# Patient Record
Sex: MALE | Race: WHITE | NOT HISPANIC OR LATINO | Employment: OTHER | URBAN - METROPOLITAN AREA
[De-identification: names, ages, dates, MRNs, and addresses within clinical notes are randomized per-mention and may not be internally consistent; named-entity substitution may affect disease eponyms.]

---

## 2018-06-26 ENCOUNTER — TELEPHONE (OUTPATIENT)
Dept: SURGERY | Facility: CLINIC | Age: 58
End: 2018-06-26

## 2018-06-30 ENCOUNTER — PREP FOR SURGERY (OUTPATIENT)
Dept: OTHER | Facility: HOSPITAL | Age: 58
End: 2018-06-30

## 2018-06-30 DIAGNOSIS — Z86.010 HISTORY OF COLON POLYPS: Primary | ICD-10-CM

## 2018-07-02 ENCOUNTER — TELEPHONE (OUTPATIENT)
Dept: GASTROENTEROLOGY | Facility: CLINIC | Age: 58
End: 2018-07-02

## 2018-07-02 PROBLEM — Z86.0100 HISTORY OF COLON POLYPS: Status: ACTIVE | Noted: 2018-07-02

## 2018-07-02 PROBLEM — Z86.010 HISTORY OF COLON POLYPS: Status: ACTIVE | Noted: 2018-07-02

## 2018-08-16 ENCOUNTER — ANESTHESIA EVENT (OUTPATIENT)
Dept: PERIOP | Facility: HOSPITAL | Age: 58
End: 2018-08-16

## 2018-08-16 RX ORDER — LISINOPRIL 20 MG/1
20 TABLET ORAL DAILY
COMMUNITY
End: 2020-06-25 | Stop reason: SDUPTHER

## 2018-08-16 RX ORDER — DULOXETIN HYDROCHLORIDE 60 MG/1
60 CAPSULE, DELAYED RELEASE ORAL DAILY
COMMUNITY
End: 2020-06-25 | Stop reason: SDUPTHER

## 2018-08-17 ENCOUNTER — HOSPITAL ENCOUNTER (OUTPATIENT)
Facility: HOSPITAL | Age: 58
Setting detail: HOSPITAL OUTPATIENT SURGERY
Discharge: HOME | End: 2018-08-17
Attending: INTERNAL MEDICINE | Admitting: NURSE ANESTHETIST, CERTIFIED REGISTERED

## 2018-08-17 ENCOUNTER — ANESTHESIA (OUTPATIENT)
Dept: PERIOP | Facility: HOSPITAL | Age: 58
End: 2018-08-17

## 2018-08-17 VITALS
TEMPERATURE: 98 F | HEIGHT: 74 IN | HEART RATE: 63 BPM | DIASTOLIC BLOOD PRESSURE: 100 MMHG | WEIGHT: 277.6 LBS | SYSTOLIC BLOOD PRESSURE: 151 MMHG | RESPIRATION RATE: 14 BRPM | OXYGEN SATURATION: 96 % | BODY MASS INDEX: 35.63 KG/M2

## 2018-08-17 DIAGNOSIS — Z86.010 HISTORY OF COLON POLYPS: ICD-10-CM

## 2018-08-17 LAB — GLUCOSE BLDC GLUCOMTR-MCNC: 136 MG/DL (ref 70–130)

## 2018-08-17 PROCEDURE — 82962 GLUCOSE BLOOD TEST: CPT

## 2018-08-17 PROCEDURE — 45385 COLONOSCOPY W/LESION REMOVAL: CPT | Performed by: INTERNAL MEDICINE

## 2018-08-17 PROCEDURE — 45380 COLONOSCOPY AND BIOPSY: CPT | Performed by: INTERNAL MEDICINE

## 2018-08-17 PROCEDURE — 93010 ELECTROCARDIOGRAM REPORT: CPT | Performed by: INTERNAL MEDICINE

## 2018-08-17 PROCEDURE — 25010000002 PROPOFOL 10 MG/ML EMULSION: Performed by: NURSE ANESTHETIST, CERTIFIED REGISTERED

## 2018-08-17 PROCEDURE — 93005 ELECTROCARDIOGRAM TRACING: CPT | Performed by: NURSE ANESTHETIST, CERTIFIED REGISTERED

## 2018-08-17 RX ORDER — SODIUM CHLORIDE 9 MG/ML
40 INJECTION, SOLUTION INTRAVENOUS AS NEEDED
Status: DISCONTINUED | OUTPATIENT
Start: 2018-08-17 | End: 2018-08-17 | Stop reason: HOSPADM

## 2018-08-17 RX ORDER — PROPOFOL 10 MG/ML
VIAL (ML) INTRAVENOUS AS NEEDED
Status: DISCONTINUED | OUTPATIENT
Start: 2018-08-17 | End: 2018-08-17 | Stop reason: SURG

## 2018-08-17 RX ORDER — SODIUM CHLORIDE, SODIUM LACTATE, POTASSIUM CHLORIDE, CALCIUM CHLORIDE 600; 310; 30; 20 MG/100ML; MG/100ML; MG/100ML; MG/100ML
9 INJECTION, SOLUTION INTRAVENOUS CONTINUOUS PRN
Status: DISCONTINUED | OUTPATIENT
Start: 2018-08-17 | End: 2018-08-17 | Stop reason: HOSPADM

## 2018-08-17 RX ORDER — LIDOCAINE HYDROCHLORIDE 10 MG/ML
0.5 INJECTION, SOLUTION EPIDURAL; INFILTRATION; INTRACAUDAL; PERINEURAL ONCE AS NEEDED
Status: COMPLETED | OUTPATIENT
Start: 2018-08-17 | End: 2018-08-17

## 2018-08-17 RX ORDER — LIDOCAINE HYDROCHLORIDE 20 MG/ML
INJECTION, SOLUTION INFILTRATION; PERINEURAL AS NEEDED
Status: DISCONTINUED | OUTPATIENT
Start: 2018-08-17 | End: 2018-08-17 | Stop reason: SURG

## 2018-08-17 RX ORDER — MAGNESIUM HYDROXIDE 1200 MG/15ML
LIQUID ORAL AS NEEDED
Status: DISCONTINUED | OUTPATIENT
Start: 2018-08-17 | End: 2018-08-17 | Stop reason: HOSPADM

## 2018-08-17 RX ORDER — SODIUM CHLORIDE 0.9 % (FLUSH) 0.9 %
1-10 SYRINGE (ML) INJECTION AS NEEDED
Status: DISCONTINUED | OUTPATIENT
Start: 2018-08-17 | End: 2018-08-17 | Stop reason: HOSPADM

## 2018-08-17 RX ADMIN — PROPOFOL 50 MG: 10 INJECTION, EMULSION INTRAVENOUS at 09:33

## 2018-08-17 RX ADMIN — PROPOFOL 40 MG: 10 INJECTION, EMULSION INTRAVENOUS at 09:37

## 2018-08-17 RX ADMIN — PROPOFOL 40 MG: 10 INJECTION, EMULSION INTRAVENOUS at 09:55

## 2018-08-17 RX ADMIN — PROPOFOL 40 MG: 10 INJECTION, EMULSION INTRAVENOUS at 10:12

## 2018-08-17 RX ADMIN — PROPOFOL 40 MG: 10 INJECTION, EMULSION INTRAVENOUS at 09:43

## 2018-08-17 RX ADMIN — PROPOFOL 40 MG: 10 INJECTION, EMULSION INTRAVENOUS at 09:39

## 2018-08-17 RX ADMIN — PROPOFOL 40 MG: 10 INJECTION, EMULSION INTRAVENOUS at 09:48

## 2018-08-17 RX ADMIN — PROPOFOL 40 MG: 10 INJECTION, EMULSION INTRAVENOUS at 10:09

## 2018-08-17 RX ADMIN — LIDOCAINE HYDROCHLORIDE 0.2 ML: 10 INJECTION, SOLUTION EPIDURAL; INFILTRATION; INTRACAUDAL; PERINEURAL at 08:35

## 2018-08-17 RX ADMIN — PROPOFOL 40 MG: 10 INJECTION, EMULSION INTRAVENOUS at 09:35

## 2018-08-17 RX ADMIN — PROPOFOL 40 MG: 10 INJECTION, EMULSION INTRAVENOUS at 10:04

## 2018-08-17 RX ADMIN — SODIUM CHLORIDE, POTASSIUM CHLORIDE, SODIUM LACTATE AND CALCIUM CHLORIDE: 600; 310; 30; 20 INJECTION, SOLUTION INTRAVENOUS at 08:44

## 2018-08-17 RX ADMIN — LIDOCAINE HYDROCHLORIDE 100 MG: 20 INJECTION, SOLUTION INFILTRATION; PERINEURAL at 09:33

## 2018-08-17 RX ADMIN — PROPOFOL 40 MG: 10 INJECTION, EMULSION INTRAVENOUS at 09:45

## 2018-08-17 RX ADMIN — PROPOFOL 40 MG: 10 INJECTION, EMULSION INTRAVENOUS at 10:01

## 2018-08-17 RX ADMIN — PROPOFOL 40 MG: 10 INJECTION, EMULSION INTRAVENOUS at 09:51

## 2018-08-17 RX ADMIN — PROPOFOL 40 MG: 10 INJECTION, EMULSION INTRAVENOUS at 09:58

## 2018-08-17 RX ADMIN — PROPOFOL 40 MG: 10 INJECTION, EMULSION INTRAVENOUS at 09:41

## 2018-08-17 RX ADMIN — SODIUM CHLORIDE, POTASSIUM CHLORIDE, SODIUM LACTATE AND CALCIUM CHLORIDE 9 ML/HR: 600; 310; 30; 20 INJECTION, SOLUTION INTRAVENOUS at 08:35

## 2018-08-17 NOTE — ANESTHESIA POSTPROCEDURE EVALUATION
Patient: Sylwia Spears    Procedure Summary     Date:  08/17/18 Room / Location:  [facility] ENDOSCOPY 1 /  [facility] OR    Anesthesia Start:  0930 Anesthesia Stop:  1023    Procedure:  COLONOSCOPY, polypectomy (N/A ) Diagnosis:       History of colon polyps      Diverticulosis      Colon polyps      (History of colon polyps [Z86.010])    Surgeon:  Raleigh Champagne MD Provider:  Padmini Arnold CRNA    Anesthesia Type:  MAC ASA Status:  2          Anesthesia Type: MAC  Last vitals  BP   148/93 (08/17/18 0823)   Temp   98.3 °F (36.8 °C) (08/17/18 0823)   Pulse   77 (08/17/18 0823)   Resp   14 (08/17/18 0823)     SpO2   95 % (08/17/18 0823)     Post Anesthesia Care and Evaluation    Patient location during evaluation: PHASE II  Patient participation: complete - patient participated  Level of consciousness: awake  Pain management: adequate  Airway patency: patent  Anesthetic complications: No anesthetic complications  PONV Status: none  Cardiovascular status: acceptable  Respiratory status: acceptable  Hydration status: acceptable

## 2018-08-17 NOTE — ANESTHESIA PREPROCEDURE EVALUATION
Anesthesia Evaluation     Patient summary reviewed and Nursing notes reviewed   no history of anesthetic complications:  NPO Solid Status: > 8 hours  NPO Liquid Status: > 8 hours           Airway   Mallampati: II  TM distance: >3 FB  Neck ROM: full  No difficulty expected  Dental    (+) lower dentures and upper dentures    Pulmonary     breath sounds clear to auscultation  (+) a smoker (1PPD) Current Abstained day of surgery,   Cardiovascular   Exercise tolerance: good (4-7 METS)    Rhythm: regular  Rate: normal    (+) hypertension well controlled less than 2 medications,       Neuro/Psych  (+) numbness (BRUCE FINGERS NOT CONSTANT, FROM PREVIOIUS ELECTRICUTION ),     GI/Hepatic/Renal/Endo    (+) obesity,   diabetes mellitus type 2 well controlled,     Musculoskeletal     (+) back pain, chronic pain, neck pain,   Abdominal   (+) obese,    Substance History   (+) drug use (MJ 1-2X PER WEEK)     OB/GYN          Other   (+) arthritis                     Anesthesia Plan    ASA 2     MAC     intravenous induction   Anesthetic plan and risks discussed with patient.  Use of blood products discussed with patient  Consented to blood products.

## 2018-08-17 NOTE — OP NOTE
COLONOSCOPY  Procedure Report    Patient Name:  Sylwia Spears  YOB: 1960    Date of Surgery:  8/17/2018     Indications:  Previous Polyps    Pre-op Diagnosis:   History of colon polyps [Z86.010]    Post-Op Diagnosis Codes:     * History of colon polyps [Z86.010]     * Diverticulosis [K57.90]     * Colon polyps [K63.5]         Procedure/CPT® Codes:      Procedure(s):  COLONOSCOPY, polypectomy    Staff:  Surgeon(s):  Raleigh Champagne MD         Anesthesia: Monitor Anesthesia Care    Estimated Blood Loss: none    Specimens:   ID Type Source Tests Collected by Time   A : Sigmoid polyp x 8- cold snare, forcep, hot snare Polyp Large Intestine, Sigmoid Colon TISSUE PATHOLOGY EXAM Raleigh Champagne MD 8/17/2018 0941   B : Ascending polyp x 3- cold snare, forcep Polyp Large Intestine, Right / Ascending Colon TISSUE PATHOLOGY EXAM Raleigh Champagne MD 8/17/2018 0956   C : Transverse polyp x 3- hot snare, cold snare Polyp Large Intestine, Transverse Colon TISSUE PATHOLOGY EXAM Raleigh Champagne MD 8/17/2018 0957   D : Rectal polyp x 1 Polyp Large Intestine, Rectum TISSUE PATHOLOGY EXAM Raleigh Champagne MD 8/17/2018 1020       Implants:    Nothing was implanted during the procedure      Description of Procedure: After having signed informed consent, he was brought to the endoscopy suite and placed in the left lateral decubitus position and given his IV sedation. Rectal exam revealed no external lesions, normal anal tone, mildly enlarged prostate without nodules. Scope was introduced into rectum and advanced under direct visualization into the rectum and through the sigmoid colon, past a large polyp at 40 cm. There was a smaller polyp that was biopsied to be sent as a sigmoid colon polyp. A 2nd small polyp was noted in the sigmoid that was sent, as well. The scope was advanced through the descending colon, to and around the splenic flexure, reduced, advanced  through the transverse colon with some looping, to and around the hepatic flexure, around the hepatic flexure. Scope was reduced in the ascending colon, then advanced using abdominal splinting into the cecum. Cecum was identified by appendiceal orifice and ileocecal valve. The cecum was normal appearing. The ileocecal valve was identified but not intubated. Scope was drawn back into the ascending colon. Within the ascending colon, there were 3 polyps noted. They ranged in size from 5-8 mm. They were removed with cold biopsy forceps and 1 cold snare. They were sent together as ascending colon polyps x3. Scope was withdrawn around the hepatic flexure, into the transverse colon. There was a larger polyp noted in the transverse colon that was removed with hot snare. Two polyps in the area were smaller and removed with cold snare technique. These were sent together as transverse colon polyps x3. Scope was withdrawn around the splenic flexure, through the descending colon and into the sigmoid colon. Multiple other polyps were noted in the sigmoid colon. They were removed with cold snare technique. The larger polyp that was identified on the way in was removed. It was greater than 1 cm and removed with hot snare technique. Eight total polyps were removed from the sigmoid colon. Scope was withdrawn back into the rectum. There was a single diminutive polyp noted in the rectum that was biopsied, felt to be completely removed and sent separately as rectal polyp. Scope was retroflexed revealing intact dentate line and no further mucosal lesions. Scope was de-retroflexed and withdrawn. Patient tolerated procedure very well.          Findings:Colon to Cecum good Prep  Polyps (15) Hot Snare x 2, Cold Snare,Biopsy    Complications: None    Recommendations: No ASA,NSAIDS       Raleigh Champagne MD     Date: 8/17/2018  Time: 10:27 AM

## 2018-08-17 NOTE — H&P
"Patient Care Team:  Bronwyn Chiang MD as PCP - General (Family Medicine)    CHIEF COMPLAINT: Previous polyps    HISTORY OF PRESENT ILLNESS:    Last exam >3 years       Past Medical History:   Diagnosis Date   • Diabetes mellitus (CMS/HCC)    • Hypertension      Past Surgical History:   Procedure Laterality Date   • CHOLECYSTECTOMY     • COLONOSCOPY     • COLONOSCOPY W/ POLYPECTOMY     • SHOULDER SURGERY Left      History reviewed. No pertinent family history.  Social History   Substance Use Topics   • Smoking status: Current Every Day Smoker     Packs/day: 1.00   • Smokeless tobacco: Never Used   • Alcohol use No     Prescriptions Prior to Admission   Medication Sig Dispense Refill Last Dose   • DULoxetine (CYMBALTA) 60 MG capsule Take 60 mg by mouth Daily.   8/16/2018 at 0800   • lisinopril (PRINIVIL,ZESTRIL) 20 MG tablet Take 20 mg by mouth Daily.   8/16/2018 at 0800   • MetFORMIN HCl (GLUCOPHAGE PO) Take 1 tablet by mouth Daily.   8/16/2018 at 0800     Allergies:  Patient has no known allergies.    REVIEW OF SYSTEMS:  Please see the above history of present illness for pertinent positives and negatives.  The remainder of the patient's systems have been reviewed and are negative.     Vital Signs  Temp:  [98.3 °F (36.8 °C)] 98.3 °F (36.8 °C)  Heart Rate:  [77] 77  Resp:  [14] 14  BP: (148)/(93) 148/93    Flowsheet Rows      First Filed Value   Admission Height  188 cm (74\") Documented at 08/16/2018 1352   Admission Weight  125 kg (275 lb) Documented at 08/16/2018 1352           Physical Exam:  Physical Exam   Constitutional: Patient appears well-developed and well-nourished and in no acute distress   HEENT:   Head: Normocephalic and atraumatic.   Eyes:  Pupils are equal, round, and reactive to light. EOM are intact. Sclera are anicteric and non-injected.  Mouth and Throat: Patient has moist mucous membranes. Oropharynx is clear of any erythema or exudate.     Neck: Neck supple. No JVD present. No thyromegaly " present. No lymphadenopathy present.  Cardiovascular: Regular rate, regular rhythm, S1 normal and S2 normal.  Exam reveals no gallop and no friction rub.  No murmur heard.  Pulmonary/Chest: Lungs are clear to auscultation bilaterally. No respiratory distress. No wheezes. No rhonchi. No rales.   Abdominal: Soft. Bowel sounds are normal. No distension and no mass. There is no hepatosplenomegaly. There is no tenderness.   Musculoskeletal: Normal Muscle tone  Extremities: No edema. Pulses are palpable in all 4 extremities.  Neurological: Patient is alert and oriented to person, place, and time. Cranial nerves II-XII are grossly intact with no focal deficits.  Skin: Skin is warm. No rash noted. Nails show no clubbing.  No cyanosis or erythema.     Results Review:    I reviewed the patient's new clinical results.  Lab Results (most recent)     Procedure Component Value Units Date/Time    POC Glucose Once [[id number]]  (Abnormal) Collected:  08/17/18 0836    Specimen:  Blood Updated:  08/17/18 0841     Glucose 136 (H) mg/dL     Narrative:       Meter: [id number] : [id number] Shalini SUE RN          Imaging Results (most recent)     None        reviewed    ECG/EMG Results (most recent)     Procedure Component Value Units Date/Time    ECG 12 Lead [[id number]] Collected:  08/17/18 0841     Updated:  08/17/18 0855    Narrative:       RR Interval= 833 ms  RI Interval= 184 ms  QRSD Interval= 98 ms  QT Interval= 388 ms  QTc Interval= 425 ms  Heart Rate= 72 ms  P Axis= 31 deg  QRS Axis= 50 deg  T Wave Axis= 8 deg  I: 40 Axis= 31 deg  T: 40 Axis= 74 deg  ST Axis= 11 deg  SINUS RHYTHM  NO SIGNIFICANT CHANGE FROM PREVIOUS ECG  Electronically Signed by:  Scarlet Oglesby ([facility]) 17-Aug-2018 08:53:02  Date and Time of Study: 2018-08-17 08:41:59        reviewed    Assessment/Plan     Personal history of colon polyps/ Colonoscopy    I discussed the patients findings and my recommendations with patient.     Raleigh Mcbride  MD Ihsan  08/17/18  9:35 AM    Time: 10 min prior to procedure.

## 2018-08-17 NOTE — BRIEF OP NOTE
COLONOSCOPY  Progress Note    Sylwia Spears  8/17/2018    Pre-op Diagnosis:   History of colon polyps [Z86.010]       Post-Op Diagnosis Codes:     * History of colon polyps [Z86.010]     * Diverticulosis [K57.90]     * Colon polyps [K63.5]    Procedure/CPT® Codes:      Procedure(s):  COLONOSCOPY, polypectomy    Surgeon(s):  Raleigh Champagne MD    Anesthesia: Monitor Anesthesia Care    Staff:   Circulator: Dayna Rolon RN  Scrub Person: Pepper Chaves    Estimated Blood Loss: none    Urine Voided: * No values recorded between 8/17/2018  9:29 AM and 8/17/2018 10:20 AM *    Specimens:                ID Type Source Tests Collected by Time   A : Sigmoid polyp x 8- cold snare, forcep, hot snare Polyp Large Intestine, Sigmoid Colon TISSUE PATHOLOGY EXAM Raleigh Champagne MD 8/17/2018 0941   B : Ascending polyp x 3- cold snare, forcep Polyp Large Intestine, Right / Ascending Colon TISSUE PATHOLOGY EXAM Raleigh Champagne MD 8/17/2018 0956   C : Transverse polyp x 3- hot snare, cold snare Polyp Large Intestine, Transverse Colon TISSUE PATHOLOGY EXAM Raleigh Champagne MD 8/17/2018 0957   D : Rectal polyp x 1 Polyp Large Intestine, Rectum TISSUE PATHOLOGY EXAM Raleigh Champagne MD 8/17/2018 1020         Drains:      Findings: Colon to Cecum good Prep  Polyps (15) Hot Snare x 2, Cold Snare,Biopsy    Complications: None      Raleigh Champagne MD     Date: 8/17/2018  Time: 10:23 AM

## 2018-08-25 LAB
LAB AP CASE REPORT: NORMAL
LAB AP CLINICAL INFORMATION: NORMAL
PATH REPORT.FINAL DX SPEC: NORMAL

## 2018-11-01 ENCOUNTER — APPOINTMENT (OUTPATIENT)
Dept: LAB | Facility: HOSPITAL | Age: 58
End: 2018-11-01
Attending: INTERNAL MEDICINE

## 2018-11-01 ENCOUNTER — OFFICE VISIT (OUTPATIENT)
Dept: GASTROENTEROLOGY | Facility: CLINIC | Age: 58
End: 2018-11-01

## 2018-11-01 VITALS
WEIGHT: 277.4 LBS | BODY MASS INDEX: 35.6 KG/M2 | HEIGHT: 74 IN | DIASTOLIC BLOOD PRESSURE: 90 MMHG | SYSTOLIC BLOOD PRESSURE: 148 MMHG

## 2018-11-01 DIAGNOSIS — K62.5 RECTAL BLEEDING: Primary | ICD-10-CM

## 2018-11-01 LAB
ALBUMIN SERPL-MCNC: 5 G/DL (ref 3.5–5.2)
ALBUMIN/GLOB SERPL: 1.6 G/DL
ALP SERPL-CCNC: 53 U/L (ref 40–129)
ALT SERPL W P-5'-P-CCNC: 71 U/L (ref 5–41)
ANION GAP SERPL CALCULATED.3IONS-SCNC: 13.2 MMOL/L
AST SERPL-CCNC: 48 U/L (ref 5–40)
BASOPHILS # BLD AUTO: 0.05 10*3/MM3 (ref 0–0.2)
BASOPHILS NFR BLD AUTO: 0.5 % (ref 0–2)
BILIRUB SERPL-MCNC: 0.6 MG/DL (ref 0.2–1.2)
BUN BLD-MCNC: 11 MG/DL (ref 6–20)
BUN/CREAT SERPL: 10.3 (ref 7–25)
CALCIUM SPEC-SCNC: 9.9 MG/DL (ref 8.6–10.5)
CHLORIDE SERPL-SCNC: 101 MMOL/L (ref 98–107)
CO2 SERPL-SCNC: 25.8 MMOL/L (ref 22–29)
CREAT BLD-MCNC: 1.07 MG/DL (ref 0.76–1.27)
DEPRECATED RDW RBC AUTO: 43.6 FL (ref 37–54)
EOSINOPHIL # BLD AUTO: 0.22 10*3/MM3 (ref 0.1–0.3)
EOSINOPHIL NFR BLD AUTO: 2.2 % (ref 0–4)
ERYTHROCYTE [DISTWIDTH] IN BLOOD BY AUTOMATED COUNT: 13.2 % (ref 11.5–14.5)
GFR SERPL CREATININE-BSD FRML MDRD: 71 ML/MIN/1.73
GLOBULIN UR ELPH-MCNC: 3.2 GM/DL
GLUCOSE BLD-MCNC: 111 MG/DL (ref 65–99)
HCT VFR BLD AUTO: 48.9 % (ref 42–52)
HGB BLD-MCNC: 16.6 G/DL (ref 14–18)
IMM GRANULOCYTES # BLD: 0.02 10*3/MM3 (ref 0–0.03)
IMM GRANULOCYTES NFR BLD: 0.2 % (ref 0–0.5)
LYMPHOCYTES # BLD AUTO: 3.79 10*3/MM3 (ref 0.6–4.8)
LYMPHOCYTES NFR BLD AUTO: 37 % (ref 20–45)
MCH RBC QN AUTO: 30.6 PG (ref 27–31)
MCHC RBC AUTO-ENTMCNC: 33.9 G/DL (ref 31–37)
MCV RBC AUTO: 90.1 FL (ref 80–94)
MONOCYTES # BLD AUTO: 0.66 10*3/MM3 (ref 0–1)
MONOCYTES NFR BLD AUTO: 6.5 % (ref 3–8)
NEUTROPHILS # BLD AUTO: 5.49 10*3/MM3 (ref 1.5–8.3)
NEUTROPHILS NFR BLD AUTO: 53.6 % (ref 45–70)
NRBC BLD MANUAL-RTO: 0 /100 WBC (ref 0–0)
PLATELET # BLD AUTO: 184 10*3/MM3 (ref 140–500)
PMV BLD AUTO: 10.8 FL (ref 7.4–10.4)
POTASSIUM BLD-SCNC: 5 MMOL/L (ref 3.5–5.2)
PROT SERPL-MCNC: 8.2 G/DL (ref 6–8.5)
RBC # BLD AUTO: 5.43 10*6/MM3 (ref 4.7–6.1)
SODIUM BLD-SCNC: 140 MMOL/L (ref 136–145)
WBC NRBC COR # BLD: 10.23 10*3/MM3 (ref 4.8–10.8)

## 2018-11-01 PROCEDURE — 85025 COMPLETE CBC W/AUTO DIFF WBC: CPT | Performed by: INTERNAL MEDICINE

## 2018-11-01 PROCEDURE — 36415 COLL VENOUS BLD VENIPUNCTURE: CPT | Performed by: INTERNAL MEDICINE

## 2018-11-01 PROCEDURE — 80053 COMPREHEN METABOLIC PANEL: CPT | Performed by: INTERNAL MEDICINE

## 2018-11-01 PROCEDURE — 99213 OFFICE O/P EST LOW 20 MIN: CPT | Performed by: INTERNAL MEDICINE

## 2018-11-01 RX ORDER — ZOLPIDEM TARTRATE 10 MG/1
TABLET ORAL
COMMUNITY
Start: 2018-10-18 | End: 2020-06-25

## 2018-11-01 NOTE — PROGRESS NOTES
PATIENT INFORMATION  Sylwia Spears       - 1960    CHIEF COMPLAINT  Chief Complaint   Patient presents with   • Abdominal Pain   • Rectal Bleeding   • Diarrhea       HISTORY OF PRESENT ILLNESS  Baseline BMs but thatcan be up to 3-6 a day and last week had bleeding with every BM for 3 days, Did have significant cramping and bloody incontinence    Says he is chronically tender in Right abd and no previous diverticulosis. Multiple polyps last colon 2018      Abdominal Pain   Associated symptoms include diarrhea and hematochezia.   Rectal Bleeding   Associated symptoms include abdominal pain.   Diarrhea    Associated symptoms include abdominal pain.           REVIEW OF SYSTEMS  Review of Systems   Gastrointestinal: Positive for abdominal pain, anal bleeding, diarrhea, hematochezia and rectal pain.   All other systems reviewed and are negative.        ACTIVE PROBLEMS  Patient Active Problem List    Diagnosis   • History of colon polyps [Z86.010]         PAST MEDICAL HISTORY  Past Medical History:   Diagnosis Date   • Colon polyp    • Diabetes mellitus (CMS/HCC)    • Hypertension          SURGICAL HISTORY  Past Surgical History:   Procedure Laterality Date   • CHOLECYSTECTOMY     • COLONOSCOPY     • COLONOSCOPY N/A 2018    Procedure: COLONOSCOPY, polypectomy;  Surgeon: Raleigh Champagne MD;  Location: [facility];  Service: Gastroenterology   • COLONOSCOPY W/ POLYPECTOMY     • SHOULDER SURGERY Left          FAMILY HISTORY  Family History   Problem Relation Age of Onset   • Colon cancer Mother    • Crohn's disease Brother          SOCIAL HISTORY  Social History     Occupational History   • Not on file.     Social History Main Topics   • Smoking status: Current Every Day Smoker     Packs/day: 1.00   • Smokeless tobacco: Never Used   • Alcohol use No   • Drug use: Yes     Frequency: 2.0 times per week     Types: Marijuana   • Sexual activity: Defer       Debilities/Disabilities Identified:  "None    Emotional Behavior: Appropriate    CURRENT MEDICATIONS    Current Outpatient Prescriptions:   •  DULoxetine (CYMBALTA) 60 MG capsule, Take 60 mg by mouth Daily., Disp: , Rfl:   •  lisinopril (PRINIVIL,ZESTRIL) 20 MG tablet, Take 20 mg by mouth Daily., Disp: , Rfl:   •  metFORMIN (GLUCOPHAGE) 1000 MG tablet, , Disp: , Rfl:   •  MetFORMIN HCl (GLUCOPHAGE PO), Take 1 tablet by mouth Daily., Disp: , Rfl:   •  zolpidem (AMBIEN) 10 MG tablet, , Disp: , Rfl:     ALLERGIES  Patient has no known allergies.    VITALS  Vitals:    11/01/18 1304   BP: 148/90   Weight: 126 kg (277 lb 6.4 oz)   Height: 188 cm (74.02\")       LAST RESULTS   Admission on 08/17/2018, Discharged on 08/17/2018   Component Date Value Ref Range Status   • Glucose 08/17/2018 136* 70 - 130 mg/dL Final   • Case Report 08/17/2018    Final                    Value:Surgical Pathology Report                         Case: [id number]                                  Authorizing Provider:  Raleigh Champagne        Collected:           08/17/2018 09:41 AM                                 MD Rae                                                                   Ordering Location:     [facility]   Received:            08/17/2018 12:53 PM                                 OR                                                                           Pathologist:           Gilbert Carson MD                                                     Specimens:   1) - Large Intestine, Sigmoid Colon, Sigmoid polyp x 8- cold snare, forcep, hot snare               2) - Large Intestine, Right / Ascending Colon, Ascending polyp x 3- cold snare,                     forcep                                                                                              3) - Large Intestine, Transverse Colon, Transverse polyp x 3- hot snare, cold snare                                           4) - Large Intestine, Rectum, Rectal polyp x 1                         "                    • Clinical Information 08/17/2018    Final                    Value:This result contains rich text formatting which cannot be displayed here.   • Final Diagnosis 08/17/2018    Final                    Value:This result contains rich text formatting which cannot be displayed here.     No results found.    PHYSICAL EXAM  Physical Exam   Constitutional: He is oriented to person, place, and time. He appears well-developed and well-nourished.   HENT:   Head: Normocephalic and atraumatic.   Eyes: Pupils are equal, round, and reactive to light. Conjunctivae and EOM are normal. No scleral icterus.   Neck: Normal range of motion. Neck supple. No thyromegaly present.   Cardiovascular: Normal rate, regular rhythm, normal heart sounds and intact distal pulses.  Exam reveals no gallop.    No murmur heard.  Pulmonary/Chest: Effort normal and breath sounds normal. He has no wheezes. He has no rales.   Abdominal: Soft. Bowel sounds are normal. He exhibits no shifting dullness, no distension, no fluid wave, no abdominal bruit, no ascites and no mass. There is no hepatosplenomegaly. There is tenderness in the right upper quadrant, right lower quadrant and periumbilical area. There is no guarding and negative Hodge's sign. Hernia confirmed negative in the ventral area.   Musculoskeletal: Normal range of motion. He exhibits no edema.   Lymphadenopathy:     He has no cervical adenopathy.   Neurological: He is alert and oriented to person, place, and time.   Skin: Skin is warm and dry. No rash noted. He is not diaphoretic. No erythema.   Psychiatric: He has a normal mood and affect. His behavior is normal.       ASSESSMENT  Diagnoses and all orders for this visit:    Rectal bleeding  -     CBC & Differential  -     Comprehensive Metabolic Panel    Other orders  -     zolpidem (AMBIEN) 10 MG tablet;   -     metFORMIN (GLUCOPHAGE) 1000 MG tablet;           PLAN  Return in about 6 weeks (around  12/13/2018).

## 2019-01-07 ENCOUNTER — TRANSCRIBE ORDERS (OUTPATIENT)
Dept: ADMINISTRATIVE | Facility: HOSPITAL | Age: 59
End: 2019-01-07

## 2019-01-07 DIAGNOSIS — R94.31 ABNORMAL ELECTROCARDIOGRAM: Primary | ICD-10-CM

## 2019-01-11 ENCOUNTER — HOSPITAL ENCOUNTER (OUTPATIENT)
Dept: CARDIOLOGY | Facility: HOSPITAL | Age: 59
Discharge: HOME | End: 2019-01-11
Admitting: NURSE PRACTITIONER

## 2019-01-11 DIAGNOSIS — R94.31 ABNORMAL ELECTROCARDIOGRAM: ICD-10-CM

## 2019-01-11 LAB
BH CV STRESS BP STAGE 1: NORMAL
BH CV STRESS BP STAGE 2: NORMAL
BH CV STRESS BP STAGE 3: NORMAL
BH CV STRESS DURATION MIN STAGE 1: 3
BH CV STRESS DURATION MIN STAGE 2: 3
BH CV STRESS DURATION MIN STAGE 3: 0
BH CV STRESS DURATION SEC STAGE 1: 0
BH CV STRESS DURATION SEC STAGE 2: 0
BH CV STRESS DURATION SEC STAGE 3: 54
BH CV STRESS GRADE STAGE 1: 10
BH CV STRESS GRADE STAGE 2: 12
BH CV STRESS GRADE STAGE 3: 14
BH CV STRESS HR STAGE 1: 107
BH CV STRESS HR STAGE 2: 128
BH CV STRESS HR STAGE 3: 128
BH CV STRESS METS STAGE 1: 5
BH CV STRESS METS STAGE 2: 7.5
BH CV STRESS METS STAGE 3: 10
BH CV STRESS PROTOCOL 1: NORMAL
BH CV STRESS RECOVERY BP: NORMAL MMHG
BH CV STRESS RECOVERY HR: 88 BPM
BH CV STRESS SPEED STAGE 1: 1.7
BH CV STRESS SPEED STAGE 2: 2.5
BH CV STRESS SPEED STAGE 3: 3.4
BH CV STRESS STAGE 1: 1
BH CV STRESS STAGE 2: 2
BH CV STRESS STAGE 3: 3
MAXIMAL PREDICTED HEART RATE: 163 BPM
PERCENT MAX PREDICTED HR: 84.66 %
STRESS BASELINE BP: NORMAL MMHG
STRESS BASELINE HR: 73 BPM
STRESS O2 SAT REST: 95 %
STRESS PERCENT HR: 100 %
STRESS POST ESTIMATED WORKLOAD: 8.4 METS
STRESS POST EXERCISE DUR MIN: 6 MIN
STRESS POST EXERCISE DUR SEC: 55 SEC
STRESS POST O2 SAT PEAK: 95 %
STRESS POST PEAK BP: NORMAL MMHG
STRESS POST PEAK HR: 138 BPM
STRESS TARGET HR: 139 BPM

## 2019-01-11 PROCEDURE — 93017 CV STRESS TEST TRACING ONLY: CPT

## 2019-01-11 PROCEDURE — 93016 CV STRESS TEST SUPVJ ONLY: CPT | Performed by: INTERNAL MEDICINE

## 2019-01-11 PROCEDURE — 93018 CV STRESS TEST I&R ONLY: CPT | Performed by: INTERNAL MEDICINE

## 2020-06-25 ENCOUNTER — TELEPHONE (OUTPATIENT)
Dept: INTERNAL MEDICINE | Facility: CLINIC | Age: 60
End: 2020-06-25

## 2020-06-25 ENCOUNTER — OFFICE VISIT (OUTPATIENT)
Dept: INTERNAL MEDICINE | Facility: CLINIC | Age: 60
End: 2020-06-25

## 2020-06-25 VITALS
SYSTOLIC BLOOD PRESSURE: 138 MMHG | WEIGHT: 291 LBS | HEIGHT: 74 IN | TEMPERATURE: 98 F | DIASTOLIC BLOOD PRESSURE: 86 MMHG | RESPIRATION RATE: 16 BRPM | HEART RATE: 80 BPM | OXYGEN SATURATION: 96 % | BODY MASS INDEX: 37.35 KG/M2

## 2020-06-25 DIAGNOSIS — I10 BENIGN ESSENTIAL HTN: ICD-10-CM

## 2020-06-25 DIAGNOSIS — Z11.59 NEED FOR HEPATITIS C SCREENING TEST: ICD-10-CM

## 2020-06-25 DIAGNOSIS — E11.9 TYPE 2 DIABETES MELLITUS WITHOUT COMPLICATION, WITHOUT LONG-TERM CURRENT USE OF INSULIN (HCC): Primary | ICD-10-CM

## 2020-06-25 DIAGNOSIS — E66.01 MORBIDLY OBESE (HCC): ICD-10-CM

## 2020-06-25 DIAGNOSIS — Z12.5 SCREENING FOR PROSTATE CANCER: ICD-10-CM

## 2020-06-25 DIAGNOSIS — Z86.010 HISTORY OF COLON POLYPS: ICD-10-CM

## 2020-06-25 PROCEDURE — 99204 OFFICE O/P NEW MOD 45 MIN: CPT | Performed by: NURSE PRACTITIONER

## 2020-06-25 PROCEDURE — 99406 BEHAV CHNG SMOKING 3-10 MIN: CPT | Performed by: NURSE PRACTITIONER

## 2020-06-25 RX ORDER — LISINOPRIL 20 MG/1
20 TABLET ORAL DAILY
Qty: 90 TABLET | Refills: 3 | Status: SHIPPED | OUTPATIENT
Start: 2020-06-25 | End: 2021-06-12

## 2020-06-25 RX ORDER — DULOXETIN HYDROCHLORIDE 60 MG/1
60 CAPSULE, DELAYED RELEASE ORAL DAILY
Qty: 90 CAPSULE | Refills: 1 | Status: SHIPPED | OUTPATIENT
Start: 2020-06-25 | End: 2020-12-16

## 2020-06-25 RX ORDER — DULOXETIN HYDROCHLORIDE 60 MG/1
60 CAPSULE, DELAYED RELEASE ORAL DAILY
Qty: 30 CAPSULE | Refills: 3 | Status: CANCELLED | OUTPATIENT
Start: 2020-06-25

## 2020-06-25 NOTE — PATIENT INSTRUCTIONS
"Carbohydrate Counting for Diabetes Mellitus, Adult    Carbohydrate counting is a method of keeping track of how many carbohydrates you eat. Eating carbohydrates naturally increases the amount of sugar (glucose) in the blood. Counting how many carbohydrates you eat helps keep your blood glucose within normal limits, which helps you manage your diabetes (diabetes mellitus).  It is important to know how many carbohydrates you can safely have in each meal. This is different for every person. A diet and nutrition specialist (registered dietitian) can help you make a meal plan and calculate how many carbohydrates you should have at each meal and snack.  Carbohydrates are found in the following foods:  · Grains, such as breads and cereals.  · Dried beans and soy products.  · Starchy vegetables, such as potatoes, peas, and corn.  · Fruit and fruit juices.  · Milk and yogurt.  · Sweets and snack foods, such as cake, cookies, candy, chips, and soft drinks.  How do I count carbohydrates?  There are two ways to count carbohydrates in food. You can use either of the methods or a combination of both.  Reading \"Nutrition Facts\" on packaged food  The \"Nutrition Facts\" list is included on the labels of almost all packaged foods and beverages in the [place] It includes:  · The serving size.  · Information about nutrients in each serving, including the grams (g) of carbohydrate per serving.  To use the “Nutrition Facts\":  · Decide how many servings you will have.  · Multiply the number of servings by the number of carbohydrates per serving.  · The resulting number is the total amount of carbohydrates that you will be having.  Learning standard serving sizes of other foods  When you eat carbohydrate foods that are not packaged or do not include \"Nutrition Facts\" on the label, you need to measure the servings in order to count the amount of carbohydrates:  · Measure the foods that you will eat with a food scale or measuring cup, if " needed.  · Decide how many standard-size servings you will eat.  · Multiply the number of servings by 15. Most carbohydrate-rich foods have about 15 g of carbohydrates per serving.  ? For example, if you eat 8 oz (170 g) of strawberries, you will have eaten 2 servings and 30 g of carbohydrates (2 servings x 15 g = 30 g).  · For foods that have more than one food mixed, such as soups and casseroles, you must count the carbohydrates in each food that is included.  The following list contains standard serving sizes of common carbohydrate-rich foods. Each of these servings has about 15 g of carbohydrates:  · ½ hamburger bun or ½ English muffin.  · ½ oz (15 mL) syrup.  · ½ oz (14 g) jelly.  · 1 slice of bread.  · 1 six-inch tortilla.  · 3 oz (85 g) cooked rice or pasta.  · 4 oz (113 g) cooked dried beans.  · 4 oz (113 g) starchy vegetable, such as peas, corn, or potatoes.  · 4 oz (113 g) hot cereal.  · 4 oz (113 g) mashed potatoes or ¼ of a large baked potato.  · 4 oz (113 g) canned or frozen fruit.  · 4 oz (120 mL) fruit juice.  · 4-6 crackers.  · 6 chicken nuggets.  · 6 oz (170 g) unsweetened dry cereal.  · 6 oz (170 g) plain fat-free yogurt or yogurt sweetened with artificial sweeteners.  · 8 oz (240 mL) milk.  · 8 oz (170 g) fresh fruit or one small piece of fruit.  · 24 oz (680 g) popped popcorn.  Example of carbohydrate counting  Sample meal  · 3 oz (85 g) chicken breast.  · 6 oz (170 g) brown rice.  · 4 oz (113 g) corn.  · 8 oz (240 mL) milk.  · 8 oz (170 g) strawberries with sugar-free whipped topping.  Carbohydrate calculation  1. Identify the foods that contain carbohydrates:  ? Rice.  ? Corn.  ? Milk.  ? Strawberries.  2. Calculate how many servings you have of each food:  ? 2 servings rice.  ? 1 serving corn.  ? 1 serving milk.  ? 1 serving strawberries.  3. Multiply each number of servings by 15 g:  ? 2 servings rice x 15 g = 30 g.  ? 1 serving corn x 15 g = 15 g.  ? 1 serving milk x 15 g = 15 g.  ? 1  serving strawberries x 15 g = 15 g.  4. Add together all of the amounts to find the total grams of carbohydrates eaten:  ? 30 g + 15 g + 15 g + 15 g = 75 g of carbohydrates total.  Summary  · Carbohydrate counting is a method of keeping track of how many carbohydrates you eat.  · Eating carbohydrates naturally increases the amount of sugar (glucose) in the blood.  · Counting how many carbohydrates you eat helps keep your blood glucose within normal limits, which helps you manage your diabetes.  · A diet and nutrition specialist (registered dietitian) can help you make a meal plan and calculate how many carbohydrates you should have at each meal and snack.  This information is not intended to replace advice given to you by your health care provider. Make sure you discuss any questions you have with your health care provider.  Document Released: 12/18/2006 Document Revised: 07/12/2018 Document Reviewed: 05/31/2017  Elsevier Patient Education © 2020 [employer]

## 2020-06-25 NOTE — TELEPHONE ENCOUNTER
MELIZA CALLED IN AND STATED PATIENT NEEDS A REFILL OF metFORMIN (GLUCOPHAGE) 1000 MG tablet  AND DULoxetine (CYMBALTA) 60 MG capsule SENT TO [facility] [address] [place], [place] [address], [place] - [address] [facility] [address] - [phone]  - [phone]   [phone] PATIENT CALL BACK [phone]

## 2020-06-25 NOTE — PROGRESS NOTES
"Establish care for DM 2, HTN, colon polyps    Subjective     Sylwia Spears is a 60 y.o. male being seen for a new patient appointment for DM 2, HTN, colon polyps, and chrinc neck pain. He has been a Type 2 diabetic for 10 years. He  Is on Glucophage 1000mg once daily. He reports that \" if I take it more, I get diarrhea\". He does not have a glucose meter at home. He norman shave urinary frequency but no blurred vision, dry mouth. He tatum numbness in feet, but does report intermittent tingling.     He has HTN. He takes lisinopril 20mg once daily. He denies CP, SA, edema. He does not check his BP at home.     He had an electrocution in 2012, where he fell off the ladder. He has chronic neck pain from cervical spinal stenosis for which he takes Duloxetine. He uses marijuana daily as well. Daily pain 4 of 10 with tingling in left arm    He is followed by Dr. Champagne (GI) for history of colon polyps. He had a colonoscopy 8-.    History of Present Illness     No Known Allergies      Current Outpatient Medications:   •  DULoxetine (CYMBALTA) 60 MG capsule, Take 60 mg by mouth Daily., Disp: , Rfl:   •  lisinopril (PRINIVIL,ZESTRIL) 20 MG tablet, Take 20 mg by mouth Daily., Disp: , Rfl:   •  metFORMIN (GLUCOPHAGE) 1000 MG tablet, , Disp: , Rfl:   •  MetFORMIN HCl (GLUCOPHAGE PO), Take 1 tablet by mouth Daily., Disp: , Rfl:   •  zolpidem (AMBIEN) 10 MG tablet, , Disp: , Rfl:     The following portions of the patient's history were reviewed and updated as appropriate: allergies, current medications, past family history, past medical history, past social history, past surgical history and problem list.    Review of Systems   Constitutional: Negative.  Negative for activity change, appetite change, fever and unexpected weight change.   HENT: Negative.  Negative for congestion and dental problem.    Eyes: Negative.    Respiratory: Negative.  Negative for shortness of breath, wheezing and stridor.    Cardiovascular: " Negative.  Negative for chest pain, palpitations and leg swelling.   Gastrointestinal: Negative.    Endocrine: Negative.  Negative for polyphagia and polyuria.   Genitourinary: Positive for frequency. Negative for decreased urine volume and urgency.   Musculoskeletal: Positive for arthralgias, back pain and neck pain.   Allergic/Immunologic: Negative.  Negative for environmental allergies, food allergies and immunocompromised state.   Neurological: Negative for weakness.   Hematological: Negative.  Negative for adenopathy. Does not bruise/bleed easily.   Psychiatric/Behavioral: Negative.  Negative for agitation and behavioral problems.   All other systems reviewed and are negative.      Assessment     Physical Exam   Constitutional: He is oriented to person, place, and time. He appears well-developed and well-nourished. No distress.   HENT:   Head: Normocephalic.   Right Ear: External ear normal.   Left Ear: External ear normal.   Mouth/Throat: Oropharynx is clear and moist.   Neck: Neck supple.   Cardiovascular: Normal rate, regular rhythm and normal heart sounds.   No murmur heard.  Pulmonary/Chest: Effort normal. No stridor. No respiratory distress. He has decreased breath sounds in the right lower field and the left lower field.   Musculoskeletal: He exhibits no edema.   Neurological: He is alert and oriented to person, place, and time.   Skin: Skin is warm and dry. He is not diaphoretic.   Psychiatric: He has a normal mood and affect. His behavior is normal.   Vitals reviewed.      Plan     His fasting labs were reviewed with the patient from last week.     Sylwia was seen today for establish care, med refill, diabetes and shoulder pain.    Diagnoses and all orders for this visit:    Type 2 diabetes mellitus without complication, without long-term current use of insulin (CMS/[facility])  -     Comprehensive metabolic panel  -     Lipid panel  -     Hemoglobin A1c  -     CBC & Differential  -     MicroAlbumin, Urine,  "Random - Urine, Clean Catch  -     Blood Gluc Meter Disp-Strips device; 1 each 2 (Two) Times a Day.  -     Testosterone (Free & Total), LC / MS    Benign essential HTN  -     Comprehensive metabolic panel  -     Lipid panel  -     CBC & Differential  -     Testosterone (Free & Total), LC / MS  -     lisinopril (PRINIVIL,ZESTRIL) 20 MG tablet; Take 1 tablet by mouth Daily.    History of colon polyps  -     CBC & Differential    Morbidly obese (CMS/HCC)  -     Testosterone (Free & Total), LC / MS    Need for hepatitis C screening test  -     Hepatitis C Antibody    Screening for prostate cancer  -     PSA SCREENING    Reviewed last C-scope. Will obtain records from [facility].     He declined pneumonia vaccines today.     Discussed low carb diet for DM 2. He will need to obtain a glucose meter and check his glucose fasting daily.     Smoking cessation was discussed today for 3 minutes. We have chosen a quit date for 12/1/20. The medication management chosen was cold turkey. \"Steps to Quit Smoking\" materials dispensed at visit,  and referral placed to [facility] Smoking Cessation Program. Follow up on progress 3 or at next scheduled appointment.     Follow up in 3 months with AWV  "

## 2020-06-28 LAB
ALBUMIN SERPL-MCNC: 4.6 G/DL (ref 3.5–5.2)
ALBUMIN/GLOB SERPL: 1.6 G/DL
ALP SERPL-CCNC: 55 U/L (ref 39–117)
ALT SERPL-CCNC: 48 U/L (ref 1–41)
AST SERPL-CCNC: 42 U/L (ref 1–40)
BASOPHILS # BLD AUTO: ABNORMAL 10*3/UL
BASOPHILS # BLD MANUAL: 0.06 10*3/MM3 (ref 0–0.2)
BASOPHILS NFR BLD MANUAL: 1 % (ref 0–1.5)
BILIRUB SERPL-MCNC: 0.3 MG/DL (ref 0.2–1.2)
BUN SERPL-MCNC: 13 MG/DL (ref 6–20)
BUN/CREAT SERPL: 14.1 (ref 7–25)
CALCIUM SERPL-MCNC: 10.4 MG/DL (ref 8.6–10.5)
CHLORIDE SERPL-SCNC: 103 MMOL/L (ref 98–107)
CHOLEST SERPL-MCNC: 173 MG/DL (ref 0–200)
CO2 SERPL-SCNC: 23.1 MMOL/L (ref 22–29)
CREAT SERPL-MCNC: 0.92 MG/DL (ref 0.76–1.27)
DIFFERENTIAL COMMENT: NORMAL
EOSINOPHIL # BLD AUTO: ABNORMAL 10*3/UL
EOSINOPHIL # BLD MANUAL: 0.24 10*3/MM3 (ref 0–0.4)
EOSINOPHIL NFR BLD AUTO: ABNORMAL %
EOSINOPHIL NFR BLD MANUAL: 4 % (ref 0.3–6.2)
ERYTHROCYTE [DISTWIDTH] IN BLOOD BY AUTOMATED COUNT: 14.2 % (ref 12.3–15.4)
GLOBULIN SER CALC-MCNC: 2.8 GM/DL
GLUCOSE SERPL-MCNC: 137 MG/DL (ref 65–99)
HBA1C MFR BLD: 6.76 % (ref 4.8–5.6)
HCT VFR BLD AUTO: 44.5 % (ref 37.5–51)
HCV AB S/CO SERPL IA: <0.1 S/CO RATIO (ref 0–0.9)
HDLC SERPL-MCNC: 31 MG/DL (ref 40–60)
HGB BLD-MCNC: 15.2 G/DL (ref 13–17.7)
LDLC SERPL CALC-MCNC: 88 MG/DL (ref 0–100)
LYMPHOCYTES # BLD AUTO: ABNORMAL 10*3/UL
LYMPHOCYTES # BLD MANUAL: 2.38 10*3/MM3 (ref 0.7–3.1)
LYMPHOCYTES NFR BLD AUTO: ABNORMAL %
LYMPHOCYTES NFR BLD MANUAL: 40 % (ref 19.6–45.3)
MCH RBC QN AUTO: 31 PG (ref 26.6–33)
MCHC RBC AUTO-ENTMCNC: 34.2 G/DL (ref 31.5–35.7)
MCV RBC AUTO: 90.8 FL (ref 79–97)
MONOCYTES # BLD MANUAL: 0.42 10*3/MM3 (ref 0.1–0.9)
MONOCYTES NFR BLD AUTO: ABNORMAL %
MONOCYTES NFR BLD MANUAL: 7 % (ref 5–12)
NEUTROPHILS # BLD MANUAL: 2.85 10*3/MM3 (ref 1.7–7)
NEUTROPHILS NFR BLD AUTO: ABNORMAL %
NEUTROPHILS NFR BLD MANUAL: 48 % (ref 42.7–76)
PLATELET # BLD AUTO: 121 10*3/MM3 (ref 140–450)
PLATELET BLD QL SMEAR: NORMAL
POTASSIUM SERPL-SCNC: 4.3 MMOL/L (ref 3.5–5.2)
PROT SERPL-MCNC: 7.4 G/DL (ref 6–8.5)
PSA SERPL-MCNC: 0.64 NG/ML (ref 0–4)
RBC # BLD AUTO: 4.9 10*6/MM3 (ref 4.14–5.8)
RBC MORPH BLD: NORMAL
SODIUM SERPL-SCNC: 139 MMOL/L (ref 136–145)
TESTOST FREE SERPL-MCNC: 2.4 PG/ML (ref 7.2–24)
TESTOST SERPL-MCNC: 326.2 NG/DL (ref 264–916)
TRIGL SERPL-MCNC: 268 MG/DL (ref 0–150)
VLDLC SERPL CALC-MCNC: 53.6 MG/DL
WBC # BLD AUTO: 5.94 10*3/MM3 (ref 3.4–10.8)

## 2020-06-29 ENCOUNTER — TELEPHONE (OUTPATIENT)
Dept: INTERNAL MEDICINE | Facility: CLINIC | Age: 60
End: 2020-06-29

## 2020-06-29 NOTE — TELEPHONE ENCOUNTER
Talked to pt about why his Glucose meter is having a problem getting filled. Called pharmacy and they advised me that he needs to call his Medicare PART B plan and get the information of which formulary meter, lancets and strips are covered. I let him know that, and he is going to call me back in a couple of days and let me know that information.

## 2020-07-07 ENCOUNTER — TELEPHONE (OUTPATIENT)
Dept: INTERNAL MEDICINE | Facility: CLINIC | Age: 60
End: 2020-07-07

## 2020-07-07 NOTE — TELEPHONE ENCOUNTER
PATIENT CALLED AND SAID THAT HIS INSURANCE WILL PAY FOR THE [employer]ootsuite ONE TOUCH. PLEASE CALL IN TO PHARMACY.

## 2020-07-10 DIAGNOSIS — E11.9 TYPE 2 DIABETES MELLITUS WITHOUT COMPLICATION, WITHOUT LONG-TERM CURRENT USE OF INSULIN (HCC): Primary | ICD-10-CM

## 2020-07-10 RX ORDER — LANCETS 33 GAUGE
1 EACH MISCELLANEOUS 3 TIMES DAILY
Qty: 200 EACH | Refills: 2 | Status: SHIPPED | OUTPATIENT
Start: 2020-07-10 | End: 2021-01-24

## 2020-07-10 RX ORDER — BLOOD-GLUCOSE METER
1 EACH MISCELLANEOUS 3 TIMES DAILY
Qty: 1 EACH | Refills: 0 | Status: SHIPPED | OUTPATIENT
Start: 2020-07-10

## 2020-07-10 NOTE — TELEPHONE ENCOUNTER
Called pharmacy and they told me it was ONETOUCH, sent in what they told me. Called Sylwia and advised him I sent in what they told me to, and to let me know asap if there is another problem with insurance.

## 2020-10-27 DIAGNOSIS — R79.89 LOW TESTOSTERONE IN MALE: Primary | ICD-10-CM

## 2020-10-27 DIAGNOSIS — I10 BENIGN ESSENTIAL HTN: ICD-10-CM

## 2020-10-27 DIAGNOSIS — E11.9 TYPE 2 DIABETES MELLITUS WITHOUT COMPLICATION, WITHOUT LONG-TERM CURRENT USE OF INSULIN (HCC): ICD-10-CM

## 2020-11-02 LAB
ALBUMIN SERPL-MCNC: 4.5 G/DL (ref 3.5–5.2)
ALBUMIN/GLOB SERPL: 1.9 G/DL
ALP SERPL-CCNC: 62 U/L (ref 39–117)
ALT SERPL-CCNC: 48 U/L (ref 1–41)
AST SERPL-CCNC: 40 U/L (ref 1–40)
BASOPHILS # BLD AUTO: ABNORMAL 10*3/UL
BILIRUB SERPL-MCNC: 0.3 MG/DL (ref 0–1.2)
BUN SERPL-MCNC: 18 MG/DL (ref 6–20)
BUN/CREAT SERPL: 17.6 (ref 7–25)
CALCIUM SERPL-MCNC: 9.9 MG/DL (ref 8.6–10.5)
CHLORIDE SERPL-SCNC: 104 MMOL/L (ref 98–107)
CHOLEST SERPL-MCNC: 178 MG/DL (ref 0–200)
CHOLEST/HDLC SERPL: 5.56 {RATIO}
CO2 SERPL-SCNC: 25.3 MMOL/L (ref 22–29)
CREAT SERPL-MCNC: 1.02 MG/DL (ref 0.76–1.27)
DIFFERENTIAL COMMENT: NORMAL
EOSINOPHIL # BLD AUTO: ABNORMAL 10*3/UL
EOSINOPHIL # BLD MANUAL: 0.2 10*3/MM3 (ref 0–0.4)
EOSINOPHIL NFR BLD AUTO: ABNORMAL %
EOSINOPHIL NFR BLD MANUAL: 3.1 % (ref 0.3–6.2)
ERYTHROCYTE [DISTWIDTH] IN BLOOD BY AUTOMATED COUNT: 13.1 % (ref 12.3–15.4)
GLOBULIN SER CALC-MCNC: 2.4 GM/DL
GLUCOSE SERPL-MCNC: 123 MG/DL (ref 65–99)
HBA1C MFR BLD: 6.3 % (ref 4.8–5.6)
HCT VFR BLD AUTO: 44.7 % (ref 37.5–51)
HDLC SERPL-MCNC: 32 MG/DL (ref 40–60)
HGB BLD-MCNC: 15.6 G/DL (ref 13–17.7)
LDLC SERPL CALC-MCNC: 89 MG/DL (ref 0–100)
LYMPHOCYTES # BLD AUTO: ABNORMAL 10*3/UL
LYMPHOCYTES # BLD MANUAL: 2.6 10*3/MM3 (ref 0.7–3.1)
LYMPHOCYTES NFR BLD AUTO: ABNORMAL %
LYMPHOCYTES NFR BLD MANUAL: 40.2 % (ref 19.6–45.3)
MCH RBC QN AUTO: 31.1 PG (ref 26.6–33)
MCHC RBC AUTO-ENTMCNC: 34.9 G/DL (ref 31.5–35.7)
MCV RBC AUTO: 89 FL (ref 79–97)
MONOCYTES # BLD MANUAL: 0.67 10*3/MM3 (ref 0.1–0.9)
MONOCYTES NFR BLD AUTO: ABNORMAL %
MONOCYTES NFR BLD MANUAL: 10.3 % (ref 5–12)
NEUTROPHILS # BLD MANUAL: 3 10*3/MM3 (ref 1.7–7)
NEUTROPHILS NFR BLD AUTO: ABNORMAL %
NEUTROPHILS NFR BLD MANUAL: 46.4 % (ref 42.7–76)
PLATELET # BLD AUTO: 132 10*3/MM3 (ref 140–450)
PLATELET BLD QL SMEAR: NORMAL
POTASSIUM SERPL-SCNC: 4.9 MMOL/L (ref 3.5–5.2)
PROT SERPL-MCNC: 6.9 G/DL (ref 6–8.5)
RBC # BLD AUTO: 5.02 10*6/MM3 (ref 4.14–5.8)
RBC MORPH BLD: NORMAL
SODIUM SERPL-SCNC: 139 MMOL/L (ref 136–145)
TESTOST FREE SERPL-MCNC: 3.7 PG/ML (ref 7.2–24)
TESTOST SERPL-MCNC: 306.7 NG/DL (ref 264–916)
TRIGL SERPL-MCNC: 341 MG/DL (ref 0–150)
VLDLC SERPL CALC-MCNC: 57 MG/DL (ref 5–40)
WBC # BLD AUTO: 6.46 10*3/MM3 (ref 3.4–10.8)

## 2020-11-03 ENCOUNTER — OFFICE VISIT (OUTPATIENT)
Dept: INTERNAL MEDICINE | Facility: CLINIC | Age: 60
End: 2020-11-03

## 2020-11-03 VITALS
HEIGHT: 75 IN | RESPIRATION RATE: 16 BRPM | DIASTOLIC BLOOD PRESSURE: 78 MMHG | TEMPERATURE: 97.7 F | BODY MASS INDEX: 35.43 KG/M2 | SYSTOLIC BLOOD PRESSURE: 138 MMHG | HEART RATE: 80 BPM | WEIGHT: 285 LBS | OXYGEN SATURATION: 98 %

## 2020-11-03 DIAGNOSIS — M48.02 CERVICAL SPINAL STENOSIS: ICD-10-CM

## 2020-11-03 DIAGNOSIS — I10 BENIGN ESSENTIAL HTN: ICD-10-CM

## 2020-11-03 DIAGNOSIS — Z00.00 MEDICARE ANNUAL WELLNESS VISIT, INITIAL: Primary | ICD-10-CM

## 2020-11-03 DIAGNOSIS — E11.9 TYPE 2 DIABETES MELLITUS WITHOUT COMPLICATION, WITHOUT LONG-TERM CURRENT USE OF INSULIN (HCC): ICD-10-CM

## 2020-11-03 PROBLEM — Z11.59 NEED FOR HEPATITIS C SCREENING TEST: Status: RESOLVED | Noted: 2020-06-25 | Resolved: 2020-11-03

## 2020-11-03 PROCEDURE — 99213 OFFICE O/P EST LOW 20 MIN: CPT | Performed by: NURSE PRACTITIONER

## 2020-11-03 PROCEDURE — G0439 PPPS, SUBSEQ VISIT: HCPCS | Performed by: NURSE PRACTITIONER

## 2020-11-03 PROCEDURE — 93000 ELECTROCARDIOGRAM COMPLETE: CPT | Performed by: NURSE PRACTITIONER

## 2020-11-03 RX ORDER — LANOLIN ALCOHOL/MO/W.PET/CERES
500 CREAM (GRAM) TOPICAL NIGHTLY
Qty: 90 CAPSULE | Refills: 0
Start: 2020-11-03 | End: 2022-06-13

## 2020-11-03 NOTE — PATIENT INSTRUCTIONS
Advance Directive    Advance directives are legal documents that let you make choices ahead of time about your health care and medical treatment in case you become unable to communicate for yourself. Advance directives are a way for you to make known your wishes to family, friends, and health care providers. This can let others know about your end-of-life care if you become unable to communicate.  Discussing and writing advance directives should happen over time rather than all at once. Advance directives can be changed depending on your situation and what you want, even after you have signed the advance directives.  There are different types of advance directives, such as:  · Medical power of .  · Living will.  · Do not resuscitate (DNR) or do not attempt resuscitation (DNAR) order.  Health care proxy and medical power of   A health care proxy is also called a health care agent. This is a person who is appointed to make medical decisions for you in cases where you are unable to make the decisions yourself. Generally, people choose someone they know well and trust to represent their preferences. Make sure to ask this person for an agreement to act as your proxy. A proxy may have to exercise judgment in the event of a medical decision for which your wishes are not known.  A medical power of  is a legal document that names your health care proxy. Depending on the laws in your state, after the document is written, it may also need to be:  · Signed.  · Notarized.  · Dated.  · Copied.  · Witnessed.  · Incorporated into your medical record.  You may also want to appoint someone to manage your money in a situation in which you are unable to do so. This is called a durable power of  for finances. It is a separate legal document from the durable power of  for health care. You may choose the same person or someone different from your health care proxy to act as your agent in money  matters.  If you do not appoint a proxy, or if there is a concern that the proxy is not acting in your best interests, a court may appoint a guardian to act on your behalf.  Living will  A living will is a set of instructions that state your wishes about medical care when you cannot express them yourself. Health care providers should keep a copy of your living will in your medical record. You may want to give a copy to family members or friends. To alert caregivers in case of an emergency, you can place a card in your wallet to let them know that you have a living will and where they can find it. A living will is used if you become:  · Terminally ill.  · Disabled.  · Unable to communicate or make decisions.  Items to consider in your living will include:  · To use or not to use life-support equipment, such as dialysis machines and breathing machines (ventilators).  · A DNR or DNAR order. This tells health care providers not to use cardiopulmonary resuscitation (CPR) if breathing or heartbeat stops.  · To use or not to use tube feeding.  · To be given or not to be given food and fluids.  · Comfort (palliative) care when the goal becomes comfort rather than a cure.  · Donation of organs and tissues.  A living will does not give instructions for distributing your money and property if you should pass away.  DNR or DNAR  A DNR or DNAR order is a request not to have CPR in the event that your heart stops beating or you stop breathing. If a DNR or DNAR order has not been made and shared, a health care provider will try to help any patient whose heart has stopped or who has stopped breathing. If you plan to have surgery, talk with your health care provider about how your DNR or DNAR order will be followed if problems occur.  What if I do not have an advance directive?  If you do not have an advance directive, some states assign family decision makers to act on your behalf based on how closely you are related to them. Each  state has its own laws about advance directives. You may want to check with your health care provider, , or state representative about the laws in your state.  Summary  · Advance directives are the legal documents that allow you to make choices ahead of time about your health care and medical treatment in case you become unable to tell others about your care.  · The process of discussing and writing advance directives should happen over time. You can change the advance directives, even after you have signed them.  · Advance directives include DNR or DNAR orders, living villavicencio, and designating an agent as your medical power of .  This information is not intended to replace advice given to you by your health care provider. Make sure you discuss any questions you have with your health care provider.  Document Released: 03/26/2009 Document Revised: 07/16/2020 Document Reviewed: 07/16/2020  Elsevier Patient Education © 2020 [employer]

## 2020-11-03 NOTE — PROGRESS NOTES
The ABCs of the Annual Wellness Visit  Initial Medicare Wellness Visit    Chief Complaint   Patient presents with   • Medicare Wellness-subsequent   • Diabetes   • Hypertension       Subjective   History of Present Illness:  Sylwia Spears is a 60 y.o. male who presents for an Initial Medicare Wellness Visit.    He will also need a F/U appt of DM 2, HTN, and spinal stenosis.    He is on Glucophage 1000 mg daily for DM 2. He was prescribed twice daily, but only takes it once due to diarrhea of he increases the dose. His last glucose check was a few months ago, running 120 fasting. He denies blurred vision, urinary frequency, dry mouth.     He takes lisinopril 10 mg daily for HTN. He has a stress treadmill 1-7-2019 showing a low risk study.     He is on cymbalta 60 mg daily for Spinal stenosis and chronic neck pain.     HEALTH RISK ASSESSMENT    Recent Hospitalizations:  No hospitalization(s) within the last year.    Current Medical Providers:  Patient Care Team:  Angie Munoz APRN as PCP - General (Family Medicine)    Smoking Status:  Social History     Tobacco Use   Smoking Status Current Every Day Smoker   • Packs/day: 1.00   • Types: Cigarettes   • Start date: 1976   Smokeless Tobacco Never Used       Alcohol Consumption:  Social History     Substance and Sexual Activity   Alcohol Use No       Depression Screen:   PHQ-2/PHQ-9 Depression Screening 11/3/2020   Little interest or pleasure in doing things 0   Feeling down, depressed, or hopeless 0   Total Score 0       Fall Risk Screen:  STEADI Fall Risk Assessment has not been completed.    Health Habits and Functional and Cognitive Screening:  Functional & Cognitive Status 11/3/2020   Do you have difficulty preparing food and eating? No   Do you have difficulty bathing yourself, getting dressed or grooming yourself? No   Do you have difficulty using the toilet? No   Do you have difficulty moving around from place to place? No   Do you have trouble with steps or  getting out of a bed or a chair? No   Current Diet Well Balanced Diet   Dental Exam Up to date   Eye Exam Not up to date   Exercise (times per week) 5 times per week   Current Exercise Activities Include Yard Work   Do you need help using the phone?  No   Are you deaf or do you have serious difficulty hearing?  Yes   Do you need help with transportation? No   Do you need help shopping? No   Do you need help preparing meals?  No   Do you need help with housework?  No   Do you need help with laundry? No   Do you need help taking your medications? No   Do you need help managing money? No   Do you ever drive or ride in a car without wearing a seat belt? No   Have you felt unusual stress, anger or loneliness in the last month? No   Who do you live with? Spouse   If you need help, do you have trouble finding someone available to you? No   Have you been bothered in the last four weeks by sexual problems? No   Do you have difficulty concentrating, remembering or making decisions? No         Does the patient have evidence of cognitive impairment? No    Asprin use counseling:Start ASA 81 mg daily     Age-appropriate Screening Schedule:  Refer to the list below for future screening recommendations based on patient's age, sex and/or medical conditions. Orders for these recommended tests are listed in the plan section. The patient has been provided with a written plan.    Health Maintenance   Topic Date Due   • URINE MICROALBUMIN  11/21/2020 (Originally 1960)   • DIABETIC EYE EXAM  12/22/2020 (Originally 6/26/2018)   • TDAP/TD VACCINES (1 - Tdap) 11/02/2021 (Originally 3/18/1980)   • ZOSTER VACCINE (1 of 2) 11/02/2021 (Originally 3/18/2011)   • INFLUENZA VACCINE  11/03/2021 (Originally 8/1/2020)   • HEMOGLOBIN A1C  04/27/2021   • COLONOSCOPY  08/17/2021   • DIABETIC FOOT EXAM  11/03/2021          The following portions of the patient's history were reviewed and updated as appropriate: allergies, current medications, past  family history, past medical history, past social history, past surgical history and problem list.    Outpatient Medications Prior to Visit   Medication Sig Dispense Refill   • Blood Gluc Meter Disp-Strips device 1 each 2 (Two) Times a Day. 1 each 0   • Blood Glucose Monitoring Suppl (ONE TOUCH ULTRA 2) w/Device kit 1 Device 3 (Three) Times a Day. 1 each 0   • DULoxetine (CYMBALTA) 60 MG capsule Take 1 capsule by mouth Daily. 90 capsule 1   • glucose blood test strip Use as instructed 200 each 12   • lisinopril (PRINIVIL,ZESTRIL) 20 MG tablet Take 1 tablet by mouth Daily. 90 tablet 3   • metFORMIN (GLUCOPHAGE) 1000 MG tablet Take 1 tablet by mouth Daily With Breakfast. 90 tablet 1   • OneTouch Delica Lancets 33G misc 1 stick 3 (Three) Times a Day. 200 each 2     No facility-administered medications prior to visit.        Patient Active Problem List   Diagnosis   • History of colon polyps   • Type 2 diabetes mellitus without complication, without long-term current use of insulin (CMS/HCC)   • Benign essential HTN   • Morbidly obese (CMS/HCC)   • Need for hepatitis C screening test       Advanced Care Planning:  ACP discussion was held with the patient during this visit. Patient has an advance directive in EMR which is still valid.  Patient does not have an advance directive, information provided.    Review of Systems   Constitutional: Negative.    HENT: Negative.  Negative for congestion and dental problem.    Eyes: Negative.    Respiratory: Negative.  Negative for wheezing and stridor.    Cardiovascular: Negative.    Gastrointestinal: Negative.  Negative for abdominal distention, abdominal pain and anal bleeding.   Endocrine: Negative.    Genitourinary: Negative.    Musculoskeletal: Positive for arthralgias, neck pain and neck stiffness.   Allergic/Immunologic: Negative.  Negative for environmental allergies, food allergies and immunocompromised state.   Neurological: Negative.  Negative for dizziness.  "  Hematological: Negative.    Psychiatric/Behavioral: Negative.  Negative for agitation, behavioral problems and confusion.   All other systems reviewed and are negative.      Compared to one year ago, the patient feels his physical health is the same.  Compared to one year ago, the patient feels his mental health is the same.    Reviewed chart for potential of high risk medication in the elderly: yes  Reviewed chart for potential of harmful drug interactions in the elderly:yes    Objective         Vitals:    11/03/20 0805   BP: 138/78   BP Location: Left arm   Patient Position: Sitting   Cuff Size: Large Adult   Pulse: 80   Resp: 16   Temp: 97.7 °F (36.5 °C)   TempSrc: Temporal   SpO2: 98%   Weight: 129 kg (285 lb)   Height: 189.2 cm (74.5\")       Body mass index is 36.1 kg/m².  Discussed the patient's BMI with him. The BMI is above average; BMI management plan is completed.    Physical Exam  Vitals signs reviewed.   Constitutional:       General: He is not in acute distress.     Appearance: He is obese. He is not ill-appearing.   HENT:      Right Ear: Tympanic membrane normal.      Left Ear: Tympanic membrane normal.      Nose: Nose normal.      Mouth/Throat:      Mouth: Mucous membranes are moist.   Neck:      Musculoskeletal: Neck supple.   Cardiovascular:      Rate and Rhythm: Normal rate and regular rhythm.      Pulses: Normal pulses.      Heart sounds: Normal heart sounds.   Pulmonary:      Effort: Pulmonary effort is normal.      Breath sounds: Normal breath sounds.   Musculoskeletal: Normal range of motion.         General: No swelling, tenderness or deformity.      Right foot: Normal range of motion.      Left foot: Normal range of motion.   Feet:      Right foot:      Protective Sensation: 8 sites tested. 8 sites sensed.      Skin integrity: Skin integrity normal.      Toenail Condition: Right toenails are long.      Left foot:      Protective Sensation: 8 sites tested. 8 sites sensed.      Skin " integrity: Skin integrity normal.      Toenail Condition: Left toenails are long.   Skin:     General: Skin is warm and dry.      Coloration: Skin is not jaundiced or pale.      Findings: No erythema.   Neurological:      General: No focal deficit present.      Mental Status: He is alert and oriented to person, place, and time.   Psychiatric:         Mood and Affect: Mood normal.         Behavior: Behavior normal.         Thought Content: Thought content normal.         Lab Results   Component Value Date     (H) 10/27/2020    CHLPL 178 10/27/2020    TRIG 341 (H) 10/27/2020    HDL 32 (L) 10/27/2020    LDL 89 10/27/2020    VLDL 57 (H) 10/27/2020    HGBA1C 6.30 (H) 10/27/2020        Assessment/Plan   Medicare Risks and Personalized Health Plan  CMS Preventative Services Quick Reference  Advance Directive Discussion  Cardiovascular risk  Chronic Pain   Depression/Dysphoria  Immunizations Discussed/Encouraged (specific immunizations; adacel Tdap, Pneumococcal 23, Prevnar and Shingrix )  Inactivity/Sedentary  Obesity/Overweight   Polypharmacy    The above risks/problems have been discussed with the patient.  Pertinent information has been shared with the patient in the After Visit Summary.  Follow up plans and orders are seen below in the Assessment/Plan Section.     Diagnosis Plan   1. Medicare annual wellness visit, initial  ECG 12 Lead   2. Type 2 diabetes mellitus without complication, without long-term current use of insulin (CMS/[facility])  ECG 12 Lead    Comprehensive metabolic panel    Lipid panel    Hemoglobin A1c    CBC w AUTO Differential    niacin (NIACIN SR,NIASPAN ER) 500 MG CR capsule   3. Benign essential HTN  ECG 12 Lead    Comprehensive metabolic panel    Lipid panel    niacin (NIACIN SR,NIASPAN ER) 500 MG CR capsule   4. Cervical spinal stenosis         Follow Up:    6 months with labs    An After Visit Summary and PPPS were given to the patient.

## 2020-11-03 NOTE — PROGRESS NOTES
Procedure     ECG 12 Lead    Date/Time: 11/3/2020 8:27 AM  Performed by: Angie Munoz APRN  Authorized by: Angie Munoz APRN   Comparison: compared with previous ECG from 8/17/2018  Similar to previous ECG  Rhythm: sinus rhythm  Ectopy comments: none  Rate: normal  BPM: 75  Conduction: conduction normal  Conduction comments: UT 0.20 QRS 0.12  ST Segments: ST segments normal  T Waves: T waves normal  QRS axis: normal  Other: no other findings    Clinical impression: normal ECG

## 2020-11-08 ENCOUNTER — RESULTS ENCOUNTER (OUTPATIENT)
Dept: INTERNAL MEDICINE | Facility: CLINIC | Age: 60
End: 2020-11-08

## 2020-11-08 DIAGNOSIS — I10 BENIGN ESSENTIAL HTN: ICD-10-CM

## 2020-11-08 DIAGNOSIS — E11.9 TYPE 2 DIABETES MELLITUS WITHOUT COMPLICATION, WITHOUT LONG-TERM CURRENT USE OF INSULIN (HCC): ICD-10-CM

## 2020-12-16 RX ORDER — DULOXETIN HYDROCHLORIDE 60 MG/1
CAPSULE, DELAYED RELEASE ORAL
Qty: 90 CAPSULE | Refills: 0 | Status: SHIPPED | OUTPATIENT
Start: 2020-12-16 | End: 2020-12-21

## 2020-12-21 RX ORDER — DULOXETIN HYDROCHLORIDE 60 MG/1
CAPSULE, DELAYED RELEASE ORAL
Qty: 90 CAPSULE | Refills: 0 | Status: SHIPPED | OUTPATIENT
Start: 2020-12-21 | End: 2021-03-15

## 2021-01-22 DIAGNOSIS — E11.9 TYPE 2 DIABETES MELLITUS WITHOUT COMPLICATION, WITHOUT LONG-TERM CURRENT USE OF INSULIN (HCC): ICD-10-CM

## 2021-01-24 RX ORDER — LANCETS 33 GAUGE
EACH MISCELLANEOUS
Qty: 200 EACH | Refills: 1 | Status: SHIPPED | OUTPATIENT
Start: 2021-01-24

## 2021-03-15 RX ORDER — DULOXETIN HYDROCHLORIDE 60 MG/1
CAPSULE, DELAYED RELEASE ORAL
Qty: 90 CAPSULE | Refills: 3 | Status: SHIPPED | OUTPATIENT
Start: 2021-03-15 | End: 2022-06-06

## 2021-03-22 ENCOUNTER — TELEPHONE (OUTPATIENT)
Dept: GASTROENTEROLOGY | Facility: CLINIC | Age: 61
End: 2021-03-22

## 2021-03-26 ENCOUNTER — PREP FOR SURGERY (OUTPATIENT)
Dept: OTHER | Facility: HOSPITAL | Age: 61
End: 2021-03-26

## 2021-03-26 DIAGNOSIS — Z80.0 FAMILY HISTORY OF COLON CANCER: ICD-10-CM

## 2021-03-26 DIAGNOSIS — Z86.010 PERSONAL HISTORY OF COLONIC POLYPS: ICD-10-CM

## 2021-03-26 DIAGNOSIS — Z12.11 ENCOUNTER FOR SCREENING FOR MALIGNANT NEOPLASM OF COLON: Primary | ICD-10-CM

## 2021-03-29 NOTE — TELEPHONE ENCOUNTER
CALLED AND SPOKE WITH PATIENT.  SCHEDULED AT [place] ON 09/02/2021 AT 8:45AM - ARRIVE 7:30AM.  WILL MAIL INSTRUCTIONS.    COVID TEST ON 08/31/2021, WILL CALL WITH TIME.  NEED TO SELF QUARANTINE UNTIL AFTER PROCEDURE.  HE UNDERSTANDS.

## 2021-03-30 PROBLEM — Z80.0 FAMILY HISTORY OF COLON CANCER: Status: ACTIVE | Noted: 2021-03-30

## 2021-03-30 PROBLEM — Z12.11 ENCOUNTER FOR SCREENING FOR MALIGNANT NEOPLASM OF COLON: Status: ACTIVE | Noted: 2021-03-30

## 2021-03-30 PROBLEM — Z86.010 PERSONAL HISTORY OF COLONIC POLYPS: Status: ACTIVE | Noted: 2021-03-30

## 2021-03-30 PROBLEM — Z86.0100 PERSONAL HISTORY OF COLONIC POLYPS: Status: ACTIVE | Noted: 2021-03-30

## 2021-04-28 LAB
ALBUMIN SERPL-MCNC: 4.3 G/DL (ref 3.5–5.2)
ALBUMIN/GLOB SERPL: 1.7 G/DL
ALP SERPL-CCNC: 61 U/L (ref 39–117)
ALT SERPL-CCNC: 51 U/L (ref 1–41)
AST SERPL-CCNC: 36 U/L (ref 1–40)
BASOPHILS # BLD AUTO: ABNORMAL 10*3/UL
BILIRUB SERPL-MCNC: 0.3 MG/DL (ref 0–1.2)
BUN SERPL-MCNC: 18 MG/DL (ref 8–23)
BUN/CREAT SERPL: 21.2 (ref 7–25)
CALCIUM SERPL-MCNC: 9.9 MG/DL (ref 8.6–10.5)
CHLORIDE SERPL-SCNC: 106 MMOL/L (ref 98–107)
CHOLEST SERPL-MCNC: 169 MG/DL (ref 0–200)
CO2 SERPL-SCNC: 24.2 MMOL/L (ref 22–29)
CREAT SERPL-MCNC: 0.85 MG/DL (ref 0.76–1.27)
DIFFERENTIAL COMMENT: NORMAL
EOSINOPHIL # BLD AUTO: ABNORMAL 10*3/UL
EOSINOPHIL # BLD MANUAL: 0.3 10*3/MM3 (ref 0–0.4)
EOSINOPHIL NFR BLD AUTO: ABNORMAL %
EOSINOPHIL NFR BLD MANUAL: 5.2 % (ref 0.3–6.2)
ERYTHROCYTE [DISTWIDTH] IN BLOOD BY AUTOMATED COUNT: 14.4 % (ref 12.3–15.4)
GLOBULIN SER CALC-MCNC: 2.5 GM/DL
GLUCOSE SERPL-MCNC: 148 MG/DL (ref 65–99)
HBA1C MFR BLD: 6.7 % (ref 4.8–5.6)
HCT VFR BLD AUTO: 46.5 % (ref 37.5–51)
HDLC SERPL-MCNC: 29 MG/DL (ref 40–60)
HGB BLD-MCNC: 15.6 G/DL (ref 13–17.7)
LDLC SERPL CALC-MCNC: 81 MG/DL (ref 0–100)
LYMPHOCYTES # BLD AUTO: ABNORMAL 10*3/UL
LYMPHOCYTES # BLD MANUAL: 2.13 10*3/MM3 (ref 0.7–3.1)
LYMPHOCYTES NFR BLD AUTO: ABNORMAL %
LYMPHOCYTES NFR BLD MANUAL: 37.5 % (ref 19.6–45.3)
MCH RBC QN AUTO: 30.8 PG (ref 26.6–33)
MCHC RBC AUTO-ENTMCNC: 33.5 G/DL (ref 31.5–35.7)
MCV RBC AUTO: 91.9 FL (ref 79–97)
MONOCYTES # BLD MANUAL: 0.3 10*3/MM3 (ref 0.1–0.9)
MONOCYTES NFR BLD AUTO: ABNORMAL %
MONOCYTES NFR BLD MANUAL: 5.2 % (ref 5–12)
NEUTROPHILS # BLD MANUAL: 2.96 10*3/MM3 (ref 1.7–7)
NEUTROPHILS NFR BLD AUTO: ABNORMAL %
NEUTROPHILS NFR BLD MANUAL: 52.1 % (ref 42.7–76)
PLATELET # BLD AUTO: 125 10*3/MM3 (ref 140–450)
PLATELET BLD QL SMEAR: NORMAL
POTASSIUM SERPL-SCNC: 4.4 MMOL/L (ref 3.5–5.2)
PROT SERPL-MCNC: 6.8 G/DL (ref 6–8.5)
RBC # BLD AUTO: 5.06 10*6/MM3 (ref 4.14–5.8)
RBC MORPH BLD: NORMAL
SODIUM SERPL-SCNC: 139 MMOL/L (ref 136–145)
TRIGL SERPL-MCNC: 363 MG/DL (ref 0–150)
VLDLC SERPL CALC-MCNC: 59 MG/DL (ref 5–40)
WBC # BLD AUTO: 5.68 10*3/MM3 (ref 3.4–10.8)

## 2021-05-04 ENCOUNTER — OFFICE VISIT (OUTPATIENT)
Dept: INTERNAL MEDICINE | Facility: CLINIC | Age: 61
End: 2021-05-04

## 2021-05-04 VITALS
TEMPERATURE: 96.8 F | SYSTOLIC BLOOD PRESSURE: 130 MMHG | WEIGHT: 284 LBS | BODY MASS INDEX: 35.31 KG/M2 | HEART RATE: 88 BPM | OXYGEN SATURATION: 98 % | HEIGHT: 75 IN | DIASTOLIC BLOOD PRESSURE: 78 MMHG | RESPIRATION RATE: 16 BRPM

## 2021-05-04 DIAGNOSIS — I10 BENIGN ESSENTIAL HTN: ICD-10-CM

## 2021-05-04 DIAGNOSIS — E78.5 HYPERLIPIDEMIA LDL GOAL <70: ICD-10-CM

## 2021-05-04 DIAGNOSIS — M48.02 CERVICAL SPINAL STENOSIS: ICD-10-CM

## 2021-05-04 DIAGNOSIS — E11.9 TYPE 2 DIABETES MELLITUS WITHOUT COMPLICATION, WITHOUT LONG-TERM CURRENT USE OF INSULIN (HCC): Primary | ICD-10-CM

## 2021-05-04 PROCEDURE — 99214 OFFICE O/P EST MOD 30 MIN: CPT | Performed by: NURSE PRACTITIONER

## 2021-05-04 NOTE — PROGRESS NOTES
"Chief Complaint   Patient presents with   • Hypertension   • Diabetes       Subjective     Sylwia Spears is a 61 y.o. male being seen for a follow up appointment today regarding DDM 2, HTN, Cervical spinal stenosis. He takes Metformin 1000mg daily with food. He checks his glucose \"every once in a while\". He reports once monthly checks, last reading 121, but he cannot remember any others. He denies blurred vision, urinary changes  He is on Lisinopril 20mg daily for HTN. Denies CP, SOA, edema. He does not regulary exercise. He has daily pain from \"where I got electrocuted\" he has nerve pain. Pain rated a 5 of 10. He pain is mostly in upper back and shoulders. He takes duloxetine for this.       History of Present Illness     No Known Allergies      Current Outpatient Medications:   •  Blood Gluc Meter Disp-Strips device, 1 each 2 (Two) Times a Day., Disp: 1 each, Rfl: 0  •  Blood Glucose Monitoring Suppl (ONE TOUCH ULTRA 2) w/Device kit, 1 Device 3 (Three) Times a Day., Disp: 1 each, Rfl: 0  •  DULoxetine (CYMBALTA) 60 MG capsule, TAKE ONE CAPSULE BY MOUTH DAILY, Disp: 90 capsule, Rfl: 3  •  glucose blood test strip, Use as instructed, Disp: 200 each, Rfl: 12  •  Lancets (OneTouch Delica Plus [id number]) misc, USE THREE TIMES A DAY, Disp: 200 each, Rfl: 1  •  lisinopril (PRINIVIL,ZESTRIL) 20 MG tablet, Take 1 tablet by mouth Daily., Disp: 90 tablet, Rfl: 3  •  metFORMIN (GLUCOPHAGE) 1000 MG tablet, TAKE ONE TABLET BY MOUTH DAILY WITH BREAKFAST, Disp: 90 tablet, Rfl: 0  •  niacin (NIACIN SR,NIASPAN ER) 500 MG CR capsule, Take 1 capsule by mouth Every Night., Disp: 90 capsule, Rfl: 0    The following portions of the patient's history were reviewed and updated as appropriate: allergies, current medications, past family history, past medical history, past social history, past surgical history and problem list.    Review of Systems   Constitutional: Negative.  Negative for fever and unexpected weight change.   HENT: " Negative.    Cardiovascular: Negative.  Negative for chest pain, palpitations and leg swelling.   Endocrine: Negative.    Genitourinary: Negative.    Musculoskeletal: Positive for arthralgias, back pain and neck pain. Negative for gait problem, joint swelling and myalgias.   Allergic/Immunologic: Negative.  Negative for environmental allergies and food allergies.   Neurological: Negative.    Hematological: Negative for adenopathy. Does not bruise/bleed easily.   Psychiatric/Behavioral: Negative.        Assessment     Physical Exam  HENT:      Head: Normocephalic.   Cardiovascular:      Rate and Rhythm: Normal rate and regular rhythm.      Pulses: Normal pulses.      Heart sounds: No murmur heard.     Pulmonary:      Effort: Pulmonary effort is normal. No respiratory distress.      Breath sounds: Normal breath sounds. No stridor.   Musculoskeletal:         General: No swelling.      Cervical back: Neck supple.   Skin:     General: Skin is warm and dry.      Findings: No erythema or rash.   Neurological:      General: No focal deficit present.      Mental Status: He is alert and oriented to person, place, and time.   Psychiatric:         Mood and Affect: Mood normal.         Behavior: Behavior normal.         Thought Content: Thought content normal.         Judgment: Judgment normal.         Plan     His fasting labs were reviewed with the patient from last week.     Diagnoses and all orders for this visit:    1. Type 2 diabetes mellitus without complication, without long-term current use of insulin (CMS/[facility]) (Primary)  Comments:  Hgb A1c 6.7, at goal. Reviewed low carb diet due to rising levels  Orders:  -     CBC & Differential; Future  -     Comprehensive Metabolic Panel; Future  -     Lipid Panel With / Chol / HDL Ratio; Future  -     Hemoglobin A1c; Future  -     TSH; Future  -     Microalbumin / Creatinine Urine Ratio - Urine, Clean Catch; Future    2. Benign essential HTN  Comments:  Bp well controlled on  lisinopril 20mg daily  Orders:  -     CBC & Differential; Future  -     Comprehensive Metabolic Panel; Future  -     Lipid Panel With / Chol / HDL Ratio; Future  -     Hemoglobin A1c; Future  -     TSH; Future  -     Microalbumin / Creatinine Urine Ratio - Urine, Clean Catch; Future    3. Cervical spinal stenosis    4. Hyperlipidemia LDL goal <70         He decline ortho follow referral for shoulders    Follow up in 6 months with IVONNE

## 2021-06-11 DIAGNOSIS — I10 BENIGN ESSENTIAL HTN: ICD-10-CM

## 2021-06-12 RX ORDER — LISINOPRIL 20 MG/1
TABLET ORAL
Qty: 90 TABLET | Refills: 2 | Status: SHIPPED | OUTPATIENT
Start: 2021-06-12 | End: 2022-03-08

## 2021-09-01 ENCOUNTER — ANESTHESIA EVENT (OUTPATIENT)
Dept: PERIOP | Facility: HOSPITAL | Age: 61
End: 2021-09-01

## 2021-09-02 ENCOUNTER — ANESTHESIA (OUTPATIENT)
Dept: PERIOP | Facility: HOSPITAL | Age: 61
End: 2021-09-02

## 2021-09-02 ENCOUNTER — HOSPITAL ENCOUNTER (OUTPATIENT)
Facility: HOSPITAL | Age: 61
Setting detail: HOSPITAL OUTPATIENT SURGERY
Discharge: HOME | End: 2021-09-02
Attending: INTERNAL MEDICINE | Admitting: INTERNAL MEDICINE

## 2021-09-02 VITALS
HEART RATE: 85 BPM | RESPIRATION RATE: 16 BRPM | OXYGEN SATURATION: 95 % | WEIGHT: 275.8 LBS | TEMPERATURE: 98 F | BODY MASS INDEX: 34.94 KG/M2 | SYSTOLIC BLOOD PRESSURE: 130 MMHG | DIASTOLIC BLOOD PRESSURE: 85 MMHG

## 2021-09-02 DIAGNOSIS — Z12.11 ENCOUNTER FOR SCREENING FOR MALIGNANT NEOPLASM OF COLON: ICD-10-CM

## 2021-09-02 DIAGNOSIS — Z80.0 FAMILY HISTORY OF COLON CANCER: ICD-10-CM

## 2021-09-02 DIAGNOSIS — Z86.010 PERSONAL HISTORY OF COLONIC POLYPS: ICD-10-CM

## 2021-09-02 LAB
GLUCOSE BLDC GLUCOMTR-MCNC: 171 MG/DL (ref 70–130)
GLUCOSE BLDC GLUCOMTR-MCNC: 171 MG/DL (ref 70–130)

## 2021-09-02 PROCEDURE — 88305 TISSUE EXAM BY PATHOLOGIST: CPT | Performed by: INTERNAL MEDICINE

## 2021-09-02 PROCEDURE — 82962 GLUCOSE BLOOD TEST: CPT

## 2021-09-02 PROCEDURE — 45380 COLONOSCOPY AND BIOPSY: CPT | Performed by: INTERNAL MEDICINE

## 2021-09-02 PROCEDURE — 25010000002 PROPOFOL 10 MG/ML EMULSION: Performed by: NURSE ANESTHETIST, CERTIFIED REGISTERED

## 2021-09-02 PROCEDURE — 45385 COLONOSCOPY W/LESION REMOVAL: CPT | Performed by: INTERNAL MEDICINE

## 2021-09-02 RX ORDER — SODIUM CHLORIDE, SODIUM LACTATE, POTASSIUM CHLORIDE, CALCIUM CHLORIDE 600; 310; 30; 20 MG/100ML; MG/100ML; MG/100ML; MG/100ML
9 INJECTION, SOLUTION INTRAVENOUS CONTINUOUS PRN
Status: DISCONTINUED | OUTPATIENT
Start: 2021-09-02 | End: 2021-09-02 | Stop reason: HOSPADM

## 2021-09-02 RX ORDER — SODIUM CHLORIDE 0.9 % (FLUSH) 0.9 %
10 SYRINGE (ML) INJECTION AS NEEDED
Status: DISCONTINUED | OUTPATIENT
Start: 2021-09-02 | End: 2021-09-02 | Stop reason: HOSPADM

## 2021-09-02 RX ORDER — LIDOCAINE HYDROCHLORIDE 10 MG/ML
0.5 INJECTION, SOLUTION EPIDURAL; INFILTRATION; INTRACAUDAL; PERINEURAL ONCE AS NEEDED
Status: DISCONTINUED | OUTPATIENT
Start: 2021-09-02 | End: 2021-09-02 | Stop reason: HOSPADM

## 2021-09-02 RX ORDER — LIDOCAINE HYDROCHLORIDE 20 MG/ML
INJECTION, SOLUTION INFILTRATION; PERINEURAL AS NEEDED
Status: DISCONTINUED | OUTPATIENT
Start: 2021-09-02 | End: 2021-09-02 | Stop reason: SURG

## 2021-09-02 RX ORDER — SODIUM CHLORIDE 0.9 % (FLUSH) 0.9 %
10 SYRINGE (ML) INJECTION EVERY 12 HOURS SCHEDULED
Status: DISCONTINUED | OUTPATIENT
Start: 2021-09-02 | End: 2021-09-02 | Stop reason: HOSPADM

## 2021-09-02 RX ORDER — PROPOFOL 10 MG/ML
VIAL (ML) INTRAVENOUS AS NEEDED
Status: DISCONTINUED | OUTPATIENT
Start: 2021-09-02 | End: 2021-09-02 | Stop reason: SURG

## 2021-09-02 RX ORDER — SODIUM CHLORIDE 9 MG/ML
40 INJECTION, SOLUTION INTRAVENOUS AS NEEDED
Status: DISCONTINUED | OUTPATIENT
Start: 2021-09-02 | End: 2021-09-02 | Stop reason: HOSPADM

## 2021-09-02 RX ORDER — GLYCOPYRROLATE 0.2 MG/ML
INJECTION INTRAMUSCULAR; INTRAVENOUS AS NEEDED
Status: DISCONTINUED | OUTPATIENT
Start: 2021-09-02 | End: 2021-09-02 | Stop reason: SURG

## 2021-09-02 RX ADMIN — LIDOCAINE HYDROCHLORIDE 100 MG: 20 INJECTION, SOLUTION INFILTRATION; PERINEURAL at 08:43

## 2021-09-02 RX ADMIN — PROPOFOL 50 MG: 10 INJECTION, EMULSION INTRAVENOUS at 08:47

## 2021-09-02 RX ADMIN — PROPOFOL 50 MG: 10 INJECTION, EMULSION INTRAVENOUS at 08:43

## 2021-09-02 RX ADMIN — PROPOFOL 50 MG: 10 INJECTION, EMULSION INTRAVENOUS at 09:11

## 2021-09-02 RX ADMIN — SODIUM CHLORIDE, POTASSIUM CHLORIDE, SODIUM LACTATE AND CALCIUM CHLORIDE: 600; 310; 30; 20 INJECTION, SOLUTION INTRAVENOUS at 08:43

## 2021-09-02 RX ADMIN — PROPOFOL 50 MG: 10 INJECTION, EMULSION INTRAVENOUS at 09:19

## 2021-09-02 RX ADMIN — PROPOFOL 50 MG: 10 INJECTION, EMULSION INTRAVENOUS at 08:56

## 2021-09-02 RX ADMIN — PROPOFOL 50 MG: 10 INJECTION, EMULSION INTRAVENOUS at 09:06

## 2021-09-02 RX ADMIN — GLYCOPYRROLATE 0.2 MG: 0.2 INJECTION INTRAMUSCULAR; INTRAVENOUS at 08:43

## 2021-09-02 RX ADMIN — PROPOFOL 50 MG: 10 INJECTION, EMULSION INTRAVENOUS at 08:50

## 2021-09-02 RX ADMIN — PROPOFOL 50 MG: 10 INJECTION, EMULSION INTRAVENOUS at 08:45

## 2021-09-02 RX ADMIN — PROPOFOL 50 MG: 10 INJECTION, EMULSION INTRAVENOUS at 09:01

## 2021-09-02 NOTE — OP NOTE
COLONOSCOPY  Procedure Report    Patient Name:  Sylwia Spears  YOB: 1960    Date of Surgery:  9/2/2021     Indications:  Encounter for screening for malignant neoplasm of colon [Z12.11]  Personal history of colonic polyps [Z86.010]  Family history of colon cancer [Z80.0]      Pre-op Diagnosis:   Encounter for screening for malignant neoplasm of colon [Z12.11]  Personal history of colonic polyps [Z86.010]  Family history of colon cancer [Z80.0]    Post-Op Diagnosis Codes:     * Encounter for screening for malignant neoplasm of colon [Z12.11]     * Personal history of colonic polyps [Z86.010]     * Family history of colon cancer [Z80.0]     * Colon polyp [K63.5]     * Diverticulosis [K57.90]         Procedure/CPT® Codes:      Procedure(s):  COLONOSCOPY; polypectomy    Staff:  Surgeon(s):  Raleigh Champagne MD         Anesthesia: Monitored Anesthesia Care    Estimated Blood Loss: none    Specimens:   ID Type Source Tests Collected by Time   A :  Polyp Large Intestine, Right / Ascending Colon TISSUE PATHOLOGY EXAM Raleigh Champagne MD 9/2/2021 0854   B : x5 Polyp Large Intestine, Transverse Colon TISSUE PATHOLOGY EXAM Raleigh Champagne MD 9/2/2021 0905   C :  Polyp Large Intestine, Sigmoid Colon TISSUE PATHOLOGY EXAM Raleigh Champagne MD 9/2/2021 0920       Implants:    Nothing was implanted during the procedure      Description of Procedure: After having signed informed consent he was brought to the endoscopy suite and placed in the left lateral decubitus position. He was given IV sedation.  Rectal exam revealed no external lesions, normal  anal tone, no rectal mass. The scope was introduced in the rectum and advanced under direct visualization through the rectum, into the sigmoid colon, past scattered diverticulum, through the   descending colon, to and around the splenic flexure; reduced; advanced through the transverse colon with significant looping, to and  around the hepatic flexure. The scope would not advance easily through the ascending colon. The scope was reduced, using abdominal splinting. The scope was advanced still with some looping past the ileocecal valve into the cecum. The cecum was identified by the appendiceal orifice and the ileocecal valve. The ileocecal valve was not intubated. The preparation overall was fair, there was thick opaque liquid present, significant flushing was required to attempt to remove as much of this as possible. As the scope was withdrawn back into the ascending colon there was a sessile 6-7 mm polyp that was removed by cold biopsy forceps. The colon was subjectively spastic, difficult to keep distended. The scope was withdrawn through the remainder of the ascending colon, around the hepatic flexure, into the transverse colon. In the transverse colon there were scattered polyps, these ranged in size from 5 to 8 mm. They were removed with a combination of cold biopsy forceps and 1 cold snare. Colon polyps were retrieved and sent together as transverse colon polyps x5. The scope was then withdrawn around the splenic flexure, through the descending colon, back into the sigmoid colon where the diverticulum were limited to. There was a sigmoid polyp that was 7-8 mm, removed with cold biopsy forceps and sent separately as sigmoid colon polyp. The scope was withdrawn back into the rectum and retroflexed revealing an intact dentate line and no mucosal lesions. The scope was deretroflexed and withdrawn. The patient tolerated the lengthy procedure well.          Findings: Colon to Cecum, Moderately difficult (Looping , Spastic Colon) Fair Prep  Sigmoid Diverticulosis  Polyps-7-Cold Snare,Biopsy         Complications: None    Recommendations: Results to be called. and Repeat in 3 years      Raleigh Champagne MD     Date: 9/2/2021  Time: 09:25 EDT

## 2021-09-02 NOTE — H&P
Patient Care Team:  Angie Munoz APRN as PCP - General (Family Medicine)    CHIEF COMPLAINT: Personal hx colon polyps and Family hx of CRC or polyps    HISTORY OF PRESENT ILLNESS:  Last exam 2018    Past Medical History:   Diagnosis Date   • Cervical disc disease 2012    stenosis   • Colon polyp    • Diabetes mellitus (CMS/HCC)    • Electrocution 2012    Fell off a ladder and has chronic neck pain   • Hypertension      Past Surgical History:   Procedure Laterality Date   • CHOLECYSTECTOMY     • COLONOSCOPY     • COLONOSCOPY N/A 8/17/2018    Procedure: COLONOSCOPY, polypectomy;  Surgeon: Raleigh Champagne MD;  Location: [facility];  Service: Gastroenterology   • COLONOSCOPY W/ POLYPECTOMY     • SHOULDER SURGERY Left      Family History   Problem Relation Age of Onset   • Colon cancer Mother    • Stroke Mother    • Cancer Mother    • Crohn's disease Brother      Social History     Tobacco Use   • Smoking status: Current Every Day Smoker     Packs/day: 1.00     Types: Cigarettes     Start date: 1976   • Smokeless tobacco: Never Used   Substance Use Topics   • Alcohol use: No   • Drug use: Yes     Frequency: 7.0 times per week     Types: Marijuana     Medications Prior to Admission   Medication Sig Dispense Refill Last Dose   • DULoxetine (CYMBALTA) 60 MG capsule TAKE ONE CAPSULE BY MOUTH DAILY 90 capsule 3 9/1/2021 at 0600   • lisinopril (PRINIVIL,ZESTRIL) 20 MG tablet TAKE ONE TABLET BY MOUTH DAILY 90 tablet 2 9/1/2021 at 0600   • metFORMIN (GLUCOPHAGE) 1000 MG tablet TAKE ONE TABLET BY MOUTH DAILY WITH BREAKFAST 90 tablet 0 9/1/2021 at 0800   • niacin (NIACIN SR,NIASPAN ER) 500 MG CR capsule Take 1 capsule by mouth Every Night. 90 capsule 0 9/1/2021 at 0600   • Blood Gluc Meter Disp-Strips device 1 each 2 (Two) Times a Day. 1 each 0    • Blood Glucose Monitoring Suppl (ONE TOUCH ULTRA 2) w/Device kit 1 Device 3 (Three) Times a Day. 1 each 0    • glucose blood test strip Use as instructed 200 each 12    •  Lancets (OneTouch Delica Plus [id number]) misc USE THREE TIMES A  each 1      Allergies:  Patient has no known allergies.    REVIEW OF SYSTEMS:  Please see the above history of present illness for pertinent positives and negatives.  The remainder of the patient's systems have been reviewed and are negative.     Vital Signs  Temp:  [97.9 °F (36.6 °C)] 97.9 °F (36.6 °C)  Resp:  [14] 14  BP: (133)/(78) 133/78    Flowsheet Rows      First Filed Value   Admission Height  --   Admission Weight  125 kg (275 lb 12.8 oz) Documented at 09/02/2021 0737           Physical Exam:  Physical Exam   Constitutional: Patient appears well-developed and well-nourished and in no acute distress   HEENT:   Head: Normocephalic and atraumatic.   Eyes:  Pupils are equal, round, and reactive to light. EOM are intact. Sclerae are anicteric and non-injected.  Mouth and Throat: Patient has moist mucous membranes. Oropharynx is clear of any erythema or exudate.     Neck: Neck supple. No JVD present. No thyromegaly present. No lymphadenopathy present.  Cardiovascular: Regular rate, regular rhythm, S1 normal and S2 normal.  Exam reveals no gallop and no friction rub.  No murmur heard.  Pulmonary/Chest: Lungs are clear to auscultation bilaterally. No respiratory distress. No wheezes. No rhonchi. No rales.   Abdominal: Soft. Bowel sounds are normal. No distension and no mass. There is no hepatosplenomegaly. There is no tenderness.   Musculoskeletal: Normal Muscle tone  Extremities: No edema. Pulses are palpable in all 4 extremities.  Neurological: Patient is alert and oriented to person, place, and time. Cranial nerves II-XII are grossly intact with no focal deficits.  Skin: Skin is warm. No rash noted. Nails show no clubbing.  No cyanosis or erythema.    Debilities/Disabilities Identified: None  Emotional Behavior: Appropriate     Results Review:   I reviewed the patient's new clinical results.    Lab Results (most recent)     None           Imaging Results (Most Recent)     None        reviewed    ECG/EMG Results (most recent)     None        reviewed    Assessment/Plan   Personal hx colon polyps and Family hx of CRC or polyps/  colonoscopy      I discussed the patient's findings and my recommendations with patient.     Raleigh Champagne MD  09/02/21  08:03 EDT    Time: 10 min prior to procedure.

## 2021-09-02 NOTE — ANESTHESIA PREPROCEDURE EVALUATION
Anesthesia Evaluation     Patient summary reviewed and Nursing notes reviewed   NPO Solid Status: > 8 hours  NPO Liquid Status: > 8 hours           Airway   Mallampati: II  TM distance: >3 FB  Neck ROM: limited  Possible difficult intubation  Dental    (+) upper dentures and lower dentures    Pulmonary - normal exam   (+) a smoker Current Smoked day of surgery,   Cardiovascular - normal exam  Exercise tolerance: good (4-7 METS)    ECG reviewed    (+) hypertension well controlled less than 2 medications,     ROS comment: Rate: normal   BPM: 75   Conduction: conduction normal   Conduction comments: [place] 0.20 QRS 0.12   ST Segments: ST segments normal   T Waves: T waves normal   QRS axis: normal   Other: no other findings     Clinical impression: normal ECG     Neuro/Psych  (+) numbness (hands),     GI/Hepatic/Renal/Endo    (+) morbid obesity,  diabetes mellitus (a1c 6.7) type 2,     Musculoskeletal     (+) radiculopathy Right lower extremity  Abdominal  - normal exam   Substance History   (+) drug use     OB/GYN          Other - negative ROS                       Anesthesia Plan    ASA 3     MAC     intravenous induction     Anesthetic plan, all risks, benefits, and alternatives have been provided, discussed and informed consent has been obtained with: patient.  Use of blood products discussed with patient  Consented to blood products.

## 2021-09-02 NOTE — BRIEF OP NOTE
COLONOSCOPY  Progress Note    Sylwia Spears  9/2/2021    Pre-op Diagnosis:   Encounter for screening for malignant neoplasm of colon [Z12.11]  Personal history of colonic polyps [Z86.010]  Family history of colon cancer [Z80.0]       Post-Op Diagnosis Codes:     * Encounter for screening for malignant neoplasm of colon [Z12.11]     * Personal history of colonic polyps [Z86.010]     * Family history of colon cancer [Z80.0]     * Colon polyp [K63.5]     * Diverticulosis [K57.90]    Procedure/CPT® Codes:        Procedure(s):  COLONOSCOPY; polypectomy    Surgeon(s):  Raleigh Champagne MD    Anesthesia: Monitored Anesthesia Care    Staff:   Circulator: Rebecca Dumas RN  Scrub Person: Gail Edgar         Estimated Blood Loss: none    Urine Voided: * No values recorded between 9/2/2021  8:41 AM and 9/2/2021  9:22 AM *    Specimens:                Specimens     ID Source Type Tests Collected By Collected At Frozen?    A Large Intestine, Right / Ascending Colon Polyp · TISSUE PATHOLOGY EXAM   Raleigh Champagne MD 9/2/21 0854     B Large Intestine, Transverse Colon Polyp · TISSUE PATHOLOGY EXAM   Raleigh Champagne MD 9/2/21 0905     Description: x5    Comment: Trap 1 cold snare x1  forcep x4    C Large Intestine, Sigmoid Colon Polyp · TISSUE PATHOLOGY EXAM   Raleigh Champagne MD 9/2/21 0920                 Drains: * No LDAs found *    Findings: Colon to Cecum, Moderately difficult (Looping , Spastic Colon) Fair Prep  Sigmoid Diverticulosis  Polyps-7-Cold Snare,Biopsy    Complications: None          Raleigh Champagne MD     Date: 9/2/2021  Time: 09:23 EDT

## 2021-09-02 NOTE — ANESTHESIA POSTPROCEDURE EVALUATION
Patient: Sylwia Spears    Procedure Summary     Date: 09/02/21 Room / Location: [facility] ENDOSCOPY 2 /  [facility] OR    Anesthesia Start: 0841 Anesthesia Stop: 0924    Procedure: COLONOSCOPY; polypectomy (N/A ) Diagnosis:       Encounter for screening for malignant neoplasm of colon      Personal history of colonic polyps      Family history of colon cancer      Colon polyp      Diverticulosis      (Encounter for screening for malignant neoplasm of colon [Z12.11])      (Personal history of colonic polyps [Z86.010])      (Family history of colon cancer [Z80.0])    Surgeons: Raleigh Champagne MD Provider: Wilder Fraire CRNA    Anesthesia Type: MAC ASA Status: 3          Anesthesia Type: MAC    Vitals  Vitals Value Taken Time   /85 09/02/21 0930   Temp 98 °F (36.7 °C) 09/02/21 0927   Pulse 85 09/02/21 0930   Resp 16 09/02/21 0930   SpO2 95 % 09/02/21 0930           Post Anesthesia Care and Evaluation    Patient location during evaluation: bedside  Patient participation: complete - patient participated  Level of consciousness: awake and alert  Pain score: 0  Pain management: adequate  Airway patency: patent  Anesthetic complications: No anesthetic complications  PONV Status: none  Cardiovascular status: acceptable  Respiratory status: acceptable  Hydration status: acceptable

## 2021-09-03 LAB
CYTO UR: NORMAL
LAB AP CASE REPORT: NORMAL
PATH REPORT.FINAL DX SPEC: NORMAL
PATH REPORT.GROSS SPEC: NORMAL

## 2021-09-28 ENCOUNTER — HOSPITAL ENCOUNTER (OUTPATIENT)
Dept: GENERAL RADIOLOGY | Facility: HOSPITAL | Age: 61
Discharge: HOME | End: 2021-09-28
Admitting: NURSE PRACTITIONER

## 2021-09-28 ENCOUNTER — OFFICE VISIT (OUTPATIENT)
Dept: INTERNAL MEDICINE | Facility: CLINIC | Age: 61
End: 2021-09-28

## 2021-09-28 VITALS
HEIGHT: 74 IN | RESPIRATION RATE: 18 BRPM | OXYGEN SATURATION: 95 % | TEMPERATURE: 97.5 F | DIASTOLIC BLOOD PRESSURE: 70 MMHG | SYSTOLIC BLOOD PRESSURE: 140 MMHG | HEART RATE: 76 BPM | BODY MASS INDEX: 36.37 KG/M2 | WEIGHT: 283.4 LBS

## 2021-09-28 DIAGNOSIS — M54.6 ACUTE MIDLINE THORACIC BACK PAIN: Primary | ICD-10-CM

## 2021-09-28 DIAGNOSIS — M54.6 ACUTE MIDLINE THORACIC BACK PAIN: ICD-10-CM

## 2021-09-28 PROCEDURE — 72072 X-RAY EXAM THORAC SPINE 3VWS: CPT

## 2021-09-28 PROCEDURE — 99213 OFFICE O/P EST LOW 20 MIN: CPT | Performed by: NURSE PRACTITIONER

## 2021-09-28 RX ORDER — NAPROXEN 500 MG/1
500 TABLET ORAL 2 TIMES DAILY WITH MEALS
Qty: 30 TABLET | Refills: 0 | Status: SHIPPED | OUTPATIENT
Start: 2021-09-28 | End: 2021-10-11

## 2021-09-28 NOTE — PROGRESS NOTES
"Chief Complaint   Patient presents with   • Back Pain     2months ago is when pain started       Subjective     Sylwia Spears is a 61 y.o. male being seen for an acute visit for upper back pain for 2 to 2.5 months. He describes it as \"pain between shoulder blades\" that can be both aching and stabbing. Pain rated 7 of 10 at this time. Worse with moving arms overhead or reaching arms back. He denies abd pain or chest pain.        History of Present Illness     No Known Allergies      Current Outpatient Medications:   •  Blood Gluc Meter Disp-Strips device, 1 each 2 (Two) Times a Day., Disp: 1 each, Rfl: 0  •  Blood Glucose Monitoring Suppl (ONE TOUCH ULTRA 2) w/Device kit, 1 Device 3 (Three) Times a Day., Disp: 1 each, Rfl: 0  •  DULoxetine (CYMBALTA) 60 MG capsule, TAKE ONE CAPSULE BY MOUTH DAILY, Disp: 90 capsule, Rfl: 3  •  glucose blood test strip, Use as instructed, Disp: 200 each, Rfl: 12  •  Lancets (OneTouch Delica Plus [id number]) misc, USE THREE TIMES A DAY, Disp: 200 each, Rfl: 1  •  lisinopril (PRINIVIL,ZESTRIL) 20 MG tablet, TAKE ONE TABLET BY MOUTH DAILY, Disp: 90 tablet, Rfl: 2  •  metFORMIN (GLUCOPHAGE) 1000 MG tablet, TAKE ONE TABLET BY MOUTH DAILY WITH BREAKFAST, Disp: 90 tablet, Rfl: 0  •  niacin (NIACIN SR,NIASPAN ER) 500 MG CR capsule, Take 1 capsule by mouth Every Night., Disp: 90 capsule, Rfl: 0    The following portions of the patient's history were reviewed and updated as appropriate: allergies, current medications, past family history, past medical history, past social history, past surgical history and problem list.    Review of Systems   Constitutional: Negative.    HENT: Negative.    Eyes: Negative.    Respiratory: Negative.    Cardiovascular: Negative.    Gastrointestinal: Negative.    Endocrine: Negative.    Genitourinary: Negative.    Musculoskeletal: Positive for arthralgias and back pain.   Skin: Negative.    Allergic/Immunologic: Negative.    Neurological: Negative.  "   Hematological: Negative.    All other systems reviewed and are negative.      Assessment     Physical Exam  Vitals reviewed.   Constitutional:       Appearance: Normal appearance. He is obese. He is not ill-appearing.   Cardiovascular:      Rate and Rhythm: Normal rate and regular rhythm.      Pulses: Normal pulses.      Heart sounds: Normal heart sounds. No murmur heard.     Pulmonary:      Effort: Pulmonary effort is normal.      Breath sounds: Normal breath sounds.   Musculoskeletal:      Cervical back: Normal.      Thoracic back: Deformity (kyphosis) and tenderness present. No edema, signs of trauma or bony tenderness. Decreased range of motion.      Comments: Decreased shoulder ROM, pain reproduced with back reaching and over head reaching. Strength equal and strong bilateral hands   Neurological:      Mental Status: He is alert.   Psychiatric:         Mood and Affect: Mood normal.         Thought Content: Thought content normal.         Plan       Diagnoses and all orders for this visit:    1. Acute midline thoracic back pain (Primary)  -     XR spine thoracic 3 vw; Future  -     naproxen (Naprosyn) 500 MG tablet; Take 1 tablet by mouth 2 (Two) Times a Day With Meals.  Dispense: 30 tablet; Refill: 0    Most likely DDD, will get an XR to r/o compression fracture.     Discussed PT, will await XR.    Follow up after XR

## 2021-09-29 ENCOUNTER — TELEPHONE (OUTPATIENT)
Dept: INTERNAL MEDICINE | Facility: CLINIC | Age: 61
End: 2021-09-29

## 2021-09-29 DIAGNOSIS — M51.34 DDD (DEGENERATIVE DISC DISEASE), THORACIC: Primary | ICD-10-CM

## 2021-09-29 NOTE — TELEPHONE ENCOUNTER
Unable to get in contact with pt, Hub please inform patient Thoracic XR confirms DDD, would advise Physical therapy. Referral placed

## 2021-09-29 NOTE — TELEPHONE ENCOUNTER
"Timothy Rolling (Self) [phone] (W)          Alternative phone number: [phone] (MAIN PHONE)     Turnaround time given to caller: ""This message will be sent to Sondra Parkinson MD ~ [facility] CLINICAL SUPPORT POOL [[id number]]. The clinical team will fulfill your request as soon as they review your message. \""  " Caller: Sylwia Spears    Relationship: Self    Best call back number: [phone]    What test was performed: XRAY    When was the test performed: 9/28/21    Where was the test performed:  [facility]    Additional notes: PATIENT NEVER RECEIVED A CALL ABOUT HIS RESULTS, BUT GOT A CALL FROM  REHABILITATION WANTING TO SCHEDULE AN APPOINTMENT. HE DOESN'T WANT TO SCHEDULE ANYTHING WITH REHAB UNTIL HE KNOWS THE RESULTS OF HIS XRAY AND WHY HE IS NEEDING REHAB.    PLEASE ADVISE PATIENT

## 2021-10-01 ENCOUNTER — TELEPHONE (OUTPATIENT)
Dept: INTERNAL MEDICINE | Facility: CLINIC | Age: 61
End: 2021-10-01

## 2021-10-01 NOTE — TELEPHONE ENCOUNTER
Caller: Ruthy Spears    Relationship: Emergency Contact    Best call back number: [phone]   What is the best time to reach you: ANY  Who are you requesting to speak with (clinical staff, provider,  specific staff member): CLINICAL    What was the call regarding: XRAY RESULTS    Do you require a callback: YES

## 2021-10-10 DIAGNOSIS — M54.6 ACUTE MIDLINE THORACIC BACK PAIN: ICD-10-CM

## 2021-10-11 RX ORDER — NAPROXEN 500 MG/1
TABLET ORAL
Qty: 30 TABLET | Refills: 0 | Status: SHIPPED | OUTPATIENT
Start: 2021-10-11 | End: 2021-10-28

## 2021-10-28 DIAGNOSIS — M54.6 ACUTE MIDLINE THORACIC BACK PAIN: ICD-10-CM

## 2021-10-28 RX ORDER — NAPROXEN 500 MG/1
TABLET ORAL
Qty: 30 TABLET | Refills: 0 | Status: SHIPPED | OUTPATIENT
Start: 2021-10-28 | End: 2021-11-15

## 2021-11-08 ENCOUNTER — TELEPHONE (OUTPATIENT)
Dept: INTERNAL MEDICINE | Facility: CLINIC | Age: 61
End: 2021-11-08

## 2021-11-08 NOTE — TELEPHONE ENCOUNTER
PHARMACY NEEDS TO KNOW IF PATIENT IS ON ANY TYPE OF STATIN. IF NOT, WOULD THEY BE WILLING TO DISCUSS THE BENEFITS OF TAKING ONE?

## 2021-11-15 DIAGNOSIS — M54.6 ACUTE MIDLINE THORACIC BACK PAIN: ICD-10-CM

## 2021-11-15 RX ORDER — NAPROXEN 500 MG/1
TABLET ORAL
Qty: 30 TABLET | Refills: 0 | Status: SHIPPED | OUTPATIENT
Start: 2021-11-15 | End: 2021-11-29

## 2021-11-28 DIAGNOSIS — M54.6 ACUTE MIDLINE THORACIC BACK PAIN: ICD-10-CM

## 2021-11-29 RX ORDER — NAPROXEN 500 MG/1
TABLET ORAL
Qty: 30 TABLET | Refills: 0 | Status: SHIPPED | OUTPATIENT
Start: 2021-11-29 | End: 2021-12-09

## 2021-12-09 ENCOUNTER — OFFICE VISIT (OUTPATIENT)
Dept: INTERNAL MEDICINE | Facility: CLINIC | Age: 61
End: 2021-12-09

## 2021-12-09 VITALS
DIASTOLIC BLOOD PRESSURE: 78 MMHG | BODY MASS INDEX: 36.61 KG/M2 | TEMPERATURE: 98.7 F | SYSTOLIC BLOOD PRESSURE: 130 MMHG | HEIGHT: 74 IN | HEART RATE: 84 BPM | OXYGEN SATURATION: 98 % | RESPIRATION RATE: 20 BRPM | WEIGHT: 285.3 LBS

## 2021-12-09 DIAGNOSIS — I10 BENIGN ESSENTIAL HTN: ICD-10-CM

## 2021-12-09 DIAGNOSIS — Z12.83 SCREENING EXAM FOR SKIN CANCER: ICD-10-CM

## 2021-12-09 DIAGNOSIS — Z00.00 ENCOUNTER FOR ANNUAL WELLNESS VISIT (AWV) IN MEDICARE PATIENT: Primary | ICD-10-CM

## 2021-12-09 DIAGNOSIS — E11.9 TYPE 2 DIABETES MELLITUS WITHOUT COMPLICATION, WITHOUT LONG-TERM CURRENT USE OF INSULIN (HCC): ICD-10-CM

## 2021-12-09 DIAGNOSIS — Z12.5 SCREENING PSA (PROSTATE SPECIFIC ANTIGEN): ICD-10-CM

## 2021-12-09 DIAGNOSIS — J41.0 SIMPLE CHRONIC BRONCHITIS (HCC): ICD-10-CM

## 2021-12-09 DIAGNOSIS — E78.5 HYPERLIPIDEMIA LDL GOAL <70: ICD-10-CM

## 2021-12-09 PROCEDURE — 99214 OFFICE O/P EST MOD 30 MIN: CPT | Performed by: NURSE PRACTITIONER

## 2021-12-09 PROCEDURE — G0439 PPPS, SUBSEQ VISIT: HCPCS | Performed by: NURSE PRACTITIONER

## 2021-12-09 PROCEDURE — 1170F FXNL STATUS ASSESSED: CPT | Performed by: NURSE PRACTITIONER

## 2021-12-09 PROCEDURE — 1126F AMNT PAIN NOTED NONE PRSNT: CPT | Performed by: NURSE PRACTITIONER

## 2021-12-09 PROCEDURE — 1160F RVW MEDS BY RX/DR IN RCRD: CPT | Performed by: NURSE PRACTITIONER

## 2021-12-09 RX ORDER — METFORMIN HYDROCHLORIDE 500 MG/1
500 TABLET, EXTENDED RELEASE ORAL
Qty: 60 TABLET | Refills: 3 | Status: SHIPPED | OUTPATIENT
Start: 2021-12-09 | End: 2022-07-06

## 2021-12-09 RX ORDER — FLUTICASONE PROPIONATE AND SALMETEROL XINAFOATE 115; 21 UG/1; UG/1
2 AEROSOL, METERED RESPIRATORY (INHALATION)
Qty: 12 G | Refills: 0 | Status: SHIPPED | OUTPATIENT
Start: 2021-12-09 | End: 2022-01-05

## 2021-12-09 RX ORDER — AZITHROMYCIN 250 MG/1
TABLET, FILM COATED ORAL
Qty: 6 TABLET | Refills: 0 | Status: SHIPPED | OUTPATIENT
Start: 2021-12-09 | End: 2022-06-13

## 2021-12-09 NOTE — PROGRESS NOTES
"The ABCs of the Annual Wellness Visit  Subsequent Medicare Wellness Visit    Chief Complaint   Patient presents with   • Medicare Wellness-subsequent      Subjective    History of Present Illness:  Sylwia Spears is a 61 y.o. male who presents for a Subsequent Medicare Wellness Visit.      He will need a separate follow up on DM 2, HTN, Cervical spinal stenosis. He takes Metformin 1000mg oncce daily with food, due to diarrhea if he takes it twicec daily as prescribed. . He checks his glucose \"every once in a while\". He reports once monthly checks, last reading 120s, but he cannot remember any others. He denies blurred vision, urinary changes  He is on Lisinopril 20mg daily for HTN. Denies CP, SOA, edema. He does not regulary exercise. He has daily pain from \"where I got electrocuted\" he has nerve pain. Pain rated a 5 of 10. He pain is mostly in upper back and shoulders. He takes duloxetine for this.     He is reporting a \"chest cold and rattling for 2 weeks.\" He denies fever, loss of taste or smell, Covid exposure. He has not been Covid vaccinated. He is a current every day smoker.           The following portions of the patient's history were reviewed and   updated as appropriate: allergies, current medications, past family history, past medical history, past social history, past surgical history and problem list.    Compared to one year ago, the patient feels his physical   health is the same.    Compared to one year ago, the patient feels his mental   health is the same.    Recent Hospitalizations:  He was not admitted to the hospital during the last year.       Current Medical Providers:  Patient Care Team:  Angie Munoz APRN as PCP - General (Family Medicine)    Outpatient Medications Prior to Visit   Medication Sig Dispense Refill   • Blood Gluc Meter Disp-Strips device 1 each 2 (Two) Times a Day. 1 each 0   • Blood Glucose Monitoring Suppl (ONE TOUCH ULTRA 2) w/Device kit 1 Device 3 (Three) Times a Day. 1 " each 0   • DULoxetine (CYMBALTA) 60 MG capsule TAKE ONE CAPSULE BY MOUTH DAILY 90 capsule 3   • glucose blood test strip Use as instructed 200 each 12   • Lancets (OneTouch Delica Plus [id number]) misc USE THREE TIMES A  each 1   • lisinopril (PRINIVIL,ZESTRIL) 20 MG tablet TAKE ONE TABLET BY MOUTH DAILY 90 tablet 2   • metFORMIN (GLUCOPHAGE) 1000 MG tablet TAKE ONE TABLET BY MOUTH DAILY WITH BREAKFAST 90 tablet 0   • naproxen (NAPROSYN) 500 MG tablet TAKE ONE TABLET BY MOUTH TWICE A DAY WITH MEALS 30 tablet 0   • niacin (NIACIN SR,NIASPAN ER) 500 MG CR capsule Take 1 capsule by mouth Every Night. 90 capsule 0     No facility-administered medications prior to visit.       No opioid medication identified on active medication list. I have reviewed chart for other potential  high risk medication/s and harmful drug interactions in the elderly.          Aspirin is not on active medication list.  Aspirin use is indicated based on review of current medical condition/s. Pros and cons of this therapy have been discussed with this patient. Benefits of this medication outweigh potential harm.  Patient has been instructed to start taking this medication..    Patient Active Problem List   Diagnosis   • History of colon polyps   • Type 2 diabetes mellitus without complication, without long-term current use of insulin (HCC)   • Benign essential HTN   • Morbidly obese (HCC)   • Medicare annual wellness visit, initial   • Cervical spinal stenosis   • Encounter for screening for malignant neoplasm of colon   • Personal history of colonic polyps   • Family history of colon cancer   • Hyperlipidemia LDL goal <70   • Midline thoracic back pain     Advance Care Planning  Advance Directive is not on file.  ACP discussion was held with the patient during this visit. Patient does not have an advance directive, information provided.          Objective    Vitals:    12/09/21 1401   BP: 130/78   Pulse: 84   Resp: 20   Temp: 98.7 °F (37.1  "°C)   TempSrc: Temporal   SpO2: 98%   Weight: 129 kg (285 lb 4.8 oz)   Height: 189.2 cm (74.49\")   PainSc: 0-No pain     BMI Readings from Last 1 Encounters:   12/09/21 36.15 kg/m²   BMI is above normal parameters. Recommendations include: exercise counseling    Does the patient have evidence of cognitive impairment? No    Physical Exam  Vitals reviewed.   Constitutional:       Appearance: Normal appearance. He is obese. He is not ill-appearing.   HENT:      Head: Normocephalic.      Right Ear: Tympanic membrane normal.      Left Ear: Tympanic membrane normal.   Cardiovascular:      Rate and Rhythm: Normal rate and regular rhythm.      Pulses: Normal pulses.      Heart sounds: Normal heart sounds. No murmur heard.      Musculoskeletal:         General: No swelling.      Right lower leg: No edema.   Skin:     General: Skin is warm and dry.   Neurological:      General: No focal deficit present.      Mental Status: He is alert and oriented to person, place, and time.   Psychiatric:         Mood and Affect: Mood normal.         Behavior: Behavior normal.         Thought Content: Thought content normal.       Lab Results   Component Value Date    CHLPL 165 12/02/2021    TRIG 316 (H) 12/02/2021    HDL 29 (L) 12/02/2021    LDL 84 12/02/2021    VLDL 52 (H) 12/02/2021    HGBA1C 7.2 (H) 12/02/2021        /78   Pulse 84   Temp 98.7 °F (37.1 °C) (Temporal)   Resp 20   Ht 189.2 cm (74.49\")   Wt 129 kg (285 lb 4.8 oz)   SpO2 98%   BMI 36.15 kg/m²     HEALTH RISK ASSESSMENT    Smoking Status:  Social History     Tobacco Use   Smoking Status Current Every Day Smoker   • Packs/day: 1.00   • Types: Cigarettes   • Start date: 1976   Smokeless Tobacco Never Used     Alcohol Consumption:  Social History     Substance and Sexual Activity   Alcohol Use No     Fall Risk Screen:    OSMAN Fall Risk Assessment has not been completed.    Depression Screening:  PHQ-2/PHQ-9 Depression Screening 12/9/2021   Little interest or " pleasure in doing things 0   Feeling down, depressed, or hopeless 0   Total Score 0       Health Habits and Functional and Cognitive Screening:  Functional & Cognitive Status 12/9/2021   Do you have difficulty preparing food and eating? No   Do you have difficulty bathing yourself, getting dressed or grooming yourself? No   Do you have difficulty using the toilet? No   Do you have difficulty moving around from place to place? Yes   Do you have trouble with steps or getting out of a bed or a chair? No   Current Diet Well Balanced Diet   Dental Exam Up to date   Eye Exam Up to date   Exercise (times per week) 1 times per week   Current Exercises Include Walking   Current Exercise Activities Include -   Do you need help using the phone?  No   Are you deaf or do you have serious difficulty hearing?  No   Do you need help with transportation? No   Do you need help shopping? No   Do you need help preparing meals?  No   Do you need help with housework?  Yes   Do you need help with laundry? No   Do you need help taking your medications? No   Do you need help managing money? No   Do you ever drive or ride in a car without wearing a seat belt? No   Have you felt unusual stress, anger or loneliness in the last month? Yes   Who do you live with? Spouse   If you need help, do you have trouble finding someone available to you? No   Have you been bothered in the last four weeks by sexual problems? No   Do you have difficulty concentrating, remembering or making decisions? No       Age-appropriate Screening Schedule:  Refer to the list below for future screening recommendations based on patient's age, sex and/or medical conditions. Orders for these recommended tests are listed in the plan section. The patient has been provided with a written plan.    Health Maintenance   Topic Date Due   • URINE MICROALBUMIN  Never done   • TDAP/TD VACCINES (1 - Tdap) Never done   • ZOSTER VACCINE (1 of 2) Never done   • INFLUENZA VACCINE  Never  done   • DIABETIC FOOT EXAM  11/03/2021   • DIABETIC EYE EXAM  01/15/2022   • HEMOGLOBIN A1C  06/02/2022   • LIPID PANEL  12/02/2022              Assessment/Plan   CMS Preventative Services Quick Reference  Risk Factors Identified During Encounter  Cardiovascular Disease  Immunizations Discussed/Encouraged (specific Immunizations; Influenza, Pneumococcal 23, Shingrix and COVID19  Inactivity/Sedentary  Obesity/Overweight   Tobacco Use/Dependance (use dotphrase .tobaccocessation for documentation)  The above risks/problems have been discussed with the patient.  Follow up actions/plans if indicated are seen below in the Assessment/Plan Section.  Pertinent information has been shared with the patient in the After Visit Summary.     Diagnosis Plan   1. Encounter for annual wellness visit (AWV) in Medicare patient     2. Type 2 diabetes mellitus without complication, without long-term current use of insulin (HCC)  metFORMIN ER (GLUCOPHAGE-XR) 500 MG 24 hr tablet    CBC (No Diff)    Comprehensive Metabolic Panel    Lipid Panel With / Chol / HDL Ratio    Hemoglobin A1c    PSA Screen   3. Benign essential HTN  CBC (No Diff)    Comprehensive Metabolic Panel    Lipid Panel With / Chol / HDL Ratio    Hemoglobin A1c    PSA Screen   4. Hyperlipidemia LDL goal <70  CBC (No Diff)    Comprehensive Metabolic Panel    Lipid Panel With / Chol / HDL Ratio    Hemoglobin A1c    PSA Screen   5. Simple chronic bronchitis (HCC)  fluticasone-salmeterol (Advair HFA) 115-21 MCG/ACT inhaler    azithromycin (Zithromax) 250 MG tablet   6. Screening PSA (prostate specific antigen)  PSA Screen   7. Screening exam for skin cancer  Ambulatory Referral to Dermatology       Change to Metformin XR 500mg 2 per day. He is not tolerating Glucophage 1000mg daily due to diarrhea.     Adviar  2 puffs twice daily. Rinse after dose.       Follow Up:     6 months w labs      An After Visit Summary and PPPS were made available to the patient.

## 2021-12-09 NOTE — PATIENT INSTRUCTIONS
Advance Directive    Advance directives are legal documents that let you make choices ahead of time about your health care and medical treatment in case you become unable to communicate for yourself. Advance directives are a way for you to make known your wishes to family, friends, and health care providers. This can let others know about your end-of-life care if you become unable to communicate.  Discussing and writing advance directives should happen over time rather than all at once. Advance directives can be changed depending on your situation and what you want, even after you have signed the advance directives.  There are different types of advance directives, such as:  · Medical power of .  · Living will.  · Do not resuscitate (DNR) or do not attempt resuscitation (DNAR) order.  Health care proxy and medical power of   A health care proxy is also called a health care agent. This is a person who is appointed to make medical decisions for you in cases where you are unable to make the decisions yourself. Generally, people choose someone they know well and trust to represent their preferences. Make sure to ask this person for an agreement to act as your proxy. A proxy may have to exercise judgment in the event of a medical decision for which your wishes are not known.  A medical power of  is a legal document that names your health care proxy. Depending on the laws in your state, after the document is written, it may also need to be:  · Signed.  · Notarized.  · Dated.  · Copied.  · Witnessed.  · Incorporated into your medical record.  You may also want to appoint someone to manage your money in a situation in which you are unable to do so. This is called a durable power of  for finances. It is a separate legal document from the durable power of  for health care. You may choose the same person or someone different from your health care proxy to act as your agent in money  matters.  If you do not appoint a proxy, or if there is a concern that the proxy is not acting in your best interests, a court may appoint a guardian to act on your behalf.  Living will  A living will is a set of instructions that state your wishes about medical care when you cannot express them yourself. Health care providers should keep a copy of your living will in your medical record. You may want to give a copy to family members or friends. To alert caregivers in case of an emergency, you can place a card in your wallet to let them know that you have a living will and where they can find it. A living will is used if you become:  · Terminally ill.  · Disabled.  · Unable to communicate or make decisions.  Items to consider in your living will include:  · To use or not to use life-support equipment, such as dialysis machines and breathing machines (ventilators).  · A DNR or DNAR order. This tells health care providers not to use cardiopulmonary resuscitation (CPR) if breathing or heartbeat stops.  · To use or not to use tube feeding.  · To be given or not to be given food and fluids.  · Comfort (palliative) care when the goal becomes comfort rather than a cure.  · Donation of organs and tissues.  A living will does not give instructions for distributing your money and property if you should pass away.  DNR or DNAR  A DNR or DNAR order is a request not to have CPR in the event that your heart stops beating or you stop breathing. If a DNR or DNAR order has not been made and shared, a health care provider will try to help any patient whose heart has stopped or who has stopped breathing. If you plan to have surgery, talk with your health care provider about how your DNR or DNAR order will be followed if problems occur.  What if I do not have an advance directive?  If you do not have an advance directive, some states assign family decision makers to act on your behalf based on how closely you are related to them. Each  state has its own laws about advance directives. You may want to check with your health care provider, , or state representative about the laws in your state.  Summary  · Advance directives are the legal documents that allow you to make choices ahead of time about your health care and medical treatment in case you become unable to tell others about your care.  · The process of discussing and writing advance directives should happen over time. You can change the advance directives, even after you have signed them.  · Advance directives include DNR or DNAR orders, living villavicencio, and designating an agent as your medical power of .  This information is not intended to replace advice given to you by your health care provider. Make sure you discuss any questions you have with your health care provider.  Document Revised: 01/28/2021 Document Reviewed: 07/16/2020  Elsevier Patient Education © 2021 [employer]

## 2021-12-17 RX ORDER — NAPROXEN 500 MG/1
TABLET ORAL
Qty: 30 TABLET | Refills: 2 | Status: SHIPPED | OUTPATIENT
Start: 2021-12-17 | End: 2022-01-31

## 2021-12-29 ENCOUNTER — TELEPHONE (OUTPATIENT)
Dept: INTERNAL MEDICINE | Facility: CLINIC | Age: 61
End: 2021-12-29

## 2021-12-29 NOTE — TELEPHONE ENCOUNTER
Caller: Ruthy Spears    Relationship to patient: Emergency Contact    Best call back number:[phone]    Patient is needing: PATIENTS WIFE CALLED IN STATING THE PATIENT IS CONFUSED ON HOW HE IS SUPPOSED TO BE TAKING HIS metFORMIN ER (GLUCOPHAGE-XR) 500 MG 24 hr tablet AND WOULD LIKE A PHONE CALL WITH DIRECTIONS. PLEASE ADVISE. THANK YOU

## 2022-01-05 DIAGNOSIS — J41.0 SIMPLE CHRONIC BRONCHITIS: ICD-10-CM

## 2022-01-05 RX ORDER — FLUTICASONE PROPIONATE AND SALMETEROL XINAFOATE 115; 21 UG/1; UG/1
AEROSOL, METERED RESPIRATORY (INHALATION)
Qty: 12 G | Refills: 0 | Status: SHIPPED | OUTPATIENT
Start: 2022-01-05 | End: 2022-01-31

## 2022-01-31 DIAGNOSIS — J41.0 SIMPLE CHRONIC BRONCHITIS: ICD-10-CM

## 2022-01-31 RX ORDER — NAPROXEN 500 MG/1
TABLET ORAL
Qty: 60 TABLET | Refills: 2 | Status: SHIPPED | OUTPATIENT
Start: 2022-01-31 | End: 2022-06-13

## 2022-01-31 RX ORDER — FLUTICASONE PROPIONATE AND SALMETEROL XINAFOATE 115; 21 UG/1; UG/1
AEROSOL, METERED RESPIRATORY (INHALATION)
Qty: 12 G | Refills: 6 | Status: SHIPPED | OUTPATIENT
Start: 2022-01-31 | End: 2022-06-13

## 2022-03-08 DIAGNOSIS — I10 BENIGN ESSENTIAL HTN: ICD-10-CM

## 2022-03-08 RX ORDER — LISINOPRIL 20 MG/1
TABLET ORAL
Qty: 90 TABLET | Refills: 2 | Status: SHIPPED | OUTPATIENT
Start: 2022-03-08 | End: 2022-12-05

## 2022-06-06 ENCOUNTER — LAB (OUTPATIENT)
Dept: INTERNAL MEDICINE | Facility: CLINIC | Age: 62
End: 2022-06-06

## 2022-06-06 DIAGNOSIS — E11.9 TYPE 2 DIABETES MELLITUS WITHOUT COMPLICATION, WITHOUT LONG-TERM CURRENT USE OF INSULIN: ICD-10-CM

## 2022-06-06 DIAGNOSIS — Z12.5 SCREENING PSA (PROSTATE SPECIFIC ANTIGEN): ICD-10-CM

## 2022-06-06 DIAGNOSIS — E78.5 HYPERLIPIDEMIA LDL GOAL <70: ICD-10-CM

## 2022-06-06 DIAGNOSIS — I10 BENIGN ESSENTIAL HTN: ICD-10-CM

## 2022-06-06 RX ORDER — DULOXETIN HYDROCHLORIDE 60 MG/1
CAPSULE, DELAYED RELEASE ORAL
Qty: 90 CAPSULE | Refills: 3 | Status: SHIPPED | OUTPATIENT
Start: 2022-06-06

## 2022-06-06 NOTE — TELEPHONE ENCOUNTER
Rx Refill Note  Requested Prescriptions     Pending Prescriptions Disp Refills   • DULoxetine (CYMBALTA) 60 MG capsule [Pharmacy Med Name: DULOXETINE HCL DR 60 MG CAPSULE] 90 capsule 3     Sig: TAKE ONE CAPSULE BY MOUTH DAILY      Last office visit with prescribing clinician: 12/9/2021      Next office visit with prescribing clinician: 6/13/2022            Cortney Sampson MA  06/06/22, 11:48 EDT

## 2022-06-07 LAB
ALBUMIN SERPL-MCNC: 4.3 G/DL (ref 3.8–4.8)
ALBUMIN/GLOB SERPL: 1.9 {RATIO} (ref 1.2–2.2)
ALP SERPL-CCNC: 61 IU/L (ref 44–121)
ALT SERPL-CCNC: 37 IU/L (ref 0–44)
AST SERPL-CCNC: 30 IU/L (ref 0–40)
BILIRUB SERPL-MCNC: 0.3 MG/DL (ref 0–1.2)
BUN SERPL-MCNC: 14 MG/DL (ref 8–27)
BUN/CREAT SERPL: 15 (ref 10–24)
CALCIUM SERPL-MCNC: 9.2 MG/DL (ref 8.6–10.2)
CHLORIDE SERPL-SCNC: 106 MMOL/L (ref 96–106)
CHOLEST SERPL-MCNC: 170 MG/DL (ref 100–199)
CHOLEST/HDLC SERPL: 6.1 RATIO (ref 0–5)
CO2 SERPL-SCNC: 23 MMOL/L (ref 20–29)
CREAT SERPL-MCNC: 0.95 MG/DL (ref 0.76–1.27)
EGFRCR SERPLBLD CKD-EPI 2021: 91 ML/MIN/1.73
ERYTHROCYTE [DISTWIDTH] IN BLOOD BY AUTOMATED COUNT: 13.6 % (ref 11.6–15.4)
GLOBULIN SER CALC-MCNC: 2.3 G/DL (ref 1.5–4.5)
GLUCOSE SERPL-MCNC: 112 MG/DL (ref 65–99)
HBA1C MFR BLD: 6.8 % (ref 4.8–5.6)
HCT VFR BLD AUTO: 43.7 % (ref 37.5–51)
HDLC SERPL-MCNC: 28 MG/DL
HGB BLD-MCNC: 14.9 G/DL (ref 13–17.7)
LDLC SERPL CALC-MCNC: 93 MG/DL (ref 0–99)
MCH RBC QN AUTO: 31.2 PG (ref 26.6–33)
MCHC RBC AUTO-ENTMCNC: 34.1 G/DL (ref 31.5–35.7)
MCV RBC AUTO: 91 FL (ref 79–97)
PLATELET # BLD AUTO: 112 X10E3/UL (ref 150–450)
POTASSIUM SERPL-SCNC: 4.7 MMOL/L (ref 3.5–5.2)
PROT SERPL-MCNC: 6.6 G/DL (ref 6–8.5)
PSA SERPL-MCNC: 0.6 NG/ML (ref 0–4)
RBC # BLD AUTO: 4.78 X10E6/UL (ref 4.14–5.8)
SODIUM SERPL-SCNC: 141 MMOL/L (ref 134–144)
TRIGL SERPL-MCNC: 288 MG/DL (ref 0–149)
VLDLC SERPL CALC-MCNC: 49 MG/DL (ref 5–40)
WBC # BLD AUTO: 5.5 X10E3/UL (ref 3.4–10.8)

## 2022-06-13 ENCOUNTER — OFFICE VISIT (OUTPATIENT)
Dept: INTERNAL MEDICINE | Facility: CLINIC | Age: 62
End: 2022-06-13

## 2022-06-13 ENCOUNTER — HOSPITAL ENCOUNTER (OUTPATIENT)
Dept: GENERAL RADIOLOGY | Facility: HOSPITAL | Age: 62
Discharge: HOME | End: 2022-06-13
Admitting: NURSE PRACTITIONER

## 2022-06-13 VITALS
WEIGHT: 274.4 LBS | SYSTOLIC BLOOD PRESSURE: 132 MMHG | TEMPERATURE: 97.5 F | RESPIRATION RATE: 18 BRPM | BODY MASS INDEX: 35.22 KG/M2 | HEART RATE: 97 BPM | DIASTOLIC BLOOD PRESSURE: 88 MMHG | HEIGHT: 74 IN | OXYGEN SATURATION: 96 %

## 2022-06-13 DIAGNOSIS — E78.5 HYPERLIPIDEMIA LDL GOAL <70: ICD-10-CM

## 2022-06-13 DIAGNOSIS — G89.29 CHRONIC PAIN OF RIGHT WRIST: ICD-10-CM

## 2022-06-13 DIAGNOSIS — E11.9 TYPE 2 DIABETES MELLITUS WITHOUT COMPLICATION, WITHOUT LONG-TERM CURRENT USE OF INSULIN: Primary | ICD-10-CM

## 2022-06-13 DIAGNOSIS — I10 BENIGN ESSENTIAL HTN: ICD-10-CM

## 2022-06-13 DIAGNOSIS — M25.531 CHRONIC PAIN OF RIGHT WRIST: ICD-10-CM

## 2022-06-13 DIAGNOSIS — M48.02 CERVICAL SPINAL STENOSIS: ICD-10-CM

## 2022-06-13 PROCEDURE — 99214 OFFICE O/P EST MOD 30 MIN: CPT | Performed by: NURSE PRACTITIONER

## 2022-06-13 PROCEDURE — 73110 X-RAY EXAM OF WRIST: CPT

## 2022-06-13 RX ORDER — MELOXICAM 15 MG/1
15 TABLET ORAL DAILY
Qty: 14 TABLET | Refills: 0 | Status: SHIPPED | OUTPATIENT
Start: 2022-06-13 | End: 2023-02-08

## 2022-06-14 ENCOUNTER — TELEPHONE (OUTPATIENT)
Dept: INTERNAL MEDICINE | Facility: CLINIC | Age: 62
End: 2022-06-14

## 2022-06-14 NOTE — TELEPHONE ENCOUNTER
Okay for hub to read, please read to pt that   No wrist fracture. If pain persists after bracing, icing, and Mobic, then I will have him see hand surgery for possible injection. Please call if referral needed

## 2022-06-14 NOTE — TELEPHONE ENCOUNTER
Caller: Sylwia Spears    Relationship: Self    Best call back number: [phone]    PATIENT CALLED BACK. HUB READ MESSAGE.

## 2022-06-22 LAB
TESTOST FREE SERPL-MCNC: 2.3 PG/ML (ref 6.6–18.1)
TESTOST SERPL-MCNC: 348.2 NG/DL (ref 264–916)

## 2022-06-24 ENCOUNTER — TELEPHONE (OUTPATIENT)
Dept: INTERNAL MEDICINE | Facility: CLINIC | Age: 62
End: 2022-06-24

## 2022-06-24 DIAGNOSIS — G89.29 CHRONIC PAIN OF RIGHT WRIST: ICD-10-CM

## 2022-06-24 DIAGNOSIS — R79.89 LOW SERUM TESTOSTERONE LEVEL IN MALE: Primary | ICD-10-CM

## 2022-06-24 DIAGNOSIS — M25.531 CHRONIC PAIN OF RIGHT WRIST: ICD-10-CM

## 2022-06-24 RX ORDER — MELOXICAM 15 MG/1
TABLET ORAL
Qty: 14 TABLET | Refills: 0 | OUTPATIENT
Start: 2022-06-24

## 2022-06-24 NOTE — TELEPHONE ENCOUNTER
Patient decided that he wanted to go through with the referral to Urology from the low Free Testosterone levels, can we put in a referral for him please

## 2022-06-24 NOTE — TELEPHONE ENCOUNTER
Caller: Sylwia Spears    Relationship: Self    Best call back number: [phone]    What specialty or service is being requested: UROLOGY     What is the provider, practice or medical service name: N/A    What is the office location: IN [place] IF POSSIBLE    What is the office phone number: N/A    Any additional details: PATIENT WAS INSTRUCTED TO CALL BACK WHEN HE WAS READY TO SCHEDULE. PLEASE CALL AND ADVISE.

## 2022-06-24 NOTE — TELEPHONE ENCOUNTER
Rx Refill Note  Requested Prescriptions     Pending Prescriptions Disp Refills   • meloxicam (MOBIC) 15 MG tablet [Pharmacy Med Name: MELOXICAM 15 MG TABLET] 14 tablet 0     Sig: TAKE ONE TABLET BY MOUTH DAILY      Last office visit with prescribing clinician: 6/13/2022      Next office visit with prescribing clinician: 2/8/2023            Cortney Sampson MA  06/24/22, 09:11 EDT

## 2022-07-05 DIAGNOSIS — E11.9 TYPE 2 DIABETES MELLITUS WITHOUT COMPLICATION, WITHOUT LONG-TERM CURRENT USE OF INSULIN: ICD-10-CM

## 2022-07-06 RX ORDER — METFORMIN HYDROCHLORIDE 500 MG/1
TABLET, EXTENDED RELEASE ORAL
Qty: 60 TABLET | Refills: 3 | Status: SHIPPED | OUTPATIENT
Start: 2022-07-06 | End: 2022-12-30 | Stop reason: SDUPTHER

## 2022-12-05 DIAGNOSIS — I10 BENIGN ESSENTIAL HTN: ICD-10-CM

## 2022-12-05 RX ORDER — LISINOPRIL 20 MG/1
TABLET ORAL
Qty: 90 TABLET | Refills: 2 | Status: SHIPPED | OUTPATIENT
Start: 2022-12-05

## 2022-12-30 DIAGNOSIS — E11.9 TYPE 2 DIABETES MELLITUS WITHOUT COMPLICATION, WITHOUT LONG-TERM CURRENT USE OF INSULIN: ICD-10-CM

## 2022-12-30 RX ORDER — METFORMIN HYDROCHLORIDE 500 MG/1
500 TABLET, EXTENDED RELEASE ORAL
Qty: 90 TABLET | Refills: 0 | Status: SHIPPED | OUTPATIENT
Start: 2022-12-30 | End: 2023-02-08

## 2022-12-30 NOTE — TELEPHONE ENCOUNTER
Rx Refill Note  Requested Prescriptions     Pending Prescriptions Disp Refills   • metFORMIN ER (GLUCOPHAGE-XR) 500 MG 24 hr tablet 60 tablet 3     Sig: Take 1 tablet by mouth Daily With Breakfast.      Last office visit with prescribing clinician: 6/13/2022   Last telemedicine visit with prescribing clinician: 2/1/2023   Next office visit with prescribing clinician: 2/8/2023                         Would you like a call back once the refill request has been completed: [] Yes [] No    If the office needs to give you a call back, can they leave a voicemail: [] Yes [] No    Bianca Patterson MA  12/30/22, 16:16 EST

## 2023-02-08 ENCOUNTER — OFFICE VISIT (OUTPATIENT)
Dept: INTERNAL MEDICINE | Facility: CLINIC | Age: 63
End: 2023-02-08
Payer: MEDICARE

## 2023-02-08 VITALS
TEMPERATURE: 98.1 F | HEIGHT: 74 IN | HEART RATE: 82 BPM | BODY MASS INDEX: 35.45 KG/M2 | WEIGHT: 276.2 LBS | DIASTOLIC BLOOD PRESSURE: 74 MMHG | SYSTOLIC BLOOD PRESSURE: 132 MMHG | OXYGEN SATURATION: 95 %

## 2023-02-08 DIAGNOSIS — E78.5 HYPERLIPIDEMIA LDL GOAL <70: ICD-10-CM

## 2023-02-08 DIAGNOSIS — E11.65 TYPE 2 DIABETES MELLITUS WITH HYPERGLYCEMIA, WITHOUT LONG-TERM CURRENT USE OF INSULIN: ICD-10-CM

## 2023-02-08 DIAGNOSIS — Z87.891 PERSONAL HISTORY OF NICOTINE DEPENDENCE: ICD-10-CM

## 2023-02-08 DIAGNOSIS — Z00.00 ENCOUNTER FOR ANNUAL WELLNESS VISIT (AWV) IN MEDICARE PATIENT: Primary | ICD-10-CM

## 2023-02-08 DIAGNOSIS — I10 BENIGN ESSENTIAL HTN: ICD-10-CM

## 2023-02-08 DIAGNOSIS — N52.1 ERECTILE DYSFUNCTION DUE TO DISEASES CLASSIFIED ELSEWHERE: ICD-10-CM

## 2023-02-08 DIAGNOSIS — Z12.2 ENCOUNTER FOR SCREENING FOR LUNG CANCER: ICD-10-CM

## 2023-02-08 PROBLEM — J41.0 SIMPLE CHRONIC BRONCHITIS: Status: RESOLVED | Noted: 2021-12-09 | Resolved: 2023-02-08

## 2023-02-08 PROCEDURE — 1170F FXNL STATUS ASSESSED: CPT | Performed by: NURSE PRACTITIONER

## 2023-02-08 PROCEDURE — 1159F MED LIST DOCD IN RCRD: CPT | Performed by: NURSE PRACTITIONER

## 2023-02-08 PROCEDURE — G0439 PPPS, SUBSEQ VISIT: HCPCS | Performed by: NURSE PRACTITIONER

## 2023-02-08 PROCEDURE — 99214 OFFICE O/P EST MOD 30 MIN: CPT | Performed by: NURSE PRACTITIONER

## 2023-02-08 PROCEDURE — 3052F HG A1C>EQUAL 8.0%<EQUAL 9.0%: CPT | Performed by: NURSE PRACTITIONER

## 2023-02-08 RX ORDER — METFORMIN HYDROCHLORIDE 500 MG/1
1000 TABLET, EXTENDED RELEASE ORAL
Qty: 180 TABLET | Refills: 1 | Status: SHIPPED | OUTPATIENT
Start: 2023-02-08

## 2023-02-08 RX ORDER — TADALAFIL 10 MG/1
10 TABLET ORAL DAILY PRN
Qty: 5 TABLET | Refills: 0 | Status: SHIPPED | OUTPATIENT
Start: 2023-02-08

## 2023-02-08 RX ORDER — MULTIVIT-MIN/FERROUS FUMARATE 9 MG/15 ML
LIQUID (ML) ORAL
COMMUNITY

## 2023-02-08 NOTE — PROGRESS NOTES
The ABCs of the Annual Wellness Visit  Initial Medicare Wellness Visit    Subjective     Sylwia Spears is a 62 y.o. male who presents for an Initial Medicare Wellness Visit.    The following portions of the patient's history were reviewed and   updated as appropriate: allergies, current medications, past family history, past medical history, past social history, past surgical history and problem list.     Compared to one year ago, the patient feels his physical   health is the same.    Compared to one year ago, the patient feels his mental   health is the same.    Recent Hospitalizations:  He was not admitted to the hospital during the last year.       Current Medical Providers:  Patient Care Team:  Angie Munoz APRN as PCP - General (Family Medicine)    Outpatient Medications Prior to Visit   Medication Sig Dispense Refill   • Blood Gluc Meter Disp-Strips device 1 each 2 (Two) Times a Day. 1 each 0   • Blood Glucose Monitoring Suppl (ONE TOUCH ULTRA 2) w/Device kit 1 Device 3 (Three) Times a Day. 1 each 0   • DULoxetine (CYMBALTA) 60 MG capsule TAKE ONE CAPSULE BY MOUTH DAILY 90 capsule 3   • Elastic Bandages & Supports (Wrist Brace Deluxe/Right L-XL) misc 1 each Daily. 1 each 0   • glucose blood test strip Use as instructed 200 each 12   • Lancets (OneTouch Delica Plus [id number]) misc USE THREE TIMES A  each 1   • lisinopril (PRINIVIL,ZESTRIL) 20 MG tablet TAKE ONE TABLET BY MOUTH DAILY 90 tablet 2   • meloxicam (Mobic) 15 MG tablet Take 1 tablet by mouth Daily. 14 tablet 0   • metFORMIN ER (GLUCOPHAGE-XR) 500 MG 24 hr tablet Take 1 tablet by mouth Daily With Breakfast. 90 tablet 0     No facility-administered medications prior to visit.       No opioid medication identified on active medication list. I have reviewed chart for other potential  high risk medication/s and harmful drug interactions in the elderly.          Aspirin is not on active medication list.  Aspirin use is not indicated based on  "review of current medical condition/s. Risk of harm outweighs potential benefits.  .    Patient Active Problem List   Diagnosis   • History of colon polyps   • Type 2 diabetes mellitus without complication, without long-term current use of insulin (HCC)   • Benign essential HTN   • Morbidly obese (HCC)   • Medicare annual wellness visit, initial   • Cervical spinal stenosis   • Screening exam for skin cancer   • Personal history of colonic polyps   • Family history of colon cancer   • Hyperlipidemia LDL goal <70   • Midline thoracic back pain   • Simple chronic bronchitis (HCC)     Advance Care Planning  Advance Directive is not on file.  ACP discussion was held with the patient during this visit. Patient does not have an advance directive, information provided.       Objective    Vitals:    02/08/23 1413   Weight: 125 kg (276 lb 3.2 oz)   Height: 189.2 cm (74.49\")     Estimated body mass index is 35 kg/m² as calculated from the following:    Height as of this encounter: 189.2 cm (74.49\").    Weight as of this encounter: 125 kg (276 lb 3.2 oz).    Class 2 Severe Obesity (BMI >=35 and <=39.9). Obesity-related health conditions include the following: hypertension, diabetes mellitus and ED. Obesity is unchanged. BMI is is above average; BMI management plan is completed. We discussed portion control and increasing exercise.      Does the patient have evidence of cognitive impairment?   No    Lab Results   Component Value Date    CHLPL 187 02/01/2023    TRIG 415 (H) 02/01/2023    HDL 34 (L) 02/01/2023    LDL 85 02/01/2023    VLDL 68 (H) 02/01/2023    HGBA1C 8.10 (H) 02/01/2023        HEALTH RISK ASSESSMENT    Smoking Status:  Social History     Tobacco Use   Smoking Status Every Day   • Packs/day: 1.00   • Years: 40.00   • Pack years: 40.00   • Types: Cigarettes   • Start date: 1976   Smokeless Tobacco Never     Alcohol Consumption:  Social History     Substance and Sexual Activity   Alcohol Use No     Fall Risk " Screen:    PATRICIAADI Fall Risk Assessment was completed, and patient is at MODERATE risk for falls. Assessment completed on:2/8/2023    Depression Screen:   PHQ-2/PHQ-9 Depression Screening 2/8/2023   Little Interest or Pleasure in Doing Things 0-->not at all   Feeling Down, Depressed or Hopeless 0-->not at all   PHQ-9: Brief Depression Severity Measure Score 0       Health Habits and Functional and Cognitive Screening:  Functional & Cognitive Status 2/8/2023   Do you have difficulty preparing food and eating? No   Do you have difficulty bathing yourself, getting dressed or grooming yourself? No   Do you have difficulty using the toilet? No   Do you have difficulty moving around from place to place? No   Do you have trouble with steps or getting out of a bed or a chair? No   Current Diet Well Balanced Diet   Dental Exam Up to date   Eye Exam Up to date   Exercise (times per week) 3 times per week   Current Exercises Include -   Current Exercise Activities Include -   Do you need help using the phone?  No   Are you deaf or do you have serious difficulty hearing?  No   Do you need help with transportation? No   Do you need help shopping? No   Do you need help preparing meals?  No   Do you need help with housework?  No   Do you need help with laundry? No   Do you need help taking your medications? No   Do you need help managing money? No   Do you ever drive or ride in a car without wearing a seat belt? No   Have you felt unusual stress, anger or loneliness in the last month? -   Who do you live with? -   If you need help, do you have trouble finding someone available to you? -   Have you been bothered in the last four weeks by sexual problems? -   Do you have difficulty concentrating, remembering or making decisions? -       Age-appropriate Screening Schedule:  Refer to the list below for future screening recommendations based on patient's age, sex and/or medical conditions. Orders for these recommended tests are listed  "in the plan section. The patient has been provided with a written plan.    Health Maintenance   Topic Date Due   • ZOSTER VACCINE (1 of 2) Never done   • DIABETIC EYE EXAM  01/15/2022   • INFLUENZA VACCINE  Never done   • DIABETIC FOOT EXAM  12/09/2022   • TDAP/TD VACCINES (1 - Tdap) 02/08/2024 (Originally 3/18/1979)   • HEMOGLOBIN A1C  08/01/2023   • LIPID PANEL  02/01/2024   • URINE MICROALBUMIN  02/01/2024          CMS Preventative Services Quick Reference  Risk Factors Identified During Encounter    Chronic Pain: Natural history and expected course discussed. Questions answered.  Immunizations Discussed/Encouraged: Influenza, Pneumococcal 23, Prevnar 20 (Pneumococcal 20-valent conjugate) and Shingrix  Inactivity/Sedentary: Patient was advised to exercise at least 150 minutes a week per CDC recommendations.    The above risks/problems have been discussed with the patient.  Pertinent information has been shared with the patient in the After Visit Summary.  An After Visit Summary and PPPS were made available to the patient.  There are no diagnoses linked to this encounter.  Follow Up:  Next Medicare Wellness visit to be scheduled in 1 year.        Additional E&M Note during same encounter follows:  Patient has multiple medical problems which are significant and separately identifiable that require additional work above and beyond the Medicare Wellness Visit.      Chief Complaint  Medicare Wellness-subsequent    Subjective        HPI  Sylwia Spears is also being seen today for DM 2, HTN, hyperlipidemia, and Cervical spinal stenosis. He takes Metformin XR 500mg daily, switched from 1000mg once daily with food, due to diarrhea. He has not check ed his glucose at all.  He denies blurred vision, urinary changes. His eye exam is scheduled 6-4-2022.      He is on Lisinopril 20mg daily for HTN. Denies CP, SOA, edema. He does not regulary exercise, but does walk with his dog.       He has daily pain from \"where I got " "electrocuted\" he has nerve pain. Pain rated a 5 of 10. He pain is mostly in upper back and shoulders. He takes duloxetine for this, which helps his pain.     He is complaining of ED. Difficultly maintaining erection.        He is complaining of ED.   Review of Systems   Constitutional: Negative.    HENT: Negative.    Eyes: Negative.    Respiratory: Negative.    Cardiovascular: Negative.    Endocrine: Positive for polyuria.   Genitourinary: Negative.    Musculoskeletal: Negative.    Skin: Negative.    Psychiatric/Behavioral: Negative.    All other systems reviewed and are negative.      Objective   Vital Signs:  Ht 189.2 cm (74.49\")   Wt 125 kg (276 lb 3.2 oz)   BMI 35.00 kg/m²     Physical Exam  Vitals reviewed.   Constitutional:       Appearance: Normal appearance. He is obese.   HENT:      Head: Normocephalic.      Right Ear: Tympanic membrane normal.      Left Ear: Tympanic membrane normal.   Cardiovascular:      Rate and Rhythm: Normal rate and regular rhythm.      Pulses: Normal pulses.      Heart sounds: Normal heart sounds.   Pulmonary:      Effort: Pulmonary effort is normal.      Breath sounds: Normal breath sounds.   Musculoskeletal:      Right lower leg: No edema.   Skin:     General: Skin is warm and dry.   Neurological:      General: No focal deficit present.      Mental Status: He is alert and oriented to person, place, and time.      Gait: Gait normal.   Psychiatric:         Mood and Affect: Mood normal.         Behavior: Behavior normal.         Thought Content: Thought content normal.          The following data was reviewed by: CELIA Peres on 02/08/2023:  CBC    CBC 6/6/22 2/1/23   WBC 5.5 5.99   RBC 4.78 4.72   Hemoglobin 14.9 14.4   Hematocrit 43.7 43.6   MCV 91 92.4   MCH 31.2 30.5   MCHC 34.1 33.0   RDW 13.6 14.0   Platelets 112 (A) 108 (A)   (A) Abnormal value       Comments are available for some flowsheets but are not being displayed.           CBC w/diff    CBC w/Diff 6/6/22 2/1/23 "   WBC 5.5 5.99   RBC 4.78 4.72   Hemoglobin 14.9 14.4   Hematocrit 43.7 43.6   MCV 91 92.4   MCH 31.2 30.5   MCHC 34.1 33.0   RDW 13.6 14.0   Platelets 112 (A) 108 (A)   Neutrophil Rel %  53.6   Lymphocyte Rel %  36.4   Monocyte Rel %  6.3   Eosinophil Rel %  3.2   Basophil Rel %  0.3   (A) Abnormal value       Comments are available for some flowsheets but are not being displayed.           Lipid Panel    Lipid Panel 6/6/22 2/1/23   Total Cholesterol 170 187   Triglycerides 288 (A) 415 (A)   HDL Cholesterol 28 (A) 34 (A)   VLDL Cholesterol 49 (A) 68 (A)   LDL Cholesterol  93 85   (A) Abnormal value       Comments are available for some flowsheets but are not being displayed.           HgB    HGB 6/6/22 2/1/23   Hemoglobin 14.9 14.4      Comments are available for some flowsheets but are not being displayed.           Data reviewed: Cardiology studies Telemetry and GI studies colonoscopy           Assessment and Plan   There are no diagnoses linked to this encounter.     Diagnosis Plan   1. Encounter for annual wellness visit (AWV) in Medicare patient  Zinc Acetate, Oral, (ZINC ACETATE PO)    Cholecalciferol (Vitamin D3) 30 MCG/15ML liquid      2. Type 2 diabetes mellitus with hyperglycemia, without long-term current use of insulin (HCC)  metFORMIN ER (GLUCOPHAGE-XR) 500 MG 24 hr tablet    Comprehensive Metabolic Panel    Lipid Panel With / Chol / HDL Ratio    Hemoglobin A1c    Increase Matformin to 1000mg daily. Uncontrolled. Keep a glucose log.       3. Benign essential HTN  Comprehensive Metabolic Panel    Lipid Panel With / Chol / HDL Ratio    Hemoglobin A1c    BP at goal on lisinopril      4. Hyperlipidemia LDL goal <70  Comprehensive Metabolic Panel    Lipid Panel With / Chol / HDL Ratio    Hemoglobin A1c      5. Encounter for screening for lung cancer  CT Chest Low Dose Wo      6. Personal history of nicotine dependence  CT Chest Low Dose Wo      7. Erectile dysfunction due to diseases classified elsewhere   tadalafil (Cialis) 10 MG tablet             I spent 45 minutes caring for Sylwia on this date of service. This time includes time spent by me in the following activities:preparing for the visit, reviewing tests, obtaining and/or reviewing a separately obtained history, performing a medically appropriate examination and/or evaluation , counseling and educating the patient/family/caregiver, ordering medications, tests, or procedures, referring and communicating with other health care professionals  and documenting information in the medical record  Follow Up     4 weeks w hgbA1c    Patient was given instructions and counseling regarding his condition or for health maintenance advice. Please see specific information pulled into the AVS if appropriate.

## 2023-02-10 PROBLEM — Z87.891 PERSONAL HISTORY OF NICOTINE DEPENDENCE: Status: ACTIVE | Noted: 2023-02-10

## 2023-02-10 PROBLEM — N52.1 ERECTILE DYSFUNCTION DUE TO DISEASES CLASSIFIED ELSEWHERE: Status: ACTIVE | Noted: 2023-02-10

## 2023-03-06 ENCOUNTER — HOSPITAL ENCOUNTER (OUTPATIENT)
Dept: CT IMAGING | Facility: HOSPITAL | Age: 63
Discharge: HOME | End: 2023-03-06
Admitting: NURSE PRACTITIONER
Payer: MEDICARE

## 2023-03-06 DIAGNOSIS — Z12.2 ENCOUNTER FOR SCREENING FOR LUNG CANCER: ICD-10-CM

## 2023-03-06 DIAGNOSIS — Z87.891 PERSONAL HISTORY OF NICOTINE DEPENDENCE: ICD-10-CM

## 2023-03-06 PROCEDURE — 71271 CT THORAX LUNG CANCER SCR C-: CPT

## 2023-03-07 RX ORDER — ATORVASTATIN CALCIUM 10 MG/1
10 TABLET, FILM COATED ORAL DAILY
Qty: 90 TABLET | Refills: 1 | Status: SHIPPED | OUTPATIENT
Start: 2023-03-07 | End: 2023-03-08 | Stop reason: SDUPTHER

## 2023-03-08 ENCOUNTER — OFFICE VISIT (OUTPATIENT)
Dept: INTERNAL MEDICINE | Facility: CLINIC | Age: 63
End: 2023-03-08
Payer: MEDICARE

## 2023-03-08 VITALS
TEMPERATURE: 98.7 F | SYSTOLIC BLOOD PRESSURE: 128 MMHG | HEART RATE: 101 BPM | DIASTOLIC BLOOD PRESSURE: 90 MMHG | WEIGHT: 270.4 LBS | BODY MASS INDEX: 34.7 KG/M2 | HEIGHT: 74 IN | OXYGEN SATURATION: 98 %

## 2023-03-08 DIAGNOSIS — E11.65 TYPE 2 DIABETES MELLITUS WITH HYPERGLYCEMIA, WITHOUT LONG-TERM CURRENT USE OF INSULIN: Primary | ICD-10-CM

## 2023-03-08 DIAGNOSIS — I25.10 CORONARY ARTERY CALCIFICATION: ICD-10-CM

## 2023-03-08 DIAGNOSIS — I25.84 CORONARY ARTERY CALCIFICATION: ICD-10-CM

## 2023-03-08 LAB
EXPIRATION DATE: ABNORMAL
HBA1C MFR BLD: 8.1 %
Lab: 543

## 2023-03-08 PROCEDURE — 99214 OFFICE O/P EST MOD 30 MIN: CPT | Performed by: NURSE PRACTITIONER

## 2023-03-08 PROCEDURE — 83036 HEMOGLOBIN GLYCOSYLATED A1C: CPT | Performed by: NURSE PRACTITIONER

## 2023-03-08 PROCEDURE — 3052F HG A1C>EQUAL 8.0%<EQUAL 9.0%: CPT | Performed by: NURSE PRACTITIONER

## 2023-03-08 PROCEDURE — 3074F SYST BP LT 130 MM HG: CPT | Performed by: NURSE PRACTITIONER

## 2023-03-08 PROCEDURE — 3080F DIAST BP >= 90 MM HG: CPT | Performed by: NURSE PRACTITIONER

## 2023-03-08 RX ORDER — ATORVASTATIN CALCIUM 10 MG/1
10 TABLET, FILM COATED ORAL DAILY
Qty: 90 TABLET | Refills: 1
Start: 2023-03-08

## 2023-03-08 NOTE — PROGRESS NOTES
Chief Complaint   Patient presents with   • Diabetes     Follow up       Subjective     Sylwia Spears is a 62 y.o. male being seen for a follow up appointment today regarding uncontrolled DM 2 and recent abnormal CT chest showing: . CT Chest Low Dose Cancer Screening WO (03/06/2023 16:26)  He is on Metformin 1000mg daily with breakfast. He tatum any diarrhea at this time.  Since last visit he cut out his sweets. He has lost 6 pounds. He has not checked hs glucose.     His CT showing Coronary artery calcifications. He denies chest pains. No recent hypoglycemic events. He had a stress test 1-.     History of Present Illness     No Known Allergies      Current Outpatient Medications:   •  atorvastatin (Lipitor) 10 MG tablet, Take 1 tablet by mouth Daily., Disp: 90 tablet, Rfl: 1  •  Blood Gluc Meter Disp-Strips device, 1 each 2 (Two) Times a Day., Disp: 1 each, Rfl: 0  •  Blood Glucose Monitoring Suppl (ONE TOUCH ULTRA 2) w/Device kit, 1 Device 3 (Three) Times a Day., Disp: 1 each, Rfl: 0  •  Cholecalciferol (Vitamin D3) 30 MCG/15ML liquid, Take  by mouth., Disp: , Rfl:   •  DULoxetine (CYMBALTA) 60 MG capsule, TAKE ONE CAPSULE BY MOUTH DAILY, Disp: 90 capsule, Rfl: 3  •  Elastic Bandages & Supports (Wrist Brace Deluxe/Right L-XL) misc, 1 each Daily., Disp: 1 each, Rfl: 0  •  glucose blood test strip, Use as instructed, Disp: 200 each, Rfl: 12  •  Lancets (OneTouch Delica Plus [id number]) misc, USE THREE TIMES A DAY, Disp: 200 each, Rfl: 1  •  lisinopril (PRINIVIL,ZESTRIL) 20 MG tablet, TAKE ONE TABLET BY MOUTH DAILY, Disp: 90 tablet, Rfl: 2  •  metFORMIN ER (GLUCOPHAGE-XR) 500 MG 24 hr tablet, Take 2 tablets by mouth Daily With Breakfast., Disp: 180 tablet, Rfl: 1  •  tadalafil (Cialis) 10 MG tablet, Take 1 tablet by mouth Daily As Needed for Erectile Dysfunction., Disp: 5 tablet, Rfl: 0  •  Zinc Acetate, Oral, (ZINC ACETATE PO), Take  by mouth., Disp: , Rfl:     The following portions of the patient's  history were reviewed and updated as appropriate: allergies, current medications, past family history, past medical history, past social history, past surgical history and problem list.    Review of Systems   Constitutional: Negative.    HENT: Negative.    Eyes: Negative.    Respiratory: Negative.    Cardiovascular: Negative.  Negative for chest pain, palpitations and leg swelling.   Endocrine: Negative.    Genitourinary: Negative.    Musculoskeletal: Negative.    Allergic/Immunologic: Negative.    Neurological: Negative.    Hematological: Negative.    Psychiatric/Behavioral: Negative.    All other systems reviewed and are negative.      Assessment     Physical Exam  Vitals reviewed.   Constitutional:       Appearance: Normal appearance. He is obese.   Cardiovascular:      Rate and Rhythm: Normal rate and regular rhythm.      Pulses: Normal pulses.      Heart sounds: Normal heart sounds. No murmur heard.  Pulmonary:      Effort: Pulmonary effort is normal.      Breath sounds: Normal breath sounds.   Musculoskeletal:      Cervical back: Neck supple.      Right lower leg: No edema.      Left lower leg: No edema.   Skin:     General: Skin is warm and dry.   Neurological:      Mental Status: He is alert and oriented to person, place, and time.   Psychiatric:         Mood and Affect: Mood normal.         Behavior: Behavior normal.         Thought Content: Thought content normal.         Plan     His low dose CT chest reviewed:     Diagnoses and all orders for this visit:    1. Type 2 diabetes mellitus with hyperglycemia, without long-term current use of insulin (HCC) (Primary)  Comments:  Discussed trulicity  Orders:  -     POC Glycosylated Hemoglobin (Hb A1C)    2. Coronary artery calcification  Comments:  Aggressive BP and lipid control discussed. Start statin theray 10mg nightly  Orders:  -     atorvastatin (Lipitor) 10 MG tablet; Take 1 tablet by mouth Daily.  Dispense: 90 tablet; Refill: 1    Continue metformin,  considered Trulicity 0.75mg weekly    Discussed CV risk, start Lipitor 10mg    The 10-year ASCVD risk score (Jovita CONNOLLY, et al., 2019) is: 35.4%    Values used to calculate the score:      Age: 62 years      Sex: Male      Is Non- : No      Diabetic: Yes      Tobacco smoker: Yes      Systolic Blood Pressure: 128 mmHg      Is BP treated: Yes      HDL Cholesterol: 34 mg/dL      Total Cholesterol: 187 mg/dL    Follow up in 3 months with [facility] labs

## 2023-05-28 RX ORDER — DULOXETIN HYDROCHLORIDE 60 MG/1
CAPSULE, DELAYED RELEASE ORAL
Qty: 90 CAPSULE | Refills: 3 | Status: SHIPPED | OUTPATIENT
Start: 2023-05-28

## 2023-05-28 NOTE — TELEPHONE ENCOUNTER
Rx Refill Note  Requested Prescriptions     Pending Prescriptions Disp Refills    DULoxetine (CYMBALTA) 60 MG capsule [Pharmacy Med Name: DULoxetine HCL DR 60 MG CAPSULE] 90 capsule 3     Sig: TAKE ONE CAPSULE BY MOUTH DAILY      Last office visit with prescribing clinician: 3/8/2023   Last telemedicine visit with prescribing clinician: Visit date not found   Next office visit with prescribing clinician: 6/9/2023                         Would you like a call back once the refill request has been completed: [] Yes [] No    If the office needs to give you a call back, can they leave a voicemail: [] Yes [] No    Bianca Patterson MA  05/28/23, 15:08 EDT

## 2023-06-09 ENCOUNTER — OFFICE VISIT (OUTPATIENT)
Dept: INTERNAL MEDICINE | Facility: CLINIC | Age: 63
End: 2023-06-09
Payer: MEDICARE

## 2023-06-09 VITALS
TEMPERATURE: 97.7 F | BODY MASS INDEX: 32.75 KG/M2 | HEIGHT: 74 IN | WEIGHT: 255.2 LBS | DIASTOLIC BLOOD PRESSURE: 88 MMHG | RESPIRATION RATE: 18 BRPM | OXYGEN SATURATION: 97 % | SYSTOLIC BLOOD PRESSURE: 132 MMHG | HEART RATE: 64 BPM

## 2023-06-09 DIAGNOSIS — I10 BENIGN ESSENTIAL HTN: ICD-10-CM

## 2023-06-09 DIAGNOSIS — I83.891 VARICOSE VEINS OF RIGHT LOWER EXTREMITY WITH OTHER COMPLICATIONS: ICD-10-CM

## 2023-06-09 DIAGNOSIS — E11.9 TYPE 2 DIABETES MELLITUS WITHOUT COMPLICATION, WITHOUT LONG-TERM CURRENT USE OF INSULIN: Primary | ICD-10-CM

## 2023-06-09 DIAGNOSIS — E78.5 HYPERLIPIDEMIA LDL GOAL <70: ICD-10-CM

## 2023-06-09 DIAGNOSIS — Z12.5 SCREENING FOR PROSTATE CANCER: ICD-10-CM

## 2023-06-09 NOTE — PROGRESS NOTES
"Chief Complaint   Patient presents with    Diabetes     3 month follow up    Hypertension       Subjective     Sylwia Spears is a 63 y.o. male being seen for a follow up appointment for DM 2, HTN and hyperlipidemia.  He is on Metformin XR 500mg 2 daily. He has not been checking his glucose levels at home. He denies any hypoglycemic events unless he does not eat. He has lost 20 pounds with better eating and avoiding sugars.     He is on lisinopril 20mg daily for HTN. He tolerates this w/o cough.     He has daily pain from \"where I got electrocuted\" he has nerve pain. Daily pain rated a 5 of 10. He pain is mostly in upper back and shoulders. He takes duloxetine for this.      He has a CT chest showing Coronary artery calcifications. He was started on Lipitor 10mg nightly for this. He denies chest pain, unilateral weakness.  He had a stress test 1-.      He is having aching pain in varicose veins of RLE. He has tried massage. He denies ulcerations, redness.     History of Present Illness     No Known Allergies      Current Outpatient Medications:     atorvastatin (Lipitor) 10 MG tablet, Take 1 tablet by mouth Daily., Disp: 90 tablet, Rfl: 1    Cholecalciferol (Vitamin D3) 30 MCG/15ML liquid, Take  by mouth., Disp: , Rfl:     DULoxetine (CYMBALTA) 60 MG capsule, TAKE ONE CAPSULE BY MOUTH DAILY, Disp: 90 capsule, Rfl: 3    lisinopril (PRINIVIL,ZESTRIL) 20 MG tablet, TAKE ONE TABLET BY MOUTH DAILY, Disp: 90 tablet, Rfl: 2    metFORMIN ER (GLUCOPHAGE-XR) 500 MG 24 hr tablet, Take 2 tablets by mouth Daily With Breakfast., Disp: 180 tablet, Rfl: 1    Zinc Acetate, Oral, (ZINC ACETATE PO), Take  by mouth., Disp: , Rfl:     The following portions of the patient's history were reviewed and updated as appropriate: allergies, current medications, past family history, past medical history, past social history, past surgical history, and problem list.    Review of Systems   Constitutional: Negative.  Negative for fatigue. "   HENT: Negative.     Eyes: Negative.    Respiratory: Negative.  Negative for cough and shortness of breath.    Cardiovascular:  Positive for leg swelling. Negative for palpitations.   Gastrointestinal: Negative.    Endocrine: Negative.  Negative for polyphagia and polyuria.   Genitourinary: Negative.    Musculoskeletal:  Positive for arthralgias, back pain, neck pain and neck stiffness.   Allergic/Immunologic: Negative.    Neurological: Negative.    Hematological: Negative.    Psychiatric/Behavioral: Negative.     All other systems reviewed and are negative.    Assessment     Physical Exam  Vitals reviewed.   Constitutional:       Appearance: Normal appearance. He is obese. He is not ill-appearing.   HENT:      Head: Normocephalic.      Nose: Nose normal.   Cardiovascular:      Rate and Rhythm: Normal rate and regular rhythm.      Pulses: Normal pulses.      Heart sounds: Normal heart sounds. No murmur heard.  Musculoskeletal:      Right lower leg: Edema (varicose veins RLE) present.      Left lower leg: No edema.   Skin:     General: Skin is warm and dry.   Neurological:      General: No focal deficit present.      Mental Status: He is alert and oriented to person, place, and time.   Psychiatric:         Mood and Affect: Mood normal.         Behavior: Behavior normal.         Thought Content: Thought content normal.       Plan     His fasting labs were reviewed with the patient from last week.     Diagnoses and all orders for this visit:    1. Type 2 diabetes mellitus without complication, without long-term current use of insulin (Primary)  Comments:  Hgb A1c at goal pn Meformin XR 1000mg daily  Orders:  -     CBC & Differential; Future  -     Comprehensive Metabolic Panel; Future  -     Lipid Panel With / Chol / HDL Ratio; Future  -     Hemoglobin A1c; Future  -     Microalbumin / Creatinine Urine Ratio - Urine, Clean Catch; Future    2. Benign essential HTN  Comments:  BP at goal on Lisinopril 20mg  daily  Orders:  -     CBC & Differential; Future  -     Comprehensive Metabolic Panel; Future  -     Lipid Panel With / Chol / HDL Ratio; Future    3. Hyperlipidemia LDL goal <70  Comments:  LDL at goal < 70 on Lipitor 10mg nightly  Orders:  -     Comprehensive Metabolic Panel; Future  -     Lipid Panel With / Chol / HDL Ratio; Future    4. Varicose veins of right lower extremity with other complications  Comments:  RLE wo ulveration, support hose ordered  Orders:  -     Ambulatory Referral to Vascular Surgery    5. Screening for prostate cancer  -     Elastic Bandages & Supports (Support Compression Sock Mens) misc; 1 each Daily.  -     PSA Screen; Future        Follow up with AWV in 6 months with labs

## 2023-06-28 ENCOUNTER — TELEPHONE (OUTPATIENT)
Dept: INTERNAL MEDICINE | Facility: CLINIC | Age: 63
End: 2023-06-28

## 2023-06-28 NOTE — TELEPHONE ENCOUNTER
Caller: [email or web address]    Relationship: Emergency Contact    Best call back number: [phone]    What is the medical concern/diagnosis: RIGHT SHOULDER PAIN     What specialty or service is being requested:    What is the provider, practice or medical service name:     What is the office location:     What is the office phone number:     Any additional details: WIFE STATES SHOULDER PAIN IS GETTING WORSE. ASKING FOR REFERRAL OR RECOMMENDATIONS FOR TREATMENT

## 2023-06-28 NOTE — TELEPHONE ENCOUNTER
Patient was just seen on 6/9 would you be okay with putting a referral for him in or advising on treatment options?

## 2023-06-30 NOTE — TELEPHONE ENCOUNTER
Tried to call patient to inform him of this but was unable to reach and left a voicemail for him to call back to discuss

## 2023-07-27 ENCOUNTER — OFFICE VISIT (OUTPATIENT)
Dept: ORTHOPEDIC SURGERY | Facility: CLINIC | Age: 63
End: 2023-07-27
Payer: MEDICARE

## 2023-07-27 VITALS
HEIGHT: 74 IN | BODY MASS INDEX: 32.29 KG/M2 | HEART RATE: 66 BPM | WEIGHT: 251.6 LBS | SYSTOLIC BLOOD PRESSURE: 138 MMHG | DIASTOLIC BLOOD PRESSURE: 79 MMHG

## 2023-07-27 DIAGNOSIS — R29.898 SHOULDER WEAKNESS: ICD-10-CM

## 2023-07-27 DIAGNOSIS — G89.29 CHRONIC RIGHT SHOULDER PAIN: Primary | ICD-10-CM

## 2023-07-27 DIAGNOSIS — M25.511 CHRONIC RIGHT SHOULDER PAIN: Primary | ICD-10-CM

## 2023-07-27 PROCEDURE — 99203 OFFICE O/P NEW LOW 30 MIN: CPT | Performed by: ORTHOPAEDIC SURGERY

## 2023-07-27 RX ORDER — METHYLPREDNISOLONE 4 MG/1
TABLET ORAL
Qty: 1 EACH | Refills: 0 | Status: SHIPPED | OUTPATIENT
Start: 2023-07-27

## 2023-07-27 RX ORDER — MELOXICAM 15 MG/1
15 TABLET ORAL DAILY
Qty: 30 TABLET | Refills: 0 | Status: SHIPPED | OUTPATIENT
Start: 2023-07-27

## 2023-07-27 NOTE — PROGRESS NOTES
Subjective:     Patient ID: Sylwia Spears is a 63 y.o. male.    Chief Complaint:  Right shoulder pain, new patient  History of Present Illness  Sylwia Spears presents to clinic today for evaluation of right shoulder pain.     On 2023, he was using a , went to raise it with his right upper extremity, and felt a sharp pain in his right shoulder. He denies feeling a pop. He has had minimal improvement in his pain since then. He is right hand dominant. He is not diabetic and is not taking any anti-inflammatories. Sleeping on his back and right side exacerbates the pain. He rates his pain as an 8 to 10 out of 10. He describes his pain as stabbing, radiating, and aching in nature. He has occasional sharp pain localized primarily to the anterior and anterolateral aspects with some radiation down the lateral and anterior aspects of his upper extremity. He denies any current new numbness or tingling to his right upper extremity. Minimal improvement with rest, activity modification, and over the counter anti-inflammatory medications. Pain is worse with attempts at trying to raise his upper extremity overhead, lifting, pushing, pulling, or any resistance activities. He has significant decrease in his ability to even be able to actively move his upper extremity at this time. He had prior left shoulder surgery several years ago. He is currently retired. He denies any associated neck pain.     Social History     Occupational History    Not on file   Tobacco Use    Smoking status: Former     Packs/day: 1.00     Years: 47.00     Pack years: 47.00     Types: Cigarettes     Start date:      Quit date: 2022     Years since quittin.6     Passive exposure: Past    Smokeless tobacco: Never   Vaping Use    Vaping Use: Never used   Substance and Sexual Activity    Alcohol use: No    Drug use: Yes     Frequency: 7.0 times per week     Types: Marijuana    Sexual activity: Defer      Past Medical History:  "  Diagnosis Date    Cervical disc disease 2012    stenosis    Colon polyp     Diabetes mellitus     Electrocution 2012    Fell off a ladder and has chronic neck pain    Hypertension      Past Surgical History:   Procedure Laterality Date    CHOLECYSTECTOMY      COLONOSCOPY      COLONOSCOPY N/A 8/17/2018    Procedure: COLONOSCOPY, polypectomy;  Surgeon: Raleigh Champagne MD;  Location: [facility];  Service: Gastroenterology    COLONOSCOPY W/ POLYPECTOMY      COLONOSCOPY W/ POLYPECTOMY N/A 9/2/2021    Procedure: COLONOSCOPY; polypectomy;  Surgeon: Raleigh Champagne MD;  Location: [facility];  Service: Gastroenterology;  Laterality: N/A;  polyps   diverticulosis    SHOULDER SURGERY Left        Family History   Problem Relation Age of Onset    Colon cancer Mother     Stroke Mother     Cancer Mother     Crohn's disease Brother          Review of Systems        Objective:  Vitals:    07/27/23 1046   BP: 138/79   Pulse: 66   Weight: 114 kg (251 lb 9.6 oz)   Height: 189.2 cm (74.49\")         07/27/23  1046   Weight: 114 kg (251 lb 9.6 oz)     Body mass index is 31.88 kg/mý.  Physical Exam    Vital signs reviewed.   General: No acute distress, alert and oriented  Eyes: conjunctiva clear; pupils equally round and reactive  ENT: external ears and nose atraumatic; oropharynx clear  CV: no peripheral edema  Resp: normal respiratory effort  Skin: no rashes or wounds; normal turgor  Psych: mood and affect appropriate; recent and remote memory intact        Ortho Exam     Right shoulder-  FF- Active- 50 degrees  Passive- 95 degrees  Strength- 2/5  ER- Active- 10 degrees  Passive 35 degrees  Strength- 2/5  IR Strength- 4/5  Bear hug sign- Negative  Empty can test- Positive  Drop arm test- Positive  Ext rotation lag sign- Positive  Neer's sign- Positive  Garcia- Positive  Cross arm test- Negative  Tenderness over AC joint- Minimal  Yergason- Positive  Speeds- Positive  [place]'s- Positive  Brisk cap refill to all " digits, palpable 2+ radial pulse right wrist    Imaging:  Right Shoulder X-Ray  Indication: Pain  AP, scapular Y, and axillary lateral views    Findings:  No fracture  No bony lesion  Normal soft tissues  Moderate glenohumeral joint space narrowing with mild to moderate humeral head elevation noted, mild anterior positioning of humeral head on the glenoid on axillary lateral view.  Small reactive osteophyte along medial proximal calcar of humerus.  Blunting of greater tuberosity profile on AP view    No prior studies were available for comparison.      Assessment:        1. Chronic right shoulder pain    2. Shoulder weakness           Plan:        1. Discussed treatment options at length with patient at today's visit. At this point in time, I am concerned about a large rotator cuff tear given his significant weakness and inability to actively move his right upper extremity at this point in time. We discussed options. I would recommend an urgent MRI to evaluate this in further detail, which has been ordered today.  2. I am giving the patient a prescription for Medrol Dosepak to be followed by meloxicam to try to help with initial swelling and inflammation, as well as his pain that he is noticing at this time.  3. I have recommended that he try to work on some light passive and active assisted range of motion as much as possible to limit stiffness to his shoulder.  4. Follow up after MRI to review results and discuss further treatment options.      Sylwia Spears was in agreement with plan and had all questions answered.     Orders:  Orders Placed This Encounter   Procedures    XR Shoulder 2+ View Right    MRI Shoulder Right Without Contrast       Medications:  New Medications Ordered This Visit   Medications    methylPREDNISolone (MEDROL) 4 MG dose pack     Sig: Take as directed on package instructions.     Dispense:  1 each     Refill:  0    meloxicam (MOBIC) 15 MG tablet     Sig: Take 1 tablet by mouth Daily.      Dispense:  30 tablet     Refill:  0       Followup:  Return for review of MRI results.    Diagnoses and all orders for this visit:    1. Chronic right shoulder pain (Primary)  -     XR Shoulder 2+ View Right    2. Shoulder weakness  -     MRI Shoulder Right Without Contrast; Future    Other orders  -     methylPREDNISolone (MEDROL) 4 MG dose pack; Take as directed on package instructions.  Dispense: 1 each; Refill: 0  -     meloxicam (MOBIC) 15 MG tablet; Take 1 tablet by mouth Daily.  Dispense: 30 tablet; Refill: 0          Dictated utilizing Dragon dictation     Transcribed from ambient dictation for David Ozuna MD by Sharon Whatley.  07/27/23   14:15 EDT    Patient or patient representative verbalized consent to the visit recording.  I have personally performed the services described in this document as transcribed by the above individual, and it is both accurate and complete.

## 2023-08-07 DIAGNOSIS — E11.65 TYPE 2 DIABETES MELLITUS WITH HYPERGLYCEMIA, WITHOUT LONG-TERM CURRENT USE OF INSULIN: ICD-10-CM

## 2023-08-07 RX ORDER — METFORMIN HYDROCHLORIDE 500 MG/1
TABLET, EXTENDED RELEASE ORAL
Qty: 180 TABLET | Refills: 1 | Status: SHIPPED | OUTPATIENT
Start: 2023-08-07

## 2023-08-18 ENCOUNTER — HOSPITAL ENCOUNTER (OUTPATIENT)
Dept: MRI IMAGING | Facility: HOSPITAL | Age: 63
Discharge: HOME | End: 2023-08-18
Admitting: ORTHOPAEDIC SURGERY
Payer: MEDICARE

## 2023-08-18 DIAGNOSIS — R29.898 SHOULDER WEAKNESS: ICD-10-CM

## 2023-08-18 PROCEDURE — 73221 MRI JOINT UPR EXTREM W/O DYE: CPT

## 2023-08-21 ENCOUNTER — TELEPHONE (OUTPATIENT)
Dept: ORTHOPEDIC SURGERY | Facility: CLINIC | Age: 63
End: 2023-08-21

## 2023-08-21 NOTE — TELEPHONE ENCOUNTER
Caller: Sylwia Spears    Relationship to patient: Self    Best call back number: [phone]    Chief complaint: RIGHT SHOULDER    Type of visit: FOLLOW UP     Requested date: ASAP     Additional notes:HUB UNABLE TO SCHEDULE BEFORE 09/14. PATIENT'S WIFE IS ASKING IF HE CAN BE WORKED IN ANY SOONER.

## 2023-08-28 ENCOUNTER — OFFICE VISIT (OUTPATIENT)
Dept: ORTHOPEDIC SURGERY | Facility: CLINIC | Age: 63
End: 2023-08-28
Payer: MEDICARE

## 2023-08-28 VITALS — BODY MASS INDEX: 32.21 KG/M2 | HEIGHT: 74 IN | WEIGHT: 251 LBS

## 2023-08-28 DIAGNOSIS — M75.121 COMPLETE TEAR OF RIGHT ROTATOR CUFF, UNSPECIFIED WHETHER TRAUMATIC: ICD-10-CM

## 2023-08-28 DIAGNOSIS — M12.811 ROTATOR CUFF ARTHROPATHY OF RIGHT SHOULDER: ICD-10-CM

## 2023-08-28 DIAGNOSIS — G89.29 CHRONIC RIGHT SHOULDER PAIN: Primary | ICD-10-CM

## 2023-08-28 DIAGNOSIS — M25.511 CHRONIC RIGHT SHOULDER PAIN: Primary | ICD-10-CM

## 2023-08-28 RX ORDER — DICLOFENAC SODIUM 75 MG/1
75 TABLET, DELAYED RELEASE ORAL 2 TIMES DAILY
Qty: 42 TABLET | Refills: 0 | Status: SHIPPED | OUTPATIENT
Start: 2023-08-28

## 2023-08-28 RX ORDER — PREDNISONE 10 MG/1
TABLET ORAL
Qty: 39 TABLET | Refills: 0 | Status: SHIPPED | OUTPATIENT
Start: 2023-08-28

## 2023-08-28 RX ADMIN — LIDOCAINE HYDROCHLORIDE 4 ML: 10 INJECTION, SOLUTION EPIDURAL; INFILTRATION; INTRACAUDAL; PERINEURAL at 16:15

## 2023-08-28 RX ADMIN — TRIAMCINOLONE ACETONIDE 80 MG: 40 INJECTION, SUSPENSION INTRA-ARTICULAR; INTRAMUSCULAR at 16:15

## 2023-08-28 NOTE — PROGRESS NOTES
Subjective:     Patient ID: Sylwia Spears is a 63 y.o. male.    Chief Complaint:  Follow-up right shoulder pain, follow-up MRI results    History of Present Illness  Sylwia Spears returns to clinic today for evaluation of right shoulder pain. He is accompanied by an adult female.    The patient's pain is continuing to be significant for him with minimal to no improvement in regards to motion, strength, or function of his right upper extremity. He continues to localize pain to the anterolateral aspect of the right shoulder, rates it as moderate to severe in intensity, worse with any attempts to try and range his upper extremity overhead, particularly with any attempts at resistant activities. He denies any numbness or paresthesia. He denies any fevers, chills, or sweats. Mild radiation of pain down the anterolateral aspect of the right upper extremity. He is right-hand dominant. He tried meloxicam with no significant improvement, and he was unable to tolerate Celebrex due to abdominal pain.     The patient had a chest x-ray ordered by Dr. Munoz, and was told that it was not cause for concern. He lost approximately 40 pounds and quit smoking cigarettes. His hemoglobin A1c was at least 6.0 percent, but he is not sure.      Social History     Occupational History    Not on file   Tobacco Use    Smoking status: Former     Packs/day: 1.00     Years: 47.00     Pack years: 47.00     Types: Cigarettes     Start date:      Quit date: 2022     Years since quittin.8     Passive exposure: Past    Smokeless tobacco: Never   Vaping Use    Vaping Use: Never used   Substance and Sexual Activity    Alcohol use: No    Drug use: Yes     Frequency: 7.0 times per week     Types: Marijuana    Sexual activity: Defer      Past Medical History:   Diagnosis Date    Cervical disc disease 2012    stenosis    Colon polyp     Diabetes mellitus     Electrocution     Fell off a ladder and has chronic neck pain    Hypertension   "    Past Surgical History:   Procedure Laterality Date    CHOLECYSTECTOMY      COLONOSCOPY      COLONOSCOPY N/A 8/17/2018    Procedure: COLONOSCOPY, polypectomy;  Surgeon: Raleigh Champagne MD;  Location: [facility] OR;  Service: Gastroenterology    COLONOSCOPY W/ POLYPECTOMY      COLONOSCOPY W/ POLYPECTOMY N/A 9/2/2021    Procedure: COLONOSCOPY; polypectomy;  Surgeon: Raleigh Champagne MD;  Location: [facility] OR;  Service: Gastroenterology;  Laterality: N/A;  polyps   diverticulosis    SHOULDER SURGERY Left        Family History   Problem Relation Age of Onset    Colon cancer Mother     Stroke Mother     Cancer Mother     Crohn's disease Brother          Review of Systems        Objective:  Vitals:    08/28/23 1515   Weight: 114 kg (251 lb)   Height: 189.2 cm (74.49\")         08/28/23  1515   Weight: 114 kg (251 lb)     Body mass index is 31.8 kg/m².  Physical Exam    Vital signs reviewed.   General: No acute distress, alert and oriented  Eyes: conjunctiva clear; pupils equally round and reactive  ENT: external ears and nose atraumatic; oropharynx clear  CV: no peripheral edema  Resp: normal respiratory effort  Skin: no rashes or wounds; normal turgor  Psych: mood and affect appropriate; recent and remote memory intact        Ortho Exam     Right shoulder-    FF- Active- 50 degrees  Passive- 95 degrees  Strength- 2/5  ER- Active- 10 degrees  Passive 35 degrees  Strength- 2/5  IR Strength- 4/5  Bear hug sign- Negative  Empty can test- Positive  Drop arm test- Positive  Ext rotation lag sign- Positive  Neer's sign- Positive  Garcia- Positive  Cross arm test- Negative  Tenderness over AC joint- Minimal  Yergason- Positive  Speeds- Positive  [place]'s- Positive  Brisk cap refill to all digits, palpable 2+ radial pulse right wrist    Imaging:    MRI Shoulder Right Without Contrast    Result Date: 8/18/2023   Complete supraspinatus and infraspinatus tendon tears with retraction to the glenohumeral joint " and associated moderate fatty muscle atrophy.  Mild fatty muscle atrophy of the teres minor muscle without evidence of tendon tear. No suspicious lesion within the quadrilateral space.  Distal subscapularis tendinopathy with possible low-grade partial-thickness articular sided tearing at the footprint. No significant fatty atrophy.  Glenohumeral degenerative change including anterior superior labral tearing from 12:00 to 4:00 as well as mild articular cartilage loss of the anterior superior glenoid.  Small joint effusion with fluid decompressing into the subacromial-subdeltoid bursa..   This report was finalized on 8/18/2023 3:32 PM by Ryan Issa MD.         Reviewed outside MRI including review of images as well as radiology report indicates massive rotator cuff tear involving supraspinatus and infraspinatus with retraction to the glenoid with moderate fatty atrophy of greater than 50% of both supraspinatus and infraspinatus on sagittal imaging with degenerative changes of the glenohumeral joint noted.  Assessment:        1. Chronic right shoulder pain    2. Rotator cuff arthropathy of right shoulder    3. Complete tear of right rotator cuff, unspecified whether traumatic           Plan:    - Large Joint Arthrocentesis: R glenohumeral on 8/28/2023 4:15 PM  Indications: pain  Details: 22 G needle, anterior approach  Medications: 80 mg triamcinolone acetonide 40 MG/ML; 4 mL lidocaine PF 1% 1 %  Outcome: tolerated well, no immediate complications  Procedure, treatment alternatives, risks and benefits explained, specific risks discussed. Consent was given by the patient. Immediately prior to procedure a time out was called to verify the correct patient, procedure, equipment, support staff and site/side marked as required. Patient was prepped and draped in the usual sterile fashion.             1. I discussed treatment options at length with patient at today's visit. Reviewed with patient findings from MRI indicating  massive rotator cuff tear with significant retraction and fatty atrophy as well as glenohumeral degenerative change. This is most consistent with rotator cuff arthropathy. Given his failure of other conservative treatment including anti-inflammatory medications, activity modification and rest as well as his significant limitations with functional activity and failure of home exercise program over the last 4 to 6 weeks, we discussed options. He would like to proceed with right reverse shoulder arthroplasty. At this point today, he would like to proceed with a glenohumeral injection, so we will schedule his surgery for 3 months or later on account of this.  2. The patient would like to proceed with cortisone injection today to right shoulder glenohumeral joint. Recommended limited use of affected extremity for the next 24 hours to only essential activities other than work on general active and passive motion. Recommended supplementing with ice and soft tissue massage. Discussed with patient that they should see results in 5 to 7 days, if no improvement in 5 to 6 weeks I have asked them to call the office to review other options. Patient should call office immediately if they notice redness, warmth, fevers, chills, or residual numbness or tingling for greater than 6 hours after injection.   3. The patient wishes to proceed with reverse shoulder arthroplasty at this point in time. I discussed with the patient the specific indication of a reverse shoulder arthroplasty particularly for shoulder pain as well as for pseudo paralysis, and reviewed anatomy of the shoulder as well as a model of the implant.  I reviewed details of the procedure as well as risks, benefits, and alternatives with the risks including but not limited to neurovascular damage, bleeding, infection, periprosthetic fracture, chronic pain, instability, loosening of implant, failure of implant, loss of motion, weakness, stiffness, DVT, pulmonary embolus,  death, stroke, complex regional pain syndrome, myocardial infarction, need for additional procedures.  Patient understood all these had all questions answered today.  We will have patient medically optimized by primary care physician and plan on proceeding with surgery at next available date.  Patient understood all this had all questions answered.  No guarantees were given in regards to results of the surgery.  4. The patient denies history of DVT or pulmonary embolus. Denies cardiac history. He is diabetic.    Sylwia Spears was in agreement with plan and had all questions answered.     Orders:  Orders Placed This Encounter   Procedures    - Large Joint Arthrocentesis: R glenohumeral    CT shoulder right wo contrast       Medications:  New Medications Ordered This Visit   Medications    diclofenac (VOLTAREN) 75 MG EC tablet     Sig: Take 1 tablet by mouth 2 (Two) Times a Day.     Dispense:  42 tablet     Refill:  0    predniSONE (DELTASONE) 10 MG tablet     Si mg po daily x 3 days, then 40 mg po daily x 3 days, then 20 mg po daily x 3 days, then 10 mg po daily x 3 days     Dispense:  39 tablet     Refill:  0       Followup:  Return for preop visit.    Diagnoses and all orders for this visit:    1. Chronic right shoulder pain (Primary)  -     CT shoulder right wo contrast; Future    2. Rotator cuff arthropathy of right shoulder  -     CT shoulder right wo contrast; Future    3. Complete tear of right rotator cuff, unspecified whether traumatic    Other orders  -     diclofenac (VOLTAREN) 75 MG EC tablet; Take 1 tablet by mouth 2 (Two) Times a Day.  Dispense: 42 tablet; Refill: 0  -     predniSONE (DELTASONE) 10 MG tablet; 60 mg po daily x 3 days, then 40 mg po daily x 3 days, then 20 mg po daily x 3 days, then 10 mg po daily x 3 days  Dispense: 39 tablet; Refill: 0  -     - Large Joint Arthrocentesis: R glenohumeral          Dictated utilizing Dragon dictation   Transcribed from ambient dictation for  David Ozuna MD by Sharon Whatley.  08/28/23   17:11 EDT    Patient or patient representative verbalized consent to the visit recording.  I have personally performed the services described in this document as transcribed by the above individual, and it is both accurate and complete.

## 2023-08-31 DIAGNOSIS — I10 BENIGN ESSENTIAL HTN: ICD-10-CM

## 2023-08-31 DIAGNOSIS — I25.10 CORONARY ARTERY CALCIFICATION: ICD-10-CM

## 2023-08-31 DIAGNOSIS — I25.84 CORONARY ARTERY CALCIFICATION: ICD-10-CM

## 2023-08-31 RX ORDER — LISINOPRIL 20 MG/1
TABLET ORAL
Qty: 90 TABLET | Refills: 2 | Status: SHIPPED | OUTPATIENT
Start: 2023-08-31

## 2023-08-31 RX ORDER — ATORVASTATIN CALCIUM 10 MG/1
TABLET, FILM COATED ORAL
Qty: 90 TABLET | Refills: 1 | Status: SHIPPED | OUTPATIENT
Start: 2023-08-31

## 2023-09-07 ENCOUNTER — HOSPITAL ENCOUNTER (OUTPATIENT)
Dept: CT IMAGING | Facility: HOSPITAL | Age: 63
Discharge: HOME | End: 2023-09-07
Admitting: ORTHOPAEDIC SURGERY
Payer: MEDICARE

## 2023-09-07 DIAGNOSIS — M12.811 ROTATOR CUFF ARTHROPATHY OF RIGHT SHOULDER: ICD-10-CM

## 2023-09-07 DIAGNOSIS — M25.511 CHRONIC RIGHT SHOULDER PAIN: ICD-10-CM

## 2023-09-07 DIAGNOSIS — G89.29 CHRONIC RIGHT SHOULDER PAIN: ICD-10-CM

## 2023-09-07 PROCEDURE — 73200 CT UPPER EXTREMITY W/O DYE: CPT

## 2023-09-26 DIAGNOSIS — E11.9 TYPE 2 DIABETES MELLITUS WITHOUT COMPLICATION, WITHOUT LONG-TERM CURRENT USE OF INSULIN: ICD-10-CM

## 2023-09-26 DIAGNOSIS — E78.5 HYPERLIPIDEMIA LDL GOAL <70: ICD-10-CM

## 2023-09-26 DIAGNOSIS — Z12.5 SCREENING FOR PROSTATE CANCER: ICD-10-CM

## 2023-09-26 DIAGNOSIS — I10 BENIGN ESSENTIAL HTN: ICD-10-CM

## 2023-09-27 PROBLEM — M19.019 DJD OF SHOULDER: Status: ACTIVE | Noted: 2023-09-27

## 2023-09-27 RX ORDER — TRIAMCINOLONE ACETONIDE 40 MG/ML
80 INJECTION, SUSPENSION INTRA-ARTICULAR; INTRAMUSCULAR
Status: COMPLETED | OUTPATIENT
Start: 2023-08-28 | End: 2023-08-28

## 2023-09-27 RX ORDER — ACETAMINOPHEN 325 MG/1
1000 TABLET ORAL ONCE
OUTPATIENT
Start: 2023-09-27 | End: 2023-09-27

## 2023-09-27 RX ORDER — LIDOCAINE HYDROCHLORIDE 10 MG/ML
4 INJECTION, SOLUTION EPIDURAL; INFILTRATION; INTRACAUDAL; PERINEURAL
Status: COMPLETED | OUTPATIENT
Start: 2023-08-28 | End: 2023-08-28

## 2023-09-27 RX ORDER — PREGABALIN 150 MG/1
150 CAPSULE ORAL ONCE
OUTPATIENT
Start: 2023-09-27 | End: 2023-09-27

## 2023-09-27 RX ORDER — MELOXICAM 7.5 MG/1
15 TABLET ORAL ONCE
OUTPATIENT
Start: 2023-09-27 | End: 2023-09-27

## 2023-09-29 PROBLEM — M12.811 ROTATOR CUFF ARTHROPATHY OF RIGHT SHOULDER: Status: ACTIVE | Noted: 2023-09-29

## 2023-09-29 PROBLEM — M75.121 COMPLETE TEAR OF RIGHT ROTATOR CUFF: Status: ACTIVE | Noted: 2023-09-29

## 2023-10-07 LAB
ALBUMIN SERPL-MCNC: 4.6 G/DL (ref 3.5–5.2)
ALBUMIN/GLOB SERPL: 2.3 G/DL
ALP SERPL-CCNC: 52 U/L (ref 39–117)
ALT SERPL-CCNC: 30 U/L (ref 1–41)
AST SERPL-CCNC: 28 U/L (ref 1–40)
BASOPHILS # BLD AUTO: 0.04 10*3/MM3 (ref 0–0.2)
BASOPHILS NFR BLD AUTO: 0.7 % (ref 0–1.5)
BILIRUB SERPL-MCNC: 0.4 MG/DL (ref 0–1.2)
BUN SERPL-MCNC: 22 MG/DL (ref 8–23)
BUN/CREAT SERPL: 28.2 (ref 7–25)
CALCIUM SERPL-MCNC: 9.9 MG/DL (ref 8.6–10.5)
CHLORIDE SERPL-SCNC: 104 MMOL/L (ref 98–107)
CHOLEST SERPL-MCNC: 117 MG/DL (ref 0–200)
CHOLEST/HDLC SERPL: 3.34 {RATIO}
CO2 SERPL-SCNC: 26.2 MMOL/L (ref 22–29)
CREAT SERPL-MCNC: 0.78 MG/DL (ref 0.76–1.27)
EGFRCR SERPLBLD CKD-EPI 2021: 100.8 ML/MIN/1.73
EOSINOPHIL # BLD AUTO: 0.11 10*3/MM3 (ref 0–0.4)
EOSINOPHIL NFR BLD AUTO: 2.1 % (ref 0.3–6.2)
ERYTHROCYTE [DISTWIDTH] IN BLOOD BY AUTOMATED COUNT: 13 % (ref 12.3–15.4)
GLOBULIN SER CALC-MCNC: 2 GM/DL
GLUCOSE SERPL-MCNC: 123 MG/DL (ref 65–99)
HBA1C MFR BLD: 6.2 % (ref 4.8–5.6)
HCT VFR BLD AUTO: 43.9 % (ref 37.5–51)
HDLC SERPL-MCNC: 35 MG/DL (ref 40–60)
HGB BLD-MCNC: 15.1 G/DL (ref 13–17.7)
IMM GRANULOCYTES # BLD AUTO: 0.01 10*3/MM3 (ref 0–0.05)
IMM GRANULOCYTES NFR BLD AUTO: 0.2 % (ref 0–0.5)
LDLC SERPL CALC-MCNC: 61 MG/DL (ref 0–100)
LYMPHOCYTES # BLD AUTO: 1.8 10*3/MM3 (ref 0.7–3.1)
LYMPHOCYTES NFR BLD AUTO: 33.6 % (ref 19.6–45.3)
MCH RBC QN AUTO: 30.9 PG (ref 26.6–33)
MCHC RBC AUTO-ENTMCNC: 34.4 G/DL (ref 31.5–35.7)
MCV RBC AUTO: 89.8 FL (ref 79–97)
MONOCYTES # BLD AUTO: 0.38 10*3/MM3 (ref 0.1–0.9)
MONOCYTES NFR BLD AUTO: 7.1 % (ref 5–12)
NEUTROPHILS # BLD AUTO: 3.02 10*3/MM3 (ref 1.7–7)
NEUTROPHILS NFR BLD AUTO: 56.3 % (ref 42.7–76)
PLATELET # BLD AUTO: 121 10*3/MM3 (ref 140–450)
POTASSIUM SERPL-SCNC: 4.4 MMOL/L (ref 3.5–5.2)
PROT SERPL-MCNC: 6.6 G/DL (ref 6–8.5)
PSA SERPL-MCNC: 0.8 NG/ML (ref 0–4)
RBC # BLD AUTO: 4.89 10*6/MM3 (ref 4.14–5.8)
SODIUM SERPL-SCNC: 140 MMOL/L (ref 136–145)
TRIGL SERPL-MCNC: 116 MG/DL (ref 0–150)
UNABLE TO VOID: NORMAL
VLDLC SERPL CALC-MCNC: 21 MG/DL (ref 5–40)
WBC # BLD AUTO: 5.36 10*3/MM3 (ref 3.4–10.8)

## 2023-10-17 ENCOUNTER — OFFICE VISIT (OUTPATIENT)
Dept: INTERNAL MEDICINE | Facility: CLINIC | Age: 63
End: 2023-10-17
Payer: MEDICARE

## 2023-10-17 VITALS
BODY MASS INDEX: 31.29 KG/M2 | OXYGEN SATURATION: 96 % | DIASTOLIC BLOOD PRESSURE: 80 MMHG | SYSTOLIC BLOOD PRESSURE: 124 MMHG | TEMPERATURE: 98.4 F | HEIGHT: 74 IN | WEIGHT: 243.8 LBS | HEART RATE: 88 BPM

## 2023-10-17 DIAGNOSIS — M25.511 CHRONIC RIGHT SHOULDER PAIN: ICD-10-CM

## 2023-10-17 DIAGNOSIS — I10 BENIGN ESSENTIAL HTN: ICD-10-CM

## 2023-10-17 DIAGNOSIS — I25.10 CORONARY ARTERY CALCIFICATION: ICD-10-CM

## 2023-10-17 DIAGNOSIS — G89.29 CHRONIC RIGHT SHOULDER PAIN: ICD-10-CM

## 2023-10-17 DIAGNOSIS — I25.84 CORONARY ARTERY CALCIFICATION: ICD-10-CM

## 2023-10-17 DIAGNOSIS — D69.6 THROMBOCYTOPENIA: ICD-10-CM

## 2023-10-17 DIAGNOSIS — E11.9 TYPE 2 DIABETES MELLITUS WITHOUT COMPLICATION, WITHOUT LONG-TERM CURRENT USE OF INSULIN: Primary | ICD-10-CM

## 2023-10-17 RX ORDER — METFORMIN HYDROCHLORIDE 500 MG/1
500 TABLET, EXTENDED RELEASE ORAL
Start: 2023-10-17

## 2023-10-17 RX ORDER — GABAPENTIN 300 MG/1
300 CAPSULE ORAL NIGHTLY
Qty: 30 CAPSULE | Refills: 0 | Status: SHIPPED | OUTPATIENT
Start: 2023-10-17

## 2023-10-17 NOTE — PROGRESS NOTES
"Chief Complaint   Patient presents with    Hypertension     F/u    Diabetes       Subjective     Sylwia Spears is a 63 y.o. male being seen for a follow up appointment today regarding DM 2, HTN and hyperlipidemia.  He is on Metformin XR 500mg 2 daily, but motivated to get off this. He has not been checking his glucose levels at home. He denies any hypoglycemic events unless he does not eat. He has lost 40 pounds with better eating and avoiding sugars over the past 6 months, this is intentional weight loss.      He is on lisinopril 20mg daily for HTN. He tolerates this w/o cough.      He has daily pain from \"where I got electrocuted\" he has nerve pain. Daily pain rated a 5 of 10. He pain is mostly in upper back and shoulders. He takes duloxetine for this.       He has a CT chest showing Coronary artery calcifications. He was started on Lipitor 10mg nightly for this. He denies chest pain, unilateral weakness.  He had a stress test 1-.      He is having aching pain in varicose veins of RLE. He has tried massage. He denies ulcerations, redness.      He has chronic right shoulder pain, and had an injection with Dr. Ozuna. He is scheduled for total shoulder 12-1-2023. He has been taking his wife's gabapentin and has helped with the pain.     He has had low mildly platelet count for 3 years, stable. No excess bruising, no use of aspirin products.     Hypertension  Pertinent negatives include no chest pain or palpitations.   Diabetes  Pertinent negatives for diabetes include no chest pain.        No Known Allergies      Current Outpatient Medications:     atorvastatin (LIPITOR) 10 MG tablet, TAKE ONE TABLET BY MOUTH DAILY, Disp: 90 tablet, Rfl: 1    Cholecalciferol (Vitamin D3) 30 MCG/15ML liquid, Take  by mouth., Disp: , Rfl:     CINNAMON PO, Take  by mouth., Disp: , Rfl:     DULoxetine (CYMBALTA) 60 MG capsule, TAKE ONE CAPSULE BY MOUTH DAILY, Disp: 90 capsule, Rfl: 3    lisinopril (PRINIVIL,ZESTRIL) 20 MG " tablet, TAKE ONE TABLET BY MOUTH DAILY, Disp: 90 tablet, Rfl: 2    metFORMIN ER (GLUCOPHAGE-XR) 500 MG 24 hr tablet, TAKE TWO TABLETS BY MOUTH DAILY WITH BREAKFAST, Disp: 180 tablet, Rfl: 1    Turmeric (QC TUMERIC COMPLEX PO), Take  by mouth., Disp: , Rfl:     Zinc Acetate, Oral, (ZINC ACETATE PO), Take  by mouth., Disp: , Rfl:     diclofenac (VOLTAREN) 75 MG EC tablet, Take 1 tablet by mouth 2 (Two) Times a Day. (Patient not taking: Reported on 10/17/2023), Disp: 42 tablet, Rfl: 0    Elastic Bandages & Supports (Support Compression Sock Mens) misc, 1 each Daily., Disp: , Rfl:     meloxicam (MOBIC) 15 MG tablet, Take 1 tablet by mouth Daily. (Patient not taking: Reported on 10/17/2023), Disp: 30 tablet, Rfl: 0    methylPREDNISolone (MEDROL) 4 MG dose pack, Take as directed on package instructions., Disp: 1 each, Rfl: 0    predniSONE (DELTASONE) 10 MG tablet, 60 mg po daily x 3 days, then 40 mg po daily x 3 days, then 20 mg po daily x 3 days, then 10 mg po daily x 3 days, Disp: 39 tablet, Rfl: 0    Reviewed allergies, PMSH, and med list    Review of Systems   Constitutional: Negative.    HENT: Negative.  Negative for congestion.    Respiratory: Negative.     Cardiovascular: Negative.  Negative for chest pain, palpitations and leg swelling.   Gastrointestinal: Negative.    Genitourinary: Negative.    Musculoskeletal:  Positive for arthralgias (shoulder pain).   Skin: Negative.    Allergic/Immunologic: Negative.    Hematological: Negative.  Negative for adenopathy. Does not bruise/bleed easily.   Psychiatric/Behavioral: Negative.  Negative for agitation and behavioral problems.    All other systems reviewed and are negative.      Assessment     Physical Exam  Vitals reviewed.   HENT:      Right Ear: Tympanic membrane normal.      Left Ear: Tympanic membrane normal.   Cardiovascular:      Rate and Rhythm: Normal rate and regular rhythm.      Pulses: Normal pulses.      Heart sounds: Normal heart sounds. No murmur  heard.  Pulmonary:      Effort: Pulmonary effort is normal. No respiratory distress.      Breath sounds: Normal breath sounds. No stridor.   Musculoskeletal:      Right shoulder: Tenderness (anterior) and crepitus present. No bony tenderness. Decreased range of motion. Decreased strength. Normal pulse.      Cervical back: Neck supple.      Right lower leg: No edema.      Left lower leg: No edema.   Skin:     General: Skin is warm and dry.   Neurological:      General: No focal deficit present.      Mental Status: He is oriented to person, place, and time.   Psychiatric:         Mood and Affect: Mood normal.         Behavior: Behavior normal.         Thought Content: Thought content normal.         Plan     His fasting labs were reviewed with the patient from last week.     Diagnoses and all orders for this visit:    1. Type 2 diabetes mellitus without complication, without long-term current use of insulin (Primary)  Comments:  Reduce Metformin XR 500mg daily due to weight loss  Orders:  -     metFORMIN ER (GLUCOPHAGE-XR) 500 MG 24 hr tablet; Take 1 tablet by mouth Daily With Breakfast.  -     CBC & Differential; Future  -     Comprehensive Metabolic Panel; Future  -     Lipid Panel With / Chol / HDL Ratio; Future  -     Hemoglobin A1c; Future    2. Benign essential HTN  Comments:  BP at goal on lisinopril 20mg daily    3. Coronary artery calcification  Comments:  LDL at goal on Lipitor 20mg nightly. Pre-op stress test needed  Orders:  -     Treadmill Stress Test; Future  -     Comprehensive Metabolic Panel; Future  -     Lipid Panel With / Chol / HDL Ratio; Future    4. Chronic right shoulder pain  Comments:  Reviewd Ortho note  Orders:  -     gabapentin (NEURONTIN) 300 MG capsule; Take 1 capsule by mouth Every Night.  Dispense: 30 capsule; Refill: 0    5. Thrombocytopenia  Comments:  Hematology eval due to low platelets  Orders:  -     Ambulatory Referral to Hematology  -     CBC & Differential; Future    The  patient has read and signed the [facility] Controlled Substance Contract.  I will continue to see patient for regular follow up appointments.  They are well controlled on their medication.  JUDI is updated every 3 months. The patient is aware of the potential for addiction and dependence.     Follow up in 3 months w labs

## 2023-10-18 LAB
ALBUMIN/CREAT UR: 3 MG/G CREAT (ref 0–29)
CREAT UR-MCNC: 114.8 MG/DL
MICROALBUMIN UR-MCNC: 3.5 UG/ML

## 2023-11-02 ENCOUNTER — HOSPITAL ENCOUNTER (OUTPATIENT)
Dept: CARDIOLOGY | Facility: HOSPITAL | Age: 63
Discharge: HOME | End: 2023-11-02
Payer: MEDICARE

## 2023-11-02 DIAGNOSIS — I25.84 CORONARY ARTERY CALCIFICATION: ICD-10-CM

## 2023-11-02 DIAGNOSIS — I25.10 CORONARY ARTERY CALCIFICATION: ICD-10-CM

## 2023-11-02 LAB
BH CV STRESS BP STAGE 1: NORMAL
BH CV STRESS BP STAGE 2: NORMAL
BH CV STRESS DURATION MIN STAGE 1: 3
BH CV STRESS DURATION MIN STAGE 2: 2
BH CV STRESS DURATION SEC STAGE 1: 0
BH CV STRESS DURATION SEC STAGE 2: 59
BH CV STRESS GRADE STAGE 1: 10
BH CV STRESS GRADE STAGE 2: 12
BH CV STRESS HR STAGE 1: 107
BH CV STRESS HR STAGE 2: 143
BH CV STRESS METS STAGE 1: 4.6
BH CV STRESS METS STAGE 2: 7.1
BH CV STRESS PROTOCOL 1: NORMAL
BH CV STRESS RECOVERY BP: NORMAL MMHG
BH CV STRESS RECOVERY HR: 86 BPM
BH CV STRESS SPEED STAGE 1: 1.7
BH CV STRESS SPEED STAGE 2: 2.5
BH CV STRESS STAGE 1: 1
BH CV STRESS STAGE 2: 2
MAXIMAL PREDICTED HEART RATE: 158 BPM
PERCENT MAX PREDICTED HR: 91.77 %
STRESS BASELINE BP: NORMAL MMHG
STRESS BASELINE HR: 79 BPM
STRESS O2 SAT REST: 96 %
STRESS PERCENT HR: 108 %
STRESS POST ESTIMATED WORKLOAD: 7.1 METS
STRESS POST EXERCISE DUR MIN: 5 MIN
STRESS POST EXERCISE DUR SEC: 59 SEC
STRESS POST O2 SAT PEAK: 96 %
STRESS POST PEAK BP: NORMAL MMHG
STRESS POST PEAK HR: 145 BPM
STRESS TARGET HR: 134 BPM

## 2023-11-02 PROCEDURE — 93017 CV STRESS TEST TRACING ONLY: CPT

## 2023-11-16 ENCOUNTER — CONSULT (OUTPATIENT)
Dept: ONCOLOGY | Facility: CLINIC | Age: 63
End: 2023-11-16
Payer: MEDICARE

## 2023-11-16 ENCOUNTER — APPOINTMENT (OUTPATIENT)
Dept: LAB | Facility: HOSPITAL | Age: 63
End: 2023-11-16
Payer: MEDICARE

## 2023-11-16 VITALS
OXYGEN SATURATION: 96 % | RESPIRATION RATE: 16 BRPM | DIASTOLIC BLOOD PRESSURE: 84 MMHG | SYSTOLIC BLOOD PRESSURE: 148 MMHG | HEART RATE: 73 BPM | HEIGHT: 74 IN | TEMPERATURE: 98.2 F | BODY MASS INDEX: 31.73 KG/M2 | WEIGHT: 247.2 LBS

## 2023-11-16 DIAGNOSIS — D69.6 THROMBOCYTOPENIA: Primary | ICD-10-CM

## 2023-11-16 LAB
BASOPHILS # BLD AUTO: 0.02 10*3/MM3 (ref 0–0.2)
BASOPHILS NFR BLD AUTO: 0.4 % (ref 0–1.5)
DEPRECATED RDW RBC AUTO: 46.1 FL (ref 37–54)
EOSINOPHIL # BLD AUTO: 0.11 10*3/MM3 (ref 0–0.4)
EOSINOPHIL NFR BLD AUTO: 2.2 % (ref 0.3–6.2)
ERYTHROCYTE [DISTWIDTH] IN BLOOD BY AUTOMATED COUNT: 13.4 % (ref 12.3–15.4)
ERYTHROCYTE [SEDIMENTATION RATE] IN BLOOD: 13 MM/HR (ref 0–20)
FOLATE SERPL-MCNC: 9.75 NG/ML (ref 4.78–24.2)
HCT VFR BLD AUTO: 44.1 % (ref 37.5–51)
HGB BLD-MCNC: 14.7 G/DL (ref 13–17.7)
IMM GRANULOCYTES # BLD AUTO: 0.01 10*3/MM3 (ref 0–0.05)
IMM GRANULOCYTES NFR BLD AUTO: 0.2 % (ref 0–0.5)
LYMPHOCYTES # BLD AUTO: 1.59 10*3/MM3 (ref 0.7–3.1)
LYMPHOCYTES NFR BLD AUTO: 31.6 % (ref 19.6–45.3)
MCH RBC QN AUTO: 30.8 PG (ref 26.6–33)
MCHC RBC AUTO-ENTMCNC: 33.3 G/DL (ref 31.5–35.7)
MCV RBC AUTO: 92.3 FL (ref 79–97)
MONOCYTES # BLD AUTO: 0.29 10*3/MM3 (ref 0.1–0.9)
MONOCYTES NFR BLD AUTO: 5.8 % (ref 5–12)
NEUTROPHILS NFR BLD AUTO: 3.01 10*3/MM3 (ref 1.7–7)
NEUTROPHILS NFR BLD AUTO: 59.8 % (ref 42.7–76)
NRBC BLD AUTO-RTO: 0 /100 WBC (ref 0–0.2)
PLATELET # BLD AUTO: 112 10*3/MM3 (ref 140–450)
PLATELETS.RETICULATED NFR BLD AUTO: 4.4 % (ref 0.9–6.5)
PMV BLD AUTO: 11 FL (ref 6–12)
RBC # BLD AUTO: 4.78 10*6/MM3 (ref 4.14–5.8)
VIT B12 BLD-MCNC: 654 PG/ML (ref 211–946)
WBC NRBC COR # BLD AUTO: 5.03 10*3/MM3 (ref 3.4–10.8)

## 2023-11-16 PROCEDURE — 85652 RBC SED RATE AUTOMATED: CPT | Performed by: INTERNAL MEDICINE

## 2023-11-16 PROCEDURE — 82784 ASSAY IGA/IGD/IGG/IGM EACH: CPT

## 2023-11-16 PROCEDURE — 3077F SYST BP >= 140 MM HG: CPT | Performed by: INTERNAL MEDICINE

## 2023-11-16 PROCEDURE — 82746 ASSAY OF FOLIC ACID SERUM: CPT | Performed by: INTERNAL MEDICINE

## 2023-11-16 PROCEDURE — 85055 RETICULATED PLATELET ASSAY: CPT | Performed by: INTERNAL MEDICINE

## 2023-11-16 PROCEDURE — 86334 IMMUNOFIX E-PHORESIS SERUM: CPT | Performed by: INTERNAL MEDICINE

## 2023-11-16 PROCEDURE — 36415 COLL VENOUS BLD VENIPUNCTURE: CPT | Performed by: INTERNAL MEDICINE

## 2023-11-16 PROCEDURE — 85025 COMPLETE CBC W/AUTO DIFF WBC: CPT | Performed by: INTERNAL MEDICINE

## 2023-11-16 PROCEDURE — 82607 VITAMIN B-12: CPT | Performed by: INTERNAL MEDICINE

## 2023-11-16 PROCEDURE — 99204 OFFICE O/P NEW MOD 45 MIN: CPT | Performed by: INTERNAL MEDICINE

## 2023-11-16 PROCEDURE — 86038 ANTINUCLEAR ANTIBODIES: CPT | Performed by: INTERNAL MEDICINE

## 2023-11-16 PROCEDURE — 1126F AMNT PAIN NOTED NONE PRSNT: CPT | Performed by: INTERNAL MEDICINE

## 2023-11-16 PROCEDURE — 82784 ASSAY IGA/IGD/IGG/IGM EACH: CPT | Performed by: INTERNAL MEDICINE

## 2023-11-16 PROCEDURE — 3079F DIAST BP 80-89 MM HG: CPT | Performed by: INTERNAL MEDICINE

## 2023-11-16 NOTE — PROGRESS NOTES
Subjective     REASON FOR CONSULTATION: Thrombocytopenia  Provide an opinion on any further workup or treatment                             REQUESTING PHYSICIAN: Margaret    RECORDS OBTAINED:  Records of the patients history including those obtained from the referring provider were reviewed and summarized in detail.    HISTORY OF PRESENT ILLNESS:  The patient is a 63 y.o. year old male who is here for an opinion about the above issue.    History of Present Illness   This is a pleasant 63-year-old man with diabetes, hypertension, obesity prior electrocution experienced during work as  referred for evaluation of thrombocytopenia.  Reviewing the patient's labs, show a persistent low platelet count since at least June 2020 ranging between 108-130,000 with otherwise normal white blood cell count, differential and hemoglobin.  The patient denies excessive bleeding or bruising.  He does not drink alcohol on a regular basis.  He does not have history of liver problems to his knowledge.  He denies history of autoimmune disorders or thromboses.  He has lost 70 pounds over the previous year with dietary modification but denies unintentional weight loss fevers chills night sweats or other constitutional symptoms.    Past Medical History:   Diagnosis Date    Cervical disc disease 2012    stenosis    Colon polyp     Coronary artery calcification     Diabetes mellitus     Electrocution 2012    Fell off a ladder and has chronic neck pain    Hypertension     Thrombocytopenia         Past Surgical History:   Procedure Laterality Date    CHOLECYSTECTOMY      COLONOSCOPY      COLONOSCOPY N/A 8/17/2018    Procedure: COLONOSCOPY, polypectomy;  Surgeon: Raleigh Champagne MD;  Location: [facility] OR;  Service: Gastroenterology    COLONOSCOPY W/ POLYPECTOMY      COLONOSCOPY W/ POLYPECTOMY N/A 9/2/2021    Procedure: COLONOSCOPY; polypectomy;  Surgeon: Raleigh Champagne MD;  Location: [facility] OR;  Service:  Gastroenterology;  Laterality: N/A;  polyps   diverticulosis    SHOULDER SURGERY Left         Current Outpatient Medications on File Prior to Visit   Medication Sig Dispense Refill    atorvastatin (LIPITOR) 10 MG tablet TAKE ONE TABLET BY MOUTH DAILY 90 tablet 1    Cholecalciferol (Vitamin D3) 30 MCG/15ML liquid Take  by mouth.      CINNAMON PO Take  by mouth.      DULoxetine (CYMBALTA) 60 MG capsule TAKE ONE CAPSULE BY MOUTH DAILY 90 capsule 3    Elastic Bandages & Supports (Support Compression Sock Mens) misc 1 each Daily.      gabapentin (NEURONTIN) 300 MG capsule Take 1 capsule by mouth Every Night. 30 capsule 0    lisinopril (PRINIVIL,ZESTRIL) 20 MG tablet TAKE ONE TABLET BY MOUTH DAILY 90 tablet 2    metFORMIN ER (GLUCOPHAGE-XR) 500 MG 24 hr tablet Take 1 tablet by mouth Daily With Breakfast.      Turmeric (QC TUMERIC COMPLEX PO) Take  by mouth.      Zinc Acetate, Oral, (ZINC ACETATE PO) Take  by mouth.       No current facility-administered medications on file prior to visit.        ALLERGIES:  No Known Allergies     Social History     Socioeconomic History    Marital status:    Tobacco Use    Smoking status: Former     Packs/day: 1.00     Years: 47.00     Additional pack years: 0.00     Total pack years: 47.00     Types: Cigarettes     Start date:      Quit date: 2022     Years since quittin.9     Passive exposure: Past    Smokeless tobacco: Never   Vaping Use    Vaping Use: Never used   Substance and Sexual Activity    Alcohol use: No    Drug use: Yes     Frequency: 7.0 times per week     Types: Marijuana    Sexual activity: Defer        Family History   Problem Relation Age of Onset    Colon cancer Mother     Stroke Mother     Cancer Mother     Crohn's disease Brother         Review of Systems   Constitutional: Negative.    HENT: Negative.     Respiratory: Negative.     Cardiovascular: Negative.    Gastrointestinal: Negative.    Genitourinary: Negative.    Musculoskeletal:  Positive for  "arthralgias.   Neurological: Negative.    Hematological: Negative.    Psychiatric/Behavioral: Negative.            Objective     Vitals:    11/16/23 1052   BP: 148/84   Pulse: 73   Resp: 16   Temp: 98.2 °F (36.8 °C)   TempSrc: Infrared   SpO2: 96%   Weight: 112 kg (247 lb 3.2 oz)   Height: 187 cm (73.62\")   PainSc: 0-No pain   PainLoc: Shoulder         11/16/2023    10:59 AM   Current Status   ECOG score 0       Physical Exam    CONSTITUTIONAL: pleasant well-developed adult man  HEENT: no icterus, no thrush, moist membranes  LYMPH: no cervical or supraclavicular lad  CV: RRR, S1S2, no murmur  RESP: cta bilat, no wheezing, no rales  GI: soft, non-tender, no splenomegaly but spleen exam limited by habitus, +bs  MUSC: no edema, normal gait, decreased range of motion right shoulder  NEURO: alert and oriented x3, normal strength  PSYCH: normal mood and affect      RECENT LABS:  Hematology WBC   Date Value Ref Range Status   10/06/2023 5.36 3.40 - 10.80 10*3/mm3 Final     RBC   Date Value Ref Range Status   10/06/2023 4.89 4.14 - 5.80 10*6/mm3 Final     Hemoglobin   Date Value Ref Range Status   10/06/2023 15.1 13.0 - 17.7 g/dL Final   11/01/2018 16.6 14.0 - 18.0 g/dL Final     Hematocrit   Date Value Ref Range Status   10/06/2023 43.9 37.5 - 51.0 % Final   11/01/2018 48.9 42.0 - 52.0 % Final     Platelets   Date Value Ref Range Status   10/06/2023 121 (L) 140 - 450 10*3/mm3 Final   11/01/2018 184 140 - 500 10*3/mm3 Final          Assessment & Plan     *Chronic mild thrombocytopenia in the absence of WBC/Hgb abnormalities    Hematology recommendations/plan:  Further assessment with vitamin B12, folate, SALLY, ESR, IPF, SARAVANAN/reflex and abdominal ultrasound to assess for fatty liver/cirrhosis and splenomegaly.  We will call the patient for results once available.  He will require at least annual follow-up CBC with PCP or hematology for surveillance.  He states he is scheduled for upcoming shoulder surgery and his platelet " count is adequate for surgery.    We will call the patient with results and further advice once available.  Thank you for allowing me to participate in the care of this pleasant patient.

## 2023-11-16 NOTE — LETTER
November 16, 2023       No Recipients    Patient: Sylwia Spears   YOB: 1960   Date of Visit: 11/16/2023     Dear CELIA Peres:       Thank you for referring Sylwia Spears to me for evaluation. Below are the relevant portions of my assessment and plan of care.    If you have questions, please do not hesitate to call me. I look forward to following Sylwia along with you.         Sincerely,        Uriel Fowler MD        CC:   No Recipients    Uriel Fowler MD  11/16/23 1151  Sign when Signing Visit    Subjective    REASON FOR CONSULTATION: Thrombocytopenia  Provide an opinion on any further workup or treatment                             REQUESTING PHYSICIAN: Margaret    RECORDS OBTAINED:  Records of the patients history including those obtained from the referring provider were reviewed and summarized in detail.    HISTORY OF PRESENT ILLNESS:  The patient is a 63 y.o. year old male who is here for an opinion about the above issue.    History of Present Illness   This is a pleasant 63-year-old man with diabetes, hypertension, obesity prior electrocution experienced during work as  referred for evaluation of thrombocytopenia.  Reviewing the patient's labs, show a persistent low platelet count since at least June 2020 ranging between 108-130,000 with otherwise normal white blood cell count, differential and hemoglobin.  The patient denies excessive bleeding or bruising.  He does not drink alcohol on a regular basis.  He does not have history of liver problems to his knowledge.  He denies history of autoimmune disorders or thromboses.  He has lost 70 pounds over the previous year with dietary modification but denies unintentional weight loss fevers chills night sweats or other constitutional symptoms.    Past Medical History:   Diagnosis Date   • Cervical disc disease 2012    stenosis   • Colon polyp    • Coronary artery calcification    • Diabetes mellitus    • Electrocution 2012     Fell off a ladder and has chronic neck pain   • Hypertension    • Thrombocytopenia         Past Surgical History:   Procedure Laterality Date   • CHOLECYSTECTOMY     • COLONOSCOPY     • COLONOSCOPY N/A 2018    Procedure: COLONOSCOPY, polypectomy;  Surgeon: Raleigh Champagne MD;  Location: [facility];  Service: Gastroenterology   • COLONOSCOPY W/ POLYPECTOMY     • COLONOSCOPY W/ POLYPECTOMY N/A 2021    Procedure: COLONOSCOPY; polypectomy;  Surgeon: Raleigh Champagne MD;  Location: [facility];  Service: Gastroenterology;  Laterality: N/A;  polyps   diverticulosis   • SHOULDER SURGERY Left         Current Outpatient Medications on File Prior to Visit   Medication Sig Dispense Refill   • atorvastatin (LIPITOR) 10 MG tablet TAKE ONE TABLET BY MOUTH DAILY 90 tablet 1   • Cholecalciferol (Vitamin D3) 30 MCG/15ML liquid Take  by mouth.     • CINNAMON PO Take  by mouth.     • DULoxetine (CYMBALTA) 60 MG capsule TAKE ONE CAPSULE BY MOUTH DAILY 90 capsule 3   • Elastic Bandages & Supports (Support Compression Sock Mens) misc 1 each Daily.     • gabapentin (NEURONTIN) 300 MG capsule Take 1 capsule by mouth Every Night. 30 capsule 0   • lisinopril (PRINIVIL,ZESTRIL) 20 MG tablet TAKE ONE TABLET BY MOUTH DAILY 90 tablet 2   • metFORMIN ER (GLUCOPHAGE-XR) 500 MG 24 hr tablet Take 1 tablet by mouth Daily With Breakfast.     • Turmeric (QC TUMERIC COMPLEX PO) Take  by mouth.     • Zinc Acetate, Oral, (ZINC ACETATE PO) Take  by mouth.       No current facility-administered medications on file prior to visit.        ALLERGIES:  No Known Allergies     Social History     Socioeconomic History   • Marital status:    Tobacco Use   • Smoking status: Former     Packs/day: 1.00     Years: 47.00     Additional pack years: 0.00     Total pack years: 47.00     Types: Cigarettes     Start date:      Quit date: 2022     Years since quittin.9     Passive exposure: Past   • Smokeless tobacco: Never  "  Vaping Use   • Vaping Use: Never used   Substance and Sexual Activity   • Alcohol use: No   • Drug use: Yes     Frequency: 7.0 times per week     Types: Marijuana   • Sexual activity: Defer        Family History   Problem Relation Age of Onset   • Colon cancer Mother    • Stroke Mother    • Cancer Mother    • Crohn's disease Brother         Review of Systems   Constitutional: Negative.    HENT: Negative.     Respiratory: Negative.     Cardiovascular: Negative.    Gastrointestinal: Negative.    Genitourinary: Negative.    Musculoskeletal:  Positive for arthralgias.   Neurological: Negative.    Hematological: Negative.    Psychiatric/Behavioral: Negative.            Objective    Vitals:    11/16/23 1052   BP: 148/84   Pulse: 73   Resp: 16   Temp: 98.2 °F (36.8 °C)   TempSrc: Infrared   SpO2: 96%   Weight: 112 kg (247 lb 3.2 oz)   Height: 187 cm (73.62\")   PainSc: 0-No pain   PainLoc: Shoulder         11/16/2023    10:59 AM   Current Status   ECOG score 0       Physical Exam    CONSTITUTIONAL: pleasant well-developed adult man  HEENT: no icterus, no thrush, moist membranes  LYMPH: no cervical or supraclavicular lad  CV: RRR, S1S2, no murmur  RESP: cta bilat, no wheezing, no rales  GI: soft, non-tender, no splenomegaly but spleen exam limited by habitus, +bs  MUSC: no edema, normal gait, decreased range of motion right shoulder  NEURO: alert and oriented x3, normal strength  PSYCH: normal mood and affect      RECENT LABS:  Hematology WBC   Date Value Ref Range Status   10/06/2023 5.36 3.40 - 10.80 10*3/mm3 Final     RBC   Date Value Ref Range Status   10/06/2023 4.89 4.14 - 5.80 10*6/mm3 Final     Hemoglobin   Date Value Ref Range Status   10/06/2023 15.1 13.0 - 17.7 g/dL Final   11/01/2018 16.6 14.0 - 18.0 g/dL Final     Hematocrit   Date Value Ref Range Status   10/06/2023 43.9 37.5 - 51.0 % Final   11/01/2018 48.9 42.0 - 52.0 % Final     Platelets   Date Value Ref Range Status   10/06/2023 121 (L) 140 - 450 " 10*3/mm3 Final   11/01/2018 184 140 - 500 10*3/mm3 Final          Assessment & Plan    *Chronic mild thrombocytopenia in the absence of WBC/Hgb abnormalities    Hematology recommendations/plan:  Further assessment with vitamin B12, folate, SALLY, ESR, IPF, SARAVANAN/reflex and abdominal ultrasound to assess for fatty liver/cirrhosis and splenomegaly.  We will call the patient for results once available.  He will require at least annual follow-up CBC with PCP or hematology for surveillance.  He states he is scheduled for upcoming shoulder surgery and his platelet count is adequate for surgery.    We will call the patient with results and further advice once available.  Thank you for allowing me to participate in the care of this pleasant patient.

## 2023-11-20 ENCOUNTER — PRE-ADMISSION TESTING (OUTPATIENT)
Dept: PREADMISSION TESTING | Facility: HOSPITAL | Age: 63
End: 2023-11-20
Payer: MEDICARE

## 2023-11-20 ENCOUNTER — LAB (OUTPATIENT)
Dept: LAB | Facility: HOSPITAL | Age: 63
End: 2023-11-20
Payer: MEDICARE

## 2023-11-20 ENCOUNTER — OFFICE VISIT (OUTPATIENT)
Dept: INTERNAL MEDICINE | Facility: CLINIC | Age: 63
End: 2023-11-20
Payer: MEDICARE

## 2023-11-20 VITALS
DIASTOLIC BLOOD PRESSURE: 84 MMHG | SYSTOLIC BLOOD PRESSURE: 142 MMHG | RESPIRATION RATE: 16 BRPM | WEIGHT: 242.51 LBS | HEART RATE: 72 BPM | OXYGEN SATURATION: 96 % | BODY MASS INDEX: 31.12 KG/M2 | HEIGHT: 74 IN

## 2023-11-20 VITALS
HEART RATE: 79 BPM | DIASTOLIC BLOOD PRESSURE: 66 MMHG | WEIGHT: 243.4 LBS | HEIGHT: 74 IN | SYSTOLIC BLOOD PRESSURE: 102 MMHG | BODY MASS INDEX: 31.24 KG/M2 | TEMPERATURE: 98.7 F | OXYGEN SATURATION: 97 %

## 2023-11-20 DIAGNOSIS — Z01.818 PRE-OPERATIVE CLEARANCE: Primary | ICD-10-CM

## 2023-11-20 DIAGNOSIS — E78.5 HYPERLIPIDEMIA LDL GOAL <70: ICD-10-CM

## 2023-11-20 DIAGNOSIS — M75.121 COMPLETE TEAR OF RIGHT ROTATOR CUFF, UNSPECIFIED WHETHER TRAUMATIC: ICD-10-CM

## 2023-11-20 DIAGNOSIS — E11.9 TYPE 2 DIABETES MELLITUS WITHOUT COMPLICATION, WITHOUT LONG-TERM CURRENT USE OF INSULIN: ICD-10-CM

## 2023-11-20 DIAGNOSIS — M12.811 ROTATOR CUFF ARTHROPATHY OF RIGHT SHOULDER: ICD-10-CM

## 2023-11-20 DIAGNOSIS — I10 BENIGN ESSENTIAL HTN: ICD-10-CM

## 2023-11-20 DIAGNOSIS — R79.1 ABNORMAL INTERNATIONAL NORMAL RATIO (INR): ICD-10-CM

## 2023-11-20 LAB
ABO GROUP BLD: NORMAL
ABO GROUP BLD: NORMAL
ANA INTERCELL BRIDGE TITR SER IF: ABNORMAL {TITER}
ANA SER QL IF: POSITIVE
ANION GAP SERPL CALCULATED.3IONS-SCNC: 11 MMOL/L (ref 5–15)
APTT PPP: 38.1 SECONDS (ref 24.3–38.1)
BASOPHILS # BLD AUTO: 0.02 10*3/MM3 (ref 0–0.2)
BASOPHILS NFR BLD AUTO: 0.3 % (ref 0–1.5)
BILIRUB UR QL STRIP: NEGATIVE
BLD GP AB SCN SERPL QL: NEGATIVE
BUN SERPL-MCNC: 18 MG/DL (ref 8–23)
BUN/CREAT SERPL: 22.2 (ref 7–25)
CALCIUM SPEC-SCNC: 9.6 MG/DL (ref 8.6–10.5)
CHLORIDE SERPL-SCNC: 102 MMOL/L (ref 98–107)
CLARITY UR: CLEAR
CO2 SERPL-SCNC: 22 MMOL/L (ref 22–29)
COLOR UR: YELLOW
CREAT SERPL-MCNC: 0.81 MG/DL (ref 0.76–1.27)
DEPRECATED RDW RBC AUTO: 44.4 FL (ref 37–54)
EGFRCR SERPLBLD CKD-EPI 2021: 99.7 ML/MIN/1.73
EOSINOPHIL # BLD AUTO: 0.09 10*3/MM3 (ref 0–0.4)
EOSINOPHIL NFR BLD AUTO: 1.4 % (ref 0.3–6.2)
ERYTHROCYTE [DISTWIDTH] IN BLOOD BY AUTOMATED COUNT: 13.3 % (ref 12.3–15.4)
GLUCOSE SERPL-MCNC: 113 MG/DL (ref 65–99)
GLUCOSE UR STRIP-MCNC: NEGATIVE MG/DL
HCT VFR BLD AUTO: 46.8 % (ref 37.5–51)
HGB BLD-MCNC: 15.9 G/DL (ref 13–17.7)
HGB UR QL STRIP.AUTO: NEGATIVE
IGA SERPL-MCNC: 345 MG/DL (ref 61–437)
IGG SERPL-MCNC: NORMAL MG/DL
IGM SERPL-MCNC: NORMAL MG/DL
IMM GRANULOCYTES # BLD AUTO: 0 10*3/MM3 (ref 0–0.05)
IMM GRANULOCYTES NFR BLD AUTO: 0 % (ref 0–0.5)
INR PPP: 1.1 (ref 0.9–1.1)
INR PPP: 1.14 (ref 0.9–1.1)
KETONES UR QL STRIP: NEGATIVE
LEUKOCYTE ESTERASE UR QL STRIP.AUTO: NEGATIVE
LYMPHOCYTES # BLD AUTO: 1.96 10*3/MM3 (ref 0.7–3.1)
LYMPHOCYTES NFR BLD AUTO: 30.6 % (ref 19.6–45.3)
Lab: ABNORMAL
MCH RBC QN AUTO: 30.8 PG (ref 26.6–33)
MCHC RBC AUTO-ENTMCNC: 34 G/DL (ref 31.5–35.7)
MCV RBC AUTO: 90.5 FL (ref 79–97)
MONOCYTES # BLD AUTO: 0.38 10*3/MM3 (ref 0.1–0.9)
MONOCYTES NFR BLD AUTO: 5.9 % (ref 5–12)
NEUTROPHILS NFR BLD AUTO: 3.96 10*3/MM3 (ref 1.7–7)
NEUTROPHILS NFR BLD AUTO: 61.8 % (ref 42.7–76)
NITRITE UR QL STRIP: NEGATIVE
NRBC BLD AUTO-RTO: 0 /100 WBC (ref 0–0.2)
PH UR STRIP.AUTO: 6.5 [PH] (ref 4.5–8)
PLATELET # BLD AUTO: 140 10*3/MM3 (ref 140–450)
PMV BLD AUTO: 11 FL (ref 6–12)
POTASSIUM SERPL-SCNC: 4.5 MMOL/L (ref 3.5–5.2)
PROT PATTERN SERPL IFE-IMP: NORMAL
PROT UR QL STRIP: NEGATIVE
PROTHROMBIN TIME: 14.5 SECONDS (ref 12.1–15)
RBC # BLD AUTO: 5.17 10*6/MM3 (ref 4.14–5.8)
RH BLD: POSITIVE
RH BLD: POSITIVE
SODIUM SERPL-SCNC: 135 MMOL/L (ref 136–145)
SP GR UR STRIP: 1.01 (ref 1–1.03)
T&S EXPIRATION DATE: NORMAL
UROBILINOGEN UR QL STRIP: NORMAL
WBC NRBC COR # BLD AUTO: 6.41 10*3/MM3 (ref 3.4–10.8)

## 2023-11-20 PROCEDURE — 85610 PROTHROMBIN TIME: CPT | Performed by: ORTHOPAEDIC SURGERY

## 2023-11-20 PROCEDURE — 81003 URINALYSIS AUTO W/O SCOPE: CPT | Performed by: ORTHOPAEDIC SURGERY

## 2023-11-20 PROCEDURE — 87081 CULTURE SCREEN ONLY: CPT | Performed by: ORTHOPAEDIC SURGERY

## 2023-11-20 PROCEDURE — 85730 THROMBOPLASTIN TIME PARTIAL: CPT | Performed by: ORTHOPAEDIC SURGERY

## 2023-11-20 PROCEDURE — 93005 ELECTROCARDIOGRAM TRACING: CPT

## 2023-11-20 PROCEDURE — 86900 BLOOD TYPING SEROLOGIC ABO: CPT

## 2023-11-20 PROCEDURE — 80048 BASIC METABOLIC PNL TOTAL CA: CPT | Performed by: ORTHOPAEDIC SURGERY

## 2023-11-20 PROCEDURE — 36415 COLL VENOUS BLD VENIPUNCTURE: CPT

## 2023-11-20 PROCEDURE — 86901 BLOOD TYPING SEROLOGIC RH(D): CPT | Performed by: ORTHOPAEDIC SURGERY

## 2023-11-20 PROCEDURE — 86900 BLOOD TYPING SEROLOGIC ABO: CPT | Performed by: ORTHOPAEDIC SURGERY

## 2023-11-20 PROCEDURE — 86901 BLOOD TYPING SEROLOGIC RH(D): CPT

## 2023-11-20 PROCEDURE — 86850 RBC ANTIBODY SCREEN: CPT | Performed by: ORTHOPAEDIC SURGERY

## 2023-11-20 PROCEDURE — 85025 COMPLETE CBC W/AUTO DIFF WBC: CPT | Performed by: ORTHOPAEDIC SURGERY

## 2023-11-20 NOTE — PROGRESS NOTES
"Chief Complaint   Patient presents with    Pre-op Exam     Having right shoulder replacement on 12/1       Subjective     Sylwia Spears is a 63 y.o. male being seen for a pre-op clearance for right total shoulder reverse arthroplasty. He has health history of DM 2, HTN and hyperlipidemia. He has chronic right shoulder pain. He is scheduled for total shoulder 12-1-2023. He has been taking gabapentin at night for this, which has helped with the pain. He is on Metformin XR 500mg 2 daily for diabetes, but motivated to get off this. He has not been checking his glucose levels at home. He denies any hypoglycemic events unless he does not eat. He has lost 40 pounds with better eating and avoiding sugars over the past 6 months, this is intentional weight loss.      He is on lisinopril 20mg daily for HTN. He tolerates this w/o cough. He denies CP, SOA swelling in legs.      He has daily pain from \"where I got electrocuted\" he has nerve pain. Daily pain rated a 5 of 10. He pain is mostly in upper back and shoulders. He takes duloxetine for this.       He has a CT chest showing Coronary artery calcifications. He was started on Lipitor 10mg nightly for this. He denies chest pain, unilateral weakness.  He had a stress test 1-.      He has had low mildly platelet count for 3 years. He was referred to [facility] for eval. Consult with Uriel Fowler MD (11/16/2023) No excess bruising, no use of aspirin products.         History of Present Illness     No Known Allergies      Current Outpatient Medications:     atorvastatin (LIPITOR) 10 MG tablet, TAKE ONE TABLET BY MOUTH DAILY (Patient taking differently: Take 1 tablet by mouth Daily.), Disp: 90 tablet, Rfl: 1    Cholecalciferol (Vitamin D3) 30 MCG/15ML liquid, Take  by mouth., Disp: , Rfl:     Chromic Chloride crystals, Use Daily., Disp: , Rfl:     CINNAMON PO, Take  by mouth., Disp: , Rfl:     DULoxetine (CYMBALTA) 60 MG capsule, TAKE ONE CAPSULE BY MOUTH DAILY " (Patient taking differently: Take 1 capsule by mouth Daily.), Disp: 90 capsule, Rfl: 3    gabapentin (NEURONTIN) 300 MG capsule, Take 1 capsule by mouth Every Night., Disp: 30 capsule, Rfl: 0    lisinopril (PRINIVIL,ZESTRIL) 20 MG tablet, TAKE ONE TABLET BY MOUTH DAILY (Patient taking differently: Take 1 tablet by mouth Daily.), Disp: 90 tablet, Rfl: 2    metFORMIN ER (GLUCOPHAGE-XR) 500 MG 24 hr tablet, Take 1 tablet by mouth Daily With Breakfast., Disp: , Rfl:     Turmeric (QC TUMERIC COMPLEX PO), Take  by mouth., Disp: , Rfl:     Zinc Acetate, Oral, (ZINC ACETATE PO), Take  by mouth., Disp: , Rfl:     Elastic Bandages & Supports (Support Compression Sock Mens) misc, 1 each Daily., Disp: , Rfl:     The following portions of the patient's history were reviewed and updated as appropriate: allergies, current medications, past family history, past medical history, past social history, past surgical history, and problem list.    Review of Systems   Constitutional: Negative.    Eyes: Negative.    Respiratory: Negative.     Cardiovascular: Negative.  Negative for chest pain, palpitations and leg swelling.   Endocrine: Negative.    Musculoskeletal:  Positive for arthralgias.   Neurological: Negative.    Hematological: Negative.  Negative for adenopathy. Does not bruise/bleed easily.   Psychiatric/Behavioral: Negative.     All other systems reviewed and are negative.      Assessment     Physical Exam  Vitals reviewed.   Constitutional:       Appearance: Normal appearance. He is obese. He is not ill-appearing.   HENT:      Head: Normocephalic.      Right Ear: Tympanic membrane normal.      Left Ear: Tympanic membrane normal.   Cardiovascular:      Rate and Rhythm: Normal rate and regular rhythm.   Musculoskeletal:      Cervical back: Neck supple.      Comments: Right shoulder with poor ROM and reduced strength. Unable to lift > 30 degrees. Right shoulder lower than left shoulder with surrounding muscle atriphy    Neurological:      Mental Status: He is alert.         Plan     His Pre-op labs reviewed.   POC Protime / INR (11/20/2023 15:59)   Urinalysis With Culture If Indicated - Urine, Clean Catch (11/20/2023 16:13)   CBC and Differential (11/20/2023 14:13)   Basic metabolic panel (11/20/2023 14:13)   APTT (11/20/2023 14:13)   ABORH 2ND SPECIMEN VERIFICATION (11/20/2023 13:51)     Stress test reviewed:   Treadmill Stress Test (11/02/2023 14:25)       Diagnoses and all orders for this visit:    1. Pre-operative clearance (Primary)  -     POC Protime / INR    2. Type 2 diabetes mellitus without complication, without long-term current use of insulin  -     POC Protime / INR    3. Benign essential HTN  -     POC Protime / INR    4. Hyperlipidemia LDL goal <70  -     POC Protime / INR    5. Abnormal international normal ratio (INR)  -     POC Protime / INR    PT/INR repeated in office 1.0. Cleared by hematology.     Low risk for surgery, cleared for shoulder surgery

## 2023-11-20 NOTE — DISCHARGE INSTRUCTIONS
PRE-ADMISSION TESTING INSTRUCTIONS FOR TOTAL JOINT PATIENTS    Take these medications the morning of surgery with a small sip of water: nothing       No aspirin, advil, aleve, ibuprofen, naproxen, diet pills, decongestants, or vitamin/herbal supplements for a week prior to your surgery.     Tylenol/Acetaminophen ok to take if needed.    Do not take any insulin or diabetes medications the morning of surgery.    Your surgeon may give you Bactroban to use prior to surgery. This will be started 5 days prior to surgery, follow the directions on your prescription from your surgeon for use.      General Instructions:    DO NOT EAT SOLID FOOD AFTER MIDNIGHT THE NIGHT BEFORE SURGERY. No gum, mints, or hard candy after midnight the night before surgery.  You may drink clear liquids the day of surgery up until 2 hours before your arrival time.  Clear liquids are liquids you can see through. Nothing RED in color.    Plain water    Sports drinks      Gelatin (Jell-O)  Fruit juices without pulp such as white grape juice and apple juice  Popsicles that contain no fruit or yogurt  Tea or coffee (no cream or milk added)    It is beneficial for you to have a clear drink that contains carbohydrates 2 hours prior to your arrival time.  DRINK A BOTTLE OF SPORTS DRINK 2 HOURS BEFORE YOUR ARRIVAL TIME. IF YOU ARE DIABETIC, DRINK A LOW OR NO SUGAR SPORTS DRINK. ANY COLOR EXCEPT RED.    Patients who avoid smoking, chewing tobacco and alcohol for 4 weeks prior to surgery have a reduced risk of post-operative complications.  If at all possible, quit smoking as many days before surgery as you can.    Do not smoke, use chewing tobacco or drink alcohol after midnight the day of surgery.    Bring your C-PAP/ BI-PAP machine if you use one.  Wear clean comfortable clothes.  Do not wear contact lenses, lotion, deodorant, or make-up.  Bring a case for your glasses if applicable.   You may brush your teeth the morning of surgery.  You may wear  dentures/partials, do not put adhesive/glue on them.  Leave all other jewelry and valuables at home.  NOTIFY YOUR SURGEON IF YOU BECOME ILL, HAVE A FEVER, PRODUCTIVE COUGH, OR CANNOT BE HERE THE DAY OF SURGERY.      Preventing a Surgical Site Infection:    Shower the night before and on the morning of surgery using the chlorhexidine soap you were given.  Use a clean washcloth with the soap.  Place clean sheets on your bed after showering the night before surgery. Do not use the CHG soap on your hair, face, or private areas. Wash your body gently for five (5) minutes. Do not scrub your skin.  Dry with a clean towel and dress in clean clothing.    Do not shave the surgical area for 10 days-2 weeks prior to surgery  because the razor can irritate skin and make it easier to develop an infection.  Make sure you, your family, and all healthcare providers clean their hands with soap and water or an alcohol based hand  before caring for you or your wound.      Day of surgery:    Your surgeon’s office will advise you of your arrival time for the day of surgery.    Upon arrival, a Pre-op nurse and Anesthesia provider will review your health history, obtain vital signs, and answer questions you may have. The anesthesia provider will also discuss the type of anesthesia that will be needed for your procedure, which may include general anesthesia. The only belongings needed at this time will be your home medications and if applicable your C-PAP/BI-PAP machine.  If you are staying overnight your family can leave the rest of your belongings in the car and bring them to your room later.  A Pre-op nurse will start an IV and you may receive medication in preparation for surgery, including something to help you relax.  Your family will be able to see you in the Pre-op area.  While you are in surgery your family should notify the waiting room  if they leave the waiting room area and provide a contact phone  number.    If you have any questions, you can call the Pre-Admission Department at [phone] or your surgeon's office.    Please be aware that surgery does come with discomfort.  We want to make every effort to control your discomfort so please discuss any uncontrolled symptoms with your nurse.   Your doctor will most likely have prescribed pain medications.     You may have bruising or discomfort from the tourniquet used in surgery.     Please leave all luggage in the car the morning of surgery.  You will be transported to your hospital room following the recovery period.  Your family can get your luggage at that time.      You may receive a survey regarding the care you received. Your feedback is very important and will be used to collect the necessary data to help us to continue to provide excellent care.

## 2023-11-21 LAB
MRSA SPEC QL CULT: NORMAL
QT INTERVAL: 374 MS
QTC INTERVAL: 400 MS

## 2023-11-24 ENCOUNTER — HOSPITAL ENCOUNTER (OUTPATIENT)
Dept: ULTRASOUND IMAGING | Facility: HOSPITAL | Age: 63
Discharge: HOME | End: 2023-11-24
Admitting: INTERNAL MEDICINE
Payer: MEDICARE

## 2023-11-24 DIAGNOSIS — D69.6 THROMBOCYTOPENIA: ICD-10-CM

## 2023-11-24 PROCEDURE — 76700 US EXAM ABDOM COMPLETE: CPT

## 2023-11-27 ENCOUNTER — TELEPHONE (OUTPATIENT)
Dept: ONCOLOGY | Facility: CLINIC | Age: 63
End: 2023-11-27
Payer: MEDICARE

## 2023-11-27 DIAGNOSIS — K74.60 CIRRHOSIS OF LIVER WITHOUT ASCITES, UNSPECIFIED HEPATIC CIRRHOSIS TYPE: Primary | ICD-10-CM

## 2023-11-27 NOTE — TELEPHONE ENCOUNTER
----- Message from Uriel Fowler MD sent at 11/26/2023  2:25 PM EST -----  SOLANGE Harris his labwork showed a positive SARAVANAN which indicates possible over activity of the immune system but will defer to PCP if needs further workup of this.  His ultrasound of the abdomen showed probable cirrhosis of liver causing enlarged spleen which likely is the cause of low platelet count.  Would recommend GI eval of liver.  Please

## 2023-11-27 NOTE — DISCHARGE INSTRUCTIONS
Total Shoulder Joint Replacement Discharge Instructions:    I. ACTIVITIES:  1. Exercises:  Complete exercise program as taught by the hospital physical therapist 2 times per day  Wear sling when in bed and when out of bed (except when doing exercises)  Exercise program will be advanced by the physical therapist  During the day be up ambulating every 2 hours (while awake) for short distances  Complete the ankle pump exercises at least 10 times per hour (while awake)  Elevate operative arm when in bed and for at least 30 minutes during the day.   Use cold packs 20-30 minutes approximately 5 times per day. This should be done before and after completing your exercises and at any time you are experiencing pain/ stiffness in your operative extremity.    2. Activities of Daily Living:  No tub baths, hot tubs, or swimming pools for 4 weeks  May shower and let water run over the adhesive dressing on post-operative day #7 if no drainage. If dressing does come off, cover incision with waterproof band-aids while showering until first post-op visit in the office.     II. RESTRICTIONS  No pushing, pulling, lifting, or weight bearing on operative extremity  Avoid pushing yourself up out of a chair with the operative extremity  Continue to follow shoulder precautions as instructed by hospital physical therapist  Your surgeon will discuss with you when you will be able to drive again.  First week stay inside on even terrain. May go up and down stairs one stair at a time utilizing the hand rail  After one week, you may venture outside.    III. PRECAUTIONS:  Everyone that comes near you should wash their hands  No elective dental, genital-urinary, or colon procedures or surgical procedures for 12 weeks after surgery unless absolutely necessary.   If dental work or surgical procedure is deemed absolutely necessary, you will need to contact your surgeon as you will need to take antibiotics 1 hour prior to any dental work (including  teeth cleanings).  Please discuss with your surgeon prophylactic antibiotics as the length of time this intervention will be necessary for you varies with each patient’s health history and situation.  Avoid sick people. If you must be around someone who is ill, they should wear a mask.  Avoid visits to the Emergency Room or Urgent Care unless you are having a life threatening event.     IV. INCISION CARE:  Keep clear adhesive dressing and gauze in place until follow up visit. If this dressing does come off, then cover with dry gauze and paper tape daily.Wash your hands prior to dressing changes  No creams or ointments to the incisionMay remove dressing once the incision is free of drainage  Do not touch or pick at the incision  Check dressing every day and notify surgeon immediately if any of the following signs or symptoms are noted:  Increase in redness  Increase in swelling around the incision and of the entire extremity  Increase in pain  Drainage oozing from the incision  Pulling apart of the edges of the incision  Increase in overall body temperature (greater than 100.5 degrees)  Any visible sutures or staples will be removed at your 2 week follow up as long as the incision is healing appropriately.     V. MEDICATIONS:     1. Stool Softeners: You will be at greater risk of constipation after surgery due to being less mobile and the pain medications.   Take stool softeners as instructed by your surgeon while on pain medications. Over the counter Colace 100 mg 1-2 capsules twice daily.   If stools become too loose or too frequent, please decreases the dosage or stop the stool softener.  If constipation occurs despite use of stool softeners, you are to continue the stool softeners and add a laxative (Milk of Magnesia 1 ounce daily as needed)  Drink plenty of fluids, and eat fruits and vegetables during your recovery time    2. Pain Medications utilized after surgery are narcotics and the law requires that the  following information be given to all patients that are prescribed narcotics:  CLASSIFICATION: Pain medications are called Opioids and are narcotics  LEGALITIES: It is illegal to share narcotics with others and to drive within 24 hours of taking narcotics  POTENTIAL SIDE EFFECTS: Potential side effects of opioids include: nausea, vomiting, itching, dizziness, drowsiness, dry mouth, constipation, and difficulty urinating.  POTENTIAL ADVERSE EFFECTS:   Opioid tolerance can develop with use of pain medications and this simply means that it requires more and more of the medication to control pain; however, this is seen more in patients that use opioids for longer periods of time.  Opioid dependence can develop with use of Opioids and this simply means that to stop the medication can cause withdrawal symptoms; however, this is seen with patients that use Opioids for longer periods of time.  Opioid addiction can develop with use of Opioids and the incidence of this is very unlikely in patients who take the medications as ordered and stop the medications as instructed.  Opioid overdose can be dangerous, but is unlikely when the medication is taken as ordered and stopped when ordered. It is important not to mix opioids with alcohol or with and type of sedative such as Benadryl as this can lead to over sedation and respiratory difficulty.  DOSAGE:   Pain medications will need to be taken consistently for the first week to decrease pain and promote adequate pain relief and participation in physical therapy.  After the initial surgical pain begins to resolve, you may begin to decrease the pain medication. By the end of 6 weeks, you should be off of pain medications.  Refills will not be given by the office during evening hours, on weekends, or after 12 weeks post-op.  To seek refills on pain medications during the initial 6 week post-operative period, you must call the office 48 hours in advance to request the refill. The  office will then notify you when to  the prescription. DO NOT wait until you are out of the medication to request a refill.    VI. FOLLOW-UP VISITS:  You will need to follow up in the office with Dr Ozuna in 2 weeks. Please call [phone] to schedule this appointment.  You will need to follow up with your primary care physician within 4 weeks.  If you have any concerns or suspected complications prior to your follow up visit, please call your surgeons office. Do not wait until your appointment time if you suspect complications. These will need to be addressed in the office promptly.

## 2023-11-29 ENCOUNTER — OFFICE VISIT (OUTPATIENT)
Dept: ORTHOPEDIC SURGERY | Facility: CLINIC | Age: 63
End: 2023-11-29
Payer: MEDICARE

## 2023-11-29 VITALS — HEIGHT: 74 IN | WEIGHT: 243 LBS | BODY MASS INDEX: 31.18 KG/M2

## 2023-11-29 DIAGNOSIS — M12.811 ROTATOR CUFF ARTHROPATHY OF RIGHT SHOULDER: Primary | ICD-10-CM

## 2023-11-29 DIAGNOSIS — M75.121 COMPLETE TEAR OF RIGHT ROTATOR CUFF, UNSPECIFIED WHETHER TRAUMATIC: ICD-10-CM

## 2023-11-29 PROCEDURE — 99024 POSTOP FOLLOW-UP VISIT: CPT | Performed by: ORTHOPAEDIC SURGERY
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
## 2023-11-29 NOTE — PROGRESS NOTES
CC: f/u right shoulder pain, DJD     Sylwia Spears presented to clinic today for preoperative history and physical visit in anticipation of upcoming scheduled right reverse total shoulder arthroplasty.     Discussed treatment options at length with patient today in regards to right shoulder. He has failed conservative treatment at this point in time and would like to proceed with shoulder arthroplasty. I discussed with the patient the specific indication of a reverse shoulder arthroplasty particularly for shoulder pain as well as for pseudoparalysis, and reviewed anatomy of the shoulder as well as a model of the implant. I reviewed details of the procedure as well as risks, benefits, and alternatives with the risks including but not limited to neurovascular damage, bleeding, infection, periprosthetic fracture, chronic pain, instability, loosening of implant, failure of implant, loss of motion, weakness, stiffness, DVT, pulmonary embolus, death, stroke, complex regional pain syndrome, myocardial infarction, need for additional procedures. Patient understood all these and had all questions answered today. No guarantees given in regards to results from the surgery.     Postoperative DVT prophylaxis will be with aspirin.  Patient does not have a history of DVT or pulmonary embolus.     All remaining questions answered today.
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
## 2023-11-30 ENCOUNTER — ANESTHESIA EVENT (OUTPATIENT)
Dept: PERIOP | Facility: HOSPITAL | Age: 63
End: 2023-11-30
Payer: MEDICARE

## 2023-12-01 ENCOUNTER — ANESTHESIA (OUTPATIENT)
Dept: PERIOP | Facility: HOSPITAL | Age: 63
End: 2023-12-01
Payer: MEDICARE

## 2023-12-01 ENCOUNTER — HOSPITAL ENCOUNTER (OUTPATIENT)
Facility: HOSPITAL | Age: 63
Discharge: HOME | End: 2023-12-02
Attending: ORTHOPAEDIC SURGERY | Admitting: ORTHOPAEDIC SURGERY
Payer: MEDICARE

## 2023-12-01 ENCOUNTER — APPOINTMENT (OUTPATIENT)
Dept: GENERAL RADIOLOGY | Facility: HOSPITAL | Age: 63
End: 2023-12-01
Payer: MEDICARE

## 2023-12-01 DIAGNOSIS — M75.121 COMPLETE TEAR OF RIGHT ROTATOR CUFF, UNSPECIFIED WHETHER TRAUMATIC: ICD-10-CM

## 2023-12-01 DIAGNOSIS — M12.811 ROTATOR CUFF ARTHROPATHY OF RIGHT SHOULDER: ICD-10-CM

## 2023-12-01 DIAGNOSIS — Z96.611 STATUS POST REVERSE TOTAL REPLACEMENT OF RIGHT SHOULDER: Primary | ICD-10-CM

## 2023-12-01 LAB — GLUCOSE BLDC GLUCOMTR-MCNC: 119 MG/DL (ref 70–130)

## 2023-12-01 PROCEDURE — C1713 ANCHOR/SCREW BN/BN,TIS/BN: HCPCS | Performed by: ORTHOPAEDIC SURGERY

## 2023-12-01 PROCEDURE — A9270 NON-COVERED ITEM OR SERVICE: HCPCS | Performed by: ORTHOPAEDIC SURGERY

## 2023-12-01 PROCEDURE — 63710000001 LISINOPRIL 20 MG TABLET: Performed by: ORTHOPAEDIC SURGERY

## 2023-12-01 PROCEDURE — 25010000002 PROPOFOL 200 MG/20ML EMULSION: Performed by: NURSE ANESTHETIST, CERTIFIED REGISTERED

## 2023-12-01 PROCEDURE — 25010000002 VANCOMYCIN HCL 1.25 G RECONSTITUTED SOLUTION 1 EACH VIAL: Performed by: ORTHOPAEDIC SURGERY

## 2023-12-01 PROCEDURE — 25010000002 PHENYLEPHRINE 10 MG/ML SOLUTION: Performed by: NURSE ANESTHETIST, CERTIFIED REGISTERED

## 2023-12-01 PROCEDURE — C1776 JOINT DEVICE (IMPLANTABLE): HCPCS | Performed by: ORTHOPAEDIC SURGERY

## 2023-12-01 PROCEDURE — 25810000003 SODIUM CHLORIDE 0.9 % SOLUTION 500 ML FLEX CONT: Performed by: ORTHOPAEDIC SURGERY

## 2023-12-01 PROCEDURE — 63710000001 ACETAMINOPHEN 325 MG TABLET: Performed by: ORTHOPAEDIC SURGERY

## 2023-12-01 PROCEDURE — 25010000002 VANCOMYCIN PER 500 MG: Performed by: ORTHOPAEDIC SURGERY

## 2023-12-01 PROCEDURE — 82948 REAGENT STRIP/BLOOD GLUCOSE: CPT

## 2023-12-01 PROCEDURE — 25010000002 DEXAMETHASONE PER 1 MG: Performed by: NURSE ANESTHETIST, CERTIFIED REGISTERED

## 2023-12-01 PROCEDURE — 63710000001 ATORVASTATIN 10 MG TABLET: Performed by: ORTHOPAEDIC SURGERY

## 2023-12-01 PROCEDURE — 63710000001 DULOXETINE 60 MG CAPSULE DELAYED-RELEASE PARTICLES: Performed by: ORTHOPAEDIC SURGERY

## 2023-12-01 PROCEDURE — 63710000001 PREGABALIN 75 MG CAPSULE: Performed by: ORTHOPAEDIC SURGERY

## 2023-12-01 PROCEDURE — G0378 HOSPITAL OBSERVATION PER HR: HCPCS

## 2023-12-01 PROCEDURE — 73020 X-RAY EXAM OF SHOULDER: CPT

## 2023-12-01 PROCEDURE — 25010000002 SUGAMMADEX 200 MG/2ML SOLUTION: Performed by: NURSE ANESTHETIST, CERTIFIED REGISTERED

## 2023-12-01 PROCEDURE — 25810000003 SODIUM CHLORIDE 0.9 % SOLUTION: Performed by: ORTHOPAEDIC SURGERY

## 2023-12-01 PROCEDURE — 63710000001 POVIDONE-IODINE 10 % SOLUTION 118 ML BOTTLE: Performed by: ORTHOPAEDIC SURGERY

## 2023-12-01 PROCEDURE — 25810000003 SODIUM CHLORIDE 0.9 % SOLUTION 250 ML FLEX CONT: Performed by: NURSE ANESTHETIST, CERTIFIED REGISTERED

## 2023-12-01 PROCEDURE — 25010000002 ONDANSETRON PER 1 MG: Performed by: NURSE ANESTHETIST, CERTIFIED REGISTERED

## 2023-12-01 PROCEDURE — 63710000001 MELOXICAM 7.5 MG TABLET: Performed by: ORTHOPAEDIC SURGERY

## 2023-12-01 PROCEDURE — 25810000003 SODIUM CHLORIDE 0.9 % SOLUTION: Performed by: NURSE ANESTHETIST, CERTIFIED REGISTERED

## 2023-12-01 PROCEDURE — 25010000002 MIDAZOLAM PER 1MG: Performed by: NURSE ANESTHETIST, CERTIFIED REGISTERED

## 2023-12-01 PROCEDURE — 25010000002 PHENYLEPHRINE 10 MG/ML SOLUTION 5 ML VIAL: Performed by: NURSE ANESTHETIST, CERTIFIED REGISTERED

## 2023-12-01 PROCEDURE — 63710000001 ACETAMINOPHEN EXTRA STRENGTH 500 MG TABLET: Performed by: ORTHOPAEDIC SURGERY

## 2023-12-01 PROCEDURE — 63710000001 OXYCODONE 5 MG TABLET: Performed by: ORTHOPAEDIC SURGERY

## 2023-12-01 PROCEDURE — 25810000003 LACTATED RINGERS PER 1000 ML: Performed by: NURSE ANESTHETIST, CERTIFIED REGISTERED

## 2023-12-01 PROCEDURE — 25010000002 VANCOMYCIN 1 G RECONSTITUTED SOLUTION: Performed by: ORTHOPAEDIC SURGERY

## 2023-12-01 PROCEDURE — C9290 INJ, BUPIVACAINE LIPOSOME: HCPCS | Performed by: ORTHOPAEDIC SURGERY

## 2023-12-01 PROCEDURE — 23472 RECONSTRUCT SHOULDER JOINT: CPT | Performed by: ORTHOPAEDIC SURGERY

## 2023-12-01 PROCEDURE — 0 BUPIVACAINE LIPOSOME 1.3 % SUSPENSION: Performed by: ORTHOPAEDIC SURGERY

## 2023-12-01 PROCEDURE — 25010000002 BUPIVACAINE (PF) 0.5 % SOLUTION: Performed by: NURSE ANESTHETIST, CERTIFIED REGISTERED

## 2023-12-01 PROCEDURE — 23472 RECONSTRUCT SHOULDER JOINT: CPT | Performed by: SPECIALIST/TECHNOLOGIST, OTHER

## 2023-12-01 DEVICE — STEM HUM/SHLDR TRABECULARMETAL REV 12X130MM: Type: IMPLANTABLE DEVICE | Site: SHOULDER | Status: FUNCTIONAL

## 2023-12-01 DEVICE — SPACR HUM REV TM PLS 9MM: Type: IMPLANTABLE DEVICE | Site: SHOULDER | Status: FUNCTIONAL

## 2023-12-01 DEVICE — IMPLANTABLE DEVICE
Type: IMPLANTABLE DEVICE | Site: SHOULDER | Status: FUNCTIONAL
Brand: COMPREHENSIVE® REVERSE SHOULDER

## 2023-12-01 DEVICE — CP SHLDR PSI/SIGN GUIDE: Type: IMPLANTABLE DEVICE | Site: SHOULDER | Status: FUNCTIONAL

## 2023-12-01 DEVICE — TOTL SHLDER REV: Type: IMPLANTABLE DEVICE | Site: SHOULDER | Status: FUNCTIONAL

## 2023-12-01 DEVICE — FW,BPB #2 SUTR,BLU W/NDL
Type: IMPLANTABLE DEVICE | Site: SHOULDER | Status: FUNCTIONAL
Brand: ARTHREX®

## 2023-12-01 DEVICE — COMP GLEN VERSA/DIAL PLS3 40MM: Type: IMPLANTABLE DEVICE | Site: SHOULDER | Status: FUNCTIONAL

## 2023-12-01 DEVICE — KNOTLESS TISSUE CONTROL DEVICE, UNDYED UNIDIRECTIONAL (ANTIBACTERIAL) SYNTHETIC ABSORBABLE DEVICE
Type: IMPLANTABLE DEVICE | Site: SHOULDER | Status: FUNCTIONAL
Brand: STRATAFIX

## 2023-12-01 RX ORDER — BUPIVACAINE HYDROCHLORIDE 5 MG/ML
INJECTION, SOLUTION EPIDURAL; INTRACAUDAL
Status: COMPLETED | OUTPATIENT
Start: 2023-12-01 | End: 2023-12-01

## 2023-12-01 RX ORDER — VANCOMYCIN HYDROCHLORIDE 1 G/20ML
INJECTION, POWDER, LYOPHILIZED, FOR SOLUTION INTRAVENOUS AS NEEDED
Status: DISCONTINUED | OUTPATIENT
Start: 2023-12-01 | End: 2023-12-01 | Stop reason: HOSPADM

## 2023-12-01 RX ORDER — ONDANSETRON 4 MG/1
4 TABLET, FILM COATED ORAL EVERY 6 HOURS PRN
Status: DISCONTINUED | OUTPATIENT
Start: 2023-12-01 | End: 2023-12-02 | Stop reason: HOSPADM

## 2023-12-01 RX ORDER — SODIUM CHLORIDE 0.9 % (FLUSH) 0.9 %
10 SYRINGE (ML) INJECTION EVERY 12 HOURS SCHEDULED
Status: DISCONTINUED | OUTPATIENT
Start: 2023-12-01 | End: 2023-12-01 | Stop reason: HOSPADM

## 2023-12-01 RX ORDER — PHENYLEPHRINE HYDROCHLORIDE 10 MG/ML
INJECTION INTRAVENOUS AS NEEDED
Status: DISCONTINUED | OUTPATIENT
Start: 2023-12-01 | End: 2023-12-01 | Stop reason: SURG

## 2023-12-01 RX ORDER — KETAMINE HYDROCHLORIDE 10 MG/ML
INJECTION INTRAMUSCULAR; INTRAVENOUS AS NEEDED
Status: DISCONTINUED | OUTPATIENT
Start: 2023-12-01 | End: 2023-12-01 | Stop reason: SURG

## 2023-12-01 RX ORDER — MELOXICAM 7.5 MG/1
15 TABLET ORAL ONCE
Status: COMPLETED | OUTPATIENT
Start: 2023-12-01 | End: 2023-12-01

## 2023-12-01 RX ORDER — TRANEXAMIC ACID 10 MG/ML
1000 INJECTION, SOLUTION INTRAVENOUS ONCE
Status: COMPLETED | OUTPATIENT
Start: 2023-12-01 | End: 2023-12-01

## 2023-12-01 RX ORDER — ASPIRIN 81 MG/1
81 TABLET ORAL DAILY
Status: DISCONTINUED | OUTPATIENT
Start: 2023-12-02 | End: 2023-12-02 | Stop reason: HOSPADM

## 2023-12-01 RX ORDER — FAMOTIDINE 20 MG/1
40 TABLET, FILM COATED ORAL DAILY
Status: DISCONTINUED | OUTPATIENT
Start: 2023-12-01 | End: 2023-12-02 | Stop reason: HOSPADM

## 2023-12-01 RX ORDER — PREGABALIN 75 MG/1
150 CAPSULE ORAL ONCE
Status: COMPLETED | OUTPATIENT
Start: 2023-12-01 | End: 2023-12-01

## 2023-12-01 RX ORDER — PROPOFOL 10 MG/ML
INJECTION, EMULSION INTRAVENOUS AS NEEDED
Status: DISCONTINUED | OUTPATIENT
Start: 2023-12-01 | End: 2023-12-01 | Stop reason: SURG

## 2023-12-01 RX ORDER — SODIUM CHLORIDE 0.9 % (FLUSH) 0.9 %
10 SYRINGE (ML) INJECTION AS NEEDED
Status: DISCONTINUED | OUTPATIENT
Start: 2023-12-01 | End: 2023-12-01 | Stop reason: HOSPADM

## 2023-12-01 RX ORDER — DEXMEDETOMIDINE HYDROCHLORIDE 100 UG/ML
INJECTION, SOLUTION INTRAVENOUS
Status: COMPLETED | OUTPATIENT
Start: 2023-12-01 | End: 2023-12-01

## 2023-12-01 RX ORDER — OXYCODONE HYDROCHLORIDE 5 MG/1
5 TABLET ORAL EVERY 4 HOURS PRN
Status: DISCONTINUED | OUTPATIENT
Start: 2023-12-01 | End: 2023-12-02

## 2023-12-01 RX ORDER — MIDAZOLAM HYDROCHLORIDE 2 MG/2ML
1 INJECTION, SOLUTION INTRAMUSCULAR; INTRAVENOUS
Status: COMPLETED | OUTPATIENT
Start: 2023-12-01 | End: 2023-12-01

## 2023-12-01 RX ORDER — OXYCODONE HYDROCHLORIDE 5 MG/1
10 TABLET ORAL EVERY 4 HOURS PRN
Status: DISCONTINUED | OUTPATIENT
Start: 2023-12-01 | End: 2023-12-02

## 2023-12-01 RX ORDER — NALOXONE HCL 0.4 MG/ML
0.4 VIAL (ML) INJECTION
Status: DISCONTINUED | OUTPATIENT
Start: 2023-12-01 | End: 2023-12-02 | Stop reason: HOSPADM

## 2023-12-01 RX ORDER — SODIUM CHLORIDE 9 MG/ML
100 INJECTION, SOLUTION INTRAVENOUS CONTINUOUS
Status: DISCONTINUED | OUTPATIENT
Start: 2023-12-01 | End: 2023-12-02

## 2023-12-01 RX ORDER — ONDANSETRON 2 MG/ML
4 INJECTION INTRAMUSCULAR; INTRAVENOUS ONCE AS NEEDED
Status: COMPLETED | OUTPATIENT
Start: 2023-12-01 | End: 2023-12-01

## 2023-12-01 RX ORDER — SODIUM CHLORIDE 9 MG/ML
100 INJECTION, SOLUTION INTRAVENOUS CONTINUOUS
Status: DISCONTINUED | OUTPATIENT
Start: 2023-12-01 | End: 2023-12-01

## 2023-12-01 RX ORDER — SODIUM CHLORIDE 9 MG/ML
INJECTION, SOLUTION INTRAVENOUS CONTINUOUS PRN
Status: DISCONTINUED | OUTPATIENT
Start: 2023-12-01 | End: 2023-12-01 | Stop reason: SURG

## 2023-12-01 RX ORDER — DULOXETIN HYDROCHLORIDE 60 MG/1
60 CAPSULE, DELAYED RELEASE ORAL DAILY
Status: DISCONTINUED | OUTPATIENT
Start: 2023-12-01 | End: 2023-12-02 | Stop reason: HOSPADM

## 2023-12-01 RX ORDER — SODIUM CHLORIDE 9 MG/ML
40 INJECTION, SOLUTION INTRAVENOUS AS NEEDED
Status: DISCONTINUED | OUTPATIENT
Start: 2023-12-01 | End: 2023-12-01 | Stop reason: HOSPADM

## 2023-12-01 RX ORDER — FAMOTIDINE 10 MG/ML
20 INJECTION, SOLUTION INTRAVENOUS
Status: COMPLETED | OUTPATIENT
Start: 2023-12-01 | End: 2023-12-01

## 2023-12-01 RX ORDER — DEXAMETHASONE SODIUM PHOSPHATE 4 MG/ML
4 INJECTION, SOLUTION INTRA-ARTICULAR; INTRALESIONAL; INTRAMUSCULAR; INTRAVENOUS; SOFT TISSUE ONCE AS NEEDED
Status: COMPLETED | OUTPATIENT
Start: 2023-12-01 | End: 2023-12-01

## 2023-12-01 RX ORDER — LIDOCAINE HYDROCHLORIDE 20 MG/ML
INJECTION, SOLUTION INFILTRATION; PERINEURAL AS NEEDED
Status: DISCONTINUED | OUTPATIENT
Start: 2023-12-01 | End: 2023-12-01 | Stop reason: SURG

## 2023-12-01 RX ORDER — ACETAMINOPHEN 325 MG/1
975 TABLET ORAL 4 TIMES DAILY
Status: DISCONTINUED | OUTPATIENT
Start: 2023-12-01 | End: 2023-12-02

## 2023-12-01 RX ORDER — SODIUM CHLORIDE, SODIUM LACTATE, POTASSIUM CHLORIDE, CALCIUM CHLORIDE 600; 310; 30; 20 MG/100ML; MG/100ML; MG/100ML; MG/100ML
100 INJECTION, SOLUTION INTRAVENOUS CONTINUOUS
Status: CANCELLED | OUTPATIENT
Start: 2023-12-01

## 2023-12-01 RX ORDER — MAGNESIUM HYDROXIDE 1200 MG/15ML
LIQUID ORAL AS NEEDED
Status: DISCONTINUED | OUTPATIENT
Start: 2023-12-01 | End: 2023-12-01 | Stop reason: HOSPADM

## 2023-12-01 RX ORDER — CHOLECALCIFEROL (VITAMIN D3) 125 MCG
5 CAPSULE ORAL NIGHTLY PRN
Status: DISCONTINUED | OUTPATIENT
Start: 2023-12-01 | End: 2023-12-02 | Stop reason: HOSPADM

## 2023-12-01 RX ORDER — LISINOPRIL 20 MG/1
20 TABLET ORAL DAILY
Status: DISCONTINUED | OUTPATIENT
Start: 2023-12-01 | End: 2023-12-02 | Stop reason: HOSPADM

## 2023-12-01 RX ORDER — ROCURONIUM BROMIDE 10 MG/ML
INJECTION, SOLUTION INTRAVENOUS AS NEEDED
Status: DISCONTINUED | OUTPATIENT
Start: 2023-12-01 | End: 2023-12-01 | Stop reason: SURG

## 2023-12-01 RX ORDER — MELOXICAM 7.5 MG/1
15 TABLET ORAL DAILY
Status: DISCONTINUED | OUTPATIENT
Start: 2023-12-01 | End: 2023-12-02 | Stop reason: HOSPADM

## 2023-12-01 RX ORDER — ONDANSETRON 2 MG/ML
4 INJECTION INTRAMUSCULAR; INTRAVENOUS EVERY 6 HOURS PRN
Status: DISCONTINUED | OUTPATIENT
Start: 2023-12-01 | End: 2023-12-02 | Stop reason: HOSPADM

## 2023-12-01 RX ORDER — SODIUM CHLORIDE, SODIUM LACTATE, POTASSIUM CHLORIDE, CALCIUM CHLORIDE 600; 310; 30; 20 MG/100ML; MG/100ML; MG/100ML; MG/100ML
9 INJECTION, SOLUTION INTRAVENOUS CONTINUOUS PRN
Status: DISCONTINUED | OUTPATIENT
Start: 2023-12-01 | End: 2023-12-01 | Stop reason: HOSPADM

## 2023-12-01 RX ORDER — ACETAMINOPHEN 500 MG
1000 TABLET ORAL ONCE
Status: COMPLETED | OUTPATIENT
Start: 2023-12-01 | End: 2023-12-01

## 2023-12-01 RX ORDER — ONDANSETRON 2 MG/ML
4 INJECTION INTRAMUSCULAR; INTRAVENOUS ONCE AS NEEDED
Status: DISCONTINUED | OUTPATIENT
Start: 2023-12-01 | End: 2023-12-01 | Stop reason: HOSPADM

## 2023-12-01 RX ORDER — ATORVASTATIN CALCIUM 10 MG/1
10 TABLET, FILM COATED ORAL DAILY
Status: DISCONTINUED | OUTPATIENT
Start: 2023-12-01 | End: 2023-12-02 | Stop reason: HOSPADM

## 2023-12-01 RX ADMIN — MELOXICAM 15 MG: 7.5 TABLET ORAL at 09:36

## 2023-12-01 RX ADMIN — ACETAMINOPHEN 975 MG: 325 TABLET ORAL at 20:19

## 2023-12-01 RX ADMIN — KETAMINE HYDROCHLORIDE 30 MG: 10 INJECTION INTRAMUSCULAR; INTRAVENOUS at 12:28

## 2023-12-01 RX ADMIN — BUPIVACAINE HYDROCHLORIDE 20 ML: 5 INJECTION, SOLUTION EPIDURAL; INTRACAUDAL; PERINEURAL at 11:17

## 2023-12-01 RX ADMIN — TRANEXAMIC ACID 1000 MG: 10 INJECTION, SOLUTION INTRAVENOUS at 14:38

## 2023-12-01 RX ADMIN — PROPOFOL 150 MG: 10 INJECTION, EMULSION INTRAVENOUS at 12:28

## 2023-12-01 RX ADMIN — DEXMEDETOMIDINE 30 MCG: 100 INJECTION, SOLUTION, CONCENTRATE INTRAVENOUS at 11:17

## 2023-12-01 RX ADMIN — ROCURONIUM BROMIDE 80 MG: 10 INJECTION INTRAVENOUS at 12:28

## 2023-12-01 RX ADMIN — MIDAZOLAM HYDROCHLORIDE 1 MG: 1 INJECTION, SOLUTION INTRAMUSCULAR; INTRAVENOUS at 11:14

## 2023-12-01 RX ADMIN — ONDANSETRON 4 MG: 2 INJECTION INTRAMUSCULAR; INTRAVENOUS at 09:35

## 2023-12-01 RX ADMIN — OXYCODONE HYDROCHLORIDE 5 MG: 5 TABLET ORAL at 18:25

## 2023-12-01 RX ADMIN — LISINOPRIL 20 MG: 20 TABLET ORAL at 17:54

## 2023-12-01 RX ADMIN — FAMOTIDINE 20 MG: 10 INJECTION, SOLUTION INTRAVENOUS at 09:35

## 2023-12-01 RX ADMIN — SUGAMMADEX 200 MG: 100 INJECTION, SOLUTION INTRAVENOUS at 14:33

## 2023-12-01 RX ADMIN — VANCOMYCIN HYDROCHLORIDE 1750 MG: 500 INJECTION, POWDER, LYOPHILIZED, FOR SOLUTION INTRAVENOUS at 11:31

## 2023-12-01 RX ADMIN — DULOXETINE HYDROCHLORIDE 60 MG: 60 CAPSULE, DELAYED RELEASE ORAL at 17:54

## 2023-12-01 RX ADMIN — ROCURONIUM BROMIDE 10 MG: 10 INJECTION INTRAVENOUS at 13:48

## 2023-12-01 RX ADMIN — PHENYLEPHRINE HYDROCHLORIDE 100 MCG: 10 INJECTION INTRAVENOUS at 13:04

## 2023-12-01 RX ADMIN — PREGABALIN 150 MG: 75 CAPSULE ORAL at 09:36

## 2023-12-01 RX ADMIN — DEXAMETHASONE SODIUM PHOSPHATE 4 MG: 4 INJECTION, SOLUTION INTRAMUSCULAR; INTRAVENOUS at 09:35

## 2023-12-01 RX ADMIN — ACETAMINOPHEN 975 MG: 325 TABLET ORAL at 17:54

## 2023-12-01 RX ADMIN — SUGAMMADEX 200 MG: 100 INJECTION, SOLUTION INTRAVENOUS at 14:31

## 2023-12-01 RX ADMIN — PHENYLEPHRINE HYDROCHLORIDE 0.2 MCG/KG/MIN: 10 INJECTION INTRAVENOUS at 13:09

## 2023-12-01 RX ADMIN — KETAMINE HYDROCHLORIDE 20 MG: 10 INJECTION INTRAMUSCULAR; INTRAVENOUS at 13:13

## 2023-12-01 RX ADMIN — ACETAMINOPHEN 1000 MG: 500 TABLET ORAL at 09:36

## 2023-12-01 RX ADMIN — ATORVASTATIN CALCIUM 10 MG: 10 TABLET, FILM COATED ORAL at 17:54

## 2023-12-01 RX ADMIN — MIDAZOLAM HYDROCHLORIDE 1 MG: 1 INJECTION, SOLUTION INTRAMUSCULAR; INTRAVENOUS at 11:02

## 2023-12-01 RX ADMIN — OXYCODONE HYDROCHLORIDE 10 MG: 5 TABLET ORAL at 22:34

## 2023-12-01 RX ADMIN — SODIUM CHLORIDE: 0.9 INJECTION, SOLUTION INTRAVENOUS at 15:16

## 2023-12-01 RX ADMIN — SODIUM CHLORIDE 100 ML/HR: 9 INJECTION, SOLUTION INTRAVENOUS at 19:00

## 2023-12-01 RX ADMIN — TRANEXAMIC ACID 1000 MG: 10 INJECTION, SOLUTION INTRAVENOUS at 12:45

## 2023-12-01 RX ADMIN — SODIUM CHLORIDE, POTASSIUM CHLORIDE, SODIUM LACTATE AND CALCIUM CHLORIDE 9 ML/HR: 600; 310; 30; 20 INJECTION, SOLUTION INTRAVENOUS at 09:35

## 2023-12-01 RX ADMIN — LIDOCAINE HYDROCHLORIDE 100 MG: 20 INJECTION, SOLUTION INFILTRATION; PERINEURAL at 12:28

## 2023-12-01 NOTE — ANESTHESIA POSTPROCEDURE EVALUATION
Patient: Sylwia Spears    Procedure Summary       Date: 12/01/23 Room / Location:  [facility] OR 1 /  [facility]    Anesthesia Start: 1227 Anesthesia Stop: 1516    Procedure: TOTAL SHOULDER REVERSE ARTHROPLASTY (Right: Shoulder) Diagnosis:       Rotator cuff arthropathy of right shoulder      Complete tear of right rotator cuff, unspecified whether traumatic      (Rotator cuff arthropathy of right shoulder [M12.811])      (Complete tear of right rotator cuff, unspecified whether traumatic [M75.121])    Surgeons: David Ozuna MD Provider: Steven Delvalle CRNA    Anesthesia Type: general, regional ASA Status: 2            Anesthesia Type: general, regional    Vitals  Vitals Value Taken Time   /80 12/01/23 1630   Temp 97.8 °F (36.6 °C) 12/01/23 1520   Pulse 85 12/01/23 1630   Resp 18 12/01/23 1630   SpO2 92 % 12/01/23 1630           Post Anesthesia Care and Evaluation    Patient location during evaluation: PACU  Patient participation: complete - patient participated  Level of consciousness: awake and alert  Pain score: 0  Pain management: adequate    Airway patency: patent  Anesthetic complications: No anesthetic complications  PONV Status: none  Cardiovascular status: acceptable  Respiratory status: acceptable  Hydration status: acceptable

## 2023-12-01 NOTE — PLAN OF CARE
Goal Outcome Evaluation:  Plan of Care Reviewed With: patient        Progress: improving  Outcome Evaluation: vss. a&o x4. pt able to wiggle fingers, medicated once with po pain medication. R shoulder with dressing c,d,i.  immobilizer in place. ice wrap applied. spouse at bedside. up with standby assist to bathroom. no other complaints at this time. iv fluids in place.

## 2023-12-01 NOTE — ANESTHESIA PROCEDURE NOTES
Airway  Urgency: elective    Date/Time: 12/1/2023 12:31 PM  Airway not difficult    General Information and Staff    Patient location during procedure: OR  CRNA/CAA: Steven Delvalle CRNA    Indications and Patient Condition  Indications for airway management: airway protection    Preoxygenated: yes  Mask difficulty assessment: 0 - not attempted    Final Airway Details  Final airway type: endotracheal airway      Successful airway: ETT  Cuffed: yes   Successful intubation technique: direct laryngoscopy  Endotracheal tube insertion site: oral  Blade: Rolon  Blade size: 2  ETT size (mm): 7.0  Cormack-Lehane Classification: grade I - full view of glottis  Placement verified by: chest auscultation and capnometry   Measured from: lips  ETT/EBT  to lips (cm): 22  Number of attempts at approach: 1  Assessment: lips, teeth, and gum same as pre-op and atraumatic intubation

## 2023-12-01 NOTE — INTERVAL H&P NOTE
H&P reviewed. The patient was examined and there are no changes to the H&P.    Vitals:    12/01/23 0907 12/01/23 0927   BP:  120/89   BP Location:  Left arm   Patient Position:  Sitting   Pulse:  72   Resp:  16   Temp: 98.7 °F (37.1 °C)    TempSrc: Oral    SpO2:  95%   Weight: 110 kg (242 lb 3.2 oz)

## 2023-12-01 NOTE — ANESTHESIA PREPROCEDURE EVALUATION
Anesthesia Evaluation     Patient summary reviewed and Nursing notes reviewed   no history of anesthetic complications:   NPO Solid Status: > 8 hours  NPO Liquid Status: > 2 hours           Airway   Mallampati: I  TM distance: >3 FB  Neck ROM: full  No difficulty expected  Dental    (+) edentulous    Pulmonary - normal exam    breath sounds clear to auscultation  (+) ,sleep apnea  Cardiovascular - normal exam  Exercise tolerance: good (4-7 METS)    ECG reviewed  Rhythm: regular  Rate: normal    (+) hypertension well controlled less than 2 medications, CAD, hyperlipidemia      Neuro/Psych  GI/Hepatic/Renal/Endo    (+) obesity, liver disease cirrhosis, diabetes mellitus type 2 well controlled  (-) morbid obesity    Musculoskeletal     (+) back pain, chronic pain  Abdominal   (+) obese   Substance History   (+) alcohol use, drug use (THC)     OB/GYN negative ob/gyn ROS         Other   arthritis,                 Anesthesia Plan    ASA 2     general and regional     intravenous induction     Anesthetic plan, risks, benefits, and alternatives have been provided, discussed and informed consent has been obtained with: patient.  Pre-procedure education provided  Use of blood products discussed with patient  Consented to blood products.      CODE STATUS:

## 2023-12-01 NOTE — ANESTHESIA PROCEDURE NOTES
Peripheral Block    Pre-sedation assessment completed: 12/1/2023 11:02 AM    Patient reassessed immediately prior to procedure    Patient location during procedure: pre-op  Start time: 12/1/2023 11:09 AM  Stop time: 12/1/2023 11:17 AM  Reason for block: at surgeon's request and post-op pain management  Performed by  CRNA/CAA: Mercy Camacho CRNA  Preanesthetic Checklist  Completed: patient identified, IV checked, site marked, risks and benefits discussed, surgical consent, monitors and equipment checked, pre-op evaluation and timeout performed  Prep:  Pt Position: supine  Sterile barriers:cap, gloves, mask and washed/disinfected hands  Prep: ChloraPrep  Patient monitoring: blood pressure monitoring, continuous pulse oximetry and EKG  Procedure    Sedation: yes  Performed under: local infiltration  Guidance:ultrasound guided    ULTRASOUND INTERPRETATION.  Using ultrasound guidance a 21 G gauge needle was placed in close proximity to the brachial plexus nerve, at which point, under ultrasound guidance anesthetic was injected in the area of the nerve and spread of the anesthesia was seen on ultrasound in close proximity thereto.  There were no abnormalities seen on ultrasound; a digital image was taken; and the patient tolerated the procedure with no complications. Images:still images obtained, printed/placed on chart  Loss of twitch: 0.5 mA  Laterality:right  Block Type:interscalene  Injection Technique:single-shot  Needle Type:echogenic  Needle Gauge:21 G  Resistance on Injection: none    Medications Used: bupivacaine PF (MARCAINE) injection 0.5% - Injection   20 mL - 12/1/2023 11:17:00 AM  dexmedetomidine HCl (PRECEDEX) injection - Intravenous   30 mcg - 12/1/2023 11:17:00 AM      Post Assessment  Injection Assessment: negative aspiration for heme, no paresthesia on injection and incremental injection  Patient Tolerance:comfortable throughout block  Complications:no

## 2023-12-02 ENCOUNTER — READMISSION MANAGEMENT (OUTPATIENT)
Dept: CALL CENTER | Facility: HOSPITAL | Age: 63
End: 2023-12-02
Payer: MEDICARE

## 2023-12-02 VITALS
SYSTOLIC BLOOD PRESSURE: 140 MMHG | RESPIRATION RATE: 16 BRPM | BODY MASS INDEX: 31.1 KG/M2 | WEIGHT: 242.2 LBS | DIASTOLIC BLOOD PRESSURE: 65 MMHG | HEART RATE: 66 BPM | TEMPERATURE: 98.5 F | OXYGEN SATURATION: 92 %

## 2023-12-02 LAB
ANION GAP SERPL CALCULATED.3IONS-SCNC: 10.7 MMOL/L (ref 5–15)
BUN SERPL-MCNC: 16 MG/DL (ref 8–23)
BUN/CREAT SERPL: 19.5 (ref 7–25)
CALCIUM SPEC-SCNC: 8.5 MG/DL (ref 8.6–10.5)
CHLORIDE SERPL-SCNC: 105 MMOL/L (ref 98–107)
CO2 SERPL-SCNC: 22.3 MMOL/L (ref 22–29)
CREAT SERPL-MCNC: 0.82 MG/DL (ref 0.76–1.27)
DEPRECATED RDW RBC AUTO: 44.4 FL (ref 37–54)
EGFRCR SERPLBLD CKD-EPI 2021: 99.3 ML/MIN/1.73
ERYTHROCYTE [DISTWIDTH] IN BLOOD BY AUTOMATED COUNT: 13.4 % (ref 12.3–15.4)
GLUCOSE SERPL-MCNC: 131 MG/DL (ref 65–99)
HCT VFR BLD AUTO: 37 % (ref 37.5–51)
HGB BLD-MCNC: 12.7 G/DL (ref 13–17.7)
MCH RBC QN AUTO: 31.1 PG (ref 26.6–33)
MCHC RBC AUTO-ENTMCNC: 34.3 G/DL (ref 31.5–35.7)
MCV RBC AUTO: 90.7 FL (ref 79–97)
PLATELET # BLD AUTO: 125 10*3/MM3 (ref 140–450)
PMV BLD AUTO: 11.2 FL (ref 6–12)
POTASSIUM SERPL-SCNC: 3.8 MMOL/L (ref 3.5–5.2)
RBC # BLD AUTO: 4.08 10*6/MM3 (ref 4.14–5.8)
SODIUM SERPL-SCNC: 138 MMOL/L (ref 136–145)
WBC NRBC COR # BLD AUTO: 10.57 10*3/MM3 (ref 3.4–10.8)

## 2023-12-02 PROCEDURE — 25010000002 VANCOMYCIN 750 MG RECONSTITUTED SOLUTION 1 EACH VIAL: Performed by: ORTHOPAEDIC SURGERY

## 2023-12-02 PROCEDURE — 85027 COMPLETE CBC AUTOMATED: CPT | Performed by: ORTHOPAEDIC SURGERY

## 2023-12-02 PROCEDURE — 63710000001 ACETAMINOPHEN 325 MG TABLET: Performed by: ORTHOPAEDIC SURGERY

## 2023-12-02 PROCEDURE — A9270 NON-COVERED ITEM OR SERVICE: HCPCS | Performed by: ORTHOPAEDIC SURGERY

## 2023-12-02 PROCEDURE — 25010000002 VANCOMYCIN 1 G RECONSTITUTED SOLUTION 1 EACH VIAL: Performed by: ORTHOPAEDIC SURGERY

## 2023-12-02 PROCEDURE — 25810000003 SODIUM CHLORIDE 0.9 % SOLUTION 500 ML FLEX CONT: Performed by: ORTHOPAEDIC SURGERY

## 2023-12-02 PROCEDURE — 63710000001 OXYCODONE-ACETAMINOPHEN 7.5-325 MG TABLET: Performed by: NURSE PRACTITIONER

## 2023-12-02 PROCEDURE — 63710000001 ASPIRIN 81 MG TABLET DELAYED-RELEASE: Performed by: ORTHOPAEDIC SURGERY

## 2023-12-02 PROCEDURE — 63710000001 MELOXICAM 7.5 MG TABLET: Performed by: ORTHOPAEDIC SURGERY

## 2023-12-02 PROCEDURE — 80048 BASIC METABOLIC PNL TOTAL CA: CPT | Performed by: ORTHOPAEDIC SURGERY

## 2023-12-02 PROCEDURE — 63710000001 FAMOTIDINE 20 MG TABLET: Performed by: ORTHOPAEDIC SURGERY

## 2023-12-02 PROCEDURE — 97161 PT EVAL LOW COMPLEX 20 MIN: CPT

## 2023-12-02 PROCEDURE — 63710000001 LISINOPRIL 20 MG TABLET: Performed by: ORTHOPAEDIC SURGERY

## 2023-12-02 PROCEDURE — 63710000001 DULOXETINE 60 MG CAPSULE DELAYED-RELEASE PARTICLES: Performed by: ORTHOPAEDIC SURGERY

## 2023-12-02 PROCEDURE — 63710000001 ATORVASTATIN 10 MG TABLET: Performed by: ORTHOPAEDIC SURGERY

## 2023-12-02 PROCEDURE — 63710000001 OXYCODONE 5 MG TABLET: Performed by: ORTHOPAEDIC SURGERY

## 2023-12-02 PROCEDURE — A9270 NON-COVERED ITEM OR SERVICE: HCPCS | Performed by: NURSE PRACTITIONER

## 2023-12-02 PROCEDURE — 25010000002 KETOROLAC TROMETHAMINE PER 15 MG: Performed by: NURSE PRACTITIONER

## 2023-12-02 PROCEDURE — 25810000003 SODIUM CHLORIDE 0.9 % SOLUTION: Performed by: ORTHOPAEDIC SURGERY

## 2023-12-02 PROCEDURE — G0378 HOSPITAL OBSERVATION PER HR: HCPCS

## 2023-12-02 PROCEDURE — 97165 OT EVAL LOW COMPLEX 30 MIN: CPT

## 2023-12-02 RX ORDER — MELOXICAM 15 MG/1
15 TABLET ORAL DAILY
Qty: 30 TABLET | Refills: 0 | Status: SHIPPED | OUTPATIENT
Start: 2023-12-02

## 2023-12-02 RX ORDER — NALOXONE HYDROCHLORIDE 4 MG/.1ML
SPRAY NASAL
Qty: 2 EACH | Refills: 0 | Status: SHIPPED | OUTPATIENT
Start: 2023-12-02

## 2023-12-02 RX ORDER — ONDANSETRON 4 MG/1
4 TABLET, FILM COATED ORAL EVERY 8 HOURS PRN
Qty: 20 TABLET | Refills: 0 | Status: SHIPPED | OUTPATIENT
Start: 2023-12-02

## 2023-12-02 RX ORDER — PREGABALIN 75 MG/1
75 CAPSULE ORAL 2 TIMES DAILY
Qty: 60 CAPSULE | Refills: 0 | Status: SHIPPED | OUTPATIENT
Start: 2023-12-02 | End: 2023-12-02 | Stop reason: SDUPTHER

## 2023-12-02 RX ORDER — OXYCODONE AND ACETAMINOPHEN 7.5; 325 MG/1; MG/1
1 TABLET ORAL EVERY 4 HOURS PRN
Qty: 18 TABLET | Refills: 0 | Status: SHIPPED | OUTPATIENT
Start: 2023-12-02 | End: 2023-12-04 | Stop reason: SDUPTHER

## 2023-12-02 RX ORDER — KETOROLAC TROMETHAMINE 30 MG/ML
30 INJECTION, SOLUTION INTRAMUSCULAR; INTRAVENOUS EVERY 6 HOURS PRN
Status: DISCONTINUED | OUTPATIENT
Start: 2023-12-02 | End: 2023-12-02 | Stop reason: HOSPADM

## 2023-12-02 RX ORDER — DOCUSATE SODIUM 100 MG/1
100 CAPSULE, LIQUID FILLED ORAL 2 TIMES DAILY PRN
Qty: 60 CAPSULE | Refills: 0 | Status: SHIPPED | OUTPATIENT
Start: 2023-12-02

## 2023-12-02 RX ORDER — PREGABALIN 75 MG/1
75 CAPSULE ORAL 2 TIMES DAILY
Qty: 60 CAPSULE | Refills: 0 | Status: SHIPPED | OUTPATIENT
Start: 2023-12-02

## 2023-12-02 RX ORDER — OXYCODONE AND ACETAMINOPHEN 7.5; 325 MG/1; MG/1
2 TABLET ORAL EVERY 4 HOURS PRN
Status: COMPLETED | OUTPATIENT
Start: 2023-12-02 | End: 2023-12-02

## 2023-12-02 RX ORDER — ASPIRIN 81 MG/1
81 TABLET ORAL DAILY
Qty: 14 TABLET | Refills: 0 | Status: SHIPPED | OUTPATIENT
Start: 2023-12-02

## 2023-12-02 RX ORDER — OXYCODONE AND ACETAMINOPHEN 7.5; 325 MG/1; MG/1
1 TABLET ORAL EVERY 4 HOURS PRN
Status: COMPLETED | OUTPATIENT
Start: 2023-12-02 | End: 2023-12-02

## 2023-12-02 RX ORDER — OXYCODONE HYDROCHLORIDE AND ACETAMINOPHEN 5; 325 MG/1; MG/1
1 TABLET ORAL EVERY 4 HOURS PRN
Qty: 18 TABLET | Refills: 0 | Status: SHIPPED | OUTPATIENT
Start: 2023-12-02 | End: 2023-12-02 | Stop reason: HOSPADM

## 2023-12-02 RX ORDER — FAMOTIDINE 40 MG/1
40 TABLET, FILM COATED ORAL DAILY
Qty: 14 TABLET | Refills: 0 | Status: SHIPPED | OUTPATIENT
Start: 2023-12-02

## 2023-12-02 RX ADMIN — VANCOMYCIN HYDROCHLORIDE 1750 MG: 1 INJECTION, POWDER, LYOPHILIZED, FOR SOLUTION INTRAVENOUS at 00:55

## 2023-12-02 RX ADMIN — OXYCODONE HYDROCHLORIDE 10 MG: 5 TABLET ORAL at 07:51

## 2023-12-02 RX ADMIN — DULOXETINE HYDROCHLORIDE 60 MG: 60 CAPSULE, DELAYED RELEASE ORAL at 09:52

## 2023-12-02 RX ADMIN — OXYCODONE HYDROCHLORIDE AND ACETAMINOPHEN 2 TABLET: 7.5; 325 TABLET ORAL at 11:55

## 2023-12-02 RX ADMIN — ATORVASTATIN CALCIUM 10 MG: 10 TABLET, FILM COATED ORAL at 09:52

## 2023-12-02 RX ADMIN — LISINOPRIL 20 MG: 20 TABLET ORAL at 09:52

## 2023-12-02 RX ADMIN — MELOXICAM 15 MG: 7.5 TABLET ORAL at 09:52

## 2023-12-02 RX ADMIN — OXYCODONE HYDROCHLORIDE 10 MG: 5 TABLET ORAL at 02:57

## 2023-12-02 RX ADMIN — SODIUM CHLORIDE 100 ML/HR: 9 INJECTION, SOLUTION INTRAVENOUS at 00:54

## 2023-12-02 RX ADMIN — ACETAMINOPHEN 975 MG: 325 TABLET ORAL at 09:52

## 2023-12-02 RX ADMIN — FAMOTIDINE 40 MG: 20 TABLET ORAL at 09:52

## 2023-12-02 RX ADMIN — ASPIRIN 81 MG: 81 TABLET, COATED ORAL at 09:52

## 2023-12-02 RX ADMIN — KETOROLAC TROMETHAMINE 30 MG: 30 INJECTION, SOLUTION INTRAMUSCULAR; INTRAVENOUS at 10:04

## 2023-12-02 NOTE — PLAN OF CARE
Goal Outcome Evaluation:              Outcome Evaluation: VSS, rue immobilizer in place, encouraged pt to keep rue elevated on pillows and utilize ice packs to help with discomfort. iv toradol given x 1 in addition to prn roxicodone to assist in pain control with positive rsyults. anticipating pt going home today, spouse at bedside.

## 2023-12-02 NOTE — OUTREACH NOTE
Prep Survey      Flowsheet Row Responses   [facility] patient discharged from? [place]   Is LACE score < 7 ? Yes   Eligibility [facility]   Date of Admission 12/01/23   Date of Discharge 12/02/23   Discharge Disposition Home or Self Care   Discharge diagnosis TOTAL SHOULDER REVERSE ARTHROPLASTY   Does the patient have one of the following disease processes/diagnoses(primary or secondary)? Total Joint Replacement   Does the patient have Home health ordered? No   Is there a DME ordered? No   Prep survey completed? Yes            Toyin RAWLS - Registered Nurse

## 2023-12-02 NOTE — THERAPY DISCHARGE NOTE
Acute Care - Occupational Therapy Discharge   Reina Boggs    Patient Name: Sylwia Spears  : 1960    MRN: [id number]                              Today's Date: 2023       Admit Date: 2023    Visit Dx:     ICD-10-CM ICD-9-CM   1. Rotator cuff arthropathy of right shoulder  M12.811 716.81   2. Complete tear of right rotator cuff, unspecified whether traumatic  M75.121 727.61     Patient Active Problem List   Diagnosis    History of colon polyps    Type 2 diabetes mellitus without complication, without long-term current use of insulin    Benign essential HTN    Morbidly obese    Cervical spinal stenosis    Screening for prostate cancer    Personal history of colonic polyps    Family history of colon cancer    Hyperlipidemia LDL goal <70    Midline thoracic back pain    Erectile dysfunction due to diseases classified elsewhere    Personal history of nicotine dependence    Coronary artery calcification    Varicose veins of right lower extremity with other complications    DJD of shoulder    Rotator cuff arthropathy of right shoulder    Complete tear of right rotator cuff    Chronic right shoulder pain    Thrombocytopenia    Abnormal international normal ratio (INR)     Past Medical History:   Diagnosis Date    Cervical disc disease     stenosis    Cirrhosis of liver     Colon polyp     Coronary artery calcification     Diabetes mellitus     Electrocution     Fell off a ladder and has chronic neck pain    Hyperlipidemia     Hypertension     Spleen enlarged     Thrombocytopenia      Past Surgical History:   Procedure Laterality Date    CHOLECYSTECTOMY      COLONOSCOPY      COLONOSCOPY N/A 2018    Procedure: COLONOSCOPY, polypectomy;  Surgeon: Raleigh Champagne MD;  Location: [facility];  Service: Gastroenterology    COLONOSCOPY W/ POLYPECTOMY      COLONOSCOPY W/ POLYPECTOMY N/A 2021    Procedure: COLONOSCOPY; polypectomy;  Surgeon: Raleigh Champagne MD;  Location:  [facility];  Service: Gastroenterology;  Laterality: N/A;  polyps   diverticulosis    LIPOMA EXCISION      groin    SHOULDER SURGERY Left       General Information       Row Name 12/02/23 0800          OT Time and Intention    Document Type discharge evaluation/summary  -SD     Mode of Treatment occupational therapy  -SD       Row Name 12/02/23 0800          General Information    Patient Profile Reviewed yes  Pt s/p right reverse total shoulder sx. Pt lives with his spouse.  -SD     Prior Level of Function independent:;ADL's;all household mobility;community mobility  -SD     Existing Precautions/Restrictions shoulder;other (see comments);brace on at all times  wear shoulder splint x 4 weeks. NWB of RUE  -SD     Barriers to Rehab none identified  -SD       Row Name 12/02/23 0800          Occupational Profile    Reason for Services/Referral (Occupational Profile) s/p right shoulder sx  -SD     Successful Occupations (Occupational Profile) I with daily tasks prior to sx  -SD     Environmental Supports and Barriers (Occupational Profile) lives with spouse  -SD     Patient Goals (Occupational Profile) go home  -SD       Row Name 12/02/23 0800          Living Environment    People in Home spouse  -SD       Row Name 12/02/23 0800          Home Main Entrance    Number of Stairs, Main Entrance three  -SD       Row Name 12/02/23 0800          Stairs Within Home, Primary    Stairs, Within Home, Primary one level home  -SD       Row Name 12/02/23 0800          Cognition    Orientation Status (Cognition) oriented x 4  -SD               User Key  (r) = Recorded By, (t) = Taken By, (c) = Cosigned By      Initials Name Provider Type    SD Lokesh Valentine OTR Occupational Therapist                   Mobility/ADL's       Row Name 12/02/23 0852          Bed Mobility    Bed Mobility supine-sit  -SD     Supine-Sit [place] (Bed Mobility) modified independence  -SD     Bed Mobility, Safety Issues decreased use of arms for  pushing/pulling  -SD     Assistive Device (Bed Mobility) head of bed elevated  -SD       Row Name 12/02/23 0852          Transfers    Transfers sit-stand transfer;stand-sit transfer  -SD       Row Name 12/02/23 0852          Sit-Stand Transfer    Sit-Stand [place] (Transfers) supervision  -SD       Row Name 12/02/23 0852          Stand-Sit Transfer    Stand-Sit [place] (Transfers) supervision  -SD       Row Name 12/02/23 0852          Functional Mobility    Functional Mobility- Ind. Level supervision required  -SD     Functional Mobility- Device --  no device  -SD     Functional Mobility-Distance (Feet) 250  -SD       Row Name 12/02/23 0852          Activities of Daily Living    BADL Assessment/Intervention upper body dressing;lower body dressing  -SD       Row Name 12/02/23 0852          Mobility    Extremity Weight-bearing Status right upper extremity  -SD     Right Upper Extremity (Weight-bearing Status) non weight-bearing (NWB)  -SD       Row Name 12/02/23 0852          Upper Body Dressing Assessment/Training    [place] Level (Upper Body Dressing) upper body dressing skills;doff;front opening garment;don;pull-over garment;modified independence;set up  -SD     Position (Upper Body Dressing) edge of bed sitting  -SD     Comment, (Upper Body Dressing) Education regarding compensatory strategies for adl management s/p shoulder sx. Education regading brace management.  -SD       Row Name 12/02/23 0852          Lower Body Dressing Assessment/Training    [place] Level (Lower Body Dressing) lower body dressing skills;don;pants/bottoms;independent;set up  -SD     Position (Lower Body Dressing) edge of bed sitting  -SD               User Key  (r) = Recorded By, (t) = Taken By, (c) = Cosigned By      Initials Name Provider Type    Lokesh Osborne OTR Occupational Therapist                   Obj/Interventions       Row Name 12/02/23 0856          Sensory Assessment (Somatosensory)    Sensory  Assessment (Somatosensory) other (see comments)  pt reports sensation is improving after surgical block, yet he still has some numbness of RUE.  -SD       Row Name 12/02/23 08          Range of Motion Comprehensive    Comment, General Range of Motion Pt able to demonstrated arom of distal RUE. Education provided regarding arom program of distal RUE and modified pendulums for right shoulder.  -SD               User Key  (r) = Recorded By, (t) = Taken By, (c) = Cosigned By      Initials Name Provider Type    SD Lokesh Valentine OTR Occupational Therapist                   Goals/Plan    No documentation.                  Clinical Impression       Row Name 12/02/23 0814          Pain Assessment    Pretreatment Pain Rating 7/10  -SD     Posttreatment Pain Rating 7/10  -SD     Pain Location - Side/Orientation Right  -SD     Pain Location - shoulder  -SD     Pre/Posttreatment Pain Comment pt had pain medication earlier this am. Pt reported no increase in pain with activity during evaluation.  -SD     Pain Intervention(s) Repositioned;Ambulation/increased activity  -SD       Row Name 12/02/23 0821          Plan of Care Review    Plan of Care Reviewed With patient;spouse  -SD     Outcome Evaluation OT evaluation complete. Pt s/p right reverse total shoulder arthroplasty. Pt managed bed mobility, transfers and functional mobility x 250 ft with supervision. Education was provided regarding compensatory strategies for adl management and brace management s/p shoulder sx. Pt was able to don clothing with supervision after set up assistance from EOB. Pt and spouse report no concerns for management of daily tasks upon discharge to home. Education provided regarding arom program of distal RUE and modified pendulums for right shoulder. No further OT services recommended.  -SD       Row Name 12/02/23 0894          Therapy Assessment/Plan (OT)    Patient/Family Therapy Goal Statement (OT) go home  -SD     Criteria for Skilled  Therapeutic Interventions Met (OT) no;other (see comments)  Education provided at time of evaluation. No further OT services recommended  -SD     Therapy Frequency (OT) evaluation only  -SD       Row Name 12/02/23 0858          Therapy Plan Review/Discharge Plan (OT)    Anticipated Discharge Disposition (OT) home with assist  -SD       Row Name 12/02/23 0858          Positioning and Restraints    Pre-Treatment Position in bed  -SD     Post Treatment Position bed  -SD     In Bed sitting EOB;call light within reach;encouraged to call for assist;with family/caregiver  splint on right shoulder  -SD               User Key  (r) = Recorded By, (t) = Taken By, (c) = Cosigned By      Initials Name Provider Type    SD Lokesh Valentine OTR Occupational Therapist                   Outcome Measures       Row Name 12/02/23 0801          How much help from another is currently needed...    Putting on and taking off regular lower body clothing? 3  -SD     Bathing (including washing, rinsing, and drying) 3  -SD     Toileting (which includes using toilet bed pan or urinal) 3  -SD     Putting on and taking off regular upper body clothing 3  -SD     Taking care of personal grooming (such as brushing teeth) 4  -SD     Eating meals 4  -SD     AM-PAC 6 Clicks Score (OT) 20  -SD       Row Name 12/02/23 0900          How much help from another person do you currently need...    Turning from your back to your side while in flat bed without using bedrails? 4  -LH     Moving from lying on back to sitting on the side of a flat bed without bedrails? 4  -LH     Moving to and from a bed to a chair (including a wheelchair)? 4  -LH     Standing up from a chair using your arms (e.g., wheelchair, bedside chair)? 4  -LH     Climbing 3-5 steps with a railing? 3  -LH     To walk in hospital room? 4  -LH     AM-PAC 6 Clicks Score (PT) 23  -LH     Highest Level of Mobility Goal 7 --> Walk 25 feet or more  -       Row Name 12/02/23 0900 12/02/23 0801        Functional Assessment    Outcome Measure Options AM-PAC 6 Clicks Basic Mobility (PT)  - AM-PAC 6 Clicks Daily Activity (OT)  -SD              User Key  (r) = Recorded By, (t) = Taken By, (c) = Cosigned By      Initials Name Provider Type     Antonia Edwards, PT Physical Therapist    SD Lokesh Valentine, OTR Occupational Therapist                  Occupational Therapy Education       Title: PT OT SLP Therapies (Done)       Topic: Occupational Therapy (Resolved)       Point: ADL training (Resolved)       Description:   Instruct learner(s) on proper safety adaptation and remediation techniques during self care or transfers.   Instruct in proper use of assistive devices.                  Learning Progress Summary             Patient Acceptance, E,TB,D, VU,DU by SD at 12/2/2023 0905    Comment: Education was provided regarding compensatory strategies for adl management and brace management s/p shoulder sx.   Family Acceptance, E,TB,D, VU,DU by SD at 12/2/2023 0905    Comment: Education was provided regarding compensatory strategies for adl management and brace management s/p shoulder sx.                         Point: Home exercise program (Resolved)       Description:   Instruct learner(s) on appropriate technique for monitoring, assisting and/or progressing therapeutic exercises/activities.                  Learning Progress Summary             Patient Acceptance, E,TB,D, VU,DU by SD at 12/2/2023 0906    Comment: Education provided regarding arom program of distal RUE and modified pendulums for right shoulder.                                         User Key       Initials Effective Dates Name Provider Type Discipline    SD 06/16/21 -  Lokesh Valentine OTR Occupational Therapist OT                  OT Recommendation and Plan  Therapy Frequency (OT): evaluation only  Plan of Care Review  Plan of Care Reviewed With: patient, spouse  Outcome Evaluation: OT evaluation complete. Pt s/p right reverse total  shoulder arthroplasty. Pt managed bed mobility, transfers and functional mobility x 250 ft with supervision. Education was provided regarding compensatory strategies for adl management and brace management s/p shoulder sx. Pt was able to don clothing with supervision after set up assistance from EOB. Pt and spouse report no concerns for management of daily tasks upon discharge to home. Education provided regarding arom program of distal RUE and modified pendulums for right shoulder. No further OT services recommended.     Time Calculation:   Evaluation Complexity (OT)  Review Occupational Profile/Medical/Therapy History Complexity: brief/low complexity  Assessment, Occupational Performance/Identification of Deficit Complexity: 1-3 performance deficits  Clinical Decision Making Complexity (OT): problem focused assessment/low complexity  Overall Complexity of Evaluation (OT): low complexity     Time Calculation- OT       Row Name 12/02/23 0907             Time Calculation- OT    OT Start Time 0810  -SD      OT Stop Time 0833  -SD      OT Time Calculation (min) 23 min  -SD         Untimed Charges    OT Eval/Re-eval Minutes 23  -SD         Total Minutes    Untimed Charges Total Minutes 23  -SD       Total Minutes 23  -SD                User Key  (r) = Recorded By, (t) = Taken By, (c) = Cosigned By      Initials Name Provider Type    Lokesh Osborne OTR Occupational Therapist                         OT Discharge Summary  Anticipated Discharge Disposition (OT): home with assist  Reason for Discharge: Discharge from facility, other (comment) (Pt has no discharge concerns)  Outcomes Achieved: Discharge from facility occurred on same date as evluation    DEDRICK Steven  12/2/2023

## 2023-12-02 NOTE — PLAN OF CARE
Goal Outcome Evaluation:  Plan of Care Reviewed With: patient           Outcome Evaluation: PT evaluation completed.  Patient performed bed mobility, transfers and gait x 250 feet with supervion.  Patient plans to d/c home with spouse today, reports no concerns.  No further acute care PT needs identified at this time.      Anticipated Discharge Disposition (PT): home with assist

## 2023-12-02 NOTE — PLAN OF CARE
Problem: Adult Inpatient Plan of Care  Goal: Plan of Care Review  Recent Flowsheet Documentation  Taken 12/2/2023 0858 by Lokesh Valentine OTR  Plan of Care Reviewed With:   patient   spouse  Outcome Evaluation: OT evaluation completed. Pt s/p right reverse total shoulder arthroplasty. Pt managed bed mobility, transfers and functional mobility x 250 ft with supervision. Education was provided regarding compensatory strategies for adl management and brace management s/p shoulder sx. Pt was able to don clothing with supervision after set up assistance from EOB. Pt and spouse report no concerns for management of daily tasks upon discharge to home. Education provided regarding arom program of distal RUE and modified pendulums for right shoulder. No further OT services recommended.

## 2023-12-02 NOTE — PLAN OF CARE
Goal Outcome Evaluation:  Plan of Care Reviewed With: patient        Progress: improving  Outcome Evaluation: Vital signs stable. Oxygen stable on room air. IV antibiotics given, IV currently saline locked d/t adequate PO intake. Tolerating PO pain medication. Dressing C/D/I, sling remains in place. No complaints at this time.

## 2023-12-02 NOTE — PROGRESS NOTES
POD# 1 s/p right reverse total shoulder arthroplasty, open biceps tenodesis    Subjective:Lenzy D Curnutte pain increased overnight and this am. Tingling remains present palmar aspect of thumb but improving. Denies any other concerns overnight.    Objective:  Vitals:    12/01/23 2017 12/01/23 2024 12/01/23 2307 12/02/23 0551   BP:  144/74 129/62 140/65   BP Location:  Left arm Left arm Left arm   Patient Position:  Lying Lying Lying   Pulse:  93 89 66   Resp:  16 15 16   Temp:  98.2 °F (36.8 °C) 98.1 °F (36.7 °C) 98.5 °F (36.9 °C)   TempSrc:  Oral Oral Oral   SpO2: 96% 95% 93% 92%   Weight:           Results from last 7 days   Lab Units 12/02/23  0349   WBC 10*3/mm3 10.57   HEMOGLOBIN g/dL 12.7*   HEMATOCRIT % 37.0*   PLATELETS 10*3/mm3 125*       Results from last 7 days   Lab Units 12/02/23  0349   SODIUM mmol/L 138   POTASSIUM mmol/L 3.8   CHLORIDE mmol/L 105   CO2 mmol/L 22.3   BUN mg/dL 16   CREATININE mg/dL 0.82   GLUCOSE mg/dL 131*   CALCIUM mg/dL 8.5*       Exam:    Right upper extremity examined  Flex/extend all digits right hand   Positive sensation light touch all distributions right hand  Brisk cap refill all digits right hand     Impression: s/p right reverse total shoulder arthroplasty, open biceps tenodesis    Plan:  1. PT/OT-continue sling x 4 weeks with range of motion of the elbow wrist and hand initiated within first week as well as gentle pendulum exercises of the shoulder  2. Pain control-acetaminophen, oxycodone  3. DVT prophylaxis-aspirin once daily  4. Wound care-dressing remain intact and to follow-up in office  5. Disposition-Home today once pain controlled. Follow-up in office previously scheduled appointment in 2 weeks.

## 2023-12-02 NOTE — DISCHARGE PLACEMENT REQUEST
"Amaliababita Kennadyсергей CARMELO (63 y.o. Male)       Date of Birth   1960    Social Security Number       Address   [address] [place] [address]    Home Phone   [phone]    MRN   [id number]       Jewish   Unknown    Marital Status                               Admission Date   12/1/23    Admission Type   Elective    Admitting Provider   David Ozuna MD    Attending Provider   David Ozuna MD    Department, Room/Bed   [facility] SURG, 1421/1       Discharge Date       Discharge Disposition   Home or Self Care    Discharge Destination                                 Attending Provider: David Ozuna MD    Allergies: No Known Allergies    Isolation: None   Infection: None   Code Status: Not on file    Ht: 188 cm (74\")   Wt: 110 kg (242 lb 3.2 oz)    Admission Cmt: None   Principal Problem: DJD of shoulder [M19.019]                   Active Insurance as of 12/1/2023       Primary Coverage       Payor Plan Insurance Group Employer/Plan Group    [facility] [employer] MED ADV HMO        Payor Plan Address Payor Plan Phone Number Payor Plan Fax Number Effective Dates    [address] [address]   11/1/2023 - None Entered    [facility] [address]         Subscriber Name Subscriber Birth Date Member ID       ENID [id number]                     Emergency Contacts        (Rel.) Home Phone Work Phone Mobile Phone    Ruthy Gomez (Spouse) -- -- [phone]                "

## 2023-12-02 NOTE — CASE MANAGEMENT/SOCIAL WORK
Continued Stay Note  DIANNE HuangSkipwith     Patient Name: Sylwia Spears  MRN: [id number]  Today's Date: 12/2/2023    Admit Date: 12/1/2023    Plan: DC Home, no needs   Discharge Plan       Row Name 12/02/23 1120       Plan    Plan DC Home, no needs    Plan Comments CCP spoke to pts wife, pt speaking to MD, I introduced myself, role and reviewed dc plans.  Pt lives with his wife in a 1 story home.  There are 3 steps to enter the front door.  Pt is independent with ADL's but his wife will assist as needed.  Pt uses no DME.  His pharmacy is [facility] in [place] and he has no issues with copays. Pt does not need a work excuse.  His plan isto return home. His wife and nephew can assist as needed.  CCP will follow. Rissa Ridley    Final Discharge Disposition Code 01 - home or self-care    Final Note dc home                   Discharge Codes    No documentation.                 Expected Discharge Date and Time       Expected Discharge Date Expected Discharge Time    Dec 2, 2023               Rissa Sams

## 2023-12-02 NOTE — THERAPY DISCHARGE NOTE
Acute Care - Physical Therapy Initial Evaluation/Discharge   Reina Boggs     Patient Name: Sylwia Spears  : 1960  MRN: [id number]  Today's Date: 2023   Onset of Illness/Injury or Date of Surgery: 23     Referring Physician: Dr. Ozuna      Admit Date: 2023    Visit Dx:    ICD-10-CM ICD-9-CM   1. Rotator cuff arthropathy of right shoulder  M12.811 716.81   2. Complete tear of right rotator cuff, unspecified whether traumatic  M75.121 727.61     Patient Active Problem List   Diagnosis    History of colon polyps    Type 2 diabetes mellitus without complication, without long-term current use of insulin    Benign essential HTN    Morbidly obese    Cervical spinal stenosis    Screening for prostate cancer    Personal history of colonic polyps    Family history of colon cancer    Hyperlipidemia LDL goal <70    Midline thoracic back pain    Erectile dysfunction due to diseases classified elsewhere    Personal history of nicotine dependence    Coronary artery calcification    Varicose veins of right lower extremity with other complications    DJD of shoulder    Rotator cuff arthropathy of right shoulder    Complete tear of right rotator cuff    Chronic right shoulder pain    Thrombocytopenia    Abnormal international normal ratio (INR)     Past Medical History:   Diagnosis Date    Cervical disc disease     stenosis    Cirrhosis of liver     Colon polyp     Coronary artery calcification     Diabetes mellitus     Electrocution     Fell off a ladder and has chronic neck pain    Hyperlipidemia     Hypertension     Spleen enlarged     Thrombocytopenia      Past Surgical History:   Procedure Laterality Date    CHOLECYSTECTOMY      COLONOSCOPY      COLONOSCOPY N/A 2018    Procedure: COLONOSCOPY, polypectomy;  Surgeon: Raleigh Champagne MD;  Location: [facility];  Service: Gastroenterology    COLONOSCOPY W/ POLYPECTOMY      COLONOSCOPY W/ POLYPECTOMY N/A 2021    Procedure:  COLONOSCOPY; polypectomy;  Surgeon: Raleigh Champagne MD;  Location: [facility] OR;  Service: Gastroenterology;  Laterality: N/A;  polyps   diverticulosis    LIPOMA EXCISION      groin    SHOULDER SURGERY Left        PT Assessment (last 12 hours)       PT Evaluation and Treatment       Row Name 12/02/23 0812          Physical Therapy Time and Intention    Subjective Information complains of;pain  -     Document Type discharge evaluation/summary  -     Mode of Treatment physical therapy  -     Patient Effort good  -     Symptoms Noted During/After Treatment none  -       Row Name 12/02/23 0812          General Information    Patient Profile Reviewed yes  -     Onset of Illness/Injury or Date of Surgery 12/01/23  -     Referring Physician Dr. Ozuna  -     Patient Observations alert;cooperative;agree to therapy  -     General Observations of Patient Patient reclined in bed, right shoulder in brace, wife present.  -     Prior Level of Function independent:;all household mobility;community mobility;ADL's  -     Equipment Currently Used at Home none  -     Existing Precautions/Restrictions brace on at all times;other (see comments)  NWB right UE  -     Risks Reviewed patient:;increased discomfort  -     Benefits Reviewed patient:;improve function;increase independence  -     Barriers to Rehab none identified  -       Row Name 12/02/23 0812          Living Environment    Current Living Arrangements home  -     People in Home spouse  -       Row Name 12/02/23 0812          Home Main Entrance    Number of Stairs, Main Entrance three  -       Row Name 12/02/23 0812          Stairs Within Home, Primary    Stairs, Within Home, Primary one  level home  -       Row Name 12/02/23 0812          Home Use of Assistive/Adaptive Equipment    Equipment Currently Used at Home none  -       Row Name 12/02/23 0812          Pain    Pretreatment Pain Rating 7/10  -     Posttreatment Pain  Rating 7/10  -     Pain Location - Side/Orientation Right  -     Pain Location - shoulder  -     Pain Intervention(s) Repositioned;Medication (See MAR)  -       Row Name 12/02/23 0812          Cognition    Orientation Status (Cognition) oriented x 4  -     Personal Safety Interventions gait belt;nonskid shoes/slippers when out of bed  -       Row Name 12/02/23 0812          Range of Motion Comprehensive    Comment, General Range of Motion B LE AROM WFL  -       Row Name 12/02/23 0812          Strength Comprehensive (MMT)    Comment, General Manual Muscle Testing (MMT) Assessment B LE strength functional for transfers/mobility  -       Row Name 12/02/23 0812          Bed Mobility    Bed Mobility supine-sit  -     Supine-Sit [place] (Bed Mobility) supervision  -     Assistive Device (Bed Mobility) head of bed elevated  -[facility] Name 12/02/23 0812          Transfers    Transfers sit-stand transfer;stand-sit transfer  -       Row Name 12/02/23 0812          Sit-Stand Transfer    Sit-Stand [place] (Transfers) supervision  -[facility] Name 12/02/23 0812          Stand-Sit Transfer    Stand-Sit [place] (Transfers) supervision  -[facility] Name 12/02/23 0812          Gait/Stairs (Locomotion)    [place] Level (Gait) supervision  -     Distance in Feet (Gait) 250  -     Deviations/Abnormal Patterns (Gait) gait speed decreased  -     Comment, (Gait/Stairs) Patient navigated obstacles, performed turns, gait with head turns and direction changes with no assistive device, no loss of balance or instability noted.  -       Row Name             Wound 12/01/23 1309 Right shoulder    Wound - Properties Group Placement Date: 12/01/23  -GP Placement Time: 1309  -GP Side: Right  -GP Location: shoulder  -GP Additional Comments: exofin, telfa, tegaderm, ABD, tape, immobilizer  -GP    Retired Wound - Properties Group Placement Date: 12/01/23  -GP Placement Time: 1309  -GP Side:  Right  -GP Location: shoulder  -GP Additional Comments: exofin, telfa, tegaderm, ABD, tape, immobilizer  -GP    Retired Wound - Properties Group Date first assessed: 12/01/23  -GP Time first assessed: 1309  -GP Side: Right  -GP Location: shoulder  -GP Additional Comments: exofin, telfa, tegaderm, ABD, tape, immobilizer  -GP      Row Name 12/02/23 08          Plan of Care Review    Plan of Care Reviewed With patient  -     Outcome Evaluation PT evaluation completed.  Patient performed bed mobility, transfers and gait x 250 feet with supervion.  Patient plans to d/c home with spouse today, reports no concerns.  No further acute care PT needs identified at this time.  -       Row Name 12/02/23 [facility]          Positioning and Restraints    Pre-Treatment Position in bed  -     Post Treatment Position bed  -     In Bed sitting EOB;with OT  -       Row Name 12/02/23 0812          Therapy Assessment/Plan (PT)    Patient/Family Therapy Goals Statement (PT) go home  -     PT Diagnosis (PT) s/p right reverse total shoulder replacement  -     Therapy Frequency (PT) evaluation only  -       Row Name 12/02/23 08          PT Evaluation Complexity    History, PT Evaluation Complexity 1-2 personal factors and/or comorbidities  -     Examination of Body Systems (PT Eval Complexity) 1-2 elements  -     Clinical Presentation (PT Evaluation Complexity) stable  -     Clinical Decision Making (PT Evaluation Complexity) low complexity  -     Overall Complexity (PT Evaluation Complexity) low complexity  -       Row Name 12/02/23 0812          Therapy Plan Review/Discharge Plan (PT)    Therapy Plan Review (PT) evaluation/treatment results reviewed;risks/benefits reviewed;current/potential barriers reviewed;participants voiced agreement with care plan;participants included;patient;care plan/treatment goals reviewed  -               User Key  (r) = Recorded By, (t) = Taken By, (c) = Cosigned By      Initials Name  Provider Type     Antonia Edwards, PT Physical Therapist    GP Rebecca Junior RN Registered Nurse                      Physical Therapy Education       Title: PT OT SLP Therapies (In Progress)       Topic: Physical Therapy (Done)       Point: Mobility training (Done)       Learning Progress Summary             Patient Acceptance, E,TB, VU by  at 12/2/2023 0902                         Point: Precautions (Done)       Learning Progress Summary             Patient Acceptance, E,TB, VU by  at 12/2/2023 0902                                         User Key       Initials Effective Dates Name Provider Type Discipline     06/16/21 -  Antonia Edwards, PT Physical Therapist PT                    PT Recommendation and Plan  Anticipated Discharge Disposition (PT): home with assist  Therapy Frequency (PT): evaluation only  Plan of Care Reviewed With: patient  Outcome Evaluation: PT evaluation completed.  Patient performed bed mobility, transfers and gait x 250 feet with supervion.  Patient plans to d/c home with spouse today, reports no concerns.  No further acute care PT needs identified at this time.     Outcome Measures       Row Name 12/02/23 0900             How much help from another person do you currently need...    Turning from your back to your side while in flat bed without using bedrails? 4  -LH      Moving from lying on back to sitting on the side of a flat bed without bedrails? 4  -LH      Moving to and from a bed to a chair (including a wheelchair)? 4  -LH      Standing up from a chair using your arms (e.g., wheelchair, bedside chair)? 4  -LH      Climbing 3-5 steps with a railing? 3  -LH      To walk in hospital room? 4  -      AM-PAC 6 Clicks Score (PT) 23  -      Highest Level of Mobility Goal 7 --> Walk 25 feet or more  -         Functional Assessment    Outcome Measure Options AM-PAC 6 Clicks Basic Mobility (PT)  -                User Key  (r) = Recorded By, (t) = Taken By, (c) =  Cosigned By      Initials Name Provider Type     Antonia Edwards, PT Physical Therapist                     Time Calculation:    PT Charges       Row Name 12/02/23 0903             Time Calculation    Start Time 0812  -      Stop Time 0826  -      Time Calculation (min) 14 min  -                User Key  (r) = Recorded By, (t) = Taken By, (c) = Cosigned By      Initials Name Provider Type     Antonia Edwards, PT Physical Therapist                  Therapy Charges for Today       Code Description Service Date Service Provider Modifiers Qty    44250741604 HC PT EVAL LOW COMPLEXITY 1 12/2/2023 Antonia Edwards, PT GP 1            PT G-Codes  Outcome Measure Options: AM-PAC 6 Clicks Basic Mobility (PT)  AM-PAC 6 Clicks Score (PT): 23  AM-PAC 6 Clicks Score (OT): 20    PT Discharge Summary  Anticipated Discharge Disposition (PT): home with assist    Antonia Edwards, PT  12/2/2023

## 2023-12-02 NOTE — CASE MANAGEMENT/SOCIAL WORK
Case Management Discharge Note      Final Note: dc home         Selected Continued Care - Admitted Since 12/1/2023       Destination    No services have been selected for the patient.                Durable Medical Equipment    No services have been selected for the patient.                Dialysis/Infusion    No services have been selected for the patient.                Home Medical Care    No services have been selected for the patient.                Therapy    No services have been selected for the patient.                Community Resources    No services have been selected for the patient.                Community & DME    No services have been selected for the patient.                         Final Discharge Disposition Code: 01 - home or self-care
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
## 2023-12-04 ENCOUNTER — TRANSITIONAL CARE MANAGEMENT TELEPHONE ENCOUNTER (OUTPATIENT)
Dept: CALL CENTER | Facility: HOSPITAL | Age: 63
End: 2023-12-04
Payer: MEDICARE

## 2023-12-04 DIAGNOSIS — Z96.611 STATUS POST REVERSE TOTAL REPLACEMENT OF RIGHT SHOULDER: ICD-10-CM

## 2023-12-04 RX ORDER — OXYCODONE AND ACETAMINOPHEN 7.5; 325 MG/1; MG/1
1 TABLET ORAL EVERY 4 HOURS PRN
Qty: 42 TABLET | Refills: 0 | Status: SHIPPED | OUTPATIENT
Start: 2023-12-04 | End: 2023-12-11 | Stop reason: SDUPTHER

## 2023-12-04 NOTE — OUTREACH NOTE
Call Center TCM Note      Flowsheet Row Responses   [facility] patient discharged from? [place]   Does the patient have one of the following disease processes/diagnoses(primary or secondary)? Total Joint Replacement   Joint surgery performed? Shoulder   TCM attempt successful? Yes   Call start time 1605   Call end time 1607   Discharge diagnosis TOTAL SHOULDER REVERSE ARTHROPLASTY   Does the patient have all medications related to this admission filled (includes all antibiotics, pain medications, etc.) Yes   Is the patient taking all medications as directed (includes completed medication regime)? Yes   Is the patient able to teach back alternate methods of pain control? Ice, Reposition, Correct alignment, Knee-elevation/no pillow under knee, Short, frequent activity, Shoulder-elevate above heart/ keep in sling as advised   Does the patient have an appointment with their PCP within 7-14 days of discharge? No   Nursing Interventions Patient desires to follow up with specialty only, Routed TCM call to PCP office, Patient declined scheduling/rescheduling appointment at this time   Has home health visited the patient within 72 hours of discharge? N/A   Psychosocial issues? No   Has the patient began therapy sessions (either in the home or as an out patient)? No   Does the patient have a wound vac in place? N/A   Has the patient fallen since discharge? No   Did the patient receive a copy of their discharge instructions? Yes   Nursing interventions Reviewed instructions with patient   What is the patient's perception of their functional status since discharge? Improving   Is the patient able to teach back signs and symptoms of infection? Temp >100.4 for 24h or longer, Severe discomfort or pain, Shortness of breath or chest pain, Changes in mobility, Increased swelling or redness around incision (not associated with surgical edema), Incisional drainage, Blisters around incision   Is the patient able to teach back how  to prevent infection? Check incision daily, Shower only as directed by surgeon, No lotion or creams, Monitor blood sugar if diabetic, Wash hands before and after touching incision, Keep incision covered if drainage, Eat well-balanced diet, No tub baths, hot tub or swimming, Keep incision covered if pets in house   Is the patient able to teach back signs and symptoms of DVT? Redness in calf, Swelling in calf, Area hot to touch, Severe pain in calf, Shortness of breath or chest pain   Is the patient able to teach back home safety measures? Ability to shower, Modifications with ADLs such as dressing, cooking, toileting, Accessibility to necessary areas in home, Modifications to reach items   Did the patient implement home safety suggestions from pre-surgery classes if attended? N/A   If the patient is a current smoker, are they able to teach back resources for cessation? Not a smoker   TCM call completed? Yes   Wrap up additional comments D/C DX: RTS RT - Pt doing pretty well. No questions at this time. POST OP appt is 12/15/2023. Pt declines TCM APPT but will keep sched ov with PCP Angie Munoz 01/17/2024   Call end time [id number]            Ruthy Busch MA    12/4/2023, 16:15 EST

## 2023-12-04 NOTE — TELEPHONE ENCOUNTER
Rx Refill Note  Requested Prescriptions     Pending Prescriptions Disp Refills    oxyCODONE-acetaminophen (PERCOCET) 7.5-325 MG per tablet 42 tablet 0     Sig: Take 1 tablet by mouth Every 4 (Four) Hours As Needed for Moderate Pain.      Last office visit with prescribing clinician: 11/29/2023      Next office visit with prescribing clinician: Visit date not found   Last Filled: 12.02.2023    Encounter Diagnosis   Name Primary?    Status post reverse total replacement of right shoulder       Date of Surgery: s/p right reverse total shoulder arthroplasty, open biceps tenodesis 12.01.2023      eMghan Whatley MA  12/04/23, 09:39 EST    Previous RX pended for your approval, change or denial.     {TIP  Encounters:    {TIP  Please add Last Relevant Lab Date if appropriate:  {TIP  Is Refill Pharmacy correct?:

## 2023-12-11 DIAGNOSIS — Z96.611 STATUS POST REVERSE TOTAL REPLACEMENT OF RIGHT SHOULDER: ICD-10-CM

## 2023-12-11 RX ORDER — OXYCODONE AND ACETAMINOPHEN 7.5; 325 MG/1; MG/1
1 TABLET ORAL EVERY 4 HOURS PRN
Qty: 42 TABLET | Refills: 0 | Status: SHIPPED | OUTPATIENT
Start: 2023-12-11 | End: 2023-12-18 | Stop reason: SDUPTHER

## 2023-12-11 NOTE — TELEPHONE ENCOUNTER
Rx Refill Note  Requested Prescriptions     Pending Prescriptions Disp Refills    oxyCODONE-acetaminophen (PERCOCET) 7.5-325 MG per tablet 42 tablet 0     Sig: Take 1 tablet by mouth Every 4 (Four) Hours As Needed for Moderate Pain.      Last office visit with prescribing clinician: 11/29/2023      Next office visit with prescribing clinician: Visit date not found   Last Filled: 12.04.2023    Encounter Diagnosis   Name Primary?    Status post reverse total replacement of right shoulder       Date of Surgery: s/p right reverse total shoulder arthroplasty, open biceps tenodesis 12.01.2023       Meghan Whatley MA  12/11/23, 09:15 EST    Previous RX pended for your approval, change or denial.     {TIP  Encounters:    {TIP  Please add Last Relevant Lab Date if appropriate:  {TIP  Is Refill Pharmacy correct?:    
no

## 2023-12-12 RX ORDER — ASPIRIN 81 MG/1
81 TABLET ORAL DAILY
Qty: 14 TABLET | Refills: 0 | OUTPATIENT
Start: 2023-12-12

## 2023-12-12 NOTE — TELEPHONE ENCOUNTER
Rx Refill Note  Requested Prescriptions     Pending Prescriptions Disp Refills    aspirin 81 MG EC tablet [Pharmacy Med Name: ASPIRIN EC 81 MG TABLET] 14 tablet 0     Sig: TAKE 1 TABLET BY MOUTH DAILY      Last office visit with prescribing clinician: Visit date not found      Next office visit with prescribing clinician: 12/15/2023   Last Filled: 12.02.2023    Dx: s/p right reverse total shoulder arthroplasty, open biceps tenodesis 12.01.2023     Meghan Whatley MA  12/12/23, 09:00 EST    {TIP  Encounters:    {TIP  Please add Last Relevant Lab Date if appropriate:  {TIP  Is Refill Pharmacy correct?:

## 2023-12-15 ENCOUNTER — OFFICE VISIT (OUTPATIENT)
Dept: ORTHOPEDIC SURGERY | Facility: CLINIC | Age: 63
End: 2023-12-15
Payer: MEDICARE

## 2023-12-15 VITALS — WEIGHT: 242 LBS | BODY MASS INDEX: 31.06 KG/M2 | HEIGHT: 74 IN

## 2023-12-15 DIAGNOSIS — Z96.611 STATUS POST REVERSE TOTAL SHOULDER REPLACEMENT, RIGHT: Primary | ICD-10-CM

## 2023-12-15 PROCEDURE — 99024 POSTOP FOLLOW-UP VISIT: CPT | Performed by: NURSE PRACTITIONER

## 2023-12-15 NOTE — PROGRESS NOTES
CC: follow-up s/p right reverse TSA arthroplasty, open biceps tenodesis, DOS 12/1/2023    Interval history: Sylwia Spears presents back to clinic for 2 week post-operative visit. He is doing well in regards to right upper extremity. Pain well controlled with current medication regimen and is improving. Denies any wound problem, fevers, or chills. Currently taking aspirin for DVT prophylaxis. Denies calf pain, bilaterally.     Physical Examination:   right shoulder examined out of dressing  Incision clean, dry, intact, free of erythema or evidence of infection  Dermabond dressing intact  Distal motor and sensory exam is grossly intact.  Hand is well perfused, flex/extend all digits right hand  Positive sensation all distributions right upper extremity   Brisk capillary refill all digits  All compartments soft and easily compressible     Assessment: status post right reverse total shoulder arthroplasty, open biceps tenodesis    Plan:  1. Continues to recover as expected.  2. Encouraged to continue with pendulum motions right upper extremity along with wrist/hand/finger range of motion.  3. Avoid submerging into water for the next 2 weeks, may shower.  4. Continue with sling for total four weeks post-op. Will start physical therapy outpatient at 3 weeks per protocol.   5. Will follow-up with Dr. Ozuna in 4 weeks, x-rays to be completed right shoulder at that time.    Orders Placed This Encounter   Procedures    Ambulatory Referral to Physical Therapy POST OP     Referral Priority:   Routine     Referral Type:   Physical Therapy     Referral Reason:   Specialty Services Required     Requested Specialty:   Physical Therapy     Number of Visits Requested:   1

## 2023-12-18 DIAGNOSIS — Z96.611 STATUS POST REVERSE TOTAL REPLACEMENT OF RIGHT SHOULDER: ICD-10-CM

## 2023-12-18 RX ORDER — OXYCODONE AND ACETAMINOPHEN 7.5; 325 MG/1; MG/1
1 TABLET ORAL EVERY 4 HOURS PRN
Qty: 42 TABLET | Refills: 0 | Status: SHIPPED | OUTPATIENT
Start: 2023-12-18

## 2023-12-18 NOTE — TELEPHONE ENCOUNTER
Rx Refill Note  Requested Prescriptions     Pending Prescriptions Disp Refills    oxyCODONE-acetaminophen (PERCOCET) 7.5-325 MG per tablet 42 tablet 0     Sig: Take 1 tablet by mouth Every 4 (Four) Hours As Needed for Moderate Pain.      Last office visit with prescribing clinician: 11/29/2023      Next office visit with prescribing clinician: 1/8/2024   Last Filled: 12.11.2023    Encounter Diagnosis   Name Primary?    Status post reverse total replacement of right shoulder       Date of Surgery:  s/p right reverse TSA arthroplasty, open biceps tenodesis, DOS 12/1/2023       Meghan Whatley MA  12/18/23, 09:02 EST    Previous RX pended for your approval, change or denial.     {TIP  Encounters:    {TIP  Please add Last Relevant Lab Date if appropriate:  {TIP  Is Refill Pharmacy correct?:

## 2023-12-22 ENCOUNTER — HOSPITAL ENCOUNTER (OUTPATIENT)
Dept: PHYSICAL THERAPY | Facility: HOSPITAL | Age: 63
Setting detail: THERAPIES SERIES
Discharge: HOME | End: 2023-12-22
Payer: MEDICARE

## 2023-12-22 DIAGNOSIS — Z96.611 STATUS POST REVERSE TOTAL SHOULDER REPLACEMENT, RIGHT: Primary | ICD-10-CM

## 2023-12-22 PROCEDURE — 97161 PT EVAL LOW COMPLEX 20 MIN: CPT

## 2023-12-22 NOTE — THERAPY EVALUATION
Outpatient Physical Therapy Ortho Initial Evaluation  DIANNE Bertrand     Patient Name: Sylwia Spears  : 1960  MRN: [id number]  Today's Date: 2023      Visit Date: 2023    Patient Active Problem List   Diagnosis    History of colon polyps    Type 2 diabetes mellitus without complication, without long-term current use of insulin    Benign essential HTN    Morbidly obese    Cervical spinal stenosis    Screening for prostate cancer    Personal history of colonic polyps    Family history of colon cancer    Hyperlipidemia LDL goal <70    Midline thoracic back pain    Erectile dysfunction due to diseases classified elsewhere    Personal history of nicotine dependence    Coronary artery calcification    Varicose veins of right lower extremity with other complications    DJD of shoulder    Rotator cuff arthropathy of right shoulder    Complete tear of right rotator cuff    Chronic right shoulder pain    Thrombocytopenia    Abnormal international normal ratio (INR)    Status post reverse total shoulder replacement, open biceps tenodesis, right, DOS 2023        Past Medical History:   Diagnosis Date    Cervical disc disease     stenosis    Cirrhosis of liver     Colon polyp     Coronary artery calcification     Diabetes mellitus     Electrocution     Fell off a ladder and has chronic neck pain    Hyperlipidemia     Hypertension     Spleen enlarged     Thrombocytopenia         Past Surgical History:   Procedure Laterality Date    CHOLECYSTECTOMY      COLONOSCOPY      COLONOSCOPY N/A 2018    Procedure: COLONOSCOPY, polypectomy;  Surgeon: Raleigh Champagne MD;  Location: [facility] OR;  Service: Gastroenterology    COLONOSCOPY W/ POLYPECTOMY      COLONOSCOPY W/ POLYPECTOMY N/A 2021    Procedure: COLONOSCOPY; polypectomy;  Surgeon: Raleigh Champagne MD;  Location: [facility] OR;  Service: Gastroenterology;  Laterality: N/A;  polyps   diverticulosis    LIPOMA EXCISION       groin    SHOULDER SURGERY Left     TOTAL SHOULDER ARTHROPLASTY W/ DISTAL CLAVICLE EXCISION Right 12/1/2023    Procedure: TOTAL SHOULDER REVERSE ARTHROPLASTY;  Surgeon: David Ozuna MD;  Location: [facility];  Service: Orthopedics;  Laterality: Right;       Visit Dx:     ICD-10-CM ICD-9-CM   1. Status post reverse total shoulder replacement, open biceps tenodesis, right, DOS 12/1/2023  Z96.611 V43.61          Patient History       Row Name 12/22/23 1100             History    Chief Complaint Difficulty with daily activities;Joint stiffness;Muscle tenderness;Muscle weakness;Pain  -AS      Type of Pain Shoulder pain  Right  -AS      Date Current Problem(s) Began 12/01/23  -AS      Brief Description of Current Complaint Sylwia Spears presents to outpatient PT s/p right TSA. Surgery was performed on 12-1-23 by Dr. Ozuna. Patient was D/C home on 12-2-23. Patient is to wear sling x4 weeks and begin PT at week 3. Patient reports pain has been well controlled with pain medication and the use of ice.  -AS      Previous treatment for THIS PROBLEM Surgery  -AS      Surgery Date: 12/01/23  -AS      Patient/Caregiver Goals Relieve pain;Return to prior level of function;Improve mobility;Improve strength  -AS      Patient's Rating of General Health Good  -AS      Hand Dominance right-handed  -AS      Patient seeing anyone else for problem(s)? Dr. Ozuna  -AS      How has patient tried to help current problem? rest, ice, medication, sling  -AS      Surgery/Hospitalization Right reverse TSA 12-2-23  -AS         Pain     Pain Location Shoulder  -AS      Pain at Present 8  -AS      Pain at Best 3  -AS      Pain at Worst 10  -AS      Pain Frequency Constant/continuous  -AS      Pain Description Aching  -AS      What Performance Factors Make the Current Problem(s) WORSE? Activity  -AS      What Performance Factors Make the Current Problem(s) BETTER? Rest  -AS         Daily Activities    Primary Language English  -AS      Are you  able to read Yes  -AS      Are you able to write Yes  -AS      How does patient learn best? Listening;Reading;Demonstration  -AS      Teaching needs identified Home Exercise Program;Management of Condition  -AS      Patient is concerned about/has problems with Difficulty with self care (i.e. bathing, dressing, toileting:;Flexibility;Performing home management (household chores, shopping, care of dependents);Performing job responsibilities/community activities (work, school,;Performing sports, recreation, and play activities;Reaching over head;Repetitive movements of the hand, arm, shoulder  -AS      Does patient have problems with the following? Depression  -AS      Barriers to learning None  -AS      Pt Participated in POC and Goals Yes  -AS         Safety    Are you being hurt, hit, or frightened by anyone at home or in your life? No  -AS      Are you being neglected by a caregiver No  -AS                User Key  (r) = Recorded By, (t) = Taken By, (c) = Cosigned By      Initials Name Provider Type    AS Royer Pedroza, PT Physical Therapist                     PT Ortho       Row Name 12/22/23 1100       Precautions and Contraindications    Precautions/Limitations shoulder precautions  Reverse TSA 12-1-23  -AS       Posture/Observations    Posture- WNL Posture is WNL  -AS    Observations Incision healing  -AS       Right Upper Ext    Rt Shoulder Abduction PROM 80  -AS    Rt Shoulder Flexion PROM 64  -AS    Rt Shoulder External Rotation PROM 18  -AS    Rt Shoulder Internal Rotation PROM 55  -AS       MMT Right Upper Ext    Rt Shoulder Flexion MMT, Gross Movement --  Not performed at IE due to recent TSA  -AS    Rt Shoulder ABduction MMT, Gross Movement --  Not performed at IE due to recent TSA  -AS    Rt Shoulder Internal Rotation MMT, Gross Movement --  Not performed at IE due to recent TSA  -AS    Rt Shoulder External Rotation MMT, Gross Movement --  Not performed at IE due to recent TSA  -AS        Sensation    Sensation WNL? WNL  -AS    Light Touch No apparent deficits  -AS              User Key  (r) = Recorded By, (t) = Taken By, (c) = Cosigned By      Initials Name Provider Type    AS Royer Pedroza, PT Physical Therapist                                Therapy Education  Program: New  How Provided: Verbal, Written, Demonstration  Provided to: Patient  Level of Understanding: Teach back education performed, Verbalized, Demonstrated      PT OP Goals       Row Name 12/22/23 1100          PT Short Term Goals    STG Date to Achieve 01/12/24  -AS     STG 1 Patient to demonstrate compliance with initial HEP for flexibility, ROM and strengthening.  -AS     STG 2 Patient to report right shoulder pain on VAS of 4-5/10 at worst with activity.  -AS     STG 3 Patient to demonstrate improved right shoulder strength to 4/5 in all planes.  -AS     STG 4 Patient to demonstrate improved right shoulder PROM to within 10 degrees of contralateral shoulder.  -AS        Long Term Goals    LTG Date to Achieve 02/02/24  -AS     LTG 1 Patient to demonstrate compliance with advanced HEP for flexibility, ROM and strengthening.  -AS     LTG 2 Patient to report right shoulder pain on VAS of 0-1/10 at worst with activity.  -AS     LTG 3 Patient to demonstrate improved right shoulder strength to 4+/5 in all planes.  -AS     LTG 4 Patient to demonstrate improved right shoulder AROM to within 10 degrees of contralateral shoulder.  -AS     LTG 5 Patient to report improved function and decreased pain Quick DASH by >10-15 points.  -AS        Time Calculation    PT Goal Re-Cert Due Date 01/19/24  -AS               User Key  (r) = Recorded By, (t) = Taken By, (c) = Cosigned By      Initials Name Provider Type    AS Royer Pedroza, PT Physical Therapist                     PT Assessment/Plan       Row Name 12/22/23 1100          PT Assessment    Functional Limitations Limitation in home management;Limitations in community  activities;Performance in leisure activities;Performance in self-care ADL;Performance in sport activities;Performance in work activities  -AS     Impairments Impaired flexibility;Muscle strength;Pain;Range of motion  -AS     Assessment Comments Sylwia Spears presents to outpatient PT s/p right TSA. Surgery was performed on 12-1-23 by Dr. Ozuna. Patient was D/C home on 12-2-23. Patient is to wear sling x4 weeks and begin PT at week 4. Patient reports pain has been well controlled with pain medication and the use of ice. Patient has limited right shoulder ROM, limited right shoulder and RUE strength, and increased pain and discomfort with activity. Patient has limited function at this time secondary to the above.  -AS     Please refer to paper survey for additional self-reported information Yes  -AS     Rehab Potential Good  -AS     Patient/caregiver participated in establishment of treatment plan and goals Yes  -AS     Patient would benefit from skilled therapy intervention Yes  -AS        PT Plan    PT Frequency 1x/week;2x/week  -AS     Predicted Duration of Therapy Intervention (PT) 6-8 weeks  -AS     Planned CPT's? PT RE-EVAL: 14018;PT THER PROC EA 15 MIN: 35412;PT THER ACT EA 15 MIN: 94654;PT MANUAL THERAPY EA 15 MIN: 81087;PT NEUROMUSC RE-EDUCATION EA 15 MIN: 71192  -AS               User Key  (r) = Recorded By, (t) = Taken By, (c) = Cosigned By      Initials Name Provider Type    AS Royer Pedroza, PT Physical Therapist                     Modalities       Row Name 12/22/23 1100             Subjective Pain    Able to rate subjective pain? yes  -AS      Pre-Treatment Pain Level 8  -AS      Post-Treatment Pain Level 8  -AS         Moist Heat    MH Applied Yes  -AS      Location Right Shoulder - Supine with roll under knees  -AS      PT Moist Heat Minutes 10  -AS      MH Prior to Rx Yes  -AS                User Key  (r) = Recorded By, (t) = Taken By, (c) = Cosigned By      Initials Name Provider Type     AS Royer Pedroza, PT Physical Therapist                   OP Exercises       Row Name 12/22/23 1100             Subjective Pain    Able to rate subjective pain? yes  -AS      Pre-Treatment Pain Level 8  -AS      Post-Treatment Pain Level 8  -AS                User Key  (r) = Recorded By, (t) = Taken By, (c) = Cosigned By      Initials Name Provider Type    AS Royer Pedroza, PT Physical Therapist                  Manual Rx (last 36 hours)       Manual Treatments       Row Name 12/22/23 1100             Manual Rx 1    Manual Rx 1 Location Right Shoulder  -AS      Manual Rx 1 Type PROM - Flex, ABD, IR, ER  -AS      Manual Rx 1 Duration 15 min  -AS                User Key  (r) = Recorded By, (t) = Taken By, (c) = Cosigned By      Initials Name Provider Type    AS Royer Pedroza, PT Physical Therapist                                Outcome Measure Options: Quick DASH  Quick DASH  Open a tight or new jar.: Unable  Do heavy household chores (e.g., wash walls, wash floors): Unable  Carry a shopping bag or briefcase: Unable  Wash your back: Unable  Use a knife to cut food: Unable  Recreational activities in which you take some force or impact through your arm, should or hand (e.g. golf, hammering, tennis, etc.): Unable  During the past week, to what extent has your arm, shoulder, or hand problem interfered with your normal social activites with family, friends, neighbors or groups?: Extremely  During the past week, were you limited in your work or other regular daily activities as a result of your arm, shoulder or hand problem?: Unable  Arm, Shoulder, or hand pain: Extreme  Tingling (pins and needles) in your arm, shoulder, or hand: Moderate  During the past week, how much difficulty have you had sleeping because of the pain in your arm, shoulder or hand?: So much Difficulty that I can't sleep  Number of Questions Answered: 11  Quick DASH Score: 95.45         Time Calculation:     Start Time:  1101  Stop Time: 1159  Time Calculation (min): 58 min  Untimed Charges  PT Moist Heat Minutes: 10  Total Minutes  Untimed Charges Total Minutes: 10   Total Minutes: 10     Therapy Charges for Today       Code Description Service Date Service Provider Modifiers Qty    18194132054 HC PT EVAL LOW COMPLEXITY 4 12/22/2023 Royer Pedroza, PT GP 1            PT G-Codes  Outcome Measure Options: Quick DASH  Quick DASH Score: 95.45         Royer Pedroza, PT  12/22/2023

## 2023-12-26 DIAGNOSIS — Z96.611 STATUS POST REVERSE TOTAL REPLACEMENT OF RIGHT SHOULDER: ICD-10-CM

## 2023-12-26 RX ORDER — OXYCODONE AND ACETAMINOPHEN 7.5; 325 MG/1; MG/1
1 TABLET ORAL EVERY 4 HOURS PRN
Qty: 42 TABLET | Refills: 0 | Status: SHIPPED | OUTPATIENT
Start: 2023-12-26

## 2023-12-26 NOTE — TELEPHONE ENCOUNTER
Rx Refill Note  Requested Prescriptions     Pending Prescriptions Disp Refills    oxyCODONE-acetaminophen (PERCOCET) 7.5-325 MG per tablet 42 tablet 0     Sig: Take 1 tablet by mouth Every 4 (Four) Hours As Needed for Moderate Pain.      Last office visit with prescribing clinician: 11/29/2023      Next office visit with prescribing clinician: 1/8/2024   Last Filled: 12.18.2023    Encounter Diagnosis   Name Primary?    Status post reverse total replacement of right shoulder       Date of Surgery:  s/p right reverse TSA arthroplasty, open biceps tenodesis, DOS 12/1/2023        Meghan Whatley MA  12/26/23, 09:49 EST    Previous RX pended for your approval, change or denial.     {TIP  Encounters:    {TIP  Please add Last Relevant Lab Date if appropriate:  {TIP  Is Refill Pharmacy correct?:

## 2023-12-28 RX ORDER — ONDANSETRON 4 MG/1
4 TABLET, FILM COATED ORAL EVERY 8 HOURS PRN
Qty: 20 TABLET | Refills: 0 | Status: SHIPPED | OUTPATIENT
Start: 2023-12-28

## 2023-12-28 RX ORDER — DOCUSATE SODIUM 100 MG/1
CAPSULE, LIQUID FILLED ORAL
Qty: 60 CAPSULE | Refills: 0 | Status: SHIPPED | OUTPATIENT
Start: 2023-12-28

## 2023-12-28 NOTE — TELEPHONE ENCOUNTER
Rx Refill Note  Requested Prescriptions     Pending Prescriptions Disp Refills    ondansetron (ZOFRAN) 4 MG tablet 20 tablet 0     Sig: Take 1 tablet by mouth Every 8 (Eight) Hours As Needed for Nausea or Vomiting.      Last office visit with prescribing clinician: 11/29/2023      Next office visit with prescribing clinician: 1/8/2024   Last Filled: 12.02.2023    DX: s/p right reverse TSA arthroplasty, open biceps tenodesis, DOS 12/1/2023           Meghan Whatley MA  12/28/23, 16:58 EST    Previous RX pended for your approval, change or denial.     {TIP  Encounters:    {TIP  Please add Last Relevant Lab Date if appropriate:  {TIP  Is Refill Pharmacy correct?:

## 2023-12-28 NOTE — TELEPHONE ENCOUNTER
Rx Refill Note  Requested Prescriptions     Pending Prescriptions Disp Refills    docusate sodium (COLACE) 100 MG capsule [Pharmacy Med Name: DOCUSATE SODIUM 100 MG SOFTGEL] 60 capsule 0     Sig: TAKE 1 CAPSULE BY MOUTH TWICE A DAY AS NEEDED FOR CONSTIPATION      Last office visit with prescribing clinician: 12/15/2023      Next office visit with prescribing clinician: Visit date not found   Last Filled: 12.2.2023    DX: s/p right reverse TSA arthroplasty, open biceps tenodesis, DOS 12/1/2023      Meghan Whatley MA  12/28/23, 11:00 EST    {TIP  Encounters:    {TIP  Please add Last Relevant Lab Date if appropriate:  {TIP  Is Refill Pharmacy correct?:

## 2023-12-29 ENCOUNTER — HOSPITAL ENCOUNTER (OUTPATIENT)
Dept: PHYSICAL THERAPY | Facility: HOSPITAL | Age: 63
Setting detail: THERAPIES SERIES
Discharge: HOME | End: 2023-12-29
Payer: MEDICARE

## 2023-12-29 DIAGNOSIS — Z96.611 STATUS POST REVERSE TOTAL SHOULDER REPLACEMENT, RIGHT: Primary | ICD-10-CM

## 2023-12-29 PROCEDURE — 97140 MANUAL THERAPY 1/> REGIONS: CPT

## 2023-12-29 NOTE — THERAPY TREATMENT NOTE
Outpatient Physical Therapy Ortho Treatment Note   Reina Boggs     Patient Name: Sylwia Spears  : 1960  MRN: [id number]  Today's Date: 2023      Visit Date: 2023    Visit Dx:    ICD-10-CM ICD-9-CM   1. Status post reverse total shoulder replacement, open biceps tenodesis, right, DOS 2023  Z96.611 V43.61       Patient Active Problem List   Diagnosis    History of colon polyps    Type 2 diabetes mellitus without complication, without long-term current use of insulin    Benign essential HTN    Morbidly obese    Cervical spinal stenosis    Screening for prostate cancer    Personal history of colonic polyps    Family history of colon cancer    Hyperlipidemia LDL goal <70    Midline thoracic back pain    Erectile dysfunction due to diseases classified elsewhere    Personal history of nicotine dependence    Coronary artery calcification    Varicose veins of right lower extremity with other complications    DJD of shoulder    Rotator cuff arthropathy of right shoulder    Complete tear of right rotator cuff    Chronic right shoulder pain    Thrombocytopenia    Abnormal international normal ratio (INR)    Status post reverse total shoulder replacement, open biceps tenodesis, right, DOS 2023        Past Medical History:   Diagnosis Date    Cervical disc disease     stenosis    Cirrhosis of liver     Colon polyp     Coronary artery calcification     Diabetes mellitus     Electrocution     Fell off a ladder and has chronic neck pain    Hyperlipidemia     Hypertension     Spleen enlarged     Thrombocytopenia         Past Surgical History:   Procedure Laterality Date    CHOLECYSTECTOMY      COLONOSCOPY      COLONOSCOPY N/A 2018    Procedure: COLONOSCOPY, polypectomy;  Surgeon: Raleigh Champagne MD;  Location: [facility];  Service: Gastroenterology    COLONOSCOPY W/ POLYPECTOMY      COLONOSCOPY W/ POLYPECTOMY N/A 2021    Procedure: COLONOSCOPY; polypectomy;  Surgeon:  Raleigh Champagne MD;  Location:  [facility];  Service: Gastroenterology;  Laterality: N/A;  polyps   diverticulosis    LIPOMA EXCISION      groin    SHOULDER SURGERY Left     TOTAL SHOULDER ARTHROPLASTY W/ DISTAL CLAVICLE EXCISION Right 12/1/2023    Procedure: TOTAL SHOULDER REVERSE ARTHROPLASTY;  Surgeon: David Ozuna MD;  Location:  [facility];  Service: Orthopedics;  Laterality: Right;                        PT Assessment/Plan       Row Name 12/29/23 1300          PT Assessment    Assessment Comments Continued with modalities and manual therapy techniques. Patient with improved right shoulder PROM today. Patient is 4 weeks post op, AAROM exercises were initiated today. Plan to continue to progress patient as tolerated and per post op protocol.  -AS        PT Plan    PT Plan Comments Continue with current treatment plan.  -AS               User Key  (r) = Recorded By, (t) = Taken By, (c) = Cosigned By      Initials Name Provider Type    AS Royer Pedroza, PT Physical Therapist                     Modalities       Row Name 12/29/23 1300             Moist Heat    MH Applied Yes  -AS      Location Right Shoulder - Supine with roll under knees  -AS      PT Moist Heat Minutes 10  -AS      MH Prior to Rx Yes  -AS         Functional Testing    Outcome Measure Options Quick DASH  -AS                User Key  (r) = Recorded By, (t) = Taken By, (c) = Cosigned By      Initials Name Provider Type    AS Royer Pedroza, PT Physical Therapist                   OP Exercises       Row Name 12/29/23 1349 12/29/23 1300          Subjective    Subjective Comments -- Patient states his shoulder is doing well. He states it is better but he is still having a lot of pain in right shoulder.  -AS        Total Minutes    20145 - PT Therapeutic Exercise Minutes 6  -AS --     48258 - PT Manual Therapy Minutes 15  -AS --        Exercise 1    Exercise Name 1 -- 3-Way Cane  -AS        Exercise 2    Exercise Name 2 --  Pulley's - Flex & ABD  -AS     Time 2 -- 3 min each  -AS               User Key  (r) = Recorded By, (t) = Taken By, (c) = Cosigned By      Initials Name Provider Type    AS Royer Pedroza, PT Physical Therapist                             Manual Rx (last 36 hours)       Manual Treatments       Row Name 12/29/23 1349 12/29/23 1300          Total Minutes    48691 - PT Manual Therapy Minutes 15  -AS --        Manual Rx 1    Manual Rx 1 Location -- Right Shoulder  -AS     Manual Rx 1 Type -- PROM - Flex, ABD, IR, ER  -AS     Manual Rx 1 Duration -- 15 min  -AS               User Key  (r) = Recorded By, (t) = Taken By, (c) = Cosigned By      Initials Name Provider Type    AS Royer Pedroza, PT Physical Therapist                             Outcome Measure Options: Quick DASH         Time Calculation:   Start Time: 1259  Stop Time: 1335  Time Calculation (min): 36 min  Timed Charges  80951 - PT Therapeutic Exercise Minutes: 6  58422 - PT Manual Therapy Minutes: 15  Untimed Charges  PT Moist Heat Minutes: 10  Total Minutes  Timed Charges Total Minutes: 21  Untimed Charges Total Minutes: 10   Total Minutes: 31  Therapy Charges for Today       Code Description Service Date Service Provider Modifiers Qty    87854158141 HC PT MANUAL THERAPY EA 15 MIN 12/29/2023 Royer Pedroza, PT GP 1            PT G-Codes  Outcome Measure Options: Pema Pedroza, PT  12/29/2023

## 2024-01-02 DIAGNOSIS — Z96.611 STATUS POST REVERSE TOTAL REPLACEMENT OF RIGHT SHOULDER: ICD-10-CM

## 2024-01-02 NOTE — TELEPHONE ENCOUNTER
Rx Refill Note  Requested Prescriptions     Pending Prescriptions Disp Refills    oxyCODONE-acetaminophen (PERCOCET) 7.5-325 MG per tablet 42 tablet 0     Sig: Take 1 tablet by mouth Every 4 (Four) Hours As Needed for Moderate Pain.      Last office visit with prescribing clinician: 11/29/2023      Next office visit with prescribing clinician: 1/8/2024   Last Filled: 12.26.2023    Encounter Diagnosis   Name Primary?    Status post reverse total replacement of right shoulder       Date of Surgery: s/p right reverse TSA arthroplasty, open biceps tenodesis, DOS 12/1/2023         Meghan Whatley MA  01/02/24, 09:06 EST    Previous RX pended for your approval, change or denial.     {TIP  Encounters:    {TIP  Please add Last Relevant Lab Date if appropriate:  {TIP  Is Refill Pharmacy correct?:

## 2024-01-03 ENCOUNTER — HOSPITAL ENCOUNTER (OUTPATIENT)
Dept: PHYSICAL THERAPY | Facility: HOSPITAL | Age: 64
Setting detail: THERAPIES SERIES
Discharge: HOME | End: 2024-01-03
Payer: MEDICARE

## 2024-01-03 DIAGNOSIS — Z96.611 STATUS POST REVERSE TOTAL SHOULDER REPLACEMENT, RIGHT: Primary | ICD-10-CM

## 2024-01-03 DIAGNOSIS — E11.9 TYPE 2 DIABETES MELLITUS WITHOUT COMPLICATION, WITHOUT LONG-TERM CURRENT USE OF INSULIN: ICD-10-CM

## 2024-01-03 DIAGNOSIS — D69.6 THROMBOCYTOPENIA: ICD-10-CM

## 2024-01-03 DIAGNOSIS — I25.10 CORONARY ARTERY CALCIFICATION: ICD-10-CM

## 2024-01-03 DIAGNOSIS — I25.84 CORONARY ARTERY CALCIFICATION: ICD-10-CM

## 2024-01-03 PROCEDURE — 97110 THERAPEUTIC EXERCISES: CPT

## 2024-01-03 PROCEDURE — 97140 MANUAL THERAPY 1/> REGIONS: CPT

## 2024-01-03 RX ORDER — ONDANSETRON 4 MG/1
4 TABLET, FILM COATED ORAL EVERY 8 HOURS PRN
Qty: 20 TABLET | Refills: 0 | Status: SHIPPED | OUTPATIENT
Start: 2024-01-03

## 2024-01-03 RX ORDER — OXYCODONE AND ACETAMINOPHEN 7.5; 325 MG/1; MG/1
1 TABLET ORAL EVERY 4 HOURS PRN
Qty: 42 TABLET | Refills: 0 | Status: SHIPPED | OUTPATIENT
Start: 2024-01-03 | End: 2024-01-08 | Stop reason: SDUPTHER

## 2024-01-03 NOTE — TELEPHONE ENCOUNTER
Rx Refill Note  Requested Prescriptions     Pending Prescriptions Disp Refills    ondansetron (ZOFRAN) 4 MG tablet 20 tablet 0     Sig: Take 1 tablet by mouth Every 8 (Eight) Hours As Needed for Nausea or Vomiting.      Last office visit with prescribing clinician: 11/29/2023      Next office visit with prescribing clinician: 1/8/2024   Last Filled: 12.28.2023    Date of Surgery: s/p right reverse TSA arthroplasty, open biceps tenodesis, DOS 12/1/2023       Meghan Whatley MA  01/03/24, 08:58 EST    Previous RX pended for your approval, change or denial.     {TIP  Encounters:    {TIP  Please add Last Relevant Lab Date if appropriate:  {TIP  Is Refill Pharmacy correct?:

## 2024-01-03 NOTE — THERAPY TREATMENT NOTE
Outpatient Physical Therapy Ortho Treatment Note   Reina Boggs     Patient Name: Sylwia Spears  : 1960  MRN: [id number]  Today's Date: 1/3/2024      Visit Date: 2024    Visit Dx:    ICD-10-CM ICD-9-CM   1. Status post reverse total shoulder replacement, open biceps tenodesis, right, DOS 2023  Z96.611 V43.61       Patient Active Problem List   Diagnosis    History of colon polyps    Type 2 diabetes mellitus without complication, without long-term current use of insulin    Benign essential HTN    Morbidly obese    Cervical spinal stenosis    Screening for prostate cancer    Personal history of colonic polyps    Family history of colon cancer    Hyperlipidemia LDL goal <70    Midline thoracic back pain    Erectile dysfunction due to diseases classified elsewhere    Personal history of nicotine dependence    Coronary artery calcification    Varicose veins of right lower extremity with other complications    DJD of shoulder    Rotator cuff arthropathy of right shoulder    Complete tear of right rotator cuff    Chronic right shoulder pain    Thrombocytopenia    Abnormal international normal ratio (INR)    Status post reverse total shoulder replacement, open biceps tenodesis, right, DOS 2023        Past Medical History:   Diagnosis Date    Cervical disc disease     stenosis    Cirrhosis of liver     Colon polyp     Coronary artery calcification     Diabetes mellitus     Electrocution     Fell off a ladder and has chronic neck pain    Hyperlipidemia     Hypertension     Spleen enlarged     Thrombocytopenia         Past Surgical History:   Procedure Laterality Date    CHOLECYSTECTOMY      COLONOSCOPY      COLONOSCOPY N/A 2018    Procedure: COLONOSCOPY, polypectomy;  Surgeon: Raleigh Champagne MD;  Location: [facility];  Service: Gastroenterology    COLONOSCOPY W/ POLYPECTOMY      COLONOSCOPY W/ POLYPECTOMY N/A 2021    Procedure: COLONOSCOPY; polypectomy;  Surgeon:  "Raleigh Champagne MD;  Location:  [facility];  Service: Gastroenterology;  Laterality: N/A;  polyps   diverticulosis    LIPOMA EXCISION      groin    SHOULDER SURGERY Left     TOTAL SHOULDER ARTHROPLASTY W/ DISTAL CLAVICLE EXCISION Right 12/1/2023    Procedure: TOTAL SHOULDER REVERSE ARTHROPLASTY;  Surgeon: David Ozuna MD;  Location:  [facility];  Service: Orthopedics;  Laterality: Right;                        PT Assessment/Plan       Row Name 01/03/24 1300          PT Assessment    Assessment Comments Progressed patient with ROM exercises today. He continues to have poor tolerance to any exercises, including manual techniques. Pain continues to be his most limiting factor at this time. Plan to continue to progress patient as tolerated per post op protocol.  -AS        PT Plan    PT Plan Comments Continue with current treatment plan.  -AS               User Key  (r) = Recorded By, (t) = Taken By, (c) = Cosigned By      Initials Name Provider Type    AS Royer Pedroza, PT Physical Therapist                     Modalities       Row Name 01/03/24 1300             Moist Heat    MH Applied Yes  -AS      Location Right Shoulder - Supine with roll under knees  -AS      PT Moist Heat Minutes 10  -AS      MH Prior to Rx Yes  -AS         Functional Testing    Outcome Measure Options Quick DASH  -AS                User Key  (r) = Recorded By, (t) = Taken By, (c) = Cosigned By      Initials Name Provider Type    AS Royer Pedroza, PT Physical Therapist                   OP Exercises       Row Name 01/03/24 1346 01/03/24 1300          Subjective    Subjective Comments -- Patient states his shoulder is doing better but does continue to be \"very tender.\"  -AS        Total Minutes    58021 - PT Therapeutic Exercise Minutes 15  -AS --     96401 - PT Manual Therapy Minutes 15  -AS --        Exercise 1    Exercise Name 1 -- 3-Way Cane  -AS     Reps 1 -- --  -AS        Exercise 2    Exercise Name 2 -- " Pulley's - Flex & ABD  -AS     Time 2 -- 3 min each  -AS        Exercise 3    Exercise Name 3 -- Supine Clasp Hands for Flexion  -AS     Reps 3 -- 10  -AS               User Key  (r) = Recorded By, (t) = Taken By, (c) = Cosigned By      Initials Name Provider Type    AS Royer Pedroza, PT Physical Therapist                             Manual Rx (last 36 hours)       Manual Treatments       Row Name 01/03/24 1346 01/03/24 1300          Total Minutes    39106 - PT Manual Therapy Minutes 15  -AS --        Manual Rx 1    Manual Rx 1 Location -- Right Shoulder  -AS     Manual Rx 1 Type -- PROM - Flex, ABD, IR, ER  -AS     Manual Rx 1 Duration -- 15 min  -AS               User Key  (r) = Recorded By, (t) = Taken By, (c) = Cosigned By      Initials Name Provider Type    AS Royer Pedroza, PT Physical Therapist                             Outcome Measure Options: Quick DASH         Time Calculation:   Start Time: 1259  Stop Time: 1346  Time Calculation (min): 47 min  Timed Charges  64029 - PT Therapeutic Exercise Minutes: 15  84637 - PT Manual Therapy Minutes: 15  Untimed Charges  PT Moist Heat Minutes: 10  Total Minutes  Timed Charges Total Minutes: 30  Untimed Charges Total Minutes: 10   Total Minutes: 40  Therapy Charges for Today       Code Description Service Date Service Provider Modifiers Qty    12216301038 HC PT THER PROC EA 15 MIN 1/3/2024 Royer Pedroza, PT GP 1    85500191806 HC PT MANUAL THERAPY EA 15 MIN 1/3/2024 Royer Pedroza, PT GP 1            PT G-Codes  Outcome Measure Options: Quick SOPHY Pedroza, PT  1/3/2024

## 2024-01-05 ENCOUNTER — HOSPITAL ENCOUNTER (OUTPATIENT)
Dept: PHYSICAL THERAPY | Facility: HOSPITAL | Age: 64
Setting detail: THERAPIES SERIES
Discharge: HOME | End: 2024-01-05
Payer: MEDICARE

## 2024-01-05 DIAGNOSIS — Z96.611 STATUS POST REVERSE TOTAL SHOULDER REPLACEMENT, RIGHT: Primary | ICD-10-CM

## 2024-01-05 PROCEDURE — 97140 MANUAL THERAPY 1/> REGIONS: CPT

## 2024-01-05 PROCEDURE — 97110 THERAPEUTIC EXERCISES: CPT

## 2024-01-05 NOTE — THERAPY TREATMENT NOTE
Outpatient Physical Therapy Ortho Treatment Note   Reina Boggs     Patient Name: Sylwia Spears  : 1960  MRN: [id number]  Today's Date: 2024      Visit Date: 2024    Visit Dx:    ICD-10-CM ICD-9-CM   1. Status post reverse total shoulder replacement, open biceps tenodesis, right, DOS 2023  Z96.611 V43.61       Patient Active Problem List   Diagnosis    History of colon polyps    Type 2 diabetes mellitus without complication, without long-term current use of insulin    Benign essential HTN    Morbidly obese    Cervical spinal stenosis    Screening for prostate cancer    Personal history of colonic polyps    Family history of colon cancer    Hyperlipidemia LDL goal <70    Midline thoracic back pain    Erectile dysfunction due to diseases classified elsewhere    Personal history of nicotine dependence    Coronary artery calcification    Varicose veins of right lower extremity with other complications    DJD of shoulder    Rotator cuff arthropathy of right shoulder    Complete tear of right rotator cuff    Chronic right shoulder pain    Thrombocytopenia    Abnormal international normal ratio (INR)    Status post reverse total shoulder replacement, open biceps tenodesis, right, DOS 2023        Past Medical History:   Diagnosis Date    Cervical disc disease     stenosis    Cirrhosis of liver     Colon polyp     Coronary artery calcification     Diabetes mellitus     Electrocution     Fell off a ladder and has chronic neck pain    Hyperlipidemia     Hypertension     Spleen enlarged     Thrombocytopenia         Past Surgical History:   Procedure Laterality Date    CHOLECYSTECTOMY      COLONOSCOPY      COLONOSCOPY N/A 2018    Procedure: COLONOSCOPY, polypectomy;  Surgeon: Raleigh Champagne MD;  Location: [facility];  Service: Gastroenterology    COLONOSCOPY W/ POLYPECTOMY      COLONOSCOPY W/ POLYPECTOMY N/A 2021    Procedure: COLONOSCOPY; polypectomy;  Surgeon:  "Raleigh Champagne MD;  Location:  [facility];  Service: Gastroenterology;  Laterality: N/A;  polyps   diverticulosis    LIPOMA EXCISION      groin    SHOULDER SURGERY Left     TOTAL SHOULDER ARTHROPLASTY W/ DISTAL CLAVICLE EXCISION Right 12/1/2023    Procedure: TOTAL SHOULDER REVERSE ARTHROPLASTY;  Surgeon: David Ozuna MD;  Location:  [facility];  Service: Orthopedics;  Laterality: Right;                        PT Assessment/Plan       Row Name 01/05/24 1300          PT Assessment    Assessment Comments Patient with slight improved right shoulder PROM as well as tolerance to treatment. However, patient's right shoulder ROM continues to be poor as well as his tolerance to exercises and manual therapy techniques. Continue to attempt to progress patient with ROM exercises, however this is difficult due to his poor tolerance.  -AS        PT Plan    PT Plan Comments Patient to see his Ortho on Monday. Will continue with outpatient PT as advised.  -AS               User Key  (r) = Recorded By, (t) = Taken By, (c) = Cosigned By      Initials Name Provider Type    AS Royer Pedroza, PT Physical Therapist                     Modalities       Row Name 01/05/24 1300             Moist Heat    MH Applied Yes  -AS      Location Right Shoulder - Supine with roll under knees  -AS      PT Moist Heat Minutes 10  -AS      MH Prior to Rx Yes  -AS         Functional Testing    Outcome Measure Options Quick DASH  -AS                User Key  (r) = Recorded By, (t) = Taken By, (c) = Cosigned By      Initials Name Provider Type    AS Royer Pedroza, PT Physical Therapist                   OP Exercises       Row Name 01/05/24 1400 01/05/24 1300          Subjective    Subjective Comments -- Patient states he has tried to be compliant with his HEP but it \"is rough.\" He states he is scheduled to see his Ortho on Monday. He states he did get his pulley's delivered yesterday so he has added that to his HEP.  -AS     "    Total Minutes    89583 - PT Therapeutic Exercise Minutes 15  -AS --     03510 - PT Manual Therapy Minutes 15  -AS --        Exercise 1    Exercise Name 1 -- 3-Way Cane  -AS        Exercise 2    Exercise Name 2 -- Pulley's - Flex & ABD  -AS     Time 2 -- 3 min each  -AS        Exercise 3    Exercise Name 3 -- Supine Clasp Hands for Flexion  -AS     Reps 3 -- 15  -AS               User Key  (r) = Recorded By, (t) = Taken By, (c) = Cosigned By      Initials Name Provider Type    AS Royer Pedroza, PT Physical Therapist                             Manual Rx (last 36 hours)       Manual Treatments       Row Name 01/05/24 1400 01/05/24 1300          Total Minutes    81850 - PT Manual Therapy Minutes 15  -AS --        Manual Rx 1    Manual Rx 1 Location -- Right Shoulder  -AS     Manual Rx 1 Type -- PROM - Flex, ABD, IR, ER  -AS     Manual Rx 1 Duration -- 15 min  -AS               User Key  (r) = Recorded By, (t) = Taken By, (c) = Cosigned By      Initials Name Provider Type    AS Royer Pedroza, PT Physical Therapist                             Outcome Measure Options: Quick DASH         Time Calculation:   Start Time: 1318  Stop Time: 1400  Time Calculation (min): 42 min  Timed Charges  42609 - PT Therapeutic Exercise Minutes: 15  43502 - PT Manual Therapy Minutes: 15  Untimed Charges  PT Moist Heat Minutes: 10  Total Minutes  Timed Charges Total Minutes: 30  Untimed Charges Total Minutes: 10   Total Minutes: 40  Therapy Charges for Today       Code Description Service Date Service Provider Modifiers Qty    77915627019 HC PT THER PROC EA 15 MIN 1/5/2024 Royer Pedroza, PT GP 1    67779104760 HC PT MANUAL THERAPY EA 15 MIN 1/5/2024 Royer Pedroza, PT GP 1            PT G-Codes  Outcome Measure Options: Quick SOPHY Pedroza, PT  1/5/2024

## 2024-01-08 ENCOUNTER — OFFICE VISIT (OUTPATIENT)
Dept: ORTHOPEDIC SURGERY | Facility: CLINIC | Age: 64
End: 2024-01-08
Payer: MEDICARE

## 2024-01-08 VITALS — WEIGHT: 242 LBS | BODY MASS INDEX: 31.06 KG/M2 | HEIGHT: 74 IN

## 2024-01-08 DIAGNOSIS — Z96.611 STATUS POST REVERSE TOTAL REPLACEMENT OF RIGHT SHOULDER: Primary | ICD-10-CM

## 2024-01-08 PROCEDURE — 1160F RVW MEDS BY RX/DR IN RCRD: CPT | Performed by: ORTHOPAEDIC SURGERY

## 2024-01-08 PROCEDURE — 73030 X-RAY EXAM OF SHOULDER: CPT | Performed by: ORTHOPAEDIC SURGERY

## 2024-01-08 PROCEDURE — 99024 POSTOP FOLLOW-UP VISIT: CPT | Performed by: ORTHOPAEDIC SURGERY

## 2024-01-08 PROCEDURE — 1159F MED LIST DOCD IN RCRD: CPT | Performed by: ORTHOPAEDIC SURGERY

## 2024-01-08 RX ORDER — METHYLPREDNISOLONE 4 MG/1
TABLET ORAL
Qty: 1 EACH | Refills: 0 | Status: SHIPPED | OUTPATIENT
Start: 2024-01-08 | End: 2024-01-17

## 2024-01-08 RX ORDER — OXYCODONE AND ACETAMINOPHEN 7.5; 325 MG/1; MG/1
1 TABLET ORAL EVERY 4 HOURS PRN
Qty: 42 TABLET | Refills: 0 | Status: SHIPPED | OUTPATIENT
Start: 2024-01-08 | End: 2024-01-16 | Stop reason: SDUPTHER

## 2024-01-08 NOTE — PROGRESS NOTES
CC: Follow-up status post right reverse shoulder arthroplasty-12/1/2023    Interval history: Patient returns clinic today for evaluation of status post right reverse total shoulder arthroplasty. The patient is accompanied by an adult female who contributes in his medical history.    Overall, he is doing reasonably well. He is still using his sling at this point in time. He feels like he is tight, but he is making progress with physical therapy. He states he is working on his motion exercises on his own at home twice a day. He denies any numbness or tingling. No fevers, chills, or sweats. No issues with his incision. He is still taking intermittent Percocet for pain control 7.5 at this point in time.        Exam:  Right shoulder-    Anterior incision well healed.  Active forward flexion: 50 degrees.  Passive forward flexion: 95 degrees.  Active external rotation: 10 degrees.  Passive external rotation: 30 degrees.  Strength- 4+/5.  Positive deltoid firing in all three components.  Flex and extend right wrist as well as all digits of the right hand at the MCP and IP joints with 4+/5 strength.  Brisk cap refill to all digits, 1+ radial pulse right wrist.  Positive sensation to light touch in all distributions, right hand as well as proximal lateral arm, symmetric to the left.          Imaging:  Right Shoulder X-Ray  Indication: Status post shoulder arthroplasty  AP, scapular Y, and axillary lateral views    Findings:  Shoulder arthroplasty components appear to be stable in position, well-seated, overall acceptable alignment.  No evidence of osteolysis, loosening, periprosthetic fracture, or reactive bone formation.    Compared to prior postoperative x-rays from hospital      Impression: Status post right reverse shoulder arthroplasty    Plan:    I discussed treatment options at length with patient at today's visit.  I discussed with patient that his x-rays look good. He is stiff at this point in time. I told him he  needs to be out of his sling, really need to work on his mobility and flexibility at this time.  He is diabetic, but we are going to try to start with a light dose with a Medrol Dosepak to try to help him with his stiffness.  I recommended pendulum exercises as much as possible, ice and/or heat, and soft tissue massage to try to increase his mobility.  I will see him back in 6 weeks for repeat assessment or sooner if needed.      Transcribed from ambient dictation for David Ozuna MD by Christopher Matamoros.  01/08/24   19:32 EST    Patient or patient representative verbalized consent to the visit recording.  I have personally performed the services described in this document as transcribed by the above individual, and it is both accurate and complete.

## 2024-01-10 ENCOUNTER — APPOINTMENT (OUTPATIENT)
Dept: PHYSICAL THERAPY | Facility: HOSPITAL | Age: 64
End: 2024-01-10
Payer: MEDICARE

## 2024-01-12 ENCOUNTER — HOSPITAL ENCOUNTER (OUTPATIENT)
Dept: PHYSICAL THERAPY | Facility: HOSPITAL | Age: 64
Setting detail: THERAPIES SERIES
Discharge: HOME | End: 2024-01-12
Payer: MEDICARE

## 2024-01-12 DIAGNOSIS — Z96.611 STATUS POST REVERSE TOTAL SHOULDER REPLACEMENT, RIGHT: Primary | ICD-10-CM

## 2024-01-12 LAB
ALBUMIN SERPL-MCNC: 4.7 G/DL (ref 3.5–5.2)
ALBUMIN/GLOB SERPL: 2.2 G/DL
ALP SERPL-CCNC: 69 U/L (ref 39–117)
ALT SERPL-CCNC: 36 U/L (ref 1–41)
AST SERPL-CCNC: 30 U/L (ref 1–40)
BASOPHILS # BLD AUTO: 0.04 10*3/MM3 (ref 0–0.2)
BASOPHILS NFR BLD AUTO: 0.7 % (ref 0–1.5)
BILIRUB SERPL-MCNC: 0.4 MG/DL (ref 0–1.2)
BUN SERPL-MCNC: 22 MG/DL (ref 8–23)
BUN/CREAT SERPL: 24.2 (ref 7–25)
CALCIUM SERPL-MCNC: 10 MG/DL (ref 8.6–10.5)
CHLORIDE SERPL-SCNC: 106 MMOL/L (ref 98–107)
CHOLEST SERPL-MCNC: 131 MG/DL (ref 0–200)
CHOLEST/HDLC SERPL: 3.05 {RATIO}
CO2 SERPL-SCNC: 24.1 MMOL/L (ref 22–29)
CREAT SERPL-MCNC: 0.91 MG/DL (ref 0.76–1.27)
EGFRCR SERPLBLD CKD-EPI 2021: 95.3 ML/MIN/1.73
EOSINOPHIL # BLD AUTO: 0.22 10*3/MM3 (ref 0–0.4)
EOSINOPHIL NFR BLD AUTO: 4 % (ref 0.3–6.2)
ERYTHROCYTE [DISTWIDTH] IN BLOOD BY AUTOMATED COUNT: 13 % (ref 12.3–15.4)
GLOBULIN SER CALC-MCNC: 2.1 GM/DL
GLUCOSE SERPL-MCNC: 114 MG/DL (ref 65–99)
HBA1C MFR BLD: 5.9 % (ref 4.8–5.6)
HCT VFR BLD AUTO: 46.3 % (ref 37.5–51)
HDLC SERPL-MCNC: 43 MG/DL (ref 40–60)
HGB BLD-MCNC: 15.8 G/DL (ref 13–17.7)
IMM GRANULOCYTES # BLD AUTO: 0.01 10*3/MM3 (ref 0–0.05)
IMM GRANULOCYTES NFR BLD AUTO: 0.2 % (ref 0–0.5)
LDLC SERPL CALC-MCNC: 63 MG/DL (ref 0–100)
LYMPHOCYTES # BLD AUTO: 2.03 10*3/MM3 (ref 0.7–3.1)
LYMPHOCYTES NFR BLD AUTO: 36.9 % (ref 19.6–45.3)
MCH RBC QN AUTO: 30.3 PG (ref 26.6–33)
MCHC RBC AUTO-ENTMCNC: 34.1 G/DL (ref 31.5–35.7)
MCV RBC AUTO: 88.9 FL (ref 79–97)
MONOCYTES # BLD AUTO: 0.42 10*3/MM3 (ref 0.1–0.9)
MONOCYTES NFR BLD AUTO: 7.6 % (ref 5–12)
NEUTROPHILS # BLD AUTO: 2.78 10*3/MM3 (ref 1.7–7)
NEUTROPHILS NFR BLD AUTO: 50.6 % (ref 42.7–76)
PLATELET # BLD AUTO: 112 10*3/MM3 (ref 140–450)
POTASSIUM SERPL-SCNC: 4.5 MMOL/L (ref 3.5–5.2)
PROT SERPL-MCNC: 6.8 G/DL (ref 6–8.5)
RBC # BLD AUTO: 5.21 10*6/MM3 (ref 4.14–5.8)
SODIUM SERPL-SCNC: 143 MMOL/L (ref 136–145)
TRIGL SERPL-MCNC: 143 MG/DL (ref 0–150)
VLDLC SERPL CALC-MCNC: 25 MG/DL (ref 5–40)
WBC # BLD AUTO: 5.5 10*3/MM3 (ref 3.4–10.8)

## 2024-01-12 PROCEDURE — 97110 THERAPEUTIC EXERCISES: CPT

## 2024-01-12 PROCEDURE — 97140 MANUAL THERAPY 1/> REGIONS: CPT

## 2024-01-12 NOTE — THERAPY TREATMENT NOTE
Outpatient Physical Therapy Ortho Treatment Note   Reina Boggs     Patient Name: Sylwia Spears  : 1960  MRN: [id number]  Today's Date: 2024      Visit Date: 2024    Visit Dx:    ICD-10-CM ICD-9-CM   1. Status post reverse total shoulder replacement, open biceps tenodesis, right, DOS 2023  Z96.611 V43.61       Patient Active Problem List   Diagnosis    History of colon polyps    Type 2 diabetes mellitus without complication, without long-term current use of insulin    Benign essential HTN    Morbidly obese    Cervical spinal stenosis    Screening for prostate cancer    Personal history of colonic polyps    Family history of colon cancer    Hyperlipidemia LDL goal <70    Midline thoracic back pain    Erectile dysfunction due to diseases classified elsewhere    Personal history of nicotine dependence    Coronary artery calcification    Varicose veins of right lower extremity with other complications    DJD of shoulder    Rotator cuff arthropathy of right shoulder    Complete tear of right rotator cuff    Chronic right shoulder pain    Thrombocytopenia    Abnormal international normal ratio (INR)    Status post reverse total shoulder replacement, open biceps tenodesis, right, DOS 2023        Past Medical History:   Diagnosis Date    Cervical disc disease     stenosis    Cirrhosis of liver     Colon polyp     Coronary artery calcification     Diabetes mellitus     Electrocution     Fell off a ladder and has chronic neck pain    Hyperlipidemia     Hypertension     Spleen enlarged     Thrombocytopenia         Past Surgical History:   Procedure Laterality Date    CHOLECYSTECTOMY      COLONOSCOPY      COLONOSCOPY N/A 2018    Procedure: COLONOSCOPY, polypectomy;  Surgeon: Raleigh Champagne MD;  Location: [facility];  Service: Gastroenterology    COLONOSCOPY W/ POLYPECTOMY      COLONOSCOPY W/ POLYPECTOMY N/A 2021    Procedure: COLONOSCOPY; polypectomy;  Surgeon:  "Raleigh Champagne MD;  Location:  [facility];  Service: Gastroenterology;  Laterality: N/A;  polyps   diverticulosis    LIPOMA EXCISION      groin    SHOULDER SURGERY Left     TOTAL SHOULDER ARTHROPLASTY W/ DISTAL CLAVICLE EXCISION Right 12/1/2023    Procedure: TOTAL SHOULDER REVERSE ARTHROPLASTY;  Surgeon: David Ozuna MD;  Location:  [facility];  Service: Orthopedics;  Laterality: Right;                        PT Assessment/Plan       Row Name 01/12/24 1300          PT Assessment    Assessment Comments Patient is 6 weeks post op today. Initiated gentle strengthening exercises today and he tolerated this well. Patient continues to make slow progress with right shoulder ROM. Patient has slightly improved tolerance to his exercises today. Patient presents without sling as it has been D/C at this time per Ortho.  -AS        PT Plan    PT Plan Comments Continue with current treatment plan.  -AS               User Key  (r) = Recorded By, (t) = Taken By, (c) = Cosigned By      Initials Name Provider Type    AS Royer Pedroza, PT Physical Therapist                     Modalities       Row Name 01/12/24 1300             Moist Heat    MH Applied Yes  -AS      Location Right Shoulder - Supine with roll under knees  -AS      PT Moist Heat Minutes 10  -AS      MH Prior to Rx Yes  -AS         Functional Testing    Outcome Measure Options Quick DASH  -AS                User Key  (r) = Recorded By, (t) = Taken By, (c) = Cosigned By      Initials Name Provider Type    AS Royer Pedrzoa, PT Physical Therapist                   OP Exercises       Row Name 01/12/24 1340 01/12/24 1200          Subjective    Subjective Comments -- Patient states his shoulder is \"doing ok.\" He states his Ortho took x-rays and \"everything looked good.\" He states he no longer needs to wear his sling. He also reports being compliant with his HEP.  -AS        Total Minutes    97655 - PT Therapeutic Exercise Minutes 15  -AS --     " 00513 - PT Manual Therapy Minutes 15  -AS --        Exercise 1    Exercise Name 1 -- 3-Way Cane  -AS        Exercise 2    Exercise Name 2 -- Pulley's - Flex & ABD  -AS     Time 2 -- 4 min each  -AS        Exercise 3    Exercise Name 3 -- Supine Clasp Hands for Flexion  -AS     Reps 3 -- 25  -AS        Exercise 4    Exercise Name 4 -- S/L ER  -AS     Reps 4 -- 25  -AS        Exercise 5    Exercise Name 5 -- Empty/Full Can  -AS        Exercise 6    Exercise Name 6 -- Rows  -AS     Reps 6 -- 25  -AS     Time 6 -- Blue  -AS        Exercise 7    Exercise Name 7 -- Extensions  -AS     Reps 7 -- 25  -AS     Time 7 -- Blue  -AS        Exercise 8    Exercise Name 8 -- IR  -AS        Exercise 9    Exercise Name 9 -- ER  -AS               User Key  (r) = Recorded By, (t) = Taken By, (c) = Cosigned By      Initials Name Provider Type    AS Royer Pedroza, PT Physical Therapist                             Manual Rx (last 36 hours)       Manual Treatments       Row Name 01/12/24 1340 01/12/24 1300          Total Minutes    23973 - PT Manual Therapy Minutes 15  -AS --        Manual Rx 1    Manual Rx 1 Location -- Right Shoulder  -AS     Manual Rx 1 Type -- PROM - Flex, ABD, IR, ER  -AS     Manual Rx 1 Duration -- 15 min  -AS               User Key  (r) = Recorded By, (t) = Taken By, (c) = Cosigned By      Initials Name Provider Type    AS Royer Pedroza, PT Physical Therapist                             Outcome Measure Options: Quick DASH         Time Calculation:   Start Time: 1259  Stop Time: 1340  Time Calculation (min): 41 min  Timed Charges  37578 - PT Therapeutic Exercise Minutes: 15  72638 - PT Manual Therapy Minutes: 15  Untimed Charges  PT Moist Heat Minutes: 10  Total Minutes  Timed Charges Total Minutes: 30  Untimed Charges Total Minutes: 10   Total Minutes: 40  Therapy Charges for Today       Code Description Service Date Service Provider Modifiers Qty    98558214396  PT THER PROC EA 15 MIN 1/12/2024  Royer Pedroza, PT GP 1    86119690801 HC PT MANUAL THERAPY EA 15 MIN 1/12/2024 Royer Pedroza, PT GP 1            PT G-Codes  Outcome Measure Options: Quick DASH         Royer Pedroza, PT  1/12/2024

## 2024-01-16 DIAGNOSIS — Z96.611 STATUS POST REVERSE TOTAL REPLACEMENT OF RIGHT SHOULDER: ICD-10-CM

## 2024-01-16 RX ORDER — OXYCODONE AND ACETAMINOPHEN 7.5; 325 MG/1; MG/1
1 TABLET ORAL EVERY 4 HOURS PRN
Qty: 42 TABLET | Refills: 0 | Status: SHIPPED | OUTPATIENT
Start: 2024-01-16

## 2024-01-16 NOTE — TELEPHONE ENCOUNTER
Rx Refill Note  Requested Prescriptions     Pending Prescriptions Disp Refills    oxyCODONE-acetaminophen (PERCOCET) 7.5-325 MG per tablet 42 tablet 0     Sig: Take 1 tablet by mouth Every 4 (Four) Hours As Needed for Moderate Pain.      Last office visit with prescribing clinician: 1/8/2024      Next office visit with prescribing clinician: 2/21/2024   Last Filled: 01.08.2024     Encounter Diagnosis   Name Primary?    Status post reverse total replacement of right shoulder       Date of Surgery: status post right reverse shoulder arthroplasty-12/1/2023       Meghan Whatley MA  01/16/24, 09:09 EST    Previous RX pended for your approval, change or denial.     {TIP  Encounters:    {TIP  Please add Last Relevant Lab Date if appropriate:  {TIP  Is Refill Pharmacy correct?:

## 2024-01-17 ENCOUNTER — HOSPITAL ENCOUNTER (OUTPATIENT)
Dept: PHYSICAL THERAPY | Facility: HOSPITAL | Age: 64
Setting detail: THERAPIES SERIES
Discharge: HOME | End: 2024-01-17
Payer: MEDICARE

## 2024-01-17 ENCOUNTER — OFFICE VISIT (OUTPATIENT)
Dept: INTERNAL MEDICINE | Facility: CLINIC | Age: 64
End: 2024-01-17
Payer: MEDICARE

## 2024-01-17 VITALS
HEIGHT: 74 IN | SYSTOLIC BLOOD PRESSURE: 128 MMHG | OXYGEN SATURATION: 97 % | TEMPERATURE: 97.7 F | WEIGHT: 231 LBS | DIASTOLIC BLOOD PRESSURE: 80 MMHG | HEART RATE: 77 BPM | BODY MASS INDEX: 29.65 KG/M2

## 2024-01-17 DIAGNOSIS — E78.5 HYPERLIPIDEMIA LDL GOAL <70: ICD-10-CM

## 2024-01-17 DIAGNOSIS — I10 BENIGN ESSENTIAL HTN: ICD-10-CM

## 2024-01-17 DIAGNOSIS — Z96.611 STATUS POST REVERSE TOTAL SHOULDER REPLACEMENT, RIGHT: Primary | ICD-10-CM

## 2024-01-17 DIAGNOSIS — M48.02 CERVICAL SPINAL STENOSIS: ICD-10-CM

## 2024-01-17 DIAGNOSIS — E11.9 TYPE 2 DIABETES MELLITUS WITHOUT COMPLICATION, WITHOUT LONG-TERM CURRENT USE OF INSULIN: Primary | ICD-10-CM

## 2024-01-17 DIAGNOSIS — D69.6 THROMBOCYTOPENIA: ICD-10-CM

## 2024-01-17 PROCEDURE — 3044F HG A1C LEVEL LT 7.0%: CPT | Performed by: NURSE PRACTITIONER

## 2024-01-17 PROCEDURE — 97140 MANUAL THERAPY 1/> REGIONS: CPT

## 2024-01-17 PROCEDURE — 99214 OFFICE O/P EST MOD 30 MIN: CPT | Performed by: NURSE PRACTITIONER

## 2024-01-17 PROCEDURE — 97110 THERAPEUTIC EXERCISES: CPT

## 2024-01-17 PROCEDURE — 3079F DIAST BP 80-89 MM HG: CPT | Performed by: NURSE PRACTITIONER

## 2024-01-17 PROCEDURE — 3074F SYST BP LT 130 MM HG: CPT | Performed by: NURSE PRACTITIONER

## 2024-01-17 RX ORDER — DULOXETIN HYDROCHLORIDE 30 MG/1
30 CAPSULE, DELAYED RELEASE ORAL DAILY
Qty: 30 CAPSULE | Refills: 0 | Status: SHIPPED | OUTPATIENT
Start: 2024-01-17

## 2024-01-17 NOTE — THERAPY TREATMENT NOTE
Outpatient Physical Therapy Ortho Treatment Note   Reina Boggs     Patient Name: Sylwia Spears  : 1960  MRN: [id number]  Today's Date: 2024      Visit Date: 2024    Visit Dx:    ICD-10-CM ICD-9-CM   1. Status post reverse total shoulder replacement, open biceps tenodesis, right, DOS 2023  Z96.611 V43.61       Patient Active Problem List   Diagnosis    History of colon polyps    Type 2 diabetes mellitus without complication, without long-term current use of insulin    Benign essential HTN    Morbidly obese    Cervical spinal stenosis    Screening for prostate cancer    Personal history of colonic polyps    Family history of colon cancer    Hyperlipidemia LDL goal <70    Midline thoracic back pain    Erectile dysfunction due to diseases classified elsewhere    Personal history of nicotine dependence    Coronary artery calcification    Varicose veins of right lower extremity with other complications    DJD of shoulder    Rotator cuff arthropathy of right shoulder    Complete tear of right rotator cuff    Chronic right shoulder pain    Thrombocytopenia    Abnormal international normal ratio (INR)    Status post reverse total shoulder replacement, open biceps tenodesis, right, DOS 2023        Past Medical History:   Diagnosis Date    Cervical disc disease     stenosis    Cirrhosis of liver     Colon polyp     Coronary artery calcification     Diabetes mellitus     Electrocution     Fell off a ladder and has chronic neck pain    Hyperlipidemia     Hypertension     Spleen enlarged     Thrombocytopenia         Past Surgical History:   Procedure Laterality Date    CHOLECYSTECTOMY      COLONOSCOPY      COLONOSCOPY N/A 2018    Procedure: COLONOSCOPY, polypectomy;  Surgeon: Raleigh Champagne MD;  Location: [facility];  Service: Gastroenterology    COLONOSCOPY W/ POLYPECTOMY      COLONOSCOPY W/ POLYPECTOMY N/A 2021    Procedure: COLONOSCOPY; polypectomy;  Surgeon:  "Raleigh Champagne MD;  Location:  [facility];  Service: Gastroenterology;  Laterality: N/A;  polyps   diverticulosis    LIPOMA EXCISION      groin    SHOULDER SURGERY Left     TOTAL SHOULDER ARTHROPLASTY W/ DISTAL CLAVICLE EXCISION Right 12/1/2023    Procedure: TOTAL SHOULDER REVERSE ARTHROPLASTY;  Surgeon: David Ozuna MD;  Location:  [facility];  Service: Orthopedics;  Laterality: Right;                        PT Assessment/Plan       Row Name 01/17/24 1300          PT Assessment    Assessment Comments Continued to progress patient with ROM and strengthening exercises and he tolerated this well. Patient's right shoulder ROM continues to slowly improve in all planes.  -AS        PT Plan    PT Plan Comments Continue with current treatment plan.  -AS               User Key  (r) = Recorded By, (t) = Taken By, (c) = Cosigned By      Initials Name Provider Type    AS Royer Pedroza, PT Physical Therapist                     Modalities       Row Name 01/17/24 1300             Moist Heat    MH Applied Yes  -AS      Location Right Shoulder - Supine with roll under knees  -AS      PT Moist Heat Minutes 10  -AS      MH Prior to Rx Yes  -AS         Functional Testing    Outcome Measure Options Quick DASH  -AS                User Key  (r) = Recorded By, (t) = Taken By, (c) = Cosigned By      Initials Name Provider Type    AS Royer Pedroza, PT Physical Therapist                   OP Exercises       Row Name 01/17/24 1348 01/17/24 1300          Subjective    Subjective Comments -- Patient states his shoulder \"is getting there.\" He states \"at times it feels really good, and other times it really hurts.\"  -AS        Total Minutes    79573 - PT Therapeutic Exercise Minutes 20  -AS --     01659 - PT Manual Therapy Minutes 15  -AS --        Exercise 1    Exercise Name 1 -- 3-Way Cane  -AS        Exercise 2    Exercise Name 2 -- Pulley's - Flex & ABD  -AS     Time 2 -- 5 min each  -AS        Exercise 3    " Exercise Name 3 -- Supine Clasp Hands for Flexion  -AS     Reps 3 -- 25  -AS        Exercise 4    Exercise Name 4 -- S/L ER  -AS     Reps 4 -- 25  -AS        Exercise 5    Exercise Name 5 -- Empty/Full Can  -AS        Exercise 6    Exercise Name 6 -- Rows  -AS     Reps 6 -- 25  -AS     Time 6 -- Blue  -AS        Exercise 7    Exercise Name 7 -- Extensions  -AS     Reps 7 -- 25  -AS     Time 7 -- Blue  -AS        Exercise 8    Exercise Name 8 -- IR  -AS     Reps 8 -- 25  -AS     Time 8 -- Blue  -AS        Exercise 9    Exercise Name 9 -- ER  -AS     Reps 9 -- 25  -AS     Time 9 -- Yellow  -AS               User Key  (r) = Recorded By, (t) = Taken By, (c) = Cosigned By      Initials Name Provider Type    AS Royer Pedroza, PT Physical Therapist                             Manual Rx (last 36 hours)       Manual Treatments       Row Name 01/17/24 1348 01/17/24 1300          Total Minutes    81967 - PT Manual Therapy Minutes 15  -AS --        Manual Rx 1    Manual Rx 1 Location -- Right Shoulder  -AS     Manual Rx 1 Type -- PROM - Flex, ABD, IR, ER  -AS     Manual Rx 1 Duration -- 15 min  -AS               User Key  (r) = Recorded By, (t) = Taken By, (c) = Cosigned By      Initials Name Provider Type    AS Royer Pedroza, PT Physical Therapist                             Outcome Measure Options: Quick DASH         Time Calculation:   Start Time: 1300  Stop Time: 1348  Time Calculation (min): 48 min  Timed Charges  88852 - PT Therapeutic Exercise Minutes: 20  88039 - PT Manual Therapy Minutes: 15  Untimed Charges  PT Moist Heat Minutes: 10  Total Minutes  Timed Charges Total Minutes: 35  Untimed Charges Total Minutes: 10   Total Minutes: 45  Therapy Charges for Today       Code Description Service Date Service Provider Modifiers Qty    94597902487 HC PT THER PROC EA 15 MIN 1/17/2024 Royer Pedroza, PT GP 1    19085044799 HC PT MANUAL THERAPY EA 15 MIN 1/17/2024 Royer Pedroza, PT GP 1             PT G-Codes  Outcome Measure Options: Pema Pedroza, PT  1/17/2024

## 2024-01-17 NOTE — PROGRESS NOTES
"Chief Complaint   Patient presents with    Diabetes    Hyperlipidemia    Hypertension       Subjective     Sylwia Spears is a 63 y.o. male being seen for a follow up appointment today regarding DM 2, HTN and hyperlipidemia. He has chronic right shoulder pain. He has been taking gabapentin at night for this, which has helped with the pain. He is on Metformin XR 500mg daily for diabetes, but motivated to get off this, so this was reduced at last visit from 1000mg. He has not been checking his glucose levels at home. He denies any hypoglycemic events unless he does not eat. He has lost about 60 pounds with better eating and avoiding sugars over the past 6 months, this is intentional weight loss. He weighed 296 pounds at one time.      He is on lisinopril 20mg daily for HTN. He tolerates this w/o cough. He denies CP, SOA swelling in legs.      He has daily pain from \"where I got electrocuted\" he has nerve pain. Daily pain rated a 5 of 10, usually, but he is on pain medication at this time due to shoulder surgery. He pain is mostly in upper back and shoulders. He takes duloxetine 60mg for this, but would like to stop this.       He has a CT chest showing Coronary artery calcifications. He was started on Lipitor 10mg nightly for this. He denies chest pain, unilateral weakness.  He had a stress test 1-.      He has had low mildly platelet count for 3 years. He was referred to [facility] for eval. Consult with Uriel Fowler MD (11/16/2023) No excess bruising, no use of aspirin products.    Diabetes  Pertinent negatives for diabetes include no chest pain.   Hyperlipidemia  Pertinent negatives include no chest pain.   Hypertension  Pertinent negatives include no chest pain.        No Known Allergies      Current Outpatient Medications:     aspirin 81 MG EC tablet, Take 1 tablet by mouth Daily. Indications: VTE Prophylaxis, Disp: 14 tablet, Rfl: 0    atorvastatin (LIPITOR) 10 MG tablet, TAKE ONE TABLET BY MOUTH " DAILY (Patient taking differently: Take 1 tablet by mouth Daily.), Disp: 90 tablet, Rfl: 1    Cholecalciferol (Vitamin D3) 30 MCG/15ML liquid, Take  by mouth., Disp: , Rfl:     Chromic Chloride crystals, Use Daily., Disp: , Rfl:     CINNAMON PO, Take  by mouth., Disp: , Rfl:     docusate sodium (COLACE) 100 MG capsule, TAKE 1 CAPSULE BY MOUTH TWICE A DAY AS NEEDED FOR CONSTIPATION, Disp: 60 capsule, Rfl: 0    DULoxetine (CYMBALTA) 60 MG capsule, TAKE ONE CAPSULE BY MOUTH DAILY (Patient taking differently: Take 1 capsule by mouth Daily.), Disp: 90 capsule, Rfl: 3    famotidine (PEPCID) 40 MG tablet, Take 1 tablet by mouth Daily., Disp: 14 tablet, Rfl: 0    lisinopril (PRINIVIL,ZESTRIL) 20 MG tablet, TAKE ONE TABLET BY MOUTH DAILY (Patient taking differently: Take 1 tablet by mouth Daily.), Disp: 90 tablet, Rfl: 2    meloxicam (MOBIC) 15 MG tablet, Take 1 tablet by mouth Daily., Disp: 30 tablet, Rfl: 0    metFORMIN ER (GLUCOPHAGE-XR) 500 MG 24 hr tablet, Take 1 tablet by mouth Daily With Breakfast., Disp: , Rfl:     ondansetron (ZOFRAN) 4 MG tablet, Take 1 tablet by mouth Every 8 (Eight) Hours As Needed for Nausea or Vomiting., Disp: 20 tablet, Rfl: 0    oxyCODONE-acetaminophen (PERCOCET) 7.5-325 MG per tablet, Take 1 tablet by mouth Every 4 (Four) Hours As Needed for Moderate Pain., Disp: 42 tablet, Rfl: 0    pregabalin (Lyrica) 75 MG capsule, Take 1 capsule by mouth 2 (Two) Times a Day., Disp: 60 capsule, Rfl: 0    Zinc Acetate, Oral, (ZINC ACETATE PO), Take  by mouth., Disp: , Rfl:     naloxone (NARCAN) 4 MG/0.1ML nasal spray, Call 911. Don't prime. Coleman in 1 nostril for overdose. Repeat in 2-3 minutes in other nostril if no or minimal breathing/responsiveness., Disp: 2 each, Rfl: 0    The following portions of the patient's history were reviewed and updated as appropriate: allergies, current medications, past family history, past medical history, past social history, past surgical history, and problem  list.    Review of Systems   Constitutional: Negative.  Negative for fever and unexpected weight change.   HENT: Negative.     Eyes: Negative.    Respiratory: Negative.     Cardiovascular: Negative.  Negative for chest pain.   Gastrointestinal: Negative.  Negative for abdominal distention.   Musculoskeletal: Negative.    Neurological:  Numbness: right hand.   All other systems reviewed and are negative.      Assessment     Physical Exam  Vitals reviewed.   Constitutional:       Appearance: Normal appearance. He is not ill-appearing.   Cardiovascular:      Rate and Rhythm: Normal rate and regular rhythm.      Pulses: Normal pulses.      Heart sounds: Normal heart sounds. No murmur heard.  Musculoskeletal:      Right shoulder: Bony tenderness present. No tenderness. Decreased range of motion. Decreased strength. Normal pulse.      Cervical back: Neck supple.      Right lower leg: No edema.      Left lower leg: No edema.   Skin:     General: Skin is warm and dry.   Neurological:      General: No focal deficit present.      Mental Status: He is alert and oriented to person, place, and time.   Psychiatric:         Mood and Affect: Mood normal.         Behavior: Behavior normal.         Thought Content: Thought content normal.         Plan     His fasting labs were reviewed with the patient from last week.     Diagnoses and all orders for this visit:    1. Type 2 diabetes mellitus without complication, without long-term current use of insulin (Primary)  Comments:  Hgb A1c 5.9%. Stop metformin, continue dietary changes.  Orders:  -     CBC & Differential; Future  -     Comprehensive Metabolic Panel; Future  -     Lipid Panel With / Chol / HDL Ratio; Future  -     Hemoglobin A1c; Future    2. Benign essential HTN  Comments:  BP at goal on lisinopril 20mg daily  Orders:  -     CBC & Differential; Future  -     Comprehensive Metabolic Panel; Future  -     Lipid Panel With / Chol / HDL Ratio; Future    3. Hyperlipidemia LDL  goal <70  -     CBC & Differential; Future  -     Comprehensive Metabolic Panel; Future  -     Lipid Panel With / Chol / HDL Ratio; Future    4. Thrombocytopenia  -     CBC & Differential; Future    5. Cervical spinal stenosis  -     DULoxetine (CYMBALTA) 30 MG capsule; Take 1 capsule by mouth Daily.  Dispense: 30 capsule; Refill: 0    Follow up in 6 moths w labs

## 2024-01-19 ENCOUNTER — APPOINTMENT (OUTPATIENT)
Dept: PHYSICAL THERAPY | Facility: HOSPITAL | Age: 64
End: 2024-01-19
Payer: MEDICARE

## 2024-01-23 DIAGNOSIS — Z96.611 STATUS POST REVERSE TOTAL REPLACEMENT OF RIGHT SHOULDER: ICD-10-CM

## 2024-01-23 RX ORDER — OXYCODONE AND ACETAMINOPHEN 7.5; 325 MG/1; MG/1
1 TABLET ORAL EVERY 4 HOURS PRN
Qty: 42 TABLET | Refills: 0 | Status: SHIPPED | OUTPATIENT
Start: 2024-01-23

## 2024-01-23 NOTE — TELEPHONE ENCOUNTER
Rx Refill Note  Requested Prescriptions     Pending Prescriptions Disp Refills    oxyCODONE-acetaminophen (PERCOCET) 7.5-325 MG per tablet 42 tablet 0     Sig: Take 1 tablet by mouth Every 4 (Four) Hours As Needed for Moderate Pain.      Last office visit with prescribing clinician: 1/8/2024      Next office visit with prescribing clinician: 2/21/2024   Last Filled: 01.16.2024    Encounter Diagnosis   Name Primary?    Status post reverse total replacement of right shoulder       Date of Surgery: status post right reverse shoulder arthroplasty-12/1/2023          Meghan Whatley MA  01/23/24, 09:10 EST    Previous RX pended for your approval, change or denial.     {TIP  Encounters:    {TIP  Please add Last Relevant Lab Date if appropriate:  {TIP  Is Refill Pharmacy correct?:

## 2024-01-24 ENCOUNTER — HOSPITAL ENCOUNTER (OUTPATIENT)
Dept: PHYSICAL THERAPY | Facility: HOSPITAL | Age: 64
Setting detail: THERAPIES SERIES
Discharge: HOME | End: 2024-01-24
Payer: MEDICARE

## 2024-01-24 DIAGNOSIS — Z96.611 STATUS POST REVERSE TOTAL SHOULDER REPLACEMENT, RIGHT: Primary | ICD-10-CM

## 2024-01-24 PROCEDURE — 97110 THERAPEUTIC EXERCISES: CPT

## 2024-01-24 PROCEDURE — 97140 MANUAL THERAPY 1/> REGIONS: CPT

## 2024-01-24 NOTE — THERAPY TREATMENT NOTE
Outpatient Physical Therapy Ortho Treatment Note   Reina Boggs     Patient Name: Sylwia Spears  : 1960  MRN: [id number]  Today's Date: 2024      Visit Date: 2024    Visit Dx:    ICD-10-CM ICD-9-CM   1. Status post reverse total shoulder replacement, open biceps tenodesis, right, DOS 2023  Z96.611 V43.61       Patient Active Problem List   Diagnosis    History of colon polyps    Type 2 diabetes mellitus without complication, without long-term current use of insulin    Benign essential HTN    Morbidly obese    Cervical spinal stenosis    Screening for prostate cancer    Personal history of colonic polyps    Family history of colon cancer    Hyperlipidemia LDL goal <70    Midline thoracic back pain    Erectile dysfunction due to diseases classified elsewhere    Personal history of nicotine dependence    Coronary artery calcification    Varicose veins of right lower extremity with other complications    DJD of shoulder    Rotator cuff arthropathy of right shoulder    Complete tear of right rotator cuff    Chronic right shoulder pain    Thrombocytopenia    Abnormal international normal ratio (INR)    Status post reverse total shoulder replacement, open biceps tenodesis, right, DOS 2023        Past Medical History:   Diagnosis Date    Cervical disc disease     stenosis    Cirrhosis of liver     Colon polyp     Coronary artery calcification     Diabetes mellitus     Electrocution     Fell off a ladder and has chronic neck pain    Hyperlipidemia     Hypertension     Spleen enlarged     Thrombocytopenia         Past Surgical History:   Procedure Laterality Date    CHOLECYSTECTOMY      COLONOSCOPY      COLONOSCOPY N/A 2018    Procedure: COLONOSCOPY, polypectomy;  Surgeon: Raleigh Champagne MD;  Location: [facility];  Service: Gastroenterology    COLONOSCOPY W/ POLYPECTOMY      COLONOSCOPY W/ POLYPECTOMY N/A 2021    Procedure: COLONOSCOPY; polypectomy;  Surgeon:  "Raleigh Champagne MD;  Location:  [facility];  Service: Gastroenterology;  Laterality: N/A;  polyps   diverticulosis    LIPOMA EXCISION      groin    SHOULDER SURGERY Left     TOTAL SHOULDER ARTHROPLASTY W/ DISTAL CLAVICLE EXCISION Right 12/1/2023    Procedure: TOTAL SHOULDER REVERSE ARTHROPLASTY;  Surgeon: David Ozuna MD;  Location:  [facility];  Service: Orthopedics;  Laterality: Right;                        PT Assessment/Plan       Row Name 01/24/24 1300          PT Assessment    Assessment Comments Continued with ROM and strengthening exercises and he tolerated this well. Patient's right shoulder ROM continues to slowly improve in all planes. Plan to progress patient as tolerated.  -AS        PT Plan    PT Plan Comments Continue with current treatment plan.  -AS               User Key  (r) = Recorded By, (t) = Taken By, (c) = Cosigned By      Initials Name Provider Type    AS Royer Pedroza, PT Physical Therapist                     Modalities       Row Name 01/24/24 1300             Moist Heat    MH Applied Yes  -AS      Location Right Shoulder - Supine with roll under knees  -AS      PT Moist Heat Minutes 10  -AS      MH Prior to Rx Yes  -AS         Functional Testing    Outcome Measure Options Quick DASH  -AS                User Key  (r) = Recorded By, (t) = Taken By, (c) = Cosigned By      Initials Name Provider Type    AS Royer Pedroza, PT Physical Therapist                   OP Exercises       Row Name 01/24/24 1345 01/24/24 1300          Subjective    Subjective Comments -- Patient states his shoulder is slowly improving. He states he is having some pain today but feels it is due to \"the rainy weather.\"  -AS        Total Minutes    05252 - PT Therapeutic Exercise Minutes 20  -AS --     29460 - PT Manual Therapy Minutes 15  -AS --        Exercise 1    Exercise Name 1 -- 3-Way Cane  -AS        Exercise 2    Exercise Name 2 -- Pulley's - Flex & ABD  -AS     Time 2 -- 5 min each  " -AS        Exercise 3    Exercise Name 3 -- Supine Clasp Hands for Flexion  -AS     Reps 3 -- 25  -AS        Exercise 4    Exercise Name 4 -- S/L ER  -AS     Reps 4 -- 25  -AS        Exercise 5    Exercise Name 5 -- Empty/Full Can  -AS        Exercise 6    Exercise Name 6 -- Rows  -AS     Reps 6 -- 25  -AS     Time 6 -- Blue  -AS        Exercise 7    Exercise Name 7 -- Extensions  -AS     Reps 7 -- 25  -AS     Time 7 -- Blue  -AS        Exercise 8    Exercise Name 8 -- IR  -AS     Reps 8 -- 25  -AS     Time 8 -- Blue  -AS        Exercise 9    Exercise Name 9 -- ER  -AS     Reps 9 -- 25  -AS     Time 9 -- Yellow  -AS               User Key  (r) = Recorded By, (t) = Taken By, (c) = Cosigned By      Initials Name Provider Type    AS Royer Pedroza, PT Physical Therapist                             Manual Rx (last 36 hours)       Manual Treatments       Row Name 01/24/24 1345             Total Minutes    33647 - PT Manual Therapy Minutes 15  -AS                User Key  (r) = Recorded By, (t) = Taken By, (c) = Cosigned By      Initials Name Provider Type    AS Royer Pedroza, PT Physical Therapist                             Outcome Measure Options: Quick DASH         Time Calculation:   Start Time: 1259  Stop Time: 1345  Time Calculation (min): 46 min  Timed Charges  49321 - PT Therapeutic Exercise Minutes: 20  24585 - PT Manual Therapy Minutes: 15  Untimed Charges  PT Moist Heat Minutes: 10  Total Minutes  Timed Charges Total Minutes: 35  Untimed Charges Total Minutes: 10   Total Minutes: 45  Therapy Charges for Today       Code Description Service Date Service Provider Modifiers Qty    00637425147 HC PT THER PROC EA 15 MIN 1/24/2024 Royer Pedroza, PT GP 1    19292016484 HC PT MANUAL THERAPY EA 15 MIN 1/24/2024 Royer Pedroza, PT GP 1            PT G-Codes  Outcome Measure Options: Pema Pedroza, PT  1/24/2024

## 2024-01-26 ENCOUNTER — HOSPITAL ENCOUNTER (OUTPATIENT)
Dept: PHYSICAL THERAPY | Facility: HOSPITAL | Age: 64
Setting detail: THERAPIES SERIES
Discharge: HOME | End: 2024-01-26
Payer: MEDICARE

## 2024-01-26 DIAGNOSIS — Z96.611 STATUS POST REVERSE TOTAL SHOULDER REPLACEMENT, RIGHT: Primary | ICD-10-CM

## 2024-01-26 PROCEDURE — 97110 THERAPEUTIC EXERCISES: CPT

## 2024-01-26 PROCEDURE — 97140 MANUAL THERAPY 1/> REGIONS: CPT

## 2024-01-30 DIAGNOSIS — Z96.611 STATUS POST REVERSE TOTAL REPLACEMENT OF RIGHT SHOULDER: ICD-10-CM

## 2024-01-30 RX ORDER — OXYCODONE AND ACETAMINOPHEN 7.5; 325 MG/1; MG/1
1 TABLET ORAL EVERY 4 HOURS PRN
Qty: 42 TABLET | Refills: 0 | Status: SHIPPED | OUTPATIENT
Start: 2024-01-30

## 2024-01-30 NOTE — TELEPHONE ENCOUNTER
Rx Refill Note  Requested Prescriptions     Pending Prescriptions Disp Refills    oxyCODONE-acetaminophen (PERCOCET) 7.5-325 MG per tablet 42 tablet 0     Sig: Take 1 tablet by mouth Every 4 (Four) Hours As Needed for Moderate Pain.      Last office visit with prescribing clinician: 1/8/2024      Next office visit with prescribing clinician: 2/21/2024   Last Filled: 01.23.2024    Encounter Diagnosis   Name Primary?    Status post reverse total replacement of right shoulder       Date of Surgery: status post right reverse shoulder arthroplasty-12/1/2023        Meghan Whatley MA  01/30/24, 09:08 EST    Previous RX pended for your approval, change or denial.     {TIP  Encounters:    {TIP  Please add Last Relevant Lab Date if appropriate:  {TIP  Is Refill Pharmacy correct?:

## 2024-01-31 ENCOUNTER — HOSPITAL ENCOUNTER (OUTPATIENT)
Dept: PHYSICAL THERAPY | Facility: HOSPITAL | Age: 64
Setting detail: THERAPIES SERIES
Discharge: HOME | End: 2024-01-31
Payer: MEDICARE

## 2024-01-31 DIAGNOSIS — Z96.611 STATUS POST REVERSE TOTAL SHOULDER REPLACEMENT, RIGHT: Primary | ICD-10-CM

## 2024-01-31 DIAGNOSIS — E11.65 TYPE 2 DIABETES MELLITUS WITH HYPERGLYCEMIA, WITHOUT LONG-TERM CURRENT USE OF INSULIN: ICD-10-CM

## 2024-01-31 PROCEDURE — 97110 THERAPEUTIC EXERCISES: CPT

## 2024-01-31 PROCEDURE — 97140 MANUAL THERAPY 1/> REGIONS: CPT

## 2024-01-31 RX ORDER — METFORMIN HYDROCHLORIDE 500 MG/1
1000 TABLET, EXTENDED RELEASE ORAL
Qty: 180 TABLET | Refills: 1 | OUTPATIENT
Start: 2024-01-31

## 2024-01-31 NOTE — TELEPHONE ENCOUNTER
The original prescription was discontinued on 10/17/2023 by Angie Munoz APRN for the following reason: Reorder.   Rx Refill Note  Requested Prescriptions     Pending Prescriptions Disp Refills    metFORMIN ER (GLUCOPHAGE-XR) 500 MG 24 hr tablet [Pharmacy Med Name: METFORMIN HCL  MG TABLET] 180 tablet 1     Sig: TAKE 2 TABLETS BY MOUTH DAILY WITH BREAKFAST      Last office visit with prescribing clinician: 1/17/2024   Last telemedicine visit with prescribing clinician: Visit date not found   Next office visit with prescribing clinician: 7/17/2024                         Would you like a call back once the refill request has been completed: [] Yes [] No    If the office needs to give you a call back, can they leave a voicemail: [] Yes [] No    Amy Capone, PCT  01/31/24, 08:48 EST

## 2024-01-31 NOTE — THERAPY TREATMENT NOTE
Outpatient Physical Therapy Ortho Treatment Note   Reina Boggs     Patient Name: Sylwia Spears  : 1960  MRN: [id number]  Today's Date: 2024      Visit Date: 2024    Visit Dx:    ICD-10-CM ICD-9-CM   1. Status post reverse total shoulder replacement, open biceps tenodesis, right, DOS 2023  Z96.611 V43.61       Patient Active Problem List   Diagnosis    History of colon polyps    Type 2 diabetes mellitus without complication, without long-term current use of insulin    Benign essential HTN    Morbidly obese    Cervical spinal stenosis    Screening for prostate cancer    Personal history of colonic polyps    Family history of colon cancer    Hyperlipidemia LDL goal <70    Midline thoracic back pain    Erectile dysfunction due to diseases classified elsewhere    Personal history of nicotine dependence    Coronary artery calcification    Varicose veins of right lower extremity with other complications    DJD of shoulder    Rotator cuff arthropathy of right shoulder    Complete tear of right rotator cuff    Chronic right shoulder pain    Thrombocytopenia    Abnormal international normal ratio (INR)    Status post reverse total shoulder replacement, open biceps tenodesis, right, DOS 2023        Past Medical History:   Diagnosis Date    Cervical disc disease     stenosis    Cirrhosis of liver     Colon polyp     Coronary artery calcification     Diabetes mellitus     Electrocution     Fell off a ladder and has chronic neck pain    Hyperlipidemia     Hypertension     Spleen enlarged     Thrombocytopenia         Past Surgical History:   Procedure Laterality Date    CHOLECYSTECTOMY      COLONOSCOPY      COLONOSCOPY N/A 2018    Procedure: COLONOSCOPY, polypectomy;  Surgeon: Raleigh Champagne MD;  Location: [facility];  Service: Gastroenterology    COLONOSCOPY W/ POLYPECTOMY      COLONOSCOPY W/ POLYPECTOMY N/A 2021    Procedure: COLONOSCOPY; polypectomy;  Surgeon:  "Raleigh Champagne MD;  Location:  [facility];  Service: Gastroenterology;  Laterality: N/A;  polyps   diverticulosis    LIPOMA EXCISION      groin    SHOULDER SURGERY Left     TOTAL SHOULDER ARTHROPLASTY W/ DISTAL CLAVICLE EXCISION Right 12/1/2023    Procedure: TOTAL SHOULDER REVERSE ARTHROPLASTY;  Surgeon: David Ozuna MD;  Location:  [facility];  Service: Orthopedics;  Laterality: Right;                        PT Assessment/Plan       Row Name 01/31/24 1300          PT Assessment    Assessment Comments Patient with improved tolerance to manual therapy techniques today. Continued with ROM and strengthening exercises and he tolerated this well. Patient's right shoulder ROM continues to slowly improve in all planes. Plan to progress patient as tolerated.  -AS        PT Plan    PT Plan Comments Continue with current treatment plan.  -AS               User Key  (r) = Recorded By, (t) = Taken By, (c) = Cosigned By      Initials Name Provider Type    AS Royer Pedroza, PT Physical Therapist                     Modalities       Row Name 01/31/24 1300             Moist Heat    MH Applied Yes  -AS      Location Right Shoulder - Supine with roll under knees  -AS      PT Moist Heat Minutes 10  -AS      MH Prior to Rx Yes  -AS         Functional Testing    Outcome Measure Options Quick DASH  -AS                User Key  (r) = Recorded By, (t) = Taken By, (c) = Cosigned By      Initials Name Provider Type    AS Royer Pedroza, PT Physical Therapist                   OP Exercises       Row Name 01/31/24 1355 01/31/24 1300          Subjective    Subjective Comments -- Patient states his shoulder is \"doing ok.\"  -AS        Total Minutes    47047 - PT Therapeutic Exercise Minutes 25  -AS --     75143 - PT Manual Therapy Minutes 15  -AS --        Exercise 1    Exercise Name 1 -- 3-Way Cane  -AS        Exercise 2    Exercise Name 2 -- Pulley's - Flex & ABD  -AS     Time 2 -- 5 min each  -AS        Exercise " 3    Exercise Name 3 -- Supine Clasp Hands for Flexion  -AS     Reps 3 -- 25  -AS        Exercise 4    Exercise Name 4 -- S/L ER  -AS     Reps 4 -- 40  -AS        Exercise 5    Exercise Name 5 -- Empty/Full Can  -AS        Exercise 6    Exercise Name 6 -- Rows  -AS     Reps 6 -- 40  -AS     Time 6 -- Blue  -AS        Exercise 7    Exercise Name 7 -- Extensions  -AS     Reps 7 -- 40  -AS     Time 7 -- Blue  -AS        Exercise 8    Exercise Name 8 -- IR  -AS     Reps 8 -- 40  -AS     Time 8 -- Blue  -AS        Exercise 9    Exercise Name 9 -- ER  -AS     Reps 9 -- 40  -AS     Time 9 -- Yellow  -AS               User Key  (r) = Recorded By, (t) = Taken By, (c) = Cosigned By      Initials Name Provider Type    AS Royer Pedroza, PT Physical Therapist                             Manual Rx (last 36 hours)       Manual Treatments       Row Name 01/31/24 1355 01/31/24 1300          Total Minutes    00014 - PT Manual Therapy Minutes 15  -AS --        Manual Rx 1    Manual Rx 1 Location -- Right Shoulder  -AS     Manual Rx 1 Type -- PROM - Flex, ABD, IR, ER  -AS     Manual Rx 1 Duration -- 15 min  -AS               User Key  (r) = Recorded By, (t) = Taken By, (c) = Cosigned By      Initials Name Provider Type    AS Royer Pedroza, PT Physical Therapist                             Outcome Measure Options: Quick DASH         Time Calculation:   Start Time: 1300  Stop Time: 1352  Time Calculation (min): 52 min  Timed Charges  22174 - PT Therapeutic Exercise Minutes: 25  55983 - PT Manual Therapy Minutes: 15  Untimed Charges  PT Moist Heat Minutes: 10  Total Minutes  Timed Charges Total Minutes: 40  Untimed Charges Total Minutes: 10   Total Minutes: 50  Therapy Charges for Today       Code Description Service Date Service Provider Modifiers Qty    28836824570 HC PT THER PROC EA 15 MIN 1/31/2024 Royer Pedroza, PT GP 1    70845099754 HC PT MANUAL THERAPY EA 15 MIN 1/31/2024 Ryoer Pedroza, PT GP 1             PT G-Codes  Outcome Measure Options: Pema Pedroza, PT  1/31/2024

## 2024-02-02 ENCOUNTER — HOSPITAL ENCOUNTER (OUTPATIENT)
Dept: PHYSICAL THERAPY | Facility: HOSPITAL | Age: 64
Setting detail: THERAPIES SERIES
Discharge: HOME | End: 2024-02-02
Payer: MEDICARE

## 2024-02-02 DIAGNOSIS — Z96.611 STATUS POST REVERSE TOTAL SHOULDER REPLACEMENT, RIGHT: Primary | ICD-10-CM

## 2024-02-02 PROCEDURE — 97140 MANUAL THERAPY 1/> REGIONS: CPT

## 2024-02-02 PROCEDURE — 97110 THERAPEUTIC EXERCISES: CPT

## 2024-02-02 NOTE — THERAPY TREATMENT NOTE
Outpatient Physical Therapy Ortho Treatment Note   Reina Boggs     Patient Name: Sylwia Spears  : 1960  MRN: [id number]  Today's Date: 2024      Visit Date: 2024    Visit Dx:    ICD-10-CM ICD-9-CM   1. Status post reverse total shoulder replacement, open biceps tenodesis, right, DOS 2023  Z96.611 V43.61       Patient Active Problem List   Diagnosis    History of colon polyps    Type 2 diabetes mellitus without complication, without long-term current use of insulin    Benign essential HTN    Morbidly obese    Cervical spinal stenosis    Screening for prostate cancer    Personal history of colonic polyps    Family history of colon cancer    Hyperlipidemia LDL goal <70    Midline thoracic back pain    Erectile dysfunction due to diseases classified elsewhere    Personal history of nicotine dependence    Coronary artery calcification    Varicose veins of right lower extremity with other complications    DJD of shoulder    Rotator cuff arthropathy of right shoulder    Complete tear of right rotator cuff    Chronic right shoulder pain    Thrombocytopenia    Abnormal international normal ratio (INR)    Status post reverse total shoulder replacement, open biceps tenodesis, right, DOS 2023        Past Medical History:   Diagnosis Date    Cervical disc disease     stenosis    Cirrhosis of liver     Colon polyp     Coronary artery calcification     Diabetes mellitus     Electrocution     Fell off a ladder and has chronic neck pain    Hyperlipidemia     Hypertension     Spleen enlarged     Thrombocytopenia         Past Surgical History:   Procedure Laterality Date    CHOLECYSTECTOMY      COLONOSCOPY      COLONOSCOPY N/A 2018    Procedure: COLONOSCOPY, polypectomy;  Surgeon: Raleigh Champagne MD;  Location: [facility];  Service: Gastroenterology    COLONOSCOPY W/ POLYPECTOMY      COLONOSCOPY W/ POLYPECTOMY N/A 2021    Procedure: COLONOSCOPY; polypectomy;  Surgeon:  "Raleigh Champagne MD;  Location:  [facility];  Service: Gastroenterology;  Laterality: N/A;  polyps   diverticulosis    LIPOMA EXCISION      groin    SHOULDER SURGERY Left     TOTAL SHOULDER ARTHROPLASTY W/ DISTAL CLAVICLE EXCISION Right 12/1/2023    Procedure: TOTAL SHOULDER REVERSE ARTHROPLASTY;  Surgeon: David Ozuna MD;  Location:  [facility];  Service: Orthopedics;  Laterality: Right;                        PT Assessment/Plan       Row Name 02/02/24 1300          PT Assessment    Assessment Comments Continue to slowly progress patient with strengthening exercises today and he tolerated this well. Overall patient is doing well, however, progress is slow.  -AS        PT Plan    PT Plan Comments Continue with current treatment plan.  -AS               User Key  (r) = Recorded By, (t) = Taken By, (c) = Cosigned By      Initials Name Provider Type    AS Royer Pedroza, PT Physical Therapist                     Modalities       Row Name 02/02/24 1300             Moist Heat    MH Applied Yes  -AS      Location Right Shoulder - Supine with roll under knees  -AS      PT Moist Heat Minutes 10  -AS      MH Prior to Rx Yes  -AS         Functional Testing    Outcome Measure Options Quick DASH  -AS                User Key  (r) = Recorded By, (t) = Taken By, (c) = Cosigned By      Initials Name Provider Type    AS Royer Pedroza, PT Physical Therapist                   OP Exercises       Row Name 02/02/24 1350 02/02/24 1300          Subjective    Subjective Comments -- Patient states his shoulder seems to be \"doing a little better.\"  -AS        Total Minutes    97490 - PT Therapeutic Exercise Minutes 25  -AS --     64117 - PT Manual Therapy Minutes 15  -AS --        Exercise 1    Exercise Name 1 -- 3-Way Cane  -AS        Exercise 2    Exercise Name 2 -- Pulley's - Flex & ABD  -AS     Time 2 -- 5 min each  -AS        Exercise 3    Exercise Name 3 -- Supine Clasp Hands for Flexion  -AS     Reps 3 -- " 25  -AS        Exercise 4    Exercise Name 4 -- S/L ER  -AS     Reps 4 -- 25  -AS     Time 4 -- 1#  -AS        Exercise 5    Exercise Name 5 -- Empty/Full Can  -AS     Reps 5 -- 25 each  -AS        Exercise 6    Exercise Name 6 -- Rows  -AS     Reps 6 -- 25  -AS     Time 6 -- Black  -AS        Exercise 7    Exercise Name 7 -- Extensions  -AS     Reps 7 -- 25  -AS     Time 7 -- Black  -AS        Exercise 8    Exercise Name 8 -- IR  -AS     Reps 8 -- 25  -AS     Time 8 -- Black  -AS        Exercise 9    Exercise Name 9 -- ER  -AS     Reps 9 -- 25  -AS     Time 9 -- Red  -AS               User Key  (r) = Recorded By, (t) = Taken By, (c) = Cosigned By      Initials Name Provider Type    AS Royer Pedroza, PT Physical Therapist                             Manual Rx (last 36 hours)       Manual Treatments       Row Name 02/02/24 1350 02/02/24 1300          Total Minutes    91451 - PT Manual Therapy Minutes 15  -AS --        Manual Rx 1    Manual Rx 1 Location -- Right Shoulder  -AS     Manual Rx 1 Type -- PROM - Flex, ABD, IR, ER  -AS     Manual Rx 1 Duration -- 15 min  -AS               User Key  (r) = Recorded By, (t) = Taken By, (c) = Cosigned By      Initials Name Provider Type    AS Royer Pedroza, PT Physical Therapist                             Outcome Measure Options: Quick DASH         Time Calculation:   Start Time: 1300  Stop Time: 1350  Time Calculation (min): 50 min  Timed Charges  56899 - PT Therapeutic Exercise Minutes: 25  31595 - PT Manual Therapy Minutes: 15  Untimed Charges  PT Moist Heat Minutes: 10  Total Minutes  Timed Charges Total Minutes: 40  Untimed Charges Total Minutes: 10   Total Minutes: 50  Therapy Charges for Today       Code Description Service Date Service Provider Modifiers Qty    04264896945 HC PT THER PROC EA 15 MIN 2/2/2024 Royer Pedroza, PT GP 2    40692428540 HC PT MANUAL THERAPY EA 15 MIN 2/2/2024 Royer Pedroza, PT GP 1            PT G-Codes  Outcome  Measure Options: Pema Pedroza, PT  2/2/2024

## 2024-02-06 DIAGNOSIS — Z96.611 STATUS POST REVERSE TOTAL REPLACEMENT OF RIGHT SHOULDER: ICD-10-CM

## 2024-02-06 NOTE — TELEPHONE ENCOUNTER
Rx Refill Note  Requested Prescriptions     Pending Prescriptions Disp Refills    oxyCODONE-acetaminophen (PERCOCET) 7.5-325 MG per tablet 42 tablet 0     Sig: Take 1 tablet by mouth Every 4 (Four) Hours As Needed for Moderate Pain.      Last office visit with prescribing clinician: 1/8/2024      Next office visit with prescribing clinician: 2/21/2024   Last Filled: 01.30.2024    Encounter Diagnosis   Name Primary?    Status post reverse total replacement of right shoulder       Date of Surgery:status post right reverse shoulder arthroplasty-12/1/2023         Meghan Whatley MA  02/06/24, 08:57 EST    Previous RX pended for your approval, change or denial.     {TIP  Encounters:    {TIP  Please add Last Relevant Lab Date if appropriate:  {TIP  Is Refill Pharmacy correct?:

## 2024-02-07 ENCOUNTER — HOSPITAL ENCOUNTER (OUTPATIENT)
Dept: PHYSICAL THERAPY | Facility: HOSPITAL | Age: 64
Setting detail: THERAPIES SERIES
Discharge: HOME | End: 2024-02-07
Payer: MEDICARE

## 2024-02-07 DIAGNOSIS — Z96.611 STATUS POST REVERSE TOTAL SHOULDER REPLACEMENT, RIGHT: Primary | ICD-10-CM

## 2024-02-07 PROCEDURE — 97110 THERAPEUTIC EXERCISES: CPT

## 2024-02-07 PROCEDURE — 97140 MANUAL THERAPY 1/> REGIONS: CPT

## 2024-02-07 RX ORDER — OXYCODONE AND ACETAMINOPHEN 7.5; 325 MG/1; MG/1
1 TABLET ORAL EVERY 4 HOURS PRN
Qty: 42 TABLET | Refills: 0 | Status: SHIPPED | OUTPATIENT
Start: 2024-02-07

## 2024-02-07 NOTE — THERAPY RE-EVALUATION
Outpatient Physical Therapy Ortho Re-Evaluation  DIANNE Bertrand     Patient Name: Sylwia Spears  : 1960  MRN: [id number]  Today's Date: 2024      Visit Date: 2024    Patient Active Problem List   Diagnosis    History of colon polyps    Type 2 diabetes mellitus without complication, without long-term current use of insulin    Benign essential HTN    Morbidly obese    Cervical spinal stenosis    Screening for prostate cancer    Personal history of colonic polyps    Family history of colon cancer    Hyperlipidemia LDL goal <70    Midline thoracic back pain    Erectile dysfunction due to diseases classified elsewhere    Personal history of nicotine dependence    Coronary artery calcification    Varicose veins of right lower extremity with other complications    DJD of shoulder    Rotator cuff arthropathy of right shoulder    Complete tear of right rotator cuff    Chronic right shoulder pain    Thrombocytopenia    Abnormal international normal ratio (INR)    Status post reverse total shoulder replacement, open biceps tenodesis, right, DOS 2023        Past Medical History:   Diagnosis Date    Cervical disc disease     stenosis    Cirrhosis of liver     Colon polyp     Coronary artery calcification     Diabetes mellitus     Electrocution     Fell off a ladder and has chronic neck pain    Hyperlipidemia     Hypertension     Spleen enlarged     Thrombocytopenia         Past Surgical History:   Procedure Laterality Date    CHOLECYSTECTOMY      COLONOSCOPY      COLONOSCOPY N/A 2018    Procedure: COLONOSCOPY, polypectomy;  Surgeon: Raleigh Champagne MD;  Location: [facility] OR;  Service: Gastroenterology    COLONOSCOPY W/ POLYPECTOMY      COLONOSCOPY W/ POLYPECTOMY N/A 2021    Procedure: COLONOSCOPY; polypectomy;  Surgeon: Raleigh Champagne MD;  Location: [facility] OR;  Service: Gastroenterology;  Laterality: N/A;  polyps   diverticulosis    LIPOMA EXCISION      groin     SHOULDER SURGERY Left     TOTAL SHOULDER ARTHROPLASTY W/ DISTAL CLAVICLE EXCISION Right 12/1/2023    Procedure: TOTAL SHOULDER REVERSE ARTHROPLASTY;  Surgeon: David Ozuna MD;  Location: [facility];  Service: Orthopedics;  Laterality: Right;       Visit Dx:     ICD-10-CM ICD-9-CM   1. Status post reverse total shoulder replacement, open biceps tenodesis, right, DOS 12/1/2023  Z96.611 V43.61              PT Ortho       Row Name 02/07/24 1300       Precautions and Contraindications    Precautions/Limitations no known precautions/limitations  -AS       Subjective Pain    Able to rate subjective pain? yes  -AS    Pre-Treatment Pain Level 2  -AS    Post-Treatment Pain Level 2  -AS       Posture/Observations    Posture- WNL Posture is WNL  -AS    Observations Incision healing  -AS       Right Upper Ext    Rt Shoulder Abduction AROM 90  -AS    Rt Shoulder Abduction PROM 170  -AS    Rt Shoulder Flexion AROM 93  -AS    Rt Shoulder Flexion PROM 160  -AS    Rt Shoulder External Rotation PROM 80  -AS    Rt Shoulder Internal Rotation PROM 75  -AS       MMT Right Upper Ext    Rt Shoulder Flexion MMT, Gross Movement (4-/5) good minus  -AS    Rt Shoulder ABduction MMT, Gross Movement (4-/5) good minus  -AS    Rt Shoulder Internal Rotation MMT, Gross Movement (4-/5) good minus  -AS    Rt Shoulder External Rotation MMT, Gross Movement (3+/5) fair plus  -AS       Sensation    Sensation WNL? WNL  -AS    Light Touch No apparent deficits  -AS              User Key  (r) = Recorded By, (t) = Taken By, (c) = Cosigned By      Initials Name Provider Type    AS Royer Pedroza, PT Physical Therapist                                       PT OP Goals       Row Name 02/07/24 1300          PT Short Term Goals    STG 1 Patient to demonstrate compliance with initial HEP for flexibility, ROM and strengthening.  -AS     STG 1 Progress Met  -AS     STG 2 Patient to report right shoulder pain on VAS of 4-5/10 at worst with activity.  -AS      STG 2 Progress Met  -AS     STG 3 Patient to demonstrate improved right shoulder strength to 4/5 in all planes.  -AS     STG 3 Progress Ongoing;Progressing  -AS     STG 4 Patient to demonstrate improved right shoulder PROM to within 10 degrees of contralateral shoulder.  -AS     STG 4 Progress Partially Met;Ongoing;Progressing  -AS        Long Term Goals    LTG 1 Patient to demonstrate compliance with advanced HEP for flexibility, ROM and strengthening.  -AS     LTG 1 Progress Met  -AS     LTG 2 Patient to report right shoulder pain on VAS of 0-1/10 at worst with activity.  -AS     LTG 2 Progress Ongoing;Progressing  -AS     LTG 3 Patient to demonstrate improved right shoulder strength to 4+/5 in all planes.  -AS     LTG 3 Progress Ongoing;Progressing  -AS     LTG 4 Patient to demonstrate improved right shoulder AROM to within 10 degrees of contralateral shoulder.  -AS     LTG 4 Progress Ongoing;Progressing  -AS     LTG 5 Patient to report improved function and decreased pain Quick DASH by >10-15 points.  -AS     LTG 5 Progress Ongoing;Progressing  -AS               User Key  (r) = Recorded By, (t) = Taken By, (c) = Cosigned By      Initials Name Provider Type    AS Royer Pedroza, PT Physical Therapist                     PT Assessment/Plan       Row Name 02/07/24 1300          PT Assessment    Functional Limitations Limitation in home management;Limitations in community activities;Performance in leisure activities;Performance in self-care ADL;Performance in sport activities;Performance in work activities  -AS     Impairments Muscle strength;Pain;Range of motion  -AS     Assessment Comments Patient continues to improve with ROM and strength. Patient's tolerance to manual therapy techniques also continues to improve. Plan to continue to progress patient as tolerated.  -AS     Please refer to paper survey for additional self-reported information Yes  -AS     Rehab Potential Good  -AS     Patient/caregiver  participated in establishment of treatment plan and goals Yes  -AS     Patient would benefit from skilled therapy intervention Yes  -AS        PT Plan    PT Frequency 2x/week  -AS     Predicted Duration of Therapy Intervention (PT) 6-8 weeks  -AS     Planned CPT's? PT RE-EVAL: 57424;PT THER PROC EA 15 MIN: 05542;PT THER ACT EA 15 MIN: 24851;PT MANUAL THERAPY EA 15 MIN: 78915;PT NEUROMUSC RE-EDUCATION EA 15 MIN: 03104  -AS     PT Plan Comments Continue with current treatment plan.  -AS               User Key  (r) = Recorded By, (t) = Taken By, (c) = Cosigned By      Initials Name Provider Type    AS Royer Pedroza, PT Physical Therapist                     Modalities       Row Name 02/07/24 1300             Moist Heat    MH Applied Yes  -AS      Location Right Shoulder - Supine with roll under knees  -AS      PT Moist Heat Minutes 10  -AS      MH Prior to Rx Yes  -AS         Functional Testing    Outcome Measure Options Quick DASH  -AS                User Key  (r) = Recorded By, (t) = Taken By, (c) = Cosigned By      Initials Name Provider Type    AS Royer Pedroza, PT Physical Therapist                   OP Exercises       Row Name 02/07/24 1355 02/07/24 1300          Subjective    Subjective Comments -- Patient states his shoulder is doing well.  -AS        Subjective Pain    Able to rate subjective pain? -- yes  -AS     Pre-Treatment Pain Level -- 2  -AS     Post-Treatment Pain Level -- 2  -AS        Total Minutes    97017 - PT Therapeutic Exercise Minutes 25  -AS --     28160 - PT Manual Therapy Minutes 15  -AS --        Exercise 1    Exercise Name 1 -- 3-Way Cane  -AS        Exercise 2    Exercise Name 2 -- Pulley's - Flex & ABD  -AS     Time 2 -- 5 min each  -AS        Exercise 3    Exercise Name 3 -- Supine Clasp Hands for Flexion  -AS     Reps 3 -- 25  -AS        Exercise 4    Exercise Name 4 -- S/L ER  -AS     Reps 4 -- 25  -AS     Time 4 -- 1#  -AS        Exercise 5    Exercise Name 5 --  Empty/Full Can  -AS     Reps 5 -- 25 each  -AS        Exercise 6    Exercise Name 6 -- Rows  -AS     Reps 6 -- 25  -AS     Time 6 -- Black  -AS        Exercise 7    Exercise Name 7 -- Extensions  -AS     Reps 7 -- 25  -AS     Time 7 -- Black  -AS        Exercise 8    Exercise Name 8 -- IR  -AS     Reps 8 -- 25  -AS     Time 8 -- Black  -AS        Exercise 9    Exercise Name 9 -- ER  -AS     Reps 9 -- 25  -AS     Time 9 -- Red  -AS               User Key  (r) = Recorded By, (t) = Taken By, (c) = Cosigned By      Initials Name Provider Type    AS Royer Pedroza, PT Physical Therapist                  Manual Rx (last 36 hours)       Manual Treatments       Row Name 02/07/24 1355 02/07/24 1300          Total Minutes    37976 - PT Manual Therapy Minutes 15  -AS --        Manual Rx 1    Manual Rx 1 Location -- Right Shoulder  -AS     Manual Rx 1 Type -- PROM - Flex, ABD, IR, ER  -AS     Manual Rx 1 Duration -- 15 min  -AS               User Key  (r) = Recorded By, (t) = Taken By, (c) = Cosigned By      Initials Name Provider Type    AS Royer Pedroza, PT Physical Therapist                                Outcome Measure Options: Quick DASH         Time Calculation:     Start Time: 1300  Stop Time: 1353  Time Calculation (min): 53 min  Timed Charges  10409 - PT Therapeutic Exercise Minutes: 25  73876 - PT Manual Therapy Minutes: 15  Untimed Charges  PT Moist Heat Minutes: 10  Total Minutes  Timed Charges Total Minutes: 40  Untimed Charges Total Minutes: 10   Total Minutes: 50     Therapy Charges for Today       Code Description Service Date Service Provider Modifiers Qty    09145623966 HC PT THER PROC EA 15 MIN 2/7/2024 Royer Pedroza, PT GP 1    42218599302 HC PT MANUAL THERAPY EA 15 MIN 2/7/2024 Royer Pedroza, PT GP 1            PT G-Codes  Outcome Measure Options: Pema Pedroza, PT  2/7/2024

## 2024-02-07 NOTE — TELEPHONE ENCOUNTER
Patient's wife calling to check the status of the pian medication- did advise that the RX request was sent to Dr. Ozuna to fill. Also advised that Dr. Ozuna does wean patient's down and off the pain medication as we can only prescribe at most 90 days post operatively.

## 2024-02-09 ENCOUNTER — HOSPITAL ENCOUNTER (OUTPATIENT)
Dept: PHYSICAL THERAPY | Facility: HOSPITAL | Age: 64
Setting detail: THERAPIES SERIES
Discharge: HOME | End: 2024-02-09
Payer: MEDICARE

## 2024-02-09 DIAGNOSIS — Z96.611 STATUS POST REVERSE TOTAL SHOULDER REPLACEMENT, RIGHT: Primary | ICD-10-CM

## 2024-02-09 PROCEDURE — 97110 THERAPEUTIC EXERCISES: CPT

## 2024-02-09 PROCEDURE — 97140 MANUAL THERAPY 1/> REGIONS: CPT

## 2024-02-09 NOTE — THERAPY TREATMENT NOTE
Outpatient Physical Therapy Ortho Treatment Note   Reina Boggs     Patient Name: Sylwia Spears  : 1960  MRN: [id number]  Today's Date: 2024      Visit Date: 2024    Visit Dx:    ICD-10-CM ICD-9-CM   1. Status post reverse total shoulder replacement, open biceps tenodesis, right, DOS 2023  Z96.611 V43.61       Patient Active Problem List   Diagnosis    History of colon polyps    Type 2 diabetes mellitus without complication, without long-term current use of insulin    Benign essential HTN    Morbidly obese    Cervical spinal stenosis    Screening for prostate cancer    Personal history of colonic polyps    Family history of colon cancer    Hyperlipidemia LDL goal <70    Midline thoracic back pain    Erectile dysfunction due to diseases classified elsewhere    Personal history of nicotine dependence    Coronary artery calcification    Varicose veins of right lower extremity with other complications    DJD of shoulder    Rotator cuff arthropathy of right shoulder    Complete tear of right rotator cuff    Chronic right shoulder pain    Thrombocytopenia    Abnormal international normal ratio (INR)    Status post reverse total shoulder replacement, open biceps tenodesis, right, DOS 2023        Past Medical History:   Diagnosis Date    Cervical disc disease     stenosis    Cirrhosis of liver     Colon polyp     Coronary artery calcification     Diabetes mellitus     Electrocution     Fell off a ladder and has chronic neck pain    Hyperlipidemia     Hypertension     Spleen enlarged     Thrombocytopenia         Past Surgical History:   Procedure Laterality Date    CHOLECYSTECTOMY      COLONOSCOPY      COLONOSCOPY N/A 2018    Procedure: COLONOSCOPY, polypectomy;  Surgeon: Raleigh Champagne MD;  Location: [facility];  Service: Gastroenterology    COLONOSCOPY W/ POLYPECTOMY      COLONOSCOPY W/ POLYPECTOMY N/A 2021    Procedure: COLONOSCOPY; polypectomy;  Surgeon:  "Raleigh Champagne MD;  Location:  [facility] OR;  Service: Gastroenterology;  Laterality: N/A;  polyps   diverticulosis    LIPOMA EXCISION      groin    SHOULDER SURGERY Left     TOTAL SHOULDER ARTHROPLASTY W/ DISTAL CLAVICLE EXCISION Right 12/1/2023    Procedure: TOTAL SHOULDER REVERSE ARTHROPLASTY;  Surgeon: David Ozuna MD;  Location:  [facility] OR;  Service: Orthopedics;  Laterality: Right;                        PT Assessment/Plan       Row Name 02/09/24 1300          PT Assessment    Assessment Comments Patient continues to slowly improve with PT at this time.  -AS        PT Plan    PT Plan Comments Continue with current treatment plan.  -AS               User Key  (r) = Recorded By, (t) = Taken By, (c) = Cosigned By      Initials Name Provider Type    AS Royer Pedroza, PT Physical Therapist                     Modalities       Row Name 02/09/24 1300             Moist Heat    MH Applied Yes  -AS      Location Right Shoulder - Supine with roll under knees  -AS      PT Moist Heat Minutes 10  -AS      MH Prior to Rx Yes  -AS         Functional Testing    Outcome Measure Options Quick DASH  -AS                User Key  (r) = Recorded By, (t) = Taken By, (c) = Cosigned By      Initials Name Provider Type    AS Royer Pedroza, PT Physical Therapist                   OP Exercises       Row Name 02/09/24 1403 02/09/24 1300          Subjective    Subjective Comments -- Patient states his shoulder is \"doing ok.\"  -AS        Total Minutes    05942 - PT Therapeutic Exercise Minutes 20  -AS --     64816 - PT Manual Therapy Minutes 15  -AS --        Exercise 1    Exercise Name 1 -- 3-Way Cane  -AS        Exercise 2    Exercise Name 2 -- Pulley's - Flex & ABD  -AS     Time 2 -- 5 min each  -AS        Exercise 3    Exercise Name 3 -- Supine Clasp Hands for Flexion  -AS     Reps 3 -- 25  -AS        Exercise 4    Exercise Name 4 -- S/L ER  -AS     Reps 4 -- 25  -AS     Time 4 -- 1#  -AS        Exercise 5 "    Exercise Name 5 -- Empty/Full Can  -AS     Reps 5 -- 25 each  -AS        Exercise 6    Exercise Name 6 -- Rows  -AS     Reps 6 -- 25  -AS     Time 6 -- Black  -AS        Exercise 7    Exercise Name 7 -- Extensions  -AS     Reps 7 -- 25  -AS     Time 7 -- Black  -AS        Exercise 8    Exercise Name 8 -- IR  -AS     Reps 8 -- 25  -AS     Time 8 -- Black  -AS        Exercise 9    Exercise Name 9 -- ER  -AS     Reps 9 -- 25  -AS     Time 9 -- Red  -AS               User Key  (r) = Recorded By, (t) = Taken By, (c) = Cosigned By      Initials Name Provider Type    AS Royer Pedroza, PT Physical Therapist                             Manual Rx (last 36 hours)       Manual Treatments       Row Name 02/09/24 1403 02/09/24 1300          Total Minutes    18919 - PT Manual Therapy Minutes 15  -AS --        Manual Rx 1    Manual Rx 1 Location -- Right Shoulder  -AS     Manual Rx 1 Type -- PROM - Flex, ABD, IR, ER  -AS     Manual Rx 1 Duration -- 15 min  -AS               User Key  (r) = Recorded By, (t) = Taken By, (c) = Cosigned By      Initials Name Provider Type    AS Royer Pedroza, PT Physical Therapist                             Outcome Measure Options: Quick DASH         Time Calculation:   Start Time: 1306  Stop Time: 1353  Time Calculation (min): 47 min  Timed Charges  68879 - PT Therapeutic Exercise Minutes: 20  96937 - PT Manual Therapy Minutes: 15  Untimed Charges  PT Moist Heat Minutes: 10  Total Minutes  Timed Charges Total Minutes: 35  Untimed Charges Total Minutes: 10   Total Minutes: 45  Therapy Charges for Today       Code Description Service Date Service Provider Modifiers Qty    22960131315 HC PT THER PROC EA 15 MIN 2/9/2024 Royer Pedroza, PT GP 1    47340025315 HC PT MANUAL THERAPY EA 15 MIN 2/9/2024 Royer Pedroza, PT GP 1            PT G-Codes  Outcome Measure Options: Pema Pedroza, PT  2/9/2024

## 2024-02-13 DIAGNOSIS — Z96.611 STATUS POST REVERSE TOTAL REPLACEMENT OF RIGHT SHOULDER: ICD-10-CM

## 2024-02-13 RX ORDER — OXYCODONE AND ACETAMINOPHEN 7.5; 325 MG/1; MG/1
1 TABLET ORAL EVERY 4 HOURS PRN
Qty: 42 TABLET | Refills: 0 | Status: SHIPPED | OUTPATIENT
Start: 2024-02-13

## 2024-02-14 ENCOUNTER — HOSPITAL ENCOUNTER (OUTPATIENT)
Dept: PHYSICAL THERAPY | Facility: HOSPITAL | Age: 64
Setting detail: THERAPIES SERIES
Discharge: HOME | End: 2024-02-14
Payer: MEDICARE

## 2024-02-14 DIAGNOSIS — Z96.611 STATUS POST REVERSE TOTAL SHOULDER REPLACEMENT, RIGHT: Primary | ICD-10-CM

## 2024-02-14 PROCEDURE — 97110 THERAPEUTIC EXERCISES: CPT

## 2024-02-16 ENCOUNTER — HOSPITAL ENCOUNTER (OUTPATIENT)
Dept: PHYSICAL THERAPY | Facility: HOSPITAL | Age: 64
Setting detail: THERAPIES SERIES
Discharge: HOME | End: 2024-02-16
Payer: MEDICARE

## 2024-02-16 DIAGNOSIS — Z96.611 STATUS POST REVERSE TOTAL SHOULDER REPLACEMENT, RIGHT: Primary | ICD-10-CM

## 2024-02-16 PROCEDURE — 97110 THERAPEUTIC EXERCISES: CPT

## 2024-02-18 DIAGNOSIS — M48.02 CERVICAL SPINAL STENOSIS: ICD-10-CM

## 2024-02-19 RX ORDER — DULOXETIN HYDROCHLORIDE 30 MG/1
30 CAPSULE, DELAYED RELEASE ORAL DAILY
Qty: 90 CAPSULE | Refills: 1 | Status: SHIPPED | OUTPATIENT
Start: 2024-02-19

## 2024-02-19 NOTE — TELEPHONE ENCOUNTER
Rx Refill Note  Requested Prescriptions     Pending Prescriptions Disp Refills    DULoxetine (CYMBALTA) 30 MG capsule [Pharmacy Med Name: DULoxetine HCL DR 30 MG CAPSULE] 30 capsule 0     Sig: Take 1 capsule by mouth Daily.      Last office visit with prescribing clinician: 1/17/2024   Last telemedicine visit with prescribing clinician: Visit date not found   Next office visit with prescribing clinician: 7/17/2024                         Would you like a call back once the refill request has been completed: [] Yes [] No    If the office needs to give you a call back, can they leave a voicemail: [] Yes [] No    Cortney Sampson MA  02/19/24, 09:31 EST

## 2024-02-20 DIAGNOSIS — Z96.611 STATUS POST REVERSE TOTAL REPLACEMENT OF RIGHT SHOULDER: ICD-10-CM

## 2024-02-20 RX ORDER — OXYCODONE AND ACETAMINOPHEN 7.5; 325 MG/1; MG/1
1 TABLET ORAL EVERY 4 HOURS PRN
Qty: 42 TABLET | Refills: 0 | Status: SHIPPED | OUTPATIENT
Start: 2024-02-20

## 2024-02-20 NOTE — TELEPHONE ENCOUNTER
[employer] ends 02.29.2024      Rx Refill Note  Requested Prescriptions     Pending Prescriptions Disp Refills    oxyCODONE-acetaminophen (PERCOCET) 7.5-325 MG per tablet 42 tablet 0     Sig: Take 1 tablet by mouth Every 4 (Four) Hours As Needed for Moderate Pain.      Last office visit with prescribing clinician: 1/8/2024      Next office visit with prescribing clinician: 2/21/2024   Last Filled: 02.13.2024    Encounter Diagnosis   Name Primary?    Status post reverse total replacement of right shoulder       Date of Surgery:  status post right reverse shoulder arthroplasty-12/1/2023           Meghan Whatley MA  02/20/24, 09:18 EST    Previous RX pended for your approval, change or denial.     {TIP  Encounters:    {TIP  Please add Last Relevant Lab Date if appropriate:  {TIP  Is Refill Pharmacy correct?:

## 2024-02-21 ENCOUNTER — HOSPITAL ENCOUNTER (OUTPATIENT)
Dept: PHYSICAL THERAPY | Facility: HOSPITAL | Age: 64
Setting detail: THERAPIES SERIES
Discharge: HOME | End: 2024-02-21
Payer: MEDICARE

## 2024-02-21 ENCOUNTER — OFFICE VISIT (OUTPATIENT)
Dept: ORTHOPEDIC SURGERY | Facility: CLINIC | Age: 64
End: 2024-02-21
Payer: MEDICARE

## 2024-02-21 VITALS — WEIGHT: 243.2 LBS | BODY MASS INDEX: 31.21 KG/M2 | HEIGHT: 74 IN

## 2024-02-21 DIAGNOSIS — Z96.611 STATUS POST REVERSE TOTAL SHOULDER REPLACEMENT, RIGHT: Primary | ICD-10-CM

## 2024-02-21 DIAGNOSIS — Z96.611 STATUS POST REVERSE TOTAL REPLACEMENT OF RIGHT SHOULDER: Primary | ICD-10-CM

## 2024-02-21 PROCEDURE — 1159F MED LIST DOCD IN RCRD: CPT | Performed by: ORTHOPAEDIC SURGERY

## 2024-02-21 PROCEDURE — 1160F RVW MEDS BY RX/DR IN RCRD: CPT | Performed by: ORTHOPAEDIC SURGERY

## 2024-02-21 PROCEDURE — 99024 POSTOP FOLLOW-UP VISIT: CPT | Performed by: ORTHOPAEDIC SURGERY

## 2024-02-21 PROCEDURE — 97110 THERAPEUTIC EXERCISES: CPT

## 2024-02-21 PROCEDURE — 97140 MANUAL THERAPY 1/> REGIONS: CPT

## 2024-02-21 NOTE — THERAPY RE-EVALUATION
Outpatient Physical Therapy Ortho Re-Evaluation  DIANNE Bertrand     Patient Name: Sylwia Spears  : 1960  MRN: [id number]  Today's Date: 2024      Visit Date: 2024    Patient Active Problem List   Diagnosis    History of colon polyps    Type 2 diabetes mellitus without complication, without long-term current use of insulin    Benign essential HTN    Morbidly obese    Cervical spinal stenosis    Screening for prostate cancer    Personal history of colonic polyps    Family history of colon cancer    Hyperlipidemia LDL goal <70    Midline thoracic back pain    Erectile dysfunction due to diseases classified elsewhere    Personal history of nicotine dependence    Coronary artery calcification    Varicose veins of right lower extremity with other complications    DJD of shoulder    Rotator cuff arthropathy of right shoulder    Complete tear of right rotator cuff    Chronic right shoulder pain    Thrombocytopenia    Abnormal international normal ratio (INR)    Status post reverse total shoulder replacement, open biceps tenodesis, right, DOS 2023        Past Medical History:   Diagnosis Date    Cervical disc disease     stenosis    Cirrhosis of liver     Colon polyp     Coronary artery calcification     Diabetes mellitus     Electrocution     Fell off a ladder and has chronic neck pain    Hyperlipidemia     Hypertension     Spleen enlarged     Thrombocytopenia         Past Surgical History:   Procedure Laterality Date    CHOLECYSTECTOMY      COLONOSCOPY      COLONOSCOPY N/A 2018    Procedure: COLONOSCOPY, polypectomy;  Surgeon: Raleigh Champagne MD;  Location: [facility];  Service: Gastroenterology    COLONOSCOPY W/ POLYPECTOMY      COLONOSCOPY W/ POLYPECTOMY N/A 2021    Procedure: COLONOSCOPY; polypectomy;  Surgeon: Raleigh Champagne MD;  Location: [facility];  Service: Gastroenterology;  Laterality: N/A;  polyps   diverticulosis    LIPOMA EXCISION      groin     SHOULDER SURGERY Left     TOTAL SHOULDER ARTHROPLASTY W/ DISTAL CLAVICLE EXCISION Right 12/1/2023    Procedure: TOTAL SHOULDER REVERSE ARTHROPLASTY;  Surgeon: David Ozuna MD;  Location: [facility];  Service: Orthopedics;  Laterality: Right;       Visit Dx:     ICD-10-CM ICD-9-CM   1. Status post reverse total shoulder replacement, open biceps tenodesis, right, DOS 12/1/2023  Z96.611 V43.61              PT Ortho       Row Name 02/21/24 1300       Precautions and Contraindications    Precautions/Limitations no known precautions/limitations  -AS       Subjective Pain    Able to rate subjective pain? yes  -AS    Pre-Treatment Pain Level 1  -AS    Post-Treatment Pain Level 1  -AS       Posture/Observations    Posture- WNL Posture is WNL  -AS    Observations Incision healing  -AS       Right Upper Ext    Rt Shoulder Abduction AROM 95  -AS    Rt Shoulder Abduction PROM 175  -AS    Rt Shoulder Flexion AROM 95  -AS    Rt Shoulder Flexion PROM 165  -AS    Rt Shoulder External Rotation PROM 80  -AS    Rt Shoulder Internal Rotation PROM 80  -AS       MMT Right Upper Ext    Rt Shoulder Flexion MMT, Gross Movement (4/5) good  -AS    Rt Shoulder ABduction MMT, Gross Movement (4/5) good  -AS    Rt Shoulder Internal Rotation MMT, Gross Movement (4/5) good  -AS    Rt Shoulder External Rotation MMT, Gross Movement (4-/5) good minus  -AS       Sensation    Sensation WNL? WNL  -AS    Light Touch No apparent deficits  -AS              User Key  (r) = Recorded By, (t) = Taken By, (c) = Cosigned By      Initials Name Provider Type    AS Royer Pedroza, PT Physical Therapist                                       PT OP Goals       Row Name 02/21/24 1300          PT Short Term Goals    STG 1 Patient to demonstrate compliance with initial HEP for flexibility, ROM and strengthening.  -AS     STG 1 Progress Met  -AS     STG 2 Patient to report right shoulder pain on VAS of 4-5/10 at worst with activity.  -AS     STG 2 Progress  Met  -AS     STG 3 Patient to demonstrate improved right shoulder strength to 4/5 in all planes.  -AS     STG 3 Progress Ongoing;Progressing  -AS     STG 4 Patient to demonstrate improved right shoulder PROM to within 10 degrees of contralateral shoulder.  -AS     STG 4 Progress Partially Met;Ongoing;Progressing  -AS        Long Term Goals    LTG 1 Patient to demonstrate compliance with advanced HEP for flexibility, ROM and strengthening.  -AS     LTG 1 Progress Met  -AS     LTG 2 Patient to report right shoulder pain on VAS of 0-1/10 at worst with activity.  -AS     LTG 2 Progress Ongoing;Progressing  -AS     LTG 3 Patient to demonstrate improved right shoulder strength to 4+/5 in all planes.  -AS     LTG 3 Progress Ongoing;Progressing  -AS     LTG 4 Patient to demonstrate improved right shoulder AROM to within 10 degrees of contralateral shoulder.  -AS     LTG 4 Progress Ongoing;Progressing  -AS     LTG 5 Patient to report improved function and decreased pain Quick DASH by >10-15 points.  -AS     LTG 5 Progress Ongoing;Progressing  -AS               User Key  (r) = Recorded By, (t) = Taken By, (c) = Cosigned By      Initials Name Provider Type    AS Royer Pedroza, PT Physical Therapist                     PT Assessment/Plan       Row Name 02/21/24 1300          PT Assessment    Functional Limitations Limitation in home management;Limitations in community activities;Performance in leisure activities;Performance in self-care ADL;Performance in sport activities;Performance in work activities  -AS     Impairments Muscle strength;Pain;Range of motion  -AS     Assessment Comments Patient continues to improve with ROM and strength. Patient's tolerance to manual therapy techniques also continues to improve. Plan to continue to progress patient as tolerated.  -AS     Please refer to paper survey for additional self-reported information Yes  -AS     Rehab Potential Good  -AS     Patient/caregiver participated in  establishment of treatment plan and goals Yes  -AS     Patient would benefit from skilled therapy intervention Yes  -AS        PT Plan    PT Frequency 2x/week  -AS     Predicted Duration of Therapy Intervention (PT) 6-8 weeks  -AS     Planned CPT's? PT RE-EVAL: 61797;PT THER PROC EA 15 MIN: 32361;PT THER ACT EA 15 MIN: 66514;PT MANUAL THERAPY EA 15 MIN: 78641;PT NEUROMUSC RE-EDUCATION EA 15 MIN: 82792  -AS     PT Plan Comments Continue with current treatment plan.  -AS               User Key  (r) = Recorded By, (t) = Taken By, (c) = Cosigned By      Initials Name Provider Type    AS Royer Pedroza, PT Physical Therapist                     Modalities       Row Name 02/21/24 1300             Moist Heat    MH Applied Yes  -AS      Location Right Shoulder - Supine with roll under knees  -AS      PT Moist Heat Minutes 10  -AS      MH Prior to Rx Yes  -AS         Functional Testing    Outcome Measure Options Quick DASH  -AS                User Key  (r) = Recorded By, (t) = Taken By, (c) = Cosigned By      Initials Name Provider Type    AS Royer Pedroza, PT Physical Therapist                   OP Exercises       Row Name 02/21/24 1346 02/21/24 1300          Subjective    Subjective Comments -- Patient states he seen his Ortho this morning and he was pleased with his progress at this time. He states he is scheduled to follow up with his Ortho again in 3 months.  -AS        Subjective Pain    Able to rate subjective pain? -- yes  -AS     Pre-Treatment Pain Level -- 1  -AS     Post-Treatment Pain Level -- 1  -AS        Total Minutes    03238 - PT Therapeutic Exercise Minutes 30  -AS --     19575 - PT Manual Therapy Minutes 10  -AS --        Exercise 1    Exercise Name 1 -- 3-Way Cane  -AS        Exercise 2    Exercise Name 2 -- Pulley's - Flex & ABD  -AS     Time 2 -- 5 min each  -AS        Exercise 3    Exercise Name 3 -- Supine Clasp Hands for Flexion  -AS     Reps 3 -- 25  -AS        Exercise 4    Exercise  Name 4 -- S/L ER  -AS     Reps 4 -- 25  -AS     Time 4 -- 2#  -AS        Exercise 5    Exercise Name 5 -- Empty/Full Can  -AS     Reps 5 -- 25 each  -AS     Time 5 -- 2#  -AS        Exercise 6    Exercise Name 6 -- Rows  -AS     Reps 6 -- 25  -AS     Time 6 -- Silver  -AS        Exercise 7    Exercise Name 7 -- Extensions  -AS     Reps 7 -- 25  -AS     Time 7 -- Silver  -AS        Exercise 8    Exercise Name 8 -- IR  -AS     Reps 8 -- 25  -AS     Time 8 -- Silver  -AS        Exercise 9    Exercise Name 9 -- ER  -AS     Reps 9 -- 25  -AS     Time 9 -- Green  -AS               User Key  (r) = Recorded By, (t) = Taken By, (c) = Cosigned By      Initials Name Provider Type    AS Royer Pedroza, PT Physical Therapist                  Manual Rx (last 36 hours)       Manual Treatments       Row Name 02/21/24 1346             Total Minutes    31258 - PT Manual Therapy Minutes 10  -AS                User Key  (r) = Recorded By, (t) = Taken By, (c) = Cosigned By      Initials Name Provider Type    AS Royer Pedroza, PT Physical Therapist                                Outcome Measure Options: Quick DASH         Time Calculation:     Start Time: 1257  Stop Time: 1348  Time Calculation (min): 51 min  Timed Charges  11398 - PT Therapeutic Exercise Minutes: 30  20790 - PT Manual Therapy Minutes: 10  Untimed Charges  PT Moist Heat Minutes: 10  Total Minutes  Timed Charges Total Minutes: 40  Untimed Charges Total Minutes: 10   Total Minutes: 50     Therapy Charges for Today       Code Description Service Date Service Provider Modifiers Qty    85468511123 HC PT THER PROC EA 15 MIN 2/21/2024 Royer Pedroza, PT GP 1    01477677463 HC PT MANUAL THERAPY EA 15 MIN 2/21/2024 Royer Pedroza, PT GP 1            PT G-Codes  Outcome Measure Options: Pema Pedroza, PT  2/21/2024

## 2024-02-21 NOTE — PROGRESS NOTES
CC: Follow-up status post right reverse shoulder arthroplasty-12/1/2023     Interval history: Patient returns clinic today stating he is is doing well overall, and notes improvement in his symptoms. He still does note some stiffness, as well as pain. He is still taking Percocet. The patient denies any numbness or tingling. He denies any fever, chills, or sweats. He denies any issues with the incision. The patient states he is still working with physical therapy at this time.    Physical Exam    General: No acute distress.  Resp: normal respiratory effort  Skin: no rashes or wounds; normal turgor  Psych: mood and affect appropriate; recent and remote memory intact     Exam:  Right shoulder-     Active FF- is to 100 degrees.  Passive FF- is to 120 degrees.  Strength- 4/5.  Active ER- is to 25 degrees.  Passive ER- is to 35 degrees.  Strength- 4/5.  Resisted abduction- to 70 degrees.  Strength- 4/5.  Positive deltoid firing in all three components.  Brisk cap refill to all digits.  1+ radial pulse, right wrist.  Positive sensation to light touch palmar, dorsal aspects of small and index fingers and anatomic snuffbox right hand as well as proximal lateral arm, symmetric to left.               Impression: Status post right reverse shoulder arthroplasty     Plan:    1. I discussed treatment options at length with patient at today's visit.  2. The patient is doing well at this point in time. He states he is getting more functional. He still has a fair amount of stiffness to his shoulder. He did not notice much improvement with his Medrol Dosepak. We did talk about option for prednisone taper, but given his diabetes and resistance to medications in general, he wants to hold off on that at this time.  3. I told him I can give no more pain medication at this point since he has reached his 90-day global period.  4. He is to continue to work with all physical therapy as well as home exercise program to maximize functional  improvement.  5. He will follow up in 3 months with repeat x-rays or sooner if needed.  6. The patient is in agreement and had all questions answered.    Transcribed from ambient dictation for David Ozuna MD by Phyllis Rausch.  02/21/24   11:39 EST    Patient or patient representative verbalized consent to the visit recording.  I have personally performed the services described in this document as transcribed by the above individual, and it is both accurate and complete.

## 2024-02-23 ENCOUNTER — HOSPITAL ENCOUNTER (OUTPATIENT)
Dept: PHYSICAL THERAPY | Facility: HOSPITAL | Age: 64
Setting detail: THERAPIES SERIES
Discharge: HOME | End: 2024-02-23
Payer: MEDICARE

## 2024-02-23 DIAGNOSIS — Z96.611 STATUS POST REVERSE TOTAL SHOULDER REPLACEMENT, RIGHT: Primary | ICD-10-CM

## 2024-02-23 PROCEDURE — 97110 THERAPEUTIC EXERCISES: CPT

## 2024-02-23 NOTE — THERAPY TREATMENT NOTE
Outpatient Physical Therapy Ortho Treatment Note   Reina Boggs     Patient Name: Sylwia Spears  : 1960  MRN: [id number]  Today's Date: 2024      Visit Date: 2024    Visit Dx:    ICD-10-CM ICD-9-CM   1. Status post reverse total shoulder replacement, open biceps tenodesis, right, DOS 2023  Z96.611 V43.61       Patient Active Problem List   Diagnosis    History of colon polyps    Type 2 diabetes mellitus without complication, without long-term current use of insulin    Benign essential HTN    Morbidly obese    Cervical spinal stenosis    Screening for prostate cancer    Personal history of colonic polyps    Family history of colon cancer    Hyperlipidemia LDL goal <70    Midline thoracic back pain    Erectile dysfunction due to diseases classified elsewhere    Personal history of nicotine dependence    Coronary artery calcification    Varicose veins of right lower extremity with other complications    DJD of shoulder    Rotator cuff arthropathy of right shoulder    Complete tear of right rotator cuff    Chronic right shoulder pain    Thrombocytopenia    Abnormal international normal ratio (INR)    Status post reverse total shoulder replacement, open biceps tenodesis, right, DOS 2023        Past Medical History:   Diagnosis Date    Cervical disc disease     stenosis    Cirrhosis of liver     Colon polyp     Coronary artery calcification     Diabetes mellitus     Electrocution     Fell off a ladder and has chronic neck pain    Hyperlipidemia     Hypertension     Spleen enlarged     Thrombocytopenia         Past Surgical History:   Procedure Laterality Date    CHOLECYSTECTOMY      COLONOSCOPY      COLONOSCOPY N/A 2018    Procedure: COLONOSCOPY, polypectomy;  Surgeon: Raleigh Champagne MD;  Location: [facility];  Service: Gastroenterology    COLONOSCOPY W/ POLYPECTOMY      COLONOSCOPY W/ POLYPECTOMY N/A 2021    Procedure: COLONOSCOPY; polypectomy;  Surgeon:  Raleigh Champagne MD;  Location:  [facility];  Service: Gastroenterology;  Laterality: N/A;  polyps   diverticulosis    LIPOMA EXCISION      groin    SHOULDER SURGERY Left     TOTAL SHOULDER ARTHROPLASTY W/ DISTAL CLAVICLE EXCISION Right 12/1/2023    Procedure: TOTAL SHOULDER REVERSE ARTHROPLASTY;  Surgeon: David Ozuna MD;  Location:  [facility];  Service: Orthopedics;  Laterality: Right;                        PT Assessment/Plan       Row Name 02/23/24 1300          PT Assessment    Assessment Comments Patient continues to slowly improve with PT at this time.  -AS        PT Plan    PT Plan Comments Continue with current treatment plan.  -AS               User Key  (r) = Recorded By, (t) = Taken By, (c) = Cosigned By      Initials Name Provider Type    AS Royer Pedroza, PT Physical Therapist                     Modalities       Row Name 02/23/24 1300             Moist Heat    MH Applied Yes  -AS      Location Right Shoulder - Supine with roll under knees  -AS      PT Moist Heat Minutes 10  -AS      MH Prior to Rx Yes  -AS         Functional Testing    Outcome Measure Options Quick DASH  -AS                User Key  (r) = Recorded By, (t) = Taken By, (c) = Cosigned By      Initials Name Provider Type    AS Royer Pedroza, PT Physical Therapist                   OP Exercises       Row Name 02/23/24 1346 02/23/24 1300          Subjective    Subjective Comments -- Patient states he is doing ok today.  -AS        Total Minutes    56701 - PT Therapeutic Exercise Minutes 25  -AS --     15969 - PT Manual Therapy Minutes 10  -AS --        Exercise 1    Exercise Name 1 -- 3-Way Cane  -AS        Exercise 2    Exercise Name 2 -- Pulley's - Flex & ABD  -AS     Time 2 -- 5 min each  -AS        Exercise 3    Exercise Name 3 -- Supine Clasp Hands for Flexion  -AS     Reps 3 -- 25  -AS        Exercise 4    Exercise Name 4 -- S/L ER  -AS     Reps 4 -- 25  -AS     Time 4 -- 2#  -AS        Exercise 5     Exercise Name 5 -- Empty/Full Can  -AS     Reps 5 -- 25 each  -AS     Time 5 -- 2#  -AS        Exercise 6    Exercise Name 6 -- Rows  -AS     Reps 6 -- 25  -AS     Time 6 -- Silver  -AS        Exercise 7    Exercise Name 7 -- Extensions  -AS     Reps 7 -- 25  -AS     Time 7 -- Silver  -AS        Exercise 8    Exercise Name 8 -- IR  -AS     Reps 8 -- 25  -AS     Time 8 -- Silver  -AS        Exercise 9    Exercise Name 9 -- ER  -AS     Reps 9 -- 25  -AS     Time 9 -- Green  -AS               User Key  (r) = Recorded By, (t) = Taken By, (c) = Cosigned By      Initials Name Provider Type    AS Royer Pedroza, PT Physical Therapist                             Manual Rx (last 36 hours)       Manual Treatments       Row Name 02/23/24 1346 02/23/24 1300          Total Minutes    90114 - PT Manual Therapy Minutes 10  -AS --        Manual Rx 1    Manual Rx 1 Location -- Right Shoulder  -AS     Manual Rx 1 Type -- PROM - Flex, ABD, IR, ER  -AS     Manual Rx 1 Duration -- 10 min  -AS               User Key  (r) = Recorded By, (t) = Taken By, (c) = Cosigned By      Initials Name Provider Type    AS Royer Pedroza, PT Physical Therapist                             Outcome Measure Options: Quick DASH         Time Calculation:   Start Time: 1300  Stop Time: 1346  Time Calculation (min): 46 min  Timed Charges  97471 - PT Therapeutic Exercise Minutes: 25  93870 - PT Manual Therapy Minutes: 10  Untimed Charges  PT Moist Heat Minutes: 10  Total Minutes  Timed Charges Total Minutes: 35  Untimed Charges Total Minutes: 10   Total Minutes: 45  Therapy Charges for Today       Code Description Service Date Service Provider Modifiers Qty    53245368399  PT THER PROC EA 15 MIN 2/23/2024 Royer Pedroza, PT GP 2            PT G-Codes  Outcome Measure Options: Pema Pedroza, PT  2/23/2024

## 2024-02-24 DIAGNOSIS — I25.10 CORONARY ARTERY CALCIFICATION: ICD-10-CM

## 2024-02-24 DIAGNOSIS — I25.84 CORONARY ARTERY CALCIFICATION: ICD-10-CM

## 2024-02-26 RX ORDER — ATORVASTATIN CALCIUM 10 MG/1
10 TABLET, FILM COATED ORAL DAILY
Qty: 90 TABLET | Refills: 1 | Status: SHIPPED | OUTPATIENT
Start: 2024-02-26

## 2024-02-28 ENCOUNTER — APPOINTMENT (OUTPATIENT)
Dept: PHYSICAL THERAPY | Facility: HOSPITAL | Age: 64
End: 2024-02-28
Payer: MEDICARE

## 2024-03-01 ENCOUNTER — HOSPITAL ENCOUNTER (OUTPATIENT)
Dept: PHYSICAL THERAPY | Facility: HOSPITAL | Age: 64
Setting detail: THERAPIES SERIES
Discharge: HOME | End: 2024-03-01
Payer: MEDICARE

## 2024-03-01 DIAGNOSIS — Z96.611 STATUS POST REVERSE TOTAL SHOULDER REPLACEMENT, RIGHT: Primary | ICD-10-CM

## 2024-03-01 PROCEDURE — 97110 THERAPEUTIC EXERCISES: CPT

## 2024-03-01 NOTE — THERAPY TREATMENT NOTE
Outpatient Physical Therapy Ortho Treatment Note   Reina Boggs     Patient Name: Sylwia Spears  : 1960  MRN: [id number]  Today's Date: 3/1/2024      Visit Date: 2024    Visit Dx:    ICD-10-CM ICD-9-CM   1. Status post reverse total shoulder replacement, open biceps tenodesis, right, DOS 2023  Z96.611 V43.61       Patient Active Problem List   Diagnosis    History of colon polyps    Type 2 diabetes mellitus without complication, without long-term current use of insulin    Benign essential HTN    Morbidly obese    Cervical spinal stenosis    Screening for prostate cancer    Personal history of colonic polyps    Family history of colon cancer    Hyperlipidemia LDL goal <70    Midline thoracic back pain    Erectile dysfunction due to diseases classified elsewhere    Personal history of nicotine dependence    Coronary artery calcification    Varicose veins of right lower extremity with other complications    DJD of shoulder    Rotator cuff arthropathy of right shoulder    Complete tear of right rotator cuff    Chronic right shoulder pain    Thrombocytopenia    Abnormal international normal ratio (INR)    Status post reverse total shoulder replacement, open biceps tenodesis, right, DOS 2023        Past Medical History:   Diagnosis Date    Cervical disc disease     stenosis    Cirrhosis of liver     Colon polyp     Coronary artery calcification     Diabetes mellitus     Electrocution     Fell off a ladder and has chronic neck pain    Hyperlipidemia     Hypertension     Spleen enlarged     Thrombocytopenia         Past Surgical History:   Procedure Laterality Date    CHOLECYSTECTOMY      COLONOSCOPY      COLONOSCOPY N/A 2018    Procedure: COLONOSCOPY, polypectomy;  Surgeon: Raleigh Champagne MD;  Location: [facility];  Service: Gastroenterology    COLONOSCOPY W/ POLYPECTOMY      COLONOSCOPY W/ POLYPECTOMY N/A 2021    Procedure: COLONOSCOPY; polypectomy;  Surgeon:  "Raleigh Champagne MD;  Location:  [facility];  Service: Gastroenterology;  Laterality: N/A;  polyps   diverticulosis    LIPOMA EXCISION      groin    SHOULDER SURGERY Left     TOTAL SHOULDER ARTHROPLASTY W/ DISTAL CLAVICLE EXCISION Right 12/1/2023    Procedure: TOTAL SHOULDER REVERSE ARTHROPLASTY;  Surgeon: David Ozuna MD;  Location:  [facility];  Service: Orthopedics;  Laterality: Right;                        PT Assessment/Plan       Row Name 03/01/24 1300          PT Assessment    Assessment Comments Patient continues to slowly improve with PT at this time.  -AS        PT Plan    PT Plan Comments Continue with current treatment plan.  -AS               User Key  (r) = Recorded By, (t) = Taken By, (c) = Cosigned By      Initials Name Provider Type    AS Royer Pedroza, PT Physical Therapist                     Modalities       Row Name 03/01/24 1300             Moist Heat    MH Applied Yes  -AS      Location Right Shoulder - Supine with roll under knees  -AS      PT Moist Heat Minutes 10  -AS      MH Prior to Rx Yes  -AS         Functional Testing    Outcome Measure Options Quick DASH  -AS                User Key  (r) = Recorded By, (t) = Taken By, (c) = Cosigned By      Initials Name Provider Type    AS Royer Pedroza, PT Physical Therapist                   OP Exercises       Row Name 03/01/24 1350 03/01/24 1300          Subjective    Subjective Comments -- Patient states his shoulder feels \"tight.\"  -AS        Total Minutes    58092 - PT Therapeutic Exercise Minutes 25  -AS --     89400 - PT Manual Therapy Minutes 10  -AS --        Exercise 1    Exercise Name 1 -- 3-Way Cane  -AS        Exercise 2    Exercise Name 2 -- Pulley's - Flex & ABD  -AS     Time 2 -- 5 min each  -AS        Exercise 3    Exercise Name 3 -- Supine Clasp Hands for Flexion  -AS     Reps 3 -- 25  -AS        Exercise 4    Exercise Name 4 -- S/L ER  -AS     Reps 4 -- 25  -AS     Time 4 -- 2#  -AS        Exercise 5 "    Exercise Name 5 -- Empty/Full Can  -AS     Reps 5 -- 25 each  -AS     Time 5 -- 2#  -AS        Exercise 6    Exercise Name 6 -- Rows  -AS     Reps 6 -- 25  -AS     Time 6 -- Silver  -AS        Exercise 7    Exercise Name 7 -- Extensions  -AS     Reps 7 -- 25  -AS     Time 7 -- Silver  -AS        Exercise 8    Exercise Name 8 -- IR  -AS     Reps 8 -- 25  -AS     Time 8 -- Silver  -AS        Exercise 9    Exercise Name 9 -- ER  -AS     Reps 9 -- 25  -AS     Time 9 -- Green  -AS               User Key  (r) = Recorded By, (t) = Taken By, (c) = Cosigned By      Initials Name Provider Type    AS Royer Pedroza, PT Physical Therapist                             Manual Rx (last 36 hours)       Manual Treatments       Row Name 03/01/24 1350 03/01/24 1300          Total Minutes    69857 - PT Manual Therapy Minutes 10  -AS --        Manual Rx 1    Manual Rx 1 Location -- Right Shoulder  -AS     Manual Rx 1 Type -- PROM - Flex, ABD, IR, ER  -AS     Manual Rx 1 Duration -- 10 min  -AS               User Key  (r) = Recorded By, (t) = Taken By, (c) = Cosigned By      Initials Name Provider Type    AS Royer Pedroza, PT Physical Therapist                             Outcome Measure Options: Quick DASH         Time Calculation:   Start Time: 1258  Stop Time: 1346  Time Calculation (min): 48 min  Timed Charges  78111 - PT Therapeutic Exercise Minutes: 25  05100 - PT Manual Therapy Minutes: 10  Untimed Charges  PT Moist Heat Minutes: 10  Total Minutes  Timed Charges Total Minutes: 35  Untimed Charges Total Minutes: 10   Total Minutes: 45  Therapy Charges for Today       Code Description Service Date Service Provider Modifiers Qty    85094645506  PT THER PROC EA 15 MIN 3/1/2024 Royer Pedroza, PT GP 2            PT G-Codes  Outcome Measure Options: Pema Pedroza, PT  3/1/2024

## 2024-03-04 ENCOUNTER — HOSPITAL ENCOUNTER (OUTPATIENT)
Dept: PHYSICAL THERAPY | Facility: HOSPITAL | Age: 64
Setting detail: THERAPIES SERIES
Discharge: HOME | End: 2024-03-04
Payer: MEDICARE

## 2024-03-04 DIAGNOSIS — Z96.611 STATUS POST REVERSE TOTAL SHOULDER REPLACEMENT, RIGHT: Primary | ICD-10-CM

## 2024-03-04 PROCEDURE — 97110 THERAPEUTIC EXERCISES: CPT

## 2024-03-04 NOTE — THERAPY TREATMENT NOTE
Outpatient Physical Therapy Ortho Treatment Note   Reina Boggs     Patient Name: Sylwia Spears  : 1960  MRN: [id number]  Today's Date: 3/4/2024      Visit Date: 2024    Visit Dx:    ICD-10-CM ICD-9-CM   1. Status post reverse total shoulder replacement, open biceps tenodesis, right, DOS 2023  Z96.611 V43.61       Patient Active Problem List   Diagnosis    History of colon polyps    Type 2 diabetes mellitus without complication, without long-term current use of insulin    Benign essential HTN    Morbidly obese    Cervical spinal stenosis    Screening for prostate cancer    Personal history of colonic polyps    Family history of colon cancer    Hyperlipidemia LDL goal <70    Midline thoracic back pain    Erectile dysfunction due to diseases classified elsewhere    Personal history of nicotine dependence    Coronary artery calcification    Varicose veins of right lower extremity with other complications    DJD of shoulder    Rotator cuff arthropathy of right shoulder    Complete tear of right rotator cuff    Chronic right shoulder pain    Thrombocytopenia    Abnormal international normal ratio (INR)    Status post reverse total shoulder replacement, open biceps tenodesis, right, DOS 2023        Past Medical History:   Diagnosis Date    Cervical disc disease     stenosis    Cirrhosis of liver     Colon polyp     Coronary artery calcification     Diabetes mellitus     Electrocution     Fell off a ladder and has chronic neck pain    Hyperlipidemia     Hypertension     Spleen enlarged     Thrombocytopenia         Past Surgical History:   Procedure Laterality Date    CHOLECYSTECTOMY      COLONOSCOPY      COLONOSCOPY N/A 2018    Procedure: COLONOSCOPY, polypectomy;  Surgeon: Raleigh Champagne MD;  Location: [facility];  Service: Gastroenterology    COLONOSCOPY W/ POLYPECTOMY      COLONOSCOPY W/ POLYPECTOMY N/A 2021    Procedure: COLONOSCOPY; polypectomy;  Surgeon:  "Raleigh Champagne MD;  Location:  [facility];  Service: Gastroenterology;  Laterality: N/A;  polyps   diverticulosis    LIPOMA EXCISION      groin    SHOULDER SURGERY Left     TOTAL SHOULDER ARTHROPLASTY W/ DISTAL CLAVICLE EXCISION Right 12/1/2023    Procedure: TOTAL SHOULDER REVERSE ARTHROPLASTY;  Surgeon: David Ozuna MD;  Location:  [facility];  Service: Orthopedics;  Laterality: Right;                        PT Assessment/Plan       Row Name 03/04/24 1300          PT Assessment    Assessment Comments Patient tolerates progressions of ther ex well. Patient continues to slowly improve with PT at this time.  -AS        PT Plan    PT Plan Comments Continue with current treatment plan.  -AS               User Key  (r) = Recorded By, (t) = Taken By, (c) = Cosigned By      Initials Name Provider Type    AS Royer Pedroza, PT Physical Therapist                     Modalities       Row Name 03/04/24 1300             Moist Heat    MH Applied Yes  -AS      Location Right Shoulder - Supine with roll under knees  -AS      PT Moist Heat Minutes 10  -AS      MH Prior to Rx Yes  -AS         Functional Testing    Outcome Measure Options Quick DASH  -AS                User Key  (r) = Recorded By, (t) = Taken By, (c) = Cosigned By      Initials Name Provider Type    AS Royer Pedroza, PT Physical Therapist                   OP Exercises       Row Name 03/04/24 1351 03/04/24 1300          Subjective    Subjective Comments -- Patient states his shoulder is \"doing ok.\" He states he is \"using it more doing things around the house.\"  -AS        Total Minutes    47979 - PT Therapeutic Exercise Minutes 25  -AS --     37681 - PT Manual Therapy Minutes 10  -AS --        Exercise 1    Exercise Name 1 -- 3-Way Cane  -AS        Exercise 2    Exercise Name 2 -- Pulley's - Flex & ABD  -AS     Time 2 -- 5 min each  -AS        Exercise 3    Exercise Name 3 -- Supine Clasp Hands for Flexion  -AS     Reps 3 -- 25  -AS     "    Exercise 4    Exercise Name 4 -- S/L ER  -AS     Reps 4 -- 30  -AS     Time 4 -- 2#  -AS        Exercise 5    Exercise Name 5 -- Empty/Full Can  -AS     Reps 5 -- 30 each  -AS     Time 5 -- 2#  -AS        Exercise 6    Exercise Name 6 -- Rows  -AS     Reps 6 -- 30  -AS     Time 6 -- Silver  -AS        Exercise 7    Exercise Name 7 -- Extensions  -AS     Reps 7 -- 30  -AS     Time 7 -- Silver  -AS        Exercise 8    Exercise Name 8 -- IR  -AS     Reps 8 -- 30  -AS     Time 8 -- Silver  -AS        Exercise 9    Exercise Name 9 -- ER  -AS     Reps 9 -- 30  -AS     Time 9 -- Green  -AS               User Key  (r) = Recorded By, (t) = Taken By, (c) = Cosigned By      Initials Name Provider Type    AS Royer Pedroza, PT Physical Therapist                             Manual Rx (Last 36 Hours)       Manual Treatments       Row Name 03/04/24 1351 03/04/24 1300          Total Minutes    48443 - PT Manual Therapy Minutes 10  -AS --        Manual Rx 1    Manual Rx 1 Location -- Right Shoulder  -AS     Manual Rx 1 Type -- PROM - Flex, ABD, IR, ER  -AS     Manual Rx 1 Duration -- 10 min  -AS               User Key  (r) = Recorded By, (t) = Taken By, (c) = Cosigned By      Initials Name Provider Type    AS Royer Pedroza, PT Physical Therapist                             Outcome Measure Options: Quick DASH         Time Calculation:   Start Time: 1300  Stop Time: 1351  Time Calculation (min): 51 min  Timed Charges  66621 - PT Therapeutic Exercise Minutes: 25  92547 - PT Manual Therapy Minutes: 10  Untimed Charges  PT Moist Heat Minutes: 10  Total Minutes  Timed Charges Total Minutes: 35  Untimed Charges Total Minutes: 10   Total Minutes: 45  Therapy Charges for Today       Code Description Service Date Service Provider Modifiers Qty    73594551263 HC PT THER PROC EA 15 MIN 3/4/2024 Royer Pedroza, PT GP 2            PT G-Codes  Outcome Measure Options: Pema Pedroza,  PT  3/4/2024

## 2024-03-06 ENCOUNTER — HOSPITAL ENCOUNTER (OUTPATIENT)
Dept: PHYSICAL THERAPY | Facility: HOSPITAL | Age: 64
Setting detail: THERAPIES SERIES
Discharge: HOME | End: 2024-03-06
Payer: MEDICARE

## 2024-03-06 DIAGNOSIS — Z96.611 STATUS POST REVERSE TOTAL SHOULDER REPLACEMENT, RIGHT: Primary | ICD-10-CM

## 2024-03-06 PROCEDURE — 97110 THERAPEUTIC EXERCISES: CPT

## 2024-03-06 NOTE — THERAPY TREATMENT NOTE
Outpatient Physical Therapy Ortho Treatment Note   Reina Boggs     Patient Name: Sylwia Spears  : 1960  MRN: [id number]  Today's Date: 3/6/2024      Visit Date: 2024    Visit Dx:    ICD-10-CM ICD-9-CM   1. Status post reverse total shoulder replacement, open biceps tenodesis, right, DOS 2023  Z96.611 V43.61       Patient Active Problem List   Diagnosis    History of colon polyps    Type 2 diabetes mellitus without complication, without long-term current use of insulin    Benign essential HTN    Morbidly obese    Cervical spinal stenosis    Screening for prostate cancer    Personal history of colonic polyps    Family history of colon cancer    Hyperlipidemia LDL goal <70    Midline thoracic back pain    Erectile dysfunction due to diseases classified elsewhere    Personal history of nicotine dependence    Coronary artery calcification    Varicose veins of right lower extremity with other complications    DJD of shoulder    Rotator cuff arthropathy of right shoulder    Complete tear of right rotator cuff    Chronic right shoulder pain    Thrombocytopenia    Abnormal international normal ratio (INR)    Status post reverse total shoulder replacement, open biceps tenodesis, right, DOS 2023        Past Medical History:   Diagnosis Date    Cervical disc disease     stenosis    Cirrhosis of liver     Colon polyp     Coronary artery calcification     Diabetes mellitus     Electrocution     Fell off a ladder and has chronic neck pain    Hyperlipidemia     Hypertension     Spleen enlarged     Thrombocytopenia         Past Surgical History:   Procedure Laterality Date    CHOLECYSTECTOMY      COLONOSCOPY      COLONOSCOPY N/A 2018    Procedure: COLONOSCOPY, polypectomy;  Surgeon: Raleigh Champagne MD;  Location: [facility];  Service: Gastroenterology    COLONOSCOPY W/ POLYPECTOMY      COLONOSCOPY W/ POLYPECTOMY N/A 2021    Procedure: COLONOSCOPY; polypectomy;  Surgeon:  Raleigh Champagne MD;  Location:  [facility];  Service: Gastroenterology;  Laterality: N/A;  polyps   diverticulosis    LIPOMA EXCISION      groin    SHOULDER SURGERY Left     TOTAL SHOULDER ARTHROPLASTY W/ DISTAL CLAVICLE EXCISION Right 12/1/2023    Procedure: TOTAL SHOULDER REVERSE ARTHROPLASTY;  Surgeon: David Ozuna MD;  Location:  [facility];  Service: Orthopedics;  Laterality: Right;                        PT Assessment/Plan       Row Name 03/06/24 1300          PT Assessment    Assessment Comments Patient continues to do well with PT at this time. Plan to continue to progress patient as tolerated.  -AS        PT Plan    PT Plan Comments Continue with current treatment plan.  -AS               User Key  (r) = Recorded By, (t) = Taken By, (c) = Cosigned By      Initials Name Provider Type    AS Royer Pedroza, PT Physical Therapist                     Modalities       Row Name 03/06/24 1300             Moist Heat    MH Applied Yes  -AS      Location Right Shoulder - Supine with roll under knees  -AS      PT Moist Heat Minutes 10  -AS      MH Prior to Rx Yes  -AS         Functional Testing    Outcome Measure Options Quick DASH  -AS                User Key  (r) = Recorded By, (t) = Taken By, (c) = Cosigned By      Initials Name Provider Type    AS Royer Pedroza, PT Physical Therapist                   OP Exercises       Row Name 03/06/24 1346 03/06/24 1300          Subjective    Subjective Comments -- Patient states he is doing ok.  -AS        Total Minutes    01649 - PT Therapeutic Exercise Minutes 25  -AS --     24997 - PT Manual Therapy Minutes 10  -AS --        Exercise 1    Exercise Name 1 -- 3-Way Cane  -AS        Exercise 2    Exercise Name 2 -- Pulley's - Flex & ABD  -AS     Time 2 -- 5 min each  -AS        Exercise 3    Exercise Name 3 -- Supine Clasp Hands for Flexion  -AS     Reps 3 -- 25  -AS        Exercise 4    Exercise Name 4 -- S/L ER  -AS     Reps 4 -- 30  -AS     Time  4 -- 2#  -AS        Exercise 5    Exercise Name 5 -- Empty/Full Can  -AS     Reps 5 -- 30 each  -AS     Time 5 -- 2#  -AS        Exercise 6    Exercise Name 6 -- Rows  -AS     Reps 6 -- 30  -AS     Time 6 -- Silver  -AS        Exercise 7    Exercise Name 7 -- Extensions  -AS     Reps 7 -- 30  -AS     Time 7 -- Silver  -AS        Exercise 8    Exercise Name 8 -- IR  -AS     Reps 8 -- 30  -AS     Time 8 -- Silver  -AS        Exercise 9    Exercise Name 9 -- ER  -AS     Reps 9 -- 30  -AS     Time 9 -- Green  -AS               User Key  (r) = Recorded By, (t) = Taken By, (c) = Cosigned By      Initials Name Provider Type    AS Royer Pedroza, PT Physical Therapist                             Manual Rx (Last 36 Hours)       Manual Treatments       Row Name 03/06/24 1346 03/06/24 1300          Total Minutes    15416 - PT Manual Therapy Minutes 10  -AS --        Manual Rx 1    Manual Rx 1 Location -- Right Shoulder  -AS     Manual Rx 1 Type -- PROM - Flex, ABD, IR, ER  -AS     Manual Rx 1 Duration -- 10 min  -AS               User Key  (r) = Recorded By, (t) = Taken By, (c) = Cosigned By      Initials Name Provider Type    AS Royer Pedroza, PT Physical Therapist                             Outcome Measure Options: Quick DASH         Time Calculation:   Start Time: 1300  Stop Time: 1346  Time Calculation (min): 46 min  Timed Charges  59443 - PT Therapeutic Exercise Minutes: 25  82888 - PT Manual Therapy Minutes: 10  Untimed Charges  PT Moist Heat Minutes: 10  Total Minutes  Timed Charges Total Minutes: 35  Untimed Charges Total Minutes: 10   Total Minutes: 45  Therapy Charges for Today       Code Description Service Date Service Provider Modifiers Qty    09295133685 HC PT THER PROC EA 15 MIN 3/6/2024 Royer Pedroza, PT GP 2            PT G-Codes  Outcome Measure Options: Pema Pedroza, LILIAN  3/6/2024

## 2024-03-08 ENCOUNTER — APPOINTMENT (OUTPATIENT)
Dept: PHYSICAL THERAPY | Facility: HOSPITAL | Age: 64
End: 2024-03-08
Payer: MEDICARE

## 2024-03-11 ENCOUNTER — APPOINTMENT (OUTPATIENT)
Dept: PHYSICAL THERAPY | Facility: HOSPITAL | Age: 64
End: 2024-03-11
Payer: MEDICARE

## 2024-03-13 ENCOUNTER — HOSPITAL ENCOUNTER (OUTPATIENT)
Dept: PHYSICAL THERAPY | Facility: HOSPITAL | Age: 64
Setting detail: THERAPIES SERIES
Discharge: HOME | End: 2024-03-13
Payer: MEDICARE

## 2024-03-13 DIAGNOSIS — Z96.611 STATUS POST REVERSE TOTAL SHOULDER REPLACEMENT, RIGHT: Primary | ICD-10-CM

## 2024-03-13 PROCEDURE — 97110 THERAPEUTIC EXERCISES: CPT

## 2024-03-13 NOTE — THERAPY TREATMENT NOTE
Outpatient Physical Therapy Ortho Treatment Note   Reina Boggs     Patient Name: Sylwia Spears  : 1960  MRN: [id number]  Today's Date: 3/13/2024      Visit Date: 2024    Visit Dx:    ICD-10-CM ICD-9-CM   1. Status post reverse total shoulder replacement, open biceps tenodesis, right, DOS 2023  Z96.611 V43.61       Patient Active Problem List   Diagnosis    History of colon polyps    Type 2 diabetes mellitus without complication, without long-term current use of insulin    Benign essential HTN    Morbidly obese    Cervical spinal stenosis    Screening for prostate cancer    Personal history of colonic polyps    Family history of colon cancer    Hyperlipidemia LDL goal <70    Midline thoracic back pain    Erectile dysfunction due to diseases classified elsewhere    Personal history of nicotine dependence    Coronary artery calcification    Varicose veins of right lower extremity with other complications    DJD of shoulder    Rotator cuff arthropathy of right shoulder    Complete tear of right rotator cuff    Chronic right shoulder pain    Thrombocytopenia    Abnormal international normal ratio (INR)    Status post reverse total shoulder replacement, open biceps tenodesis, right, DOS 2023        Past Medical History:   Diagnosis Date    Cervical disc disease     stenosis    Cirrhosis of liver     Colon polyp     Coronary artery calcification     Diabetes mellitus     Electrocution     Fell off a ladder and has chronic neck pain    Hyperlipidemia     Hypertension     Spleen enlarged     Thrombocytopenia         Past Surgical History:   Procedure Laterality Date    CHOLECYSTECTOMY      COLONOSCOPY      COLONOSCOPY N/A 2018    Procedure: COLONOSCOPY, polypectomy;  Surgeon: Raleigh Champagne MD;  Location: [facility];  Service: Gastroenterology    COLONOSCOPY W/ POLYPECTOMY      COLONOSCOPY W/ POLYPECTOMY N/A 2021    Procedure: COLONOSCOPY; polypectomy;  Surgeon:  Raleigh Champagne MD;  Location:  [facility];  Service: Gastroenterology;  Laterality: N/A;  polyps   diverticulosis    LIPOMA EXCISION      groin    SHOULDER SURGERY Left     TOTAL SHOULDER ARTHROPLASTY W/ DISTAL CLAVICLE EXCISION Right 12/1/2023    Procedure: TOTAL SHOULDER REVERSE ARTHROPLASTY;  Surgeon: David Ozuna MD;  Location:  [facility];  Service: Orthopedics;  Laterality: Right;                        PT Assessment/Plan       Row Name 03/13/24 1300          PT Assessment    Assessment Comments Patient continues to do well with PT at this time. Plan to continue to progress patient as tolerated.  -AS        PT Plan    PT Plan Comments Continue with current treatment plan.  -AS               User Key  (r) = Recorded By, (t) = Taken By, (c) = Cosigned By      Initials Name Provider Type    AS Royer Pedroza, PT Physical Therapist                     Modalities       Row Name 03/13/24 1300             Moist Heat    MH Applied Yes  -AS      Location Right Shoulder - Supine with roll under knees  -AS      PT Moist Heat Minutes 10  -AS      MH Prior to Rx Yes  -AS         Functional Testing    Outcome Measure Options Quick DASH  -AS                User Key  (r) = Recorded By, (t) = Taken By, (c) = Cosigned By      Initials Name Provider Type    AS Royer Pedroza, PT Physical Therapist                   OP Exercises       Row Name 03/13/24 1343 03/13/24 1300          Subjective    Subjective Comments -- Patient states he is doing ok.  -AS        Total Minutes    29713 - PT Therapeutic Exercise Minutes 25  -AS --     32531 - PT Manual Therapy Minutes 10  -AS --        Exercise 1    Exercise Name 1 -- 3-Way Cane  -AS        Exercise 2    Exercise Name 2 -- Pulley's - Flex & ABD  -AS     Time 2 -- 5 min each  -AS        Exercise 3    Exercise Name 3 -- Supine Clasp Hands for Flexion  -AS     Reps 3 -- 25  -AS        Exercise 4    Exercise Name 4 -- S/L ER  -AS     Reps 4 -- 40  -AS     Time  4 -- 2#  -AS        Exercise 5    Exercise Name 5 -- Empty/Full Can  -AS     Reps 5 -- 40 each  -AS     Time 5 -- 2#  -AS        Exercise 6    Exercise Name 6 -- Rows  -AS     Reps 6 -- 40  -AS     Time 6 -- Silver  -AS        Exercise 7    Exercise Name 7 -- Extensions  -AS     Reps 7 -- 40  -AS     Time 7 -- Silver  -AS        Exercise 8    Exercise Name 8 -- IR  -AS     Reps 8 -- 40  -AS     Time 8 -- Silver  -AS        Exercise 9    Exercise Name 9 -- ER  -AS     Reps 9 -- 40  -AS     Time 9 -- Green  -AS               User Key  (r) = Recorded By, (t) = Taken By, (c) = Cosigned By      Initials Name Provider Type    AS Royer Pedroza, PT Physical Therapist                             Manual Rx (Last 36 Hours)       Manual Treatments       Row Name 03/13/24 1343 03/13/24 1300          Total Minutes    34207 - PT Manual Therapy Minutes 10  -AS --        Manual Rx 1    Manual Rx 1 Location -- Right Shoulder  -AS     Manual Rx 1 Type -- PROM - Flex, ABD, IR, ER  -AS     Manual Rx 1 Duration -- 10 min  -AS               User Key  (r) = Recorded By, (t) = Taken By, (c) = Cosigned By      Initials Name Provider Type    AS Royer Pedroza, PT Physical Therapist                             Outcome Measure Options: Quick DASH         Time Calculation:   Start Time: 1257  Stop Time: 1343  Time Calculation (min): 46 min  Timed Charges  21551 - PT Therapeutic Exercise Minutes: 25  78301 - PT Manual Therapy Minutes: 10  Untimed Charges  PT Moist Heat Minutes: 10  Total Minutes  Timed Charges Total Minutes: 35  Untimed Charges Total Minutes: 10   Total Minutes: 45  Therapy Charges for Today       Code Description Service Date Service Provider Modifiers Qty    08781384329 HC PT THER PROC EA 15 MIN 3/13/2024 Royer Pedroza, PT GP 2            PT G-Codes  Outcome Measure Options: Pema Pedroza, LILIAN  3/13/2024

## 2024-03-18 ENCOUNTER — HOSPITAL ENCOUNTER (OUTPATIENT)
Dept: PHYSICAL THERAPY | Facility: HOSPITAL | Age: 64
Setting detail: THERAPIES SERIES
Discharge: HOME | End: 2024-03-18
Payer: MEDICARE

## 2024-03-18 DIAGNOSIS — Z96.611 STATUS POST REVERSE TOTAL SHOULDER REPLACEMENT, RIGHT: Primary | ICD-10-CM

## 2024-03-18 PROCEDURE — 97110 THERAPEUTIC EXERCISES: CPT

## 2024-03-18 NOTE — THERAPY TREATMENT NOTE
Outpatient Physical Therapy Ortho Treatment Note   Reina Boggs     Patient Name: Sylwia Spears  : 1960  MRN: [id number]  Today's Date: 3/18/2024      Visit Date: 2024    Visit Dx:    ICD-10-CM ICD-9-CM   1. Status post reverse total shoulder replacement, open biceps tenodesis, right, DOS 2023  Z96.611 V43.61       Patient Active Problem List   Diagnosis    History of colon polyps    Type 2 diabetes mellitus without complication, without long-term current use of insulin    Benign essential HTN    Morbidly obese    Cervical spinal stenosis    Screening for prostate cancer    Personal history of colonic polyps    Family history of colon cancer    Hyperlipidemia LDL goal <70    Midline thoracic back pain    Erectile dysfunction due to diseases classified elsewhere    Personal history of nicotine dependence    Coronary artery calcification    Varicose veins of right lower extremity with other complications    DJD of shoulder    Rotator cuff arthropathy of right shoulder    Complete tear of right rotator cuff    Chronic right shoulder pain    Thrombocytopenia    Abnormal international normal ratio (INR)    Status post reverse total shoulder replacement, open biceps tenodesis, right, DOS 2023        Past Medical History:   Diagnosis Date    Cervical disc disease     stenosis    Cirrhosis of liver     Colon polyp     Coronary artery calcification     Diabetes mellitus     Electrocution     Fell off a ladder and has chronic neck pain    Hyperlipidemia     Hypertension     Spleen enlarged     Thrombocytopenia         Past Surgical History:   Procedure Laterality Date    CHOLECYSTECTOMY      COLONOSCOPY      COLONOSCOPY N/A 2018    Procedure: COLONOSCOPY, polypectomy;  Surgeon: Raleigh Champagne MD;  Location: [facility];  Service: Gastroenterology    COLONOSCOPY W/ POLYPECTOMY      COLONOSCOPY W/ POLYPECTOMY N/A 2021    Procedure: COLONOSCOPY; polypectomy;  Surgeon:  Raleigh Champagne MD;  Location:  [facility];  Service: Gastroenterology;  Laterality: N/A;  polyps   diverticulosis    LIPOMA EXCISION      groin    SHOULDER SURGERY Left     TOTAL SHOULDER ARTHROPLASTY W/ DISTAL CLAVICLE EXCISION Right 12/1/2023    Procedure: TOTAL SHOULDER REVERSE ARTHROPLASTY;  Surgeon: David Ozuna MD;  Location:  [facility];  Service: Orthopedics;  Laterality: Right;                        PT Assessment/Plan       Row Name 03/18/24 1300          PT Assessment    Assessment Comments Patient continues to do well with PT at this time. Plan to continue to progress patient as tolerated.  -AS        PT Plan    PT Plan Comments Continue with current treatment plan.  -AS               User Key  (r) = Recorded By, (t) = Taken By, (c) = Cosigned By      Initials Name Provider Type    AS Royer Pedroza, PT Physical Therapist                     Modalities       Row Name 03/18/24 1300             Moist Heat    MH Applied Yes  -AS      Location Right Shoulder - Supine with roll under knees  -AS      PT Moist Heat Minutes 10  -AS      MH Prior to Rx Yes  -AS         Functional Testing    Outcome Measure Options Quick DASH  -AS                User Key  (r) = Recorded By, (t) = Taken By, (c) = Cosigned By      Initials Name Provider Type    AS Royer Pedroza, PT Physical Therapist                   OP Exercises       Row Name 03/18/24 1348 03/18/24 1300          Subjective    Subjective Comments -- Patient states his shoulder is doing well and slowly improving.  -AS        Total Minutes    09001 - PT Therapeutic Exercise Minutes 25  -AS --     60113 - PT Manual Therapy Minutes 10  -AS --        Exercise 1    Exercise Name 1 -- 3-Way Cane  -AS        Exercise 2    Exercise Name 2 -- Pulley's - Flex & ABD  -AS     Time 2 -- 5 min each  -AS        Exercise 3    Exercise Name 3 -- Supine Clasp Hands for Flexion  -AS     Reps 3 -- 25  -AS        Exercise 4    Exercise Name 4 -- S/L ER   -AS     Reps 4 -- 25  -AS     Time 4 -- 3#  -AS        Exercise 5    Exercise Name 5 -- Empty/Full Can  -AS     Reps 5 -- 25 each  -AS     Time 5 -- 3#  -AS        Exercise 6    Exercise Name 6 -- Rows  -AS     Reps 6 -- 25  -AS     Time 6 -- Gold  -AS        Exercise 7    Exercise Name 7 -- Extensions  -AS     Reps 7 -- 25  -AS     Time 7 -- Gold  -AS        Exercise 8    Exercise Name 8 -- IR  -AS     Reps 8 -- 25  -AS     Time 8 -- Gold  -AS        Exercise 9    Exercise Name 9 -- ER  -AS     Reps 9 -- 25  -AS     Time 9 -- Blue  -AS               User Key  (r) = Recorded By, (t) = Taken By, (c) = Cosigned By      Initials Name Provider Type    AS Royer Pedroza, PT Physical Therapist                             Manual Rx (Last 36 Hours)       Manual Treatments       Row Name 03/18/24 1348 03/18/24 1300          Total Minutes    36672 - PT Manual Therapy Minutes 10  -AS --        Manual Rx 1    Manual Rx 1 Location -- Right Shoulder  -AS     Manual Rx 1 Type -- PROM - Flex, ABD, IR, ER  -AS     Manual Rx 1 Duration -- 10 min  -AS               User Key  (r) = Recorded By, (t) = Taken By, (c) = Cosigned By      Initials Name Provider Type    AS Royer Pedroza, PT Physical Therapist                             Outcome Measure Options: Quick DASH         Time Calculation:   Start Time: 1300  Stop Time: 1348  Time Calculation (min): 48 min  Timed Charges  87705 - PT Therapeutic Exercise Minutes: 25  42361 - PT Manual Therapy Minutes: 10  Untimed Charges  PT Moist Heat Minutes: 10  Total Minutes  Timed Charges Total Minutes: 35  Untimed Charges Total Minutes: 10   Total Minutes: 45  Therapy Charges for Today       Code Description Service Date Service Provider Modifiers Qty    73390157831 HC PT THER PROC EA 15 MIN 3/18/2024 Royer Pedroza, PT GP 2            PT G-Codes  Outcome Measure Options: Pema Pedroza, PT  3/18/2024

## 2024-03-20 ENCOUNTER — APPOINTMENT (OUTPATIENT)
Dept: PHYSICAL THERAPY | Facility: HOSPITAL | Age: 64
End: 2024-03-20
Payer: MEDICARE

## 2024-03-25 ENCOUNTER — HOSPITAL ENCOUNTER (OUTPATIENT)
Dept: PHYSICAL THERAPY | Facility: HOSPITAL | Age: 64
Setting detail: THERAPIES SERIES
Discharge: HOME | End: 2024-03-25
Payer: MEDICARE

## 2024-03-25 DIAGNOSIS — Z96.611 STATUS POST REVERSE TOTAL SHOULDER REPLACEMENT, RIGHT: Primary | ICD-10-CM

## 2024-03-25 PROCEDURE — 97110 THERAPEUTIC EXERCISES: CPT

## 2024-03-25 NOTE — THERAPY RE-EVALUATION
Outpatient Physical Therapy Ortho Re-Evaluation  DIANNE Bertrand     Patient Name: Sylwia Spears  : 1960  MRN: [id number]  Today's Date: 3/25/2024      Visit Date: 2024    Patient Active Problem List   Diagnosis    History of colon polyps    Type 2 diabetes mellitus without complication, without long-term current use of insulin    Benign essential HTN    Morbidly obese    Cervical spinal stenosis    Screening for prostate cancer    Personal history of colonic polyps    Family history of colon cancer    Hyperlipidemia LDL goal <70    Midline thoracic back pain    Erectile dysfunction due to diseases classified elsewhere    Personal history of nicotine dependence    Coronary artery calcification    Varicose veins of right lower extremity with other complications    DJD of shoulder    Rotator cuff arthropathy of right shoulder    Complete tear of right rotator cuff    Chronic right shoulder pain    Thrombocytopenia    Abnormal international normal ratio (INR)    Status post reverse total shoulder replacement, open biceps tenodesis, right, DOS 2023        Past Medical History:   Diagnosis Date    Cervical disc disease     stenosis    Cirrhosis of liver     Colon polyp     Coronary artery calcification     Diabetes mellitus     Electrocution     Fell off a ladder and has chronic neck pain    Hyperlipidemia     Hypertension     Spleen enlarged     Thrombocytopenia         Past Surgical History:   Procedure Laterality Date    CHOLECYSTECTOMY      COLONOSCOPY      COLONOSCOPY N/A 2018    Procedure: COLONOSCOPY, polypectomy;  Surgeon: Raleigh Champagne MD;  Location: [facility] OR;  Service: Gastroenterology    COLONOSCOPY W/ POLYPECTOMY      COLONOSCOPY W/ POLYPECTOMY N/A 2021    Procedure: COLONOSCOPY; polypectomy;  Surgeon: Raleigh Champagne MD;  Location: [facility] OR;  Service: Gastroenterology;  Laterality: N/A;  polyps   diverticulosis    LIPOMA EXCISION      groin     SHOULDER SURGERY Left     TOTAL SHOULDER ARTHROPLASTY W/ DISTAL CLAVICLE EXCISION Right 12/1/2023    Procedure: TOTAL SHOULDER REVERSE ARTHROPLASTY;  Surgeon: David Ozuna MD;  Location: [facility];  Service: Orthopedics;  Laterality: Right;       Visit Dx:     ICD-10-CM ICD-9-CM   1. Status post reverse total shoulder replacement, open biceps tenodesis, right, DOS 12/1/2023  Z96.611 V43.61              PT Ortho       Row Name 03/25/24 1200       Precautions and Contraindications    Precautions/Limitations no known precautions/limitations  -AS       Subjective Pain    Able to rate subjective pain? yes  -AS    Pre-Treatment Pain Level 0  -AS    Post-Treatment Pain Level 0  -AS       Posture/Observations    Posture- WNL Posture is WNL  -AS    Observations Incision healing  -AS       Right Upper Ext    Rt Shoulder Abduction AROM 121  -AS    Rt Shoulder Abduction PROM WFL  -AS    Rt Shoulder Flexion AROM 129  -AS    Rt Shoulder Flexion PROM WFL  -AS    Rt Shoulder External Rotation AROM WFL  -AS    Rt Shoulder External Rotation PROM WFL  -AS    Rt Shoulder Internal Rotation PROM WFL  -AS       MMT Right Upper Ext    Rt Shoulder Flexion MMT, Gross Movement (4/5) good  -AS    Rt Shoulder ABduction MMT, Gross Movement (4/5) good  -AS    Rt Shoulder Internal Rotation MMT, Gross Movement (4/5) good  -AS    Rt Shoulder External Rotation MMT, Gross Movement (4-/5) good minus  -AS       Sensation    Sensation WNL? WNL  -AS    Light Touch No apparent deficits  -AS              User Key  (r) = Recorded By, (t) = Taken By, (c) = Cosigned By      Initials Name Provider Type    AS Royer Pedroza, PT Physical Therapist                                       PT OP Goals       Row Name 03/25/24 1300          PT Short Term Goals    STG 1 Patient to demonstrate compliance with initial HEP for flexibility, ROM and strengthening.  -AS     STG 1 Progress Met  -AS     STG 2 Patient to report right shoulder pain on VAS of  4-5/10 at worst with activity.  -AS     STG 2 Progress Met  -AS     STG 3 Patient to demonstrate improved right shoulder strength to 4/5 in all planes.  -AS     STG 3 Progress Partially Met;Ongoing;Progressing  -AS     STG 4 Patient to demonstrate improved right shoulder PROM to within 10 degrees of contralateral shoulder.  -AS     STG 4 Progress Partially Met;Ongoing;Progressing  -AS        Long Term Goals    LTG 1 Patient to demonstrate compliance with advanced HEP for flexibility, ROM and strengthening.  -AS     LTG 1 Progress Met  -AS     LTG 2 Patient to report right shoulder pain on VAS of 0-1/10 at worst with activity.  -AS     LTG 2 Progress Ongoing;Progressing  -AS     LTG 3 Patient to demonstrate improved right shoulder strength to 4+/5 in all planes.  -AS     LTG 3 Progress Ongoing;Progressing  -AS     LTG 4 Patient to demonstrate improved right shoulder AROM to within 10 degrees of contralateral shoulder.  -AS     LTG 4 Progress Partially Met;Ongoing;Progressing  -AS     LTG 5 Patient to report improved function and decreased pain Quick DASH by >10-15 points.  -AS     LTG 5 Progress Ongoing;Progressing  -AS               User Key  (r) = Recorded By, (t) = Taken By, (c) = Cosigned By      Initials Name Provider Type    AS Royer Pedroza, PT Physical Therapist                     PT Assessment/Plan       Row Name 03/25/24 1200          PT Assessment    Functional Limitations Limitation in home management;Limitations in community activities;Performance in leisure activities;Performance in sport activities;Performance in work activities  -AS     Impairments Muscle strength;Pain;Range of motion  -AS     Assessment Comments Patient continues to do well with PT at this time. Plan to continue to progress patient as tolerated. Patient's right shoulder AROM, strength, and function continue to improve. Patient would benefit from continued PT in order to return to his prior level of function.  -AS     Please  "refer to paper survey for additional self-reported information Yes  -AS     Rehab Potential Good  -AS     Patient/caregiver participated in establishment of treatment plan and goals Yes  -AS     Patient would benefit from skilled therapy intervention Yes  -AS        PT Plan    PT Frequency 1x/week;2x/week  -AS     Predicted Duration of Therapy Intervention (PT) 6-8 weeks  -AS     Planned CPT's? PT RE-EVAL: 51430;PT THER PROC EA 15 MIN: 63473;PT THER ACT EA 15 MIN: 61437;PT MANUAL THERAPY EA 15 MIN: 46862;PT NEUROMUSC RE-EDUCATION EA 15 MIN: 54154  -AS     PT Plan Comments Continue with current treatment plan.  -AS               User Key  (r) = Recorded By, (t) = Taken By, (c) = Cosigned By      Initials Name Provider Type    AS Royer Pedroza, PT Physical Therapist                     Modalities       Row Name 03/25/24 1200             Moist Heat    MH Applied Yes  -AS      Location Right Shoulder - Supine with roll under knees  -AS      PT Moist Heat Minutes 10  -AS      MH Prior to Rx Yes  -AS         Functional Testing    Outcome Measure Options Quick DASH  -AS                User Key  (r) = Recorded By, (t) = Taken By, (c) = Cosigned By      Initials Name Provider Type    AS Royer Pedroza, PT Physical Therapist                   OP Exercises       Row Name 03/25/24 1342 03/25/24 1200          Subjective    Subjective Comments -- Patient states his shoulder is \"doing ok.\"  -AS        Subjective Pain    Able to rate subjective pain? -- yes  -AS     Pre-Treatment Pain Level -- 0  -AS     Post-Treatment Pain Level -- 0  -AS        Total Minutes    03574 - PT Therapeutic Exercise Minutes 25  -AS --     36673 - PT Manual Therapy Minutes 10  -AS --        Exercise 1    Exercise Name 1 -- 3-Way Cane  -AS        Exercise 2    Exercise Name 2 -- Pulley's - Flex & ABD  -AS     Time 2 -- 5 min each  -AS        Exercise 3    Exercise Name 3 -- Supine Clasp Hands for Flexion  -AS     Reps 3 -- 25  -AS        " Exercise 4    Exercise Name 4 -- S/L ER  -AS     Reps 4 -- 25  -AS     Time 4 -- 3#  -AS        Exercise 5    Exercise Name 5 -- Empty/Full Can  -AS     Reps 5 -- 25 each  -AS     Time 5 -- 3#  -AS        Exercise 6    Exercise Name 6 -- Rows  -AS     Reps 6 -- 25  -AS     Time 6 -- Gold  -AS        Exercise 7    Exercise Name 7 -- Extensions  -AS     Reps 7 -- 25  -AS     Time 7 -- Gold  -AS        Exercise 8    Exercise Name 8 -- IR  -AS     Reps 8 -- 25  -AS     Time 8 -- Gold  -AS        Exercise 9    Exercise Name 9 -- ER  -AS     Reps 9 -- 25  -AS     Time 9 -- Blue  -AS               User Key  (r) = Recorded By, (t) = Taken By, (c) = Cosigned By      Initials Name Provider Type    AS Royer Pedroza, PT Physical Therapist                  Manual Rx (Last 36 Hours)       Manual Treatments       Row Name 03/25/24 1342 03/25/24 1100          Total Minutes    84536 - PT Manual Therapy Minutes 10  -AS --        Manual Rx 1    Manual Rx 1 Location -- Right Shoulder  -AS     Manual Rx 1 Type -- PROM - Flex, ABD, IR, ER  -AS     Manual Rx 1 Duration -- 10 min  -AS               User Key  (r) = Recorded By, (t) = Taken By, (c) = Cosigned By      Initials Name Provider Type    AS Royer Pedroza, PT Physical Therapist                                Outcome Measure Options: Quick DASH         Time Calculation:     Start Time: 1255  Stop Time: 1342  Time Calculation (min): 47 min  Timed Charges  70117 - PT Therapeutic Exercise Minutes: 25  21953 - PT Manual Therapy Minutes: 10  Untimed Charges  PT Moist Heat Minutes: 10  Total Minutes  Timed Charges Total Minutes: 35  Untimed Charges Total Minutes: 10   Total Minutes: 45     Therapy Charges for Today       Code Description Service Date Service Provider Modifiers Qty    03109906040 HC PT THER PROC EA 15 MIN 3/25/2024 Royer Pedroza, PT GP 2            PT G-Codes  Outcome Measure Options: Pema Pedroza, PT  3/25/2024

## 2024-03-27 ENCOUNTER — APPOINTMENT (OUTPATIENT)
Dept: PHYSICAL THERAPY | Facility: HOSPITAL | Age: 64
End: 2024-03-27
Payer: MEDICARE

## 2024-04-10 ENCOUNTER — APPOINTMENT (OUTPATIENT)
Dept: PHYSICAL THERAPY | Facility: HOSPITAL | Age: 64
End: 2024-04-10
Payer: MEDICARE

## 2024-04-12 ENCOUNTER — HOSPITAL ENCOUNTER (OUTPATIENT)
Dept: PHYSICAL THERAPY | Facility: HOSPITAL | Age: 64
Setting detail: THERAPIES SERIES
Discharge: HOME | End: 2024-04-12
Payer: MEDICARE

## 2024-04-12 DIAGNOSIS — Z96.611 STATUS POST REVERSE TOTAL SHOULDER REPLACEMENT, RIGHT: Primary | ICD-10-CM

## 2024-04-12 PROCEDURE — 97110 THERAPEUTIC EXERCISES: CPT

## 2024-04-12 NOTE — THERAPY TREATMENT NOTE
Outpatient Physical Therapy Ortho Treatment Note   Reina Boggs     Patient Name: Sylwia Spears  : 1960  MRN: [id number]  Today's Date: 2024      Visit Date: 2024    Visit Dx:    ICD-10-CM ICD-9-CM   1. Status post reverse total shoulder replacement, open biceps tenodesis, right, DOS 2023  Z96.611 V43.61       Patient Active Problem List   Diagnosis    History of colon polyps    Type 2 diabetes mellitus without complication, without long-term current use of insulin    Benign essential HTN    Morbidly obese    Cervical spinal stenosis    Screening for prostate cancer    Personal history of colonic polyps    Family history of colon cancer    Hyperlipidemia LDL goal <70    Midline thoracic back pain    Erectile dysfunction due to diseases classified elsewhere    Personal history of nicotine dependence    Coronary artery calcification    Varicose veins of right lower extremity with other complications    DJD of shoulder    Rotator cuff arthropathy of right shoulder    Complete tear of right rotator cuff    Chronic right shoulder pain    Thrombocytopenia    Abnormal international normal ratio (INR)    Status post reverse total shoulder replacement, open biceps tenodesis, right, DOS 2023        Past Medical History:   Diagnosis Date    Cervical disc disease     stenosis    Cirrhosis of liver     Colon polyp     Coronary artery calcification     Diabetes mellitus     Electrocution     Fell off a ladder and has chronic neck pain    Hyperlipidemia     Hypertension     Spleen enlarged     Thrombocytopenia         Past Surgical History:   Procedure Laterality Date    CHOLECYSTECTOMY      COLONOSCOPY      COLONOSCOPY N/A 2018    Procedure: COLONOSCOPY, polypectomy;  Surgeon: Raleigh Champagne MD;  Location: [facility];  Service: Gastroenterology    COLONOSCOPY W/ POLYPECTOMY      COLONOSCOPY W/ POLYPECTOMY N/A 2021    Procedure: COLONOSCOPY; polypectomy;  Surgeon:  Raleigh Champagne MD;  Location:  [facility] OR;  Service: Gastroenterology;  Laterality: N/A;  polyps   diverticulosis    LIPOMA EXCISION      groin    SHOULDER SURGERY Left     TOTAL SHOULDER ARTHROPLASTY W/ DISTAL CLAVICLE EXCISION Right 12/1/2023    Procedure: TOTAL SHOULDER REVERSE ARTHROPLASTY;  Surgeon: David Ozuna MD;  Location:  [facility] OR;  Service: Orthopedics;  Laterality: Right;                        PT Assessment/Plan       Row Name 04/12/24 1300          PT Assessment    Assessment Comments Patient continues to do well with PT at this time. Plan to continue to progress patient as tolerated. Stressed importance of strengthening exercises 2x daily at home.  -AS        PT Plan    PT Plan Comments Continue with current treatment plan.  -AS               User Key  (r) = Recorded By, (t) = Taken By, (c) = Cosigned By      Initials Name Provider Type    AS Royer Pedroza, PT Physical Therapist                     Modalities       Row Name 04/12/24 1300             Moist Heat    MH Applied Yes  -AS      Location Right Shoulder - Supine with roll under knees  -AS      PT Moist Heat Minutes 10  -AS      MH Prior to Rx Yes  -AS         Functional Testing    Outcome Measure Options Quick DASH  -AS                User Key  (r) = Recorded By, (t) = Taken By, (c) = Cosigned By      Initials Name Provider Type    AS Royer Pedroza, PT Physical Therapist                   OP Exercises       Row Name 04/12/24 1346 04/12/24 1300          Subjective    Subjective Comments -- Patient states his shoulder continues to improve at this time.  -AS        Total Minutes    91012 - PT Therapeutic Exercise Minutes 25  -AS --     57018 - PT Manual Therapy Minutes 5  -AS --        Exercise 1    Exercise Name 1 -- 3-Way Cane  -AS        Exercise 2    Exercise Name 2 -- Pulley's - Flex & ABD  -AS     Time 2 -- 5 min each  -AS        Exercise 3    Exercise Name 3 -- Supine Clasp Hands for Flexion  -AS     Reps  3 -- 25  -AS        Exercise 4    Exercise Name 4 -- S/L ER  -AS     Reps 4 -- 30  -AS     Time 4 -- 3#  -AS        Exercise 5    Exercise Name 5 -- Empty/Full Can  -AS     Reps 5 -- 30 each  -AS     Time 5 -- 3#  -AS        Exercise 6    Exercise Name 6 -- Rows  -AS     Reps 6 -- 30  -AS     Time 6 -- Gold  -AS        Exercise 7    Exercise Name 7 -- Extensions  -AS     Reps 7 -- 30  -AS     Time 7 -- Gold  -AS        Exercise 8    Exercise Name 8 -- IR  -AS     Reps 8 -- 30  -AS     Time 8 -- Gold  -AS        Exercise 9    Exercise Name 9 -- ER  -AS     Reps 9 -- 30  -AS     Time 9 -- Blue  -AS               User Key  (r) = Recorded By, (t) = Taken By, (c) = Cosigned By      Initials Name Provider Type    AS Royer Pedroza, PT Physical Therapist                             Manual Rx (Last 36 Hours)       Manual Treatments       Row Name 04/12/24 1346 04/12/24 1300          Total Minutes    44207 - PT Manual Therapy Minutes 5  -AS --        Manual Rx 1    Manual Rx 1 Location -- Right Shoulder  -AS     Manual Rx 1 Type -- PROM - Flex, ABD  -AS     Manual Rx 1 Duration -- 5 min  -AS               User Key  (r) = Recorded By, (t) = Taken By, (c) = Cosigned By      Initials Name Provider Type    AS Royer Pedroza, PT Physical Therapist                             Outcome Measure Options: Quick DASH         Time Calculation:   Start Time: 1300  Stop Time: 1346  Time Calculation (min): 46 min  Timed Charges  56946 - PT Therapeutic Exercise Minutes: 25  67026 - PT Manual Therapy Minutes: 5  Untimed Charges  PT Moist Heat Minutes: 10  Total Minutes  Timed Charges Total Minutes: 30  Untimed Charges Total Minutes: 10   Total Minutes: 40  Therapy Charges for Today       Code Description Service Date Service Provider Modifiers Qty    89489588769 HC PT THER PROC EA 15 MIN 4/12/2024 Royer Pedroza, PT GP 2            PT G-Codes  Outcome Measure Options: Pema Pedroza,  PT  4/12/2024

## 2024-04-19 ENCOUNTER — APPOINTMENT (OUTPATIENT)
Dept: PHYSICAL THERAPY | Facility: HOSPITAL | Age: 64
End: 2024-04-19
Payer: MEDICARE

## 2024-04-26 ENCOUNTER — APPOINTMENT (OUTPATIENT)
Dept: PHYSICAL THERAPY | Facility: HOSPITAL | Age: 64
End: 2024-04-26
Payer: MEDICARE

## 2024-05-22 ENCOUNTER — HOSPITAL ENCOUNTER (EMERGENCY)
Facility: HOSPITAL | Age: 64
Discharge: HOME | End: 2024-05-22
Attending: EMERGENCY MEDICINE
Payer: MEDICARE

## 2024-05-22 ENCOUNTER — APPOINTMENT (OUTPATIENT)
Dept: GENERAL RADIOLOGY | Facility: HOSPITAL | Age: 64
End: 2024-05-22
Payer: MEDICARE

## 2024-05-22 VITALS
HEART RATE: 55 BPM | WEIGHT: 240 LBS | BODY MASS INDEX: 30.8 KG/M2 | TEMPERATURE: 97.8 F | OXYGEN SATURATION: 93 % | HEIGHT: 74 IN | SYSTOLIC BLOOD PRESSURE: 130 MMHG | DIASTOLIC BLOOD PRESSURE: 99 MMHG | RESPIRATION RATE: 12 BRPM

## 2024-05-22 DIAGNOSIS — M25.511 ACUTE PAIN OF RIGHT SHOULDER: ICD-10-CM

## 2024-05-22 DIAGNOSIS — S43.014A ANTERIOR DISLOCATION OF RIGHT SHOULDER, INITIAL ENCOUNTER: Primary | ICD-10-CM

## 2024-05-22 PROCEDURE — 23650 CLTX SHO DSLC W/MNPJ WO ANES: CPT | Performed by: ORTHOPAEDIC SURGERY

## 2024-05-22 PROCEDURE — 94799 UNLISTED PULMONARY SVC/PX: CPT

## 2024-05-22 PROCEDURE — 25010000002 ONDANSETRON PER 1 MG: Performed by: EMERGENCY MEDICINE

## 2024-05-22 PROCEDURE — 25010000002 PROPOFOL 10 MG/ML EMULSION: Performed by: EMERGENCY MEDICINE

## 2024-05-22 PROCEDURE — 73030 X-RAY EXAM OF SHOULDER: CPT

## 2024-05-22 PROCEDURE — 25010000002 FENTANYL CITRATE (PF) 50 MCG/ML SOLUTION: Performed by: EMERGENCY MEDICINE

## 2024-05-22 PROCEDURE — 99214 OFFICE O/P EST MOD 30 MIN: CPT | Performed by: ORTHOPAEDIC SURGERY

## 2024-05-22 PROCEDURE — 96374 THER/PROPH/DIAG INJ IV PUSH: CPT

## 2024-05-22 PROCEDURE — 96375 TX/PRO/DX INJ NEW DRUG ADDON: CPT

## 2024-05-22 PROCEDURE — 73020 X-RAY EXAM OF SHOULDER: CPT

## 2024-05-22 PROCEDURE — 99285 EMERGENCY DEPT VISIT HI MDM: CPT

## 2024-05-22 RX ORDER — SODIUM CHLORIDE 0.9 % (FLUSH) 0.9 %
10 SYRINGE (ML) INJECTION AS NEEDED
Status: DISCONTINUED | OUTPATIENT
Start: 2024-05-22 | End: 2024-05-22 | Stop reason: HOSPADM

## 2024-05-22 RX ORDER — PROPOFOL 10 MG/ML
VIAL (ML) INTRAVENOUS
Status: COMPLETED | OUTPATIENT
Start: 2024-05-22 | End: 2024-05-22

## 2024-05-22 RX ORDER — ONDANSETRON 2 MG/ML
4 INJECTION INTRAMUSCULAR; INTRAVENOUS ONCE
Status: COMPLETED | OUTPATIENT
Start: 2024-05-22 | End: 2024-05-22

## 2024-05-22 RX ORDER — FENTANYL CITRATE 50 UG/ML
100 INJECTION, SOLUTION INTRAMUSCULAR; INTRAVENOUS ONCE
Status: COMPLETED | OUTPATIENT
Start: 2024-05-22 | End: 2024-05-22

## 2024-05-22 RX ORDER — PROPOFOL 10 MG/ML
50 VIAL (ML) INTRAVENOUS 3 TIMES DAILY PRN
Status: DISCONTINUED | OUTPATIENT
Start: 2024-05-22 | End: 2024-05-22 | Stop reason: HOSPADM

## 2024-05-22 RX ADMIN — PROPOFOL 50 MG: 10 INJECTION, EMULSION INTRAVENOUS at 10:00

## 2024-05-22 RX ADMIN — FENTANYL CITRATE 100 MCG: 50 INJECTION, SOLUTION INTRAMUSCULAR; INTRAVENOUS at 09:16

## 2024-05-22 RX ADMIN — PROPOFOL 50 MG: 10 INJECTION, EMULSION INTRAVENOUS at 09:26

## 2024-05-22 RX ADMIN — PROPOFOL 50 MG: 10 INJECTION, EMULSION INTRAVENOUS at 09:29

## 2024-05-22 RX ADMIN — ONDANSETRON 4 MG: 2 INJECTION INTRAMUSCULAR; INTRAVENOUS at 09:16

## 2024-05-22 RX ADMIN — PROPOFOL 60 MG: 10 INJECTION, EMULSION INTRAVENOUS at 09:58

## 2024-05-22 RX ADMIN — PROPOFOL 50 MG: 10 INJECTION, EMULSION INTRAVENOUS at 09:32

## 2024-05-22 NOTE — ED PROVIDER NOTES
Subjective   History of Present Illness  Patient presents complaining of right shoulder pain.  Patient had a complete shoulder 6 months ago by Dr. Ozuna and has follow-up today.  Patient woke up this morning was in extreme pain in his right shoulder.  Patient denies any known injury.  Patient unsure if he could have slept funny or done something in his sleep.  Patient was okay yesterday.  Any movement of the shoulder increases pain.  Patient keeps having little spasms.  Patient's comfort is when he has it internally rotated and adducted.  Patient denies any weakness or numbness in the right upper extremity.  No therapy taken prior to arrival.  Patient's n.p.o. status is 1 hour.      Review of Systems   All other systems reviewed and are negative.      Past Medical History:   Diagnosis Date    Cervical disc disease 2012    stenosis    Cirrhosis of liver     Colon polyp     Coronary artery calcification     Diabetes mellitus     Electrocution 2012    Fell off a ladder and has chronic neck pain    Hyperlipidemia     Hypertension     Spleen enlarged     Thrombocytopenia        No Known Allergies    Past Surgical History:   Procedure Laterality Date    CHOLECYSTECTOMY      COLONOSCOPY      COLONOSCOPY N/A 08/17/2018    Procedure: COLONOSCOPY, polypectomy;  Surgeon: Raleigh Champagne MD;  Location: [facility] OR;  Service: Gastroenterology    COLONOSCOPY W/ POLYPECTOMY      COLONOSCOPY W/ POLYPECTOMY N/A 09/02/2021    Procedure: COLONOSCOPY; polypectomy;  Surgeon: Raleigh Champagne MD;  Location: [facility] OR;  Service: Gastroenterology;  Laterality: N/A;  polyps   diverticulosis    LIPOMA EXCISION      groin    SHOULDER SURGERY Left     TOTAL SHOULDER ARTHROPLASTY W/ DISTAL CLAVICLE EXCISION Right 12/1/2023    Procedure: TOTAL SHOULDER REVERSE ARTHROPLASTY;  Surgeon: David Ozuna MD;  Location: [facility] OR;  Service: Orthopedics;  Laterality: Right;       Family History   Problem Relation Age of Onset     Hypertension Mother     Heart disease Mother     Colon cancer Mother     Stroke Mother     Cancer Mother     Hypertension Father     Crohn's disease Brother        Social History     Socioeconomic History    Marital status:    Tobacco Use    Smoking status: Former     Current packs/day: 0.00     Average packs/day: 1 pack/day for 47.0 years (47.0 ttl pk-yrs)     Types: Cigarettes     Start date:      Quit date: 2022     Years since quittin.4     Passive exposure: Past    Smokeless tobacco: Never   Vaping Use    Vaping status: Never Used   Substance and Sexual Activity    Alcohol use: No     Comment: rare    Drug use: Yes     Frequency: 7.0 times per week     Types: Marijuana    Sexual activity: Defer           Objective   Physical Exam  Vitals and nursing note reviewed.   HENT:      Head: Normocephalic.      Mouth/Throat:      Pharynx: Oropharynx is clear.   Eyes:      Conjunctiva/sclera: Conjunctivae normal.   Cardiovascular:      Pulses:           Radial pulses are 2+ on the right side.   Pulmonary:      Effort: Pulmonary effort is normal.   Musculoskeletal:      Cervical back: Neck supple.      Comments: Right shoulder has a step-off and an anterior fullness.  Patient has extremely limited range of motion secondary to pain.  Patient is able to flex and extend at the hand, wrist, and elbow without difficulty.  No external signs of erythema, swelling, or ecchymosis.   Skin:     General: Skin is warm and dry.   Neurological:      Mental Status: He is alert and oriented to person, place, and time.      Sensory: No sensory deficit.      Motor: No weakness.   Psychiatric:         Mood and Affect: Mood normal.         Behavior: Behavior normal.         FX Dislocation    Date/Time: 2024 9:30 AM    Performed by: Timmy Dorado MD  Authorized by: Timmy Dorado MD    Consent:     Consent obtained:  Written    Consent given by:  Patient    Risks, benefits, and alternatives were discussed:  yes      Risks discussed:  Pain, nerve damage and vascular damage    Alternatives discussed:  Delayed treatment  Universal protocol:     Procedure explained and questions answered to patient or proxy's satisfaction: yes      Relevant documents present and verified: yes      Test results available: yes      Imaging studies available: yes      Required blood products, implants, devices, and special equipment available: yes      Site/side marked: yes      Immediately prior to procedure, a time out was called: yes      Patient identity confirmed:  Verbally with patient and arm band  Injury:     Injury location:  Shoulder    Shoulder injury location:  R shoulder    Hill-Sachs deformity: no    Pre-procedure details:     Distal neurologic exam:  Normal    Distal perfusion: distal pulses strong and brisk capillary refill      Range of motion: reduced    Sedation:     Sedation type:  Moderate sedation  Anesthesia:     Anesthesia method:  None  Procedure details:     Manipulation performed: yes      Skin traction used: no      Skeletal traction used: yes      Pin inserted: no      Reduction successful: yes      X-ray confirmed reduction: yes      Immobilization:  Sling    Supplies used:  Sling  Post-procedure details:     Distal neurologic exam:  Normal    Distal perfusion: distal pulses strong and brisk capillary refill      Range of motion: improved      Procedure completion:  Tolerated well, no immediate complications             ED Course  ED Course as of 05/22/24 1105   Wed May 22, 2024   0859 Discussed patient with Dr. Ozuna of orthopedist and he feels that patient could be sedated and it should be able to be reduced in the ED. [AW]   0942 Did 2 sedations and attempted reduction but unsuccessful.  Notified Dr. Ozuna and will come down to assist. [AW]      ED Course User Index  [AW] Timmy Dorado MD                                             Medical Decision Making  Ddx fracture, dislocation, tendinitis, sprain,  strain    XR Shoulder 1 View Right    Result Date: 5/22/2024  Impression: Successful reduction of previous displacement of the humeral component of a reverse shoulder arthroplasty. Electronically Signed: Jerrica Bowden MD  5/22/2024 10:24 AM EDT  Workstation ID: [id number]    XR Shoulder 2+ View Right    Result Date: 5/22/2024  Impression: Evidence residual anterior or posterior displacement of the humeral component of the arthroplasty. Electronically Signed: Pk Delatorre MD  5/22/2024 9:55 AM EDT  Workstation ID: [id number]    XR Shoulder 2+ View Right    Result Date: 5/22/2024  Impression: Right reverse shoulder arthroplasty with anterior/inferior dislocation of the humerus. Electronically Signed: Jerrica Bowden MD  5/22/2024 9:01 AM EDT  Workstation ID: [id number]    1040 Pt seen again prior to d/c.  Imaging reviewed and shoulder reduced.  Symptoms improved and pt feels better, vitals stable and pt. in NAD. Non-toxic. Comfortable. Ambulating without difficulty.  Tolerating po.  Relaxed breathing.  All questions personally answered at the bedside and all d/c instructions personally reviewed with pt.  Discussed the importance of close outpt. f/u and pt. understands this and agrees to do so.  Pt agrees to return to ED immediately for any new, persistent, or worsening symptoms.    EMR Dragon/Transcription disclaimer:  Much of this encounter note is an electronic transcription/translation of spoken language to printed text, aka voice recognition.  The electronic translation of spoken language may permit erroneous or at times nonsensical words or phrases to be inadvertently transcribed; although I have reviewed the note for such errors, some may still exist so please interpret based on surrounding text content.      Problems Addressed:  Acute pain of right shoulder: complicated acute illness or injury  Anterior dislocation of right shoulder, initial encounter: complicated acute illness or injury    Amount and/or  Complexity of Data Reviewed  Radiology: ordered.    Risk  Prescription drug management.        Final diagnoses:   Anterior dislocation of right shoulder, initial encounter   Acute pain of right shoulder       ED Disposition  ED Disposition       ED Disposition   Discharge    Condition   Stable    Comment   --               Shena Pastrana, APRN  [address]  [place] [address]  [phone]    In 2 weeks           Medication List      No changes were made to your prescriptions during this visit.            Timmy Dorado MD  05/22/24 1041       Timmy Dorado MD  05/22/24 1104

## 2024-05-22 NOTE — SEDATION DOCUMENTATION
Waiting on Dr Ozuna to arrive at bs, continuing to monitor, pt alert and oriented, talkative and making inappropriate comments at times

## 2024-05-22 NOTE — PROGRESS NOTES
Orthopedic ER evaluation    Patient: Sylwia Spears    Date of Admission: 5/22/2024  8:04 AM    YOB: 1960    Medical Record Number: [id number]    Chief Complaints: Right shoulder pain and instability      History of Present Illness: 64-year-old gentleman had prior right reverse shoulder arthroplasty back in December had done very well until this morning when he states he woke up from a sleep note immediate onset of pain to his right shoulder as well as deformity and inability to raise his arm overhead.  I was contacted by the emergency department physician as they were unable to get the shoulder to reduce with sedation on 2 separate occasions.  Patient currently denies any numbness or tingling, denies fever chills or sweats, no issue with his incision over the anterior shoulder    No Known Allergies    Medications:   Home Medications:  (Not in a hospital admission)      Current Medications:  Scheduled Meds:  Continuous Infusions:No current facility-administered medications for this encounter.    PRN Meds:.       Past Medical History:   Diagnosis Date    Cervical disc disease 2012    stenosis    Cirrhosis of liver     Colon polyp     Coronary artery calcification     Diabetes mellitus     Electrocution 2012    Fell off a ladder and has chronic neck pain    Hyperlipidemia     Hypertension     Spleen enlarged     Thrombocytopenia         Past Surgical History:   Procedure Laterality Date    CHOLECYSTECTOMY      COLONOSCOPY      COLONOSCOPY N/A 08/17/2018    Procedure: COLONOSCOPY, polypectomy;  Surgeon: Raleigh Champagne MD;  Location: [facility] OR;  Service: Gastroenterology    COLONOSCOPY W/ POLYPECTOMY      COLONOSCOPY W/ POLYPECTOMY N/A 09/02/2021    Procedure: COLONOSCOPY; polypectomy;  Surgeon: Raleigh Champagne MD;  Location: [facility] OR;  Service: Gastroenterology;  Laterality: N/A;  polyps   diverticulosis    LIPOMA EXCISION      groin    SHOULDER SURGERY Left     TOTAL  SHOULDER ARTHROPLASTY W/ DISTAL CLAVICLE EXCISION Right 2023    Procedure: TOTAL SHOULDER REVERSE ARTHROPLASTY;  Surgeon: David Ozuna MD;  Location: [facility] [place];  Service: Orthopedics;  Laterality: Right;        Social History     Occupational History    Not on file   Tobacco Use    Smoking status: Former     Current packs/day: 0.00     Average packs/day: 1 pack/day for 47.0 years (47.0 ttl pk-yrs)     Types: Cigarettes     Start date:      Quit date: 2022     Years since quittin.4     Passive exposure: Past    Smokeless tobacco: Never   Vaping Use    Vaping status: Never Used   Substance and Sexual Activity    Alcohol use: No     Comment: rare    Drug use: Yes     Frequency: 7.0 times per week     Types: Marijuana    Sexual activity: Defer      Social History     Social History Narrative    He is . He is on disability due to neck pain from an injury , where he was electrocuted. He is an .        Family History   Problem Relation Age of Onset    Hypertension Mother     Heart disease Mother     Colon cancer Mother     Stroke Mother     Cancer Mother     Hypertension Father     Crohn's disease Brother          Review of Systems:   HEENT: Patient denies any headaches, vision changes, change in hearing, or tinnitus, Patient denies any rhinorrhea,epistaxis, sinus pain, mouth or dental problems, sore throat or hoarseness, or dysphagia  Pulmonary: Patient denies any cough, congestion, SOA, or wheezing  Cardiovascular: Patient denies any chest pain, dyspnea, palpitations, weakness, intolerance of exercise, varicosities, swelling of extremities, known murmur  Gastrointestinal:  Patient denies nausea, vomiting, diarrhea, constipation, loss  of appetite, change in appetite, dysphagia, gas, heartburn, melena, change in bowel habits, use of laxatives or other drugs to alter the function of the gastrointestinal tract.  Genital/Urinary: Patient denies dysuria, change in color of urine,  change in frequency of urination, pain with urgency, incontinence, retention, or nocturia.  Musculoskeletal: Patient denies increased warmth; redness; or swelling of joints; limitation of function; deformity; crepitation: notes pain in right shoulder as documented above in HPI.  Denies pain in low back or neck.    Neurological: Patient denies dizziness, tremor, ataxia, difficulty in speaking, change in speech, paresthesia, loss of sensation, seizures, syncope, changes in memory.  Endocrine system: Patient denies tremors, palpitations, intolerance of heat or cold, polyuria, polydipsia, polyphagia, diaphoresis, exophthalmos, or goiter.  Psychological: Patient denies thoughts/plans of harming self or other; depression, insomnia, night terrors, ani, memory loss, disorientation.  Skin: Patient denies any bruising, rashes, discoloration, pruritus, wounds, ulcers, decubiti, changes in the hair or nails  Hematopoietic: Patient denies history of spontaneous or excessive bleeding, epistaxis, hematuria, melena, fatigue, enlarged or tender lymph nodes, pallor, history of anemia.    Physical Exam: 64 y.o. male  Vitals:    05/22/24 1012 05/22/24 1014 05/22/24 1020 05/22/24 1043   BP:  140/80 124/66 130/99   BP Location:       Patient Position:       Pulse: 79 68 64 55   Resp:  16 16 12   Temp:       TempSrc:       SpO2: 97% 94% 93% 93%   Weight:       Height:         General Appearance:    Alert, cooperative, in no acute distress                      Head:    Normocephalic, without obvious abnormality, atraumatic   Eyes:            Lids and lashes normal, conjunctivae and sclerae normal, no   icterus, no pallor, corneas clear, PERRLA   Ears:    Ears appear intact with no abnormalities noted   Throat:   No oral lesions, no thrush, oral mucosa moist   Neck:   No adenopathy, supple, trachea midline, no thyromegaly, no   carotid bruit, no JVD   Back:     No kyphosis present, no scoliosis present, no skin lesions,    erythema or  scars, no tenderness to percussion or palpation,   range of motion normal   Lungs:     Clear to auscultation,respirations regular, even and                  unlabored    Heart:    Regular rhythm and normal rate, normal S1 and S2, no            murmur, no gallop, no rub, no click   Chest Wall:    No abnormalities observed   Abdomen:     Normal bowel sounds, no masses, no organomegaly, soft     nontender, nondistended, no guarding, no rebound                tenderness   Rectal:     Deferred   Extremities:   Tenderness noted at right shoulder with deformity and prominence of the anterior shoulder noted, incision well-healed, no effusion.  Positive sensation proximal lateral arm.  Moves all remaining extremities well, no edema, no cyanosis, no redness   Pulses:   Pulses palpable and equal bilaterally   Skin:   No bleeding, bruising or rash   Lymph nodes:   No palpable adenopathy   Neurologic:   Cranial nerves 2 - 12 grossly intact, sensation intact, DTR       present and equal bilaterally           Diagnostic Tests:  No visits with results within 2 Day(s) from this visit.   Latest known visit with results is:   Orders Only on 01/03/2024   Component Date Value Ref Range Status    Hemoglobin A1C 01/11/2024 5.90 (H)  4.80 - 5.60 % Final    Comment: Hemoglobin A1C Ranges:  Increased Risk for Diabetes  5.7% to 6.4%  Diabetes                     >= 6.5%  Diabetic Goal                < 7.0%      Total Cholesterol 01/11/2024 131  0 - 200 mg/dL Final    Comment: Cholesterol Reference Ranges  (U.S. Department of Health and Human Services ATP III  Classifications)  Desirable          <200 mg/dL  Borderline High    200-239 mg/dL  High Risk          >240 mg/dL  Triglyceride Reference Ranges  (U.S. Department of Health and Human Services ATP III  Classifications)  Normal           <150 mg/dL  Borderline High  150-199 mg/dL  High             200-499 mg/dL  Very High        >500 mg/dL  HDL Reference Ranges  (U.S. Department of Health  and Human Services ATP III  Classifications)  Low     <40 mg/dl (major risk factor for CHD)  High    >60 mg/dl ('negative' risk factor for CHD)  LDL Reference Ranges  (U.S. Department of Health and Human Services ATP III  Classifications)  Optimal          <100 mg/dL  Near Optimal     100-129 mg/dL  Borderline High  130-159 mg/dL  High             160-189 mg/dL  Very High        >189 mg/dL      Triglycerides 01/11/2024 143  0 - 150 mg/dL Final    HDL Cholesterol 01/11/2024 43  40 - 60 mg/dL Final    VLDL Cholesterol Dandre 01/11/2024 25  5 - 40 mg/dL Final    LDL Chol Calc (NIH) 01/11/2024 63  0 - 100 mg/dL Final    Chol/HDL Ratio 01/11/2024 3.05   Final    Glucose 01/11/2024 114 (H)  65 - 99 mg/dL Final    BUN 01/11/2024 22  8 - 23 mg/dL Final    Creatinine 01/11/2024 0.91  0.76 - 1.27 mg/dL Final    EGFR Result 01/11/2024 95.3  >60.0 mL/min/1.73 Final    Comment: GFR Normal >60  Chronic Kidney Disease <60  Kidney Failure <15      BUN/Creatinine Ratio 01/11/2024 24.2  7.0 - 25.0 Final    Sodium 01/11/2024 143  136 - 145 mmol/L Final    Potassium 01/11/2024 4.5  3.5 - 5.2 mmol/L Final    Chloride 01/11/2024 106  98 - 107 mmol/L Final    Total CO2 01/11/2024 24.1  22.0 - 29.0 mmol/L Final    Calcium 01/11/2024 10.0  8.6 - 10.5 mg/dL Final    Total Protein 01/11/2024 6.8  6.0 - 8.5 g/dL Final    Albumin 01/11/2024 4.7  3.5 - 5.2 g/dL Final    Globulin 01/11/2024 2.1  gm/dL Final    A/G Ratio 01/11/2024 2.2  g/dL Final    Total Bilirubin 01/11/2024 0.4  0.0 - 1.2 mg/dL Final    Alkaline Phosphatase 01/11/2024 69  39 - 117 U/L Final    AST (SGOT) 01/11/2024 30  1 - 40 U/L Final    ALT (SGPT) 01/11/2024 36  1 - 41 U/L Final    WBC 01/11/2024 5.50  3.40 - 10.80 10*3/mm3 Final    RBC 01/11/2024 5.21  4.14 - 5.80 10*6/mm3 Final    Hemoglobin 01/11/2024 15.8  13.0 - 17.7 g/dL Final    Hematocrit 01/11/2024 46.3  37.5 - 51.0 % Final    MCV 01/11/2024 88.9  79.0 - 97.0 fL Final    MCH 01/11/2024 30.3  26.6 - 33.0 pg Final    MCHC  01/11/2024 34.1  31.5 - 35.7 g/dL Final    RDW 01/11/2024 13.0  12.3 - 15.4 % Final    Platelets 01/11/2024 112 (L)  140 - 450 10*3/mm3 Final    Neutrophil Rel % 01/11/2024 50.6  42.7 - 76.0 % Final    Lymphocyte Rel % 01/11/2024 36.9  19.6 - 45.3 % Final    Monocyte Rel % 01/11/2024 7.6  5.0 - 12.0 % Final    Eosinophil Rel % 01/11/2024 4.0  0.3 - 6.2 % Final    Basophil Rel % 01/11/2024 0.7  0.0 - 1.5 % Final    Neutrophils Absolute 01/11/2024 2.78  1.70 - 7.00 10*3/mm3 Final    Lymphocytes Absolute 01/11/2024 2.03  0.70 - 3.10 10*3/mm3 Final    Monocytes Absolute 01/11/2024 0.42  0.10 - 0.90 10*3/mm3 Final    Eosinophils Absolute 01/11/2024 0.22  0.00 - 0.40 10*3/mm3 Final    Basophils Absolute 01/11/2024 0.04  0.00 - 0.20 10*3/mm3 Final    Immature Granulocyte Rel % 01/11/2024 0.2  0.0 - 0.5 % Final    Immature Grans Absolute 01/11/2024 0.01  0.00 - 0.05 10*3/mm3 Final     XR Shoulder 1 View Right    Result Date: 5/22/2024  Impression: Impression: Successful reduction of previous displacement of the humeral component of a reverse shoulder arthroplasty. Electronically Signed: Jerrica Bowden MD  5/22/2024 10:24 AM EDT  Workstation ID: [id number]    XR Shoulder 2+ View Right    Result Date: 5/22/2024  Impression: Impression: Evidence residual anterior or posterior displacement of the humeral component of the arthroplasty. Electronically Signed: Pk Delatorre MD  5/22/2024 9:55 AM EDT  Workstation ID: [id number]    XR Shoulder 2+ View Right    Result Date: 5/22/2024  Impression: Impression: Right reverse shoulder arthroplasty with anterior/inferior dislocation of the humerus. Electronically Signed: Jerrica Bowden MD  5/22/2024 9:01 AM EDT  Workstation ID: [id number]     Assessment:   Right reverse shoulder arthroplasty instability        Plan:  The patient voiced understanding of the risks, benefits, and alternative forms of treatment that were discussed and the patient consents to proceed with closed reduction right  shoulder arthroplasty under sedation in the emergency department.    Sedation was provided by emergency department attending.  Once patient was appropriately sedated and airway was secured, longitudinal traction was pulled over on the right arm while posteriorly directed force was placed on the anterior portion of the humeral component.  Shoulder was noted to spontaneously reduce at this point in time.  Patient was placed in a sling with good stability noted.  Repeat x-rays indicated successful reduction with no evidence of fracture..   Follow-up in office in 2 weeks, sling in place at all times until follow-up    David Ozuna MD      Dictated utilizing Dragon dictation

## 2024-05-23 ENCOUNTER — PATIENT OUTREACH (OUTPATIENT)
Dept: CASE MANAGEMENT | Facility: OTHER | Age: 64
End: 2024-05-23
Payer: MEDICARE

## 2024-05-23 DIAGNOSIS — S43.014A ANTERIOR DISLOCATION OF RIGHT SHOULDER, INITIAL ENCOUNTER: Primary | ICD-10-CM

## 2024-05-23 DIAGNOSIS — Z96.611 STATUS POST REVERSE TOTAL REPLACEMENT OF RIGHT SHOULDER: ICD-10-CM

## 2024-05-23 RX ORDER — OXYCODONE HYDROCHLORIDE AND ACETAMINOPHEN 5; 325 MG/1; MG/1
1 TABLET ORAL EVERY 4 HOURS PRN
Qty: 18 TABLET | Refills: 0 | Status: SHIPPED | OUTPATIENT
Start: 2024-05-23

## 2024-05-23 NOTE — OUTREACH NOTE
AMBULATORY CASE MANAGEMENT NOTE    Names and Relationships of Patient/Support Persons: Caller: AmaliababitaSylwia; Relationship: Self -     Patient Outreach  RN-ACM outreach with patient. Discussed  5/22/24 ED visit regarding anterior dislocation of right shoulder and reduction. Patient treated and discharged. . Patient states to be compliant with ED recommendations; states to be wearing sling and states to have swelling and pain to right shoulder. Patient states to have had outreach with ortho and waiting for call back regarding recommendations. Patient states no difficulty with numbness or tingling to right hand and applying cold compress to shoulder. Patient states to have 6/6/24 ortho appointment. Patient states to have difficulty with sleeping and no difficulty with fever; chest pain; SOB or appetite. Reviewed with patient ED AVS recommendations; education; role of RN-ACM and HRCM case management services. Patient verbalized understanding. Patient states to appreciate outreach and declines needs for further outreach at this time. No further questions voiced at this time.   Adult Patient Profile  Questions/Answers      Flowsheet Row Most Recent Value   Symptoms/Conditions Managed at Home musculoskeletal   Musculoskeletal Symptoms/Conditions joint pain, mobility limited, other (see comments)  [Anterior dislocation of right shoulder/ with reduction]   Musculoskeletal Management Strategies other (see comments)  [Physician follow up]   Barriers to Taking Medication as Prescribed none   Primary Source of Support/Comfort spouse   People in Home spouse        Social Work Assessment  Questions/Answers      Flowsheet Row Most Recent Value   People in Home spouse   Functional Status Comments Patient states to be independent with ADL's,  light meal preparation,  transportation (may recieve assistance from spouse due to right shoulder dislocation and successful reduction),  ambulating without assistive device .        Send  Education  Questions/Answers      Flowsheet Row Most Recent Value   Other Patient Education/Resources  24/7 [facility] Nurse Call Line        SDOH updated and reviewed with the patient during this program:  --     Food Insecurity: No Food Insecurity (5/23/2024)    Hunger Vital Sign     Worried About Running Out of Food in the Last Year: Never true     Ran Out of Food in the Last Year: Never true      --     Transportation Needs: No Transportation Needs (5/23/2024)    PRAPARE - Transportation     Lack of Transportation (Medical): No     Lack of Transportation (Non-Medical): No       Education Documentation  Pain Unresolved/Worsening, taught by Starr Law RN at 5/23/2024 12:14 PM.  Learner: Patient  Readiness: Acceptance  Method: Explanation  Response: Verbalizes Understanding    Joint Symptoms, taught by Starr Law RN at 5/23/2024 12:14 PM.  Learner: Patient  Readiness: Acceptance  Method: Explanation  Response: Verbalizes Understanding    Activity, taught by Starr Law RN at 5/23/2024 12:14 PM.  Learner: Patient  Readiness: Acceptance  Method: Explanation  Response: Verbalizes Understanding    Provider Follow-Up, taught by Starr Law RN at 5/23/2024 12:14 PM.  Learner: Patient  Readiness: Acceptance  Method: Explanation  Response: Verbalizes Understanding    Unresolved/Worsening Symptoms, taught by Starr Law RN at 5/23/2024 12:14 PM.  Learner: Patient  Readiness: Acceptance  Method: Explanation  Response: Verbalizes Understanding    Energy Conservation, taught by Starr Law RN at 5/23/2024 12:14 PM.  Learner: Patient  Readiness: Acceptance  Method: Explanation  Response: Verbalizes Understanding    Assistive/Adaptive Devices, taught by Starr Law RN at 5/23/2024 12:14 PM.  Learner: Patient  Readiness: Acceptance  Method: Explanation  Response: Verbalizes Understanding          Starr HIGHTOWER  Ambulatory Case Management    5/23/2024, 12:15 EDT

## 2024-05-25 DIAGNOSIS — I10 BENIGN ESSENTIAL HTN: ICD-10-CM

## 2024-05-28 RX ORDER — LISINOPRIL 20 MG/1
20 TABLET ORAL DAILY
Qty: 90 TABLET | Refills: 1 | Status: SHIPPED | OUTPATIENT
Start: 2024-05-28

## 2024-05-30 DIAGNOSIS — Z96.611 STATUS POST REVERSE TOTAL REPLACEMENT OF RIGHT SHOULDER: ICD-10-CM

## 2024-05-30 DIAGNOSIS — S43.014A ANTERIOR DISLOCATION OF RIGHT SHOULDER, INITIAL ENCOUNTER: ICD-10-CM

## 2024-05-30 RX ORDER — OXYCODONE HYDROCHLORIDE AND ACETAMINOPHEN 5; 325 MG/1; MG/1
1 TABLET ORAL EVERY 4 HOURS PRN
Qty: 18 TABLET | Refills: 0 | Status: SHIPPED | OUTPATIENT
Start: 2024-05-30

## 2024-05-30 RX ORDER — OXYCODONE HYDROCHLORIDE AND ACETAMINOPHEN 5; 325 MG/1; MG/1
1 TABLET ORAL EVERY 4 HOURS PRN
Qty: 18 TABLET | Refills: 0 | Status: CANCELLED | OUTPATIENT
Start: 2024-05-30

## 2024-05-30 NOTE — TELEPHONE ENCOUNTER
Rx Refill Note  Requested Prescriptions     Pending Prescriptions Disp Refills    oxyCODONE-acetaminophen (PERCOCET) 5-325 MG per tablet 18 tablet 0     Sig: Take 1 tablet by mouth Every 4 (Four) Hours As Needed for Severe Pain.      Last office visit with prescribing clinician: 12/15/2023      Next office visit with prescribing clinician: 6/6/2024   Last Filled: 5/23/2024    Encounter Diagnoses   Name Primary?    Anterior dislocation of right shoulder, initial encounter     Status post reverse total replacement of right shoulder       Date of Surgery:      Nya Sotelo MA  05/30/24, 14:34 EDT    Previous RX pended for your approval, change or denial.     {TIP  Encounters:    {TIP  Please add Last Relevant Lab Date if appropriate:  {TIP  Is Refill Pharmacy correct?:

## 2024-06-04 ENCOUNTER — TELEPHONE (OUTPATIENT)
Dept: GASTROENTEROLOGY | Facility: CLINIC | Age: 64
End: 2024-06-04
Payer: MEDICARE

## 2024-06-04 NOTE — TELEPHONE ENCOUNTER
FAST TRACK - 3 YEAR RECALL 09/2024  LAST COLONOSCOPY 09/02/2021  PERSONAL HISTORY POLY[S  FAMILY HISTORY CRC, MOTHER AGE 70'S  SCHEDULE AT [place].

## 2024-06-06 ENCOUNTER — OFFICE VISIT (OUTPATIENT)
Dept: ORTHOPEDIC SURGERY | Facility: CLINIC | Age: 64
End: 2024-06-06
Payer: MEDICARE

## 2024-06-06 VITALS — BODY MASS INDEX: 30.8 KG/M2 | HEIGHT: 74 IN | WEIGHT: 240 LBS

## 2024-06-06 DIAGNOSIS — Z96.611 STATUS POST REVERSE TOTAL REPLACEMENT OF RIGHT SHOULDER: ICD-10-CM

## 2024-06-06 DIAGNOSIS — S43.014A ANTERIOR DISLOCATION OF RIGHT SHOULDER, INITIAL ENCOUNTER: ICD-10-CM

## 2024-06-06 PROCEDURE — 99024 POSTOP FOLLOW-UP VISIT: CPT | Performed by: NURSE PRACTITIONER

## 2024-06-06 RX ORDER — OXYCODONE HYDROCHLORIDE AND ACETAMINOPHEN 5; 325 MG/1; MG/1
1 TABLET ORAL EVERY 4 HOURS PRN
Qty: 18 TABLET | Refills: 0 | Status: SHIPPED | OUTPATIENT
Start: 2024-06-06 | End: 2024-06-10 | Stop reason: SDUPTHER

## 2024-06-06 NOTE — PROGRESS NOTES
Subjective:     Patient ID: Sylwia Spears is a 64 y.o. male.    Chief Complaint:  History reverse total shoulder arthroplasty with open biceps tenodesis right shoulder 2023  Acute shoulder dislocation 2024 right shoulder  History of Present Illness  History of Present Illness    Sylwia Spears presents to clinic today 2 weeks post shoulder reduction.  He woke in his sleep with the shoulder dislocated 2024.  Presented to ER for evaluation he was sedated the shoulder was reduced he was fitted with sling and encouraged to follow-up in our office.  He is continued some gentle pendulum motions he discontinued the sling this past Monday approximately 4 days ago.  He is taking pain medication does require Refill.  He is experiencing pain with lifting pulling and pushing he has been directed seeing precaution as to avoid recurrent dislocation with the right upper extremity.  He is experiencing pain with motion especially reaching above his head reaching out to side again which she is avoided.  He is completely unable to reach behind his back he is also extremely limited when he is attempting to lift any objects as he is unable to with the right upper extremity.  He denies any presence of numbness or tingling is experience discomfort along the anterior aspect and under the axilla at the right upper extremity.  Denies any other concerns present.       Social History     Occupational History    Not on file   Tobacco Use    Smoking status: Former     Current packs/day: 0.00     Average packs/day: 1 pack/day for 47.0 years (47.0 ttl pk-yrs)     Types: Cigarettes     Start date:      Quit date: 2022     Years since quittin.5     Passive exposure: Past    Smokeless tobacco: Never   Vaping Use    Vaping status: Never Used   Substance and Sexual Activity    Alcohol use: No     Comment: rare    Drug use: Yes     Frequency: 7.0 times per week     Types: Marijuana    Sexual activity: Defer      Past  "Medical History:   Diagnosis Date    Cervical disc disease 2012    stenosis    Cirrhosis of liver     Colon polyp     Coronary artery calcification     Diabetes mellitus     Electrocution 2012    Fell off a ladder and has chronic neck pain    Hyperlipidemia     Hypertension     Spleen enlarged     Thrombocytopenia      Past Surgical History:   Procedure Laterality Date    CHOLECYSTECTOMY      COLONOSCOPY      COLONOSCOPY N/A 08/17/2018    Procedure: COLONOSCOPY, polypectomy;  Surgeon: Raleigh Champagne MD;  Location: [facility];  Service: Gastroenterology    COLONOSCOPY W/ POLYPECTOMY      COLONOSCOPY W/ POLYPECTOMY N/A 09/02/2021    Procedure: COLONOSCOPY; polypectomy;  Surgeon: Raleigh Champagne MD;  Location: [facility];  Service: Gastroenterology;  Laterality: N/A;  polyps   diverticulosis    LIPOMA EXCISION      groin    SHOULDER SURGERY Left     TOTAL SHOULDER ARTHROPLASTY W/ DISTAL CLAVICLE EXCISION Right 12/1/2023    Procedure: TOTAL SHOULDER REVERSE ARTHROPLASTY;  Surgeon: David Ozuna MD;  Location: [facility];  Service: Orthopedics;  Laterality: Right;       Family History   Problem Relation Age of Onset    Hypertension Mother     Heart disease Mother     Colon cancer Mother     Stroke Mother     Cancer Mother     Hypertension Father     Crohn's disease Brother                Objective:  Physical Exam    Vital signs reviewed.   General: No acute distress.  Eyes: conjunctiva clear; pupils equally round and reactive  ENT: external ears and nose atraumatic; oropharynx clear  CV: no peripheral edema  Resp: normal respiratory effort  Skin: no rashes or wounds; normal turgor  Psych: mood and affect appropriate; recent and remote memory intact    Vitals:    06/06/24 1133   Weight: 109 kg (240 lb)   Height: 188 cm (74\")         06/06/24  1133   Weight: 109 kg (240 lb)     Body mass index is 30.81 kg/m².      Ortho Exam     Physical Exam  Right upper extremity examined  Passive forward " flexion 90 degrees, external rotation 30 degrees  Internal rotation to side  Positive deltoid firing  Positive tenderness along the anterior glenohumeral joint  Flex/extend all digits right hand  Fine tremor right upper extremity    Assessment:        1. Anterior dislocation of right shoulder, initial encounter    2. Status post reverse total replacement of right shoulder         Assessment & Plan      Plan:  Discussed plan of care with patient.  I do agree discontinuing the sling.  We did discuss to start some pulley activity he does have a pulley at home with the right upper extremity.  We did discuss neutral position to avoid recurrent dislocation.  We did discuss the option of physical therapy however would like to hold off at this time due to the co-pay cost.  I will see him back in clinic in 2 weeks we will refill Percocet.  Encouraged to call with any questions or concerns gladly see him sooner in clinic if needed.  No x-ray images needed at follow-up.  All questions answered.  Orders:  No orders of the defined types were placed in this encounter.    New Medications Ordered This Visit   Medications    oxyCODONE-acetaminophen (PERCOCET) 5-325 MG per tablet     Sig: Take 1 tablet by mouth Every 4 (Four) Hours As Needed for Severe Pain.     Dispense:  18 tablet     Refill:  0           I ordered and reviewed the JUDI today.       Dragon dictation utilized

## 2024-06-07 DIAGNOSIS — Z86.010 PERSONAL HISTORY OF COLONIC POLYPS: Primary | ICD-10-CM

## 2024-06-10 DIAGNOSIS — S43.014A ANTERIOR DISLOCATION OF RIGHT SHOULDER, INITIAL ENCOUNTER: ICD-10-CM

## 2024-06-10 DIAGNOSIS — Z96.611 STATUS POST REVERSE TOTAL REPLACEMENT OF RIGHT SHOULDER: ICD-10-CM

## 2024-06-10 RX ORDER — OXYCODONE HYDROCHLORIDE AND ACETAMINOPHEN 5; 325 MG/1; MG/1
1 TABLET ORAL EVERY 4 HOURS PRN
Qty: 40 TABLET | Refills: 0 | Status: SHIPPED | OUTPATIENT
Start: 2024-06-10 | End: 2024-06-17 | Stop reason: SDUPTHER

## 2024-06-10 NOTE — TELEPHONE ENCOUNTER
Rx Refill Note  Requested Prescriptions     Pending Prescriptions Disp Refills    oxyCODONE-acetaminophen (PERCOCET) 5-325 MG per tablet 18 tablet 0     Sig: Take 1 tablet by mouth Every 4 (Four) Hours As Needed for Severe Pain.      Last office visit with prescribing clinician: 6/6/2024      Next office visit with prescribing clinician: 6/20/2024   Last Filled: 6/6/2024    Encounter Diagnoses   Name Primary?    Anterior dislocation of right shoulder, initial encounter     Status post reverse total replacement of right shoulder       Date of Surgery:      Nya Sotelo MA  06/10/24, 10:29 EDT    Previous RX pended for your approval, change or denial.     {TIP  Encounters:    {TIP  Please add Last Relevant Lab Date if appropriate:  {TIP  Is Refill Pharmacy correct?:

## 2024-06-17 DIAGNOSIS — S43.014A ANTERIOR DISLOCATION OF RIGHT SHOULDER, INITIAL ENCOUNTER: ICD-10-CM

## 2024-06-17 DIAGNOSIS — Z96.611 STATUS POST REVERSE TOTAL REPLACEMENT OF RIGHT SHOULDER: ICD-10-CM

## 2024-06-17 RX ORDER — OXYCODONE HYDROCHLORIDE AND ACETAMINOPHEN 5; 325 MG/1; MG/1
1 TABLET ORAL EVERY 4 HOURS PRN
Qty: 40 TABLET | Refills: 0 | Status: SHIPPED | OUTPATIENT
Start: 2024-06-17 | End: 2024-06-24 | Stop reason: SDUPTHER

## 2024-06-17 NOTE — TELEPHONE ENCOUNTER
Rx Refill Note  Requested Prescriptions     Pending Prescriptions Disp Refills    oxyCODONE-acetaminophen (PERCOCET) 5-325 MG per tablet 40 tablet 0     Sig: Take 1 tablet by mouth Every 4 (Four) Hours As Needed for Severe Pain.      Last office visit with prescribing clinician: 6/6/2024      Next office visit with prescribing clinician: 6/20/2024   Last Filled: 6/10/2024    Encounter Diagnoses   Name Primary?    Anterior dislocation of right shoulder, initial encounter     Status post reverse total replacement of right shoulder       Date of Surgery:      Nya Sotelo MA  06/17/24, 10:35 EDT    Previous RX pended for your approval, change or denial.     {TIP  Encounters:    {TIP  Please add Last Relevant Lab Date if appropriate:  {TIP  Is Refill Pharmacy correct?:

## 2024-06-20 ENCOUNTER — OFFICE VISIT (OUTPATIENT)
Dept: ORTHOPEDIC SURGERY | Facility: CLINIC | Age: 64
End: 2024-06-20
Payer: MEDICARE

## 2024-06-20 VITALS — HEIGHT: 74 IN | BODY MASS INDEX: 30.8 KG/M2 | WEIGHT: 240 LBS

## 2024-06-20 DIAGNOSIS — S43.014A ANTERIOR DISLOCATION OF RIGHT SHOULDER, INITIAL ENCOUNTER: Primary | ICD-10-CM

## 2024-06-20 RX ORDER — PREDNISONE 5 MG/1
TABLET ORAL
Qty: 21 TABLET | Refills: 0 | Status: SHIPPED | OUTPATIENT
Start: 2024-06-20

## 2024-06-20 NOTE — PROGRESS NOTES
Subjective:     Patient ID: Sylwia Spears is a 64 y.o. male.    Chief Complaint:  History reverse total shoulder arthroplasty with open biceps tenodesis right shoulder 2023  Acute shoulder dislocation 2024 right shoulder  History of Present Illness  History of Present Illness    Sylwia Spears Returns to clinic today with spouse for follow-up of his right upper extremity.  Pain has been much better controlled with the medication.  He is experiencing grinding sensation along the anterior aspect of the shoulder.  He has continued with pendulum exercises twice daily and some pulley exercises.  He has noted improvement in regards to his range of motion including reaching overhead.  He was experiencing a pain at the mid to distal wrist previously which seems to have subsided after the shoulder dislocated and was reduced.  Denies any presence of numbness or tingling at the right upper extremity.  He has discontinued sling.  Denies any other concerns present.    Social History     Occupational History    Not on file   Tobacco Use    Smoking status: Former     Current packs/day: 0.00     Average packs/day: 1 pack/day for 47.0 years (47.0 ttl pk-yrs)     Types: Cigarettes     Start date:      Quit date: 2022     Years since quittin.5     Passive exposure: Past    Smokeless tobacco: Never   Vaping Use    Vaping status: Never Used   Substance and Sexual Activity    Alcohol use: No     Comment: rare    Drug use: Yes     Frequency: 7.0 times per week     Types: Marijuana    Sexual activity: Defer      Past Medical History:   Diagnosis Date    Cervical disc disease     stenosis    Cirrhosis of liver     Colon polyp     Coronary artery calcification     Diabetes mellitus     Electrocution 2012    Fell off a ladder and has chronic neck pain    Hyperlipidemia     Hypertension     Spleen enlarged     Thrombocytopenia      Past Surgical History:   Procedure Laterality Date    CHOLECYSTECTOMY       "COLONOSCOPY      COLONOSCOPY N/A 08/17/2018    Procedure: COLONOSCOPY, polypectomy;  Surgeon: Raleigh Champagne MD;  Location:  [facility] OR;  Service: Gastroenterology    COLONOSCOPY W/ POLYPECTOMY      COLONOSCOPY W/ POLYPECTOMY N/A 09/02/2021    Procedure: COLONOSCOPY; polypectomy;  Surgeon: Raleigh Champagne MD;  Location: [facility] OR;  Service: Gastroenterology;  Laterality: N/A;  polyps   diverticulosis    LIPOMA EXCISION      groin    SHOULDER SURGERY Left     TOTAL SHOULDER ARTHROPLASTY W/ DISTAL CLAVICLE EXCISION Right 12/1/2023    Procedure: TOTAL SHOULDER REVERSE ARTHROPLASTY;  Surgeon: David Ozuna MD;  Location: [facility] OR;  Service: Orthopedics;  Laterality: Right;       Family History   Problem Relation Age of Onset    Hypertension Mother     Heart disease Mother     Colon cancer Mother     Stroke Mother     Cancer Mother     Hypertension Father     Crohn's disease Brother                Objective:  Physical Exam    General: No acute distress.  Eyes: conjunctiva clear; pupils equally round and reactive  ENT: external ears and nose atraumatic; oropharynx clear  CV: no peripheral edema  Resp: normal respiratory effort  Skin: no rashes or wounds; normal turgor  Psych: mood and affect appropriate; recent and remote memory intact    Vitals:    06/20/24 1516   Weight: 109 kg (240 lb)   Height: 188 cm (74\")         06/20/24  1516   Weight: 109 kg (240 lb)     Body mass index is 30.81 kg/m².      Ortho Exam     Physical Exam  Right shoulder examined  Active forward flexion 145 degrees  External rotation 30 degrees  Positive deltoid firing  Positive sensation light touch all distributions right upper extremity  Flex/extend all digits right hand    Assessment:        1. Anterior dislocation of right shoulder, initial encounter         Assessment & Plan      Plan:  1.  Discussed with of care with patient and family.  We did discuss to continue strengthening exercises he can begin using " resistance band.  We did discuss again neutral position to avoid anterior dislocation  I will see him back in clinic 4 weeks to reevaluate.  Encouraged to call with any questions or concerns that he has.  We did discuss marina starting oral steroid taper to see if this would offer him any significant symptom resolution for pain at the anterior aspect of the shoulder.  All questions answered.  Orders:  No orders of the defined types were placed in this encounter.    New Medications Ordered This Visit   Medications    predniSONE (DELTASONE) 5 MG tablet     Si tabs day 1, 5 tabs day 2, 4 tabs day 3, 3 tabs day 4, 2 tabs day 5, 1 tab day 6     Dispense:  21 tablet     Refill:  0           I ordered and reviewed the JUDI today.       Dragon dictation utilized

## 2024-06-24 DIAGNOSIS — S43.014A ANTERIOR DISLOCATION OF RIGHT SHOULDER, INITIAL ENCOUNTER: ICD-10-CM

## 2024-06-24 DIAGNOSIS — Z96.611 STATUS POST REVERSE TOTAL REPLACEMENT OF RIGHT SHOULDER: ICD-10-CM

## 2024-06-24 RX ORDER — OXYCODONE HYDROCHLORIDE AND ACETAMINOPHEN 5; 325 MG/1; MG/1
1 TABLET ORAL EVERY 4 HOURS PRN
Qty: 40 TABLET | Refills: 0 | Status: SHIPPED | OUTPATIENT
Start: 2024-06-24 | End: 2024-07-01 | Stop reason: SDUPTHER

## 2024-06-24 NOTE — TELEPHONE ENCOUNTER
Rx Refill Note  Requested Prescriptions     Pending Prescriptions Disp Refills    oxyCODONE-acetaminophen (PERCOCET) 5-325 MG per tablet 40 tablet 0     Sig: Take 1 tablet by mouth Every 4 (Four) Hours As Needed for Severe Pain.      Last office visit with prescribing clinician: 6/20/2024      Next office visit with prescribing clinician: 7/23/2024   Last Filled: 6/17/2024    Encounter Diagnoses   Name Primary?    Anterior dislocation of right shoulder, initial encounter     Status post reverse total replacement of right shoulder       Date of Surgery:      Nya Sotelo MA  06/24/24, 09:59 EDT    Previous RX pended for your approval, change or denial.     {TIP  Encounters:    {TIP  Please add Last Relevant Lab Date if appropriate:  {TIP  Is Refill Pharmacy correct?:

## 2024-07-01 DIAGNOSIS — Z96.611 STATUS POST REVERSE TOTAL REPLACEMENT OF RIGHT SHOULDER: ICD-10-CM

## 2024-07-01 DIAGNOSIS — S43.014A ANTERIOR DISLOCATION OF RIGHT SHOULDER, INITIAL ENCOUNTER: ICD-10-CM

## 2024-07-01 RX ORDER — OXYCODONE HYDROCHLORIDE AND ACETAMINOPHEN 5; 325 MG/1; MG/1
1 TABLET ORAL EVERY 4 HOURS PRN
Qty: 18 TABLET | Refills: 0 | Status: SHIPPED | OUTPATIENT
Start: 2024-07-01 | End: 2024-07-05 | Stop reason: SDUPTHER

## 2024-07-01 NOTE — TELEPHONE ENCOUNTER
Rx Refill Note  Requested Prescriptions      No prescriptions requested or ordered in this encounter      Last office visit with prescribing clinician: 6/20/2024      Next office visit with prescribing clinician: 7/23/2024   Last Filled: 6/24/2024     Status post reverse total replacement of right shoulder     Nya BRISEIDA Sotelo  07/01/24, 09:02 EDT    Previous RX pended for your approval, change or denial.     {TIP  Encounters:    {TIP  Please add Last Relevant Lab Date if appropriate:  {TIP  Is Refill Pharmacy correct?:

## 2024-07-02 DIAGNOSIS — Z12.2 SCREENING FOR LUNG CANCER: Primary | ICD-10-CM

## 2024-07-03 DIAGNOSIS — I10 BENIGN ESSENTIAL HTN: ICD-10-CM

## 2024-07-03 DIAGNOSIS — E78.5 HYPERLIPIDEMIA LDL GOAL <70: ICD-10-CM

## 2024-07-03 DIAGNOSIS — D69.6 THROMBOCYTOPENIA: ICD-10-CM

## 2024-07-03 DIAGNOSIS — E11.9 TYPE 2 DIABETES MELLITUS WITHOUT COMPLICATION, WITHOUT LONG-TERM CURRENT USE OF INSULIN: ICD-10-CM

## 2024-07-05 DIAGNOSIS — Z96.611 STATUS POST REVERSE TOTAL REPLACEMENT OF RIGHT SHOULDER: ICD-10-CM

## 2024-07-05 DIAGNOSIS — S43.014A ANTERIOR DISLOCATION OF RIGHT SHOULDER, INITIAL ENCOUNTER: ICD-10-CM

## 2024-07-05 RX ORDER — OXYCODONE HYDROCHLORIDE AND ACETAMINOPHEN 5; 325 MG/1; MG/1
1 TABLET ORAL EVERY 4 HOURS PRN
Qty: 18 TABLET | Refills: 0 | Status: SHIPPED | OUTPATIENT
Start: 2024-07-05

## 2024-07-05 NOTE — TELEPHONE ENCOUNTER
Rx Refill Note  Requested Prescriptions      No prescriptions requested or ordered in this encounter      Last office visit with prescribing clinician: [id number] date not found      Next office visit with prescribing clinician: [id number] date not found   Last Filled:07-01-24    No diagnosis found. Anterior dislocation of right shoulder   Date of Surgery:      Khushboo Jim MA  07/05/24, 09:42 EDT    Previous RX pended for your approval, change or denial.     {TIP  Encounters:    {TIP  Please add Last Relevant Lab Date if appropriate:  {TIP  Is Refill Pharmacy correct?:

## 2024-07-08 DIAGNOSIS — Z96.611 STATUS POST REVERSE TOTAL REPLACEMENT OF RIGHT SHOULDER: ICD-10-CM

## 2024-07-08 DIAGNOSIS — S43.014A ANTERIOR DISLOCATION OF RIGHT SHOULDER, INITIAL ENCOUNTER: ICD-10-CM

## 2024-07-08 DIAGNOSIS — S43.014A ANTERIOR DISLOCATION OF RIGHT SHOULDER, INITIAL ENCOUNTER: Primary | ICD-10-CM

## 2024-07-08 RX ORDER — OXYCODONE HYDROCHLORIDE AND ACETAMINOPHEN 5; 325 MG/1; MG/1
1 TABLET ORAL EVERY 4 HOURS PRN
Qty: 42 TABLET | Refills: 0 | Status: SHIPPED | OUTPATIENT
Start: 2024-07-08 | End: 2024-07-15 | Stop reason: SDUPTHER

## 2024-07-08 RX ORDER — OXYCODONE HYDROCHLORIDE AND ACETAMINOPHEN 5; 325 MG/1; MG/1
1 TABLET ORAL EVERY 4 HOURS PRN
Qty: 18 TABLET | Refills: 0 | OUTPATIENT
Start: 2024-07-08

## 2024-07-08 NOTE — TELEPHONE ENCOUNTER
If he is still needing narcotic pain medication, he needs to come into the office for a follow-up first

## 2024-07-08 NOTE — TELEPHONE ENCOUNTER
Rx Refill Note  Requested Prescriptions     Pending Prescriptions Disp Refills    oxyCODONE-acetaminophen (PERCOCET) 5-325 MG per tablet 18 tablet 0     Sig: Take 1 tablet by mouth Every 4 (Four) Hours As Needed for Severe Pain.      Last office visit with prescribing clinician: 6/20/2024      Next office visit with prescribing clinician: 7/23/2024   Last Filled: 7/5/2024    Encounter Diagnoses   Name Primary?    Anterior dislocation of right shoulder, initial encounter     Status post reverse total replacement of right shoulder       Date of Surgery:      Nya Sotelo MA  07/08/24, 13:16 EDT    Previous RX pended for your approval, change or denial.     {TIP  Encounters:    {TIP  Please add Last Relevant Lab Date if appropriate:  {TIP  Is Refill Pharmacy correct?:

## 2024-07-08 NOTE — TELEPHONE ENCOUNTER
Rx Refill Note  Requested Prescriptions     Pending Prescriptions Disp Refills    oxyCODONE-acetaminophen (PERCOCET) 5-325 MG per tablet 18 tablet 0     Sig: Take 1 tablet by mouth Every 4 (Four) Hours As Needed for Severe Pain.      Last office visit with prescribing clinician: 6/20/2024      Next office visit with prescribing clinician: 7/23/2024   Last Filled: 7/5/2024    Encounter Diagnoses   Name Primary?    Anterior dislocation of right shoulder, initial encounter     Status post reverse total replacement of right shoulder         Nya Sotelo MA  07/08/24, 11:58 EDT    Previous RX pended for your approval, change or denial.     {TIP  Encounters:    {TIP  Please add Last Relevant Lab Date if appropriate:  {TIP  Is Refill Pharmacy correct?:

## 2024-07-10 ENCOUNTER — HOSPITAL ENCOUNTER (EMERGENCY)
Facility: HOSPITAL | Age: 64
Discharge: HOME | End: 2024-07-10
Attending: EMERGENCY MEDICINE
Payer: MEDICARE

## 2024-07-10 ENCOUNTER — APPOINTMENT (OUTPATIENT)
Dept: GENERAL RADIOLOGY | Facility: HOSPITAL | Age: 64
End: 2024-07-10
Payer: MEDICARE

## 2024-07-10 VITALS
DIASTOLIC BLOOD PRESSURE: 77 MMHG | TEMPERATURE: 98.3 F | HEART RATE: 60 BPM | OXYGEN SATURATION: 96 % | BODY MASS INDEX: 30.29 KG/M2 | RESPIRATION RATE: 18 BRPM | WEIGHT: 236 LBS | HEIGHT: 74 IN | SYSTOLIC BLOOD PRESSURE: 146 MMHG

## 2024-07-10 DIAGNOSIS — S43.004A DISLOCATION OF RIGHT SHOULDER JOINT, INITIAL ENCOUNTER: Primary | ICD-10-CM

## 2024-07-10 PROCEDURE — 96374 THER/PROPH/DIAG INJ IV PUSH: CPT

## 2024-07-10 PROCEDURE — 99213 OFFICE O/P EST LOW 20 MIN: CPT | Performed by: INTERNAL MEDICINE

## 2024-07-10 PROCEDURE — 73030 X-RAY EXAM OF SHOULDER: CPT

## 2024-07-10 PROCEDURE — 25010000002 PROPOFOL 10 MG/ML EMULSION: Performed by: NURSE PRACTITIONER

## 2024-07-10 PROCEDURE — 96375 TX/PRO/DX INJ NEW DRUG ADDON: CPT

## 2024-07-10 PROCEDURE — 25010000002 ONDANSETRON PER 1 MG: Performed by: NURSE PRACTITIONER

## 2024-07-10 PROCEDURE — 99285 EMERGENCY DEPT VISIT HI MDM: CPT

## 2024-07-10 PROCEDURE — 25010000002 MORPHINE PER 10 MG: Performed by: NURSE PRACTITIONER

## 2024-07-10 PROCEDURE — 94799 UNLISTED PULMONARY SVC/PX: CPT

## 2024-07-10 PROCEDURE — 23655 CLTX SHO DSLC W/MNPJ W/ANES: CPT | Performed by: INTERNAL MEDICINE

## 2024-07-10 RX ORDER — PROPOFOL 10 MG/ML
VIAL (ML) INTRAVENOUS
Status: COMPLETED | OUTPATIENT
Start: 2024-07-10 | End: 2024-07-10

## 2024-07-10 RX ORDER — PROPOFOL 10 MG/ML
VIAL (ML) INTRAVENOUS
Status: DISCONTINUED
Start: 2024-07-10 | End: 2024-07-10 | Stop reason: HOSPADM

## 2024-07-10 RX ORDER — ETOMIDATE 2 MG/ML
10.7 INJECTION INTRAVENOUS ONCE
Status: DISCONTINUED | OUTPATIENT
Start: 2024-07-10 | End: 2024-07-10 | Stop reason: HOSPADM

## 2024-07-10 RX ORDER — PROPOFOL 10 MG/ML
200 VIAL (ML) INTRAVENOUS ONCE
Status: DISCONTINUED | OUTPATIENT
Start: 2024-07-10 | End: 2024-07-10 | Stop reason: HOSPADM

## 2024-07-10 RX ORDER — PROPRANOLOL HYDROCHLORIDE 1 MG/ML
200 INJECTION, SOLUTION INTRAVENOUS ONCE
Status: DISCONTINUED | OUTPATIENT
Start: 2024-07-10 | End: 2024-07-10

## 2024-07-10 RX ORDER — HYDROCODONE BITARTRATE AND ACETAMINOPHEN 5; 325 MG/1; MG/1
1 TABLET ORAL ONCE
Status: COMPLETED | OUTPATIENT
Start: 2024-07-10 | End: 2024-07-10

## 2024-07-10 RX ORDER — SODIUM CHLORIDE 0.9 % (FLUSH) 0.9 %
10 SYRINGE (ML) INJECTION AS NEEDED
Status: DISCONTINUED | OUTPATIENT
Start: 2024-07-10 | End: 2024-07-10 | Stop reason: HOSPADM

## 2024-07-10 RX ORDER — ETOMIDATE 2 MG/ML
INJECTION INTRAVENOUS
Status: COMPLETED | OUTPATIENT
Start: 2024-07-10 | End: 2024-07-10

## 2024-07-10 RX ORDER — ETOMIDATE 2 MG/ML
9.7 INJECTION INTRAVENOUS ONCE
Status: DISCONTINUED | OUTPATIENT
Start: 2024-07-10 | End: 2024-07-10 | Stop reason: HOSPADM

## 2024-07-10 RX ORDER — ONDANSETRON 2 MG/ML
4 INJECTION INTRAMUSCULAR; INTRAVENOUS ONCE
Status: COMPLETED | OUTPATIENT
Start: 2024-07-10 | End: 2024-07-10

## 2024-07-10 RX ADMIN — HYDROCODONE BITARTRATE AND ACETAMINOPHEN 1 TABLET: 5; 325 TABLET ORAL at 13:42

## 2024-07-10 RX ADMIN — PROPOFOL 100 MG: 10 INJECTION, EMULSION INTRAVENOUS at 15:29

## 2024-07-10 RX ADMIN — MORPHINE SULFATE 4 MG: 4 INJECTION, SOLUTION INTRAMUSCULAR; INTRAVENOUS at 14:51

## 2024-07-10 RX ADMIN — PROPOFOL 50 MG: 10 INJECTION, EMULSION INTRAVENOUS at 15:31

## 2024-07-10 RX ADMIN — ETOMIDATE 10.7 MG: 2 INJECTION, SOLUTION INTRAVENOUS at 15:18

## 2024-07-10 RX ADMIN — ETOMIDATE 9.3 MG: 2 INJECTION, SOLUTION INTRAVENOUS at 15:21

## 2024-07-10 RX ADMIN — ONDANSETRON 4 MG: 2 INJECTION INTRAMUSCULAR; INTRAVENOUS at 14:50

## 2024-07-10 RX ADMIN — PROPOFOL 50 MG: 10 INJECTION, EMULSION INTRAVENOUS at 15:38

## 2024-07-10 RX ADMIN — PROPOFOL 50 MG: 10 INJECTION, EMULSION INTRAVENOUS at 15:40

## 2024-07-10 NOTE — ED PROVIDER NOTES
"Subjective   History of Present Illness  Patient is 64-year-old male who presents complaining of possible right shoulder dislocation after attempting to move a stepladder.  States it was not that heavy.  Reports it is an artificial shoulder.  States he dislocated it back in May \"in my sleep\".  Complains of being unable to move it and pain.      Review of Systems   Musculoskeletal:  Positive for arthralgias.   All other systems reviewed and are negative.      Past Medical History:   Diagnosis Date    Cervical disc disease 2012    stenosis    Cirrhosis of liver     Colon polyp     Coronary artery calcification     Diabetes mellitus     Electrocution 2012    Fell off a ladder and has chronic neck pain    Hyperlipidemia     Hypertension     Spleen enlarged     Thrombocytopenia        No Known Allergies    Past Surgical History:   Procedure Laterality Date    CHOLECYSTECTOMY      COLONOSCOPY      COLONOSCOPY N/A 08/17/2018    Procedure: COLONOSCOPY, polypectomy;  Surgeon: Raleigh Champagne MD;  Location: [facility] OR;  Service: Gastroenterology    COLONOSCOPY W/ POLYPECTOMY      COLONOSCOPY W/ POLYPECTOMY N/A 09/02/2021    Procedure: COLONOSCOPY; polypectomy;  Surgeon: Raleigh Champagne MD;  Location: [facility] OR;  Service: Gastroenterology;  Laterality: N/A;  polyps   diverticulosis    LIPOMA EXCISION      groin    SHOULDER SURGERY Left     TOTAL SHOULDER ARTHROPLASTY W/ DISTAL CLAVICLE EXCISION Right 12/1/2023    Procedure: TOTAL SHOULDER REVERSE ARTHROPLASTY;  Surgeon: David Ozuna MD;  Location: [facility] OR;  Service: Orthopedics;  Laterality: Right;       Family History   Problem Relation Age of Onset    Hypertension Mother     Heart disease Mother     Colon cancer Mother     Stroke Mother     Cancer Mother     Hypertension Father     Crohn's disease Brother        Social History     Socioeconomic History    Marital status:    Tobacco Use    Smoking status: Former     Current packs/day: " 0.00     Average packs/day: 1 pack/day for 47.0 years (47.0 ttl pk-yrs)     Types: Cigarettes     Start date:      Quit date: 2022     Years since quittin.6     Passive exposure: Past    Smokeless tobacco: Never   Vaping Use    Vaping status: Never Used   Substance and Sexual Activity    Alcohol use: No     Comment: rare    Drug use: Yes     Frequency: 7.0 times per week     Types: Marijuana    Sexual activity: Defer           Objective   Physical Exam  Vitals and nursing note reviewed.   Constitutional:       Appearance: Normal appearance.   HENT:      Head: Normocephalic and atraumatic.   Pulmonary:      Effort: Pulmonary effort is normal.   Musculoskeletal:         General: Tenderness and deformity present.      Cervical back: Normal range of motion and neck supple.   Skin:     General: Skin is warm and dry.      Capillary Refill: Capillary refill takes less than 2 seconds.   Neurological:      General: No focal deficit present.      Mental Status: He is alert and oriented to person, place, and time.         Procedures           ED Course                                             Medical Decision Making  See orthopedic note for reduction.  Patient recovered well from conscious sedation, was placed in a sling, he is to follow-up with his orthopedic surgeon as an outpatient.    Amount and/or Complexity of Data Reviewed  Radiology: ordered.    Risk  Prescription drug management.        Final diagnoses:   Dislocation of right shoulder joint, initial encounter       ED Disposition  ED Disposition       ED Disposition   Discharge    Condition   Stable    Comment   --               David Ozuna MD  [address] [address]  [phone]    Schedule an appointment as soon as possible for a visit            Medication List      No changes were made to your prescriptions during this visit.            Lurdes Hull, APRJAYCE  07/10/24 0124

## 2024-07-10 NOTE — CONSULTS
Orthopedic Consult      Patient: Sylwia Spears    Date of Admission: 7/10/2024  1:24 PM    YOB: 1960    Medical Record Number: [id number]    Attending Physician: Andrews Bahena MD  Consulting Physician:       Chief Complaints: No admission diagnoses are documented for this encounter.      History of Present Illness: 64 y.o. male admitted to [facility] to services of Andrews Bahena MD with No admission diagnoses are documented for this encounter..    The patient states he had a shoulder replacement by Dr. Ozuna last December.  He has had one dislocation in his sleep. He states today he moved his arm to move a step stool and felt a pain and a pop in his Shoulder.  He had immediate pain, deformity, and decreased shoulder ROM.  He came to [facility] for further evaluation and management. He denies aurora fevers chills drainage from incisions or any numbness in his hand or fingers.     No Known Allergies    Medications:   Home Medications:  (Not in a hospital admission)      Current Medications:  Scheduled Meds:etomidate, 10.7 mg, Intravenous, Once      Continuous Infusions:   PRN Meds:.  [COMPLETED] Insert Peripheral IV **AND** sodium chloride       Past Medical History:   Diagnosis Date    Cervical disc disease 2012    stenosis    Cirrhosis of liver     Colon polyp     Coronary artery calcification     Diabetes mellitus     Electrocution 2012    Fell off a ladder and has chronic neck pain    Hyperlipidemia     Hypertension     Spleen enlarged     Thrombocytopenia         Past Surgical History:   Procedure Laterality Date    CHOLECYSTECTOMY      COLONOSCOPY      COLONOSCOPY N/A 08/17/2018    Procedure: COLONOSCOPY, polypectomy;  Surgeon: Raleigh Champagne MD;  Location: [facility] OR;  Service: Gastroenterology    COLONOSCOPY W/ POLYPECTOMY      COLONOSCOPY W/ POLYPECTOMY N/A 09/02/2021    Procedure: COLONOSCOPY; polypectomy;  Surgeon: Raleigh Champagne MD;  Location: [facility] OR;   Service: Gastroenterology;  Laterality: N/A;  polyps   diverticulosis    LIPOMA EXCISION      groin    SHOULDER SURGERY Left     TOTAL SHOULDER ARTHROPLASTY W/ DISTAL CLAVICLE EXCISION Right 2023    Procedure: TOTAL SHOULDER REVERSE ARTHROPLASTY;  Surgeon: David Ozuna MD;  Location: [facility];  Service: Orthopedics;  Laterality: Right;        Social History     Occupational History    Not on file   Tobacco Use    Smoking status: Former     Current packs/day: 0.00     Average packs/day: 1 pack/day for 47.0 years (47.0 ttl pk-yrs)     Types: Cigarettes     Start date:      Quit date: 2022     Years since quittin.6     Passive exposure: Past    Smokeless tobacco: Never   Vaping Use    Vaping status: Never Used   Substance and Sexual Activity    Alcohol use: No     Comment: rare    Drug use: Yes     Frequency: 7.0 times per week     Types: Marijuana    Sexual activity: Defer      Social History     Social History Narrative    He is . He is on disability due to neck pain from an injury , where he was electrocuted. He is an .        Family History   Problem Relation Age of Onset    Hypertension Mother     Heart disease Mother     Colon cancer Mother     Stroke Mother     Cancer Mother     Hypertension Father     Crohn's disease Brother          Review of Systems:   HEENT: Patient denies any headaches, vision changes, change in hearing, or tinnitus, Patient denies any rhinorrhea, epistaxis, sinus pain, mouth or dental problems, sore throat or hoarseness, or dysphagia  Pulmonary: Patient denies any cough, congestion, SOA, or wheezing  Cardiovascular: Patient denies any chest pain, dyspnea, palpitations, weakness, intolerance of exercise, varicosities, swelling of extremities, known murmur  Gastrointestinal:  Patient denies nausea, vomiting, diarrhea, constipation, loss of appetite, change in appetite, dysphagia, gas, heartburn, melena, change in bowel habits, use of laxatives or  "other drugs to alter the function of the gastrointestinal tract.  Genital/Urinary: Patient denies dysuria, change in color of urine, change in frequency of urination, pain with urgency, incontinence, retention, or nocturia.  Musculoskeletal: Patient denies increased warmth; redness; or swelling of joints; limitation of function; deformity; crepitation: notes pain in right shoulder as documented above in HPI.  Denies pain in low back or neck.    Neurological: Patient denies dizziness, tremor, ataxia, difficulty in speaking, change in speech, paresthesia, loss of sensation, seizures, syncope, changes in memory.  Endocrine system: Patient denies tremors, palpitations, intolerance of heat or cold, polyuria, polydipsia, polyphagia, diaphoresis, exophthalmos, or goiter.  Psychological: Patient denies thoughts/plans of harming self or other; depression, insomnia, night terrors, ani, memory loss, disorientation.  Skin: Patient denies any bruising, rashes, discoloration, pruritus, wounds, ulcers, decubiti, changes in the hair or nails  Hematopoietic: Patient denies history of spontaneous or excessive bleeding, epistaxis, hematuria, melena, fatigue, enlarged or tender lymph nodes, pallor, history of anemia.    Physical Exam: 64 y.o. male  Vitals:    07/10/24 1330 07/10/24 1333 07/10/24 1336 07/10/24 1504   BP:   148/83 144/84   Pulse: 77   76   Resp: 22   16   Temp:  98.3 °F (36.8 °C)     TempSrc:  Oral     SpO2:   95% 95%   Weight: 107 kg (236 lb)      Height: 188 cm (74\")        General: No acute distress  Pulmonary: Unlabored breathing  Cardiovascular: regular rate and rhythm  RUE  2+ radial pulse  Sensation normal  Incision healed without signs of infection  Anterior deformity to shoulder      Diagnostic Tests:  No visits with results within 2 Day(s) from this visit.   Latest known visit with results is:   Orders Only on 01/03/2024   Component Date Value Ref Range Status    Hemoglobin A1C 01/11/2024 5.90 (H)  4.80 - " 5.60 % Final    Comment: Hemoglobin A1C Ranges:  Increased Risk for Diabetes  5.7% to 6.4%  Diabetes                     >= 6.5%  Diabetic Goal                < 7.0%      Total Cholesterol 01/11/2024 131  0 - 200 mg/dL Final    Comment: Cholesterol Reference Ranges  (U.S. Department of Health and Human Services ATP III  Classifications)  Desirable          <200 mg/dL  Borderline High    200-239 mg/dL  High Risk          >240 mg/dL  Triglyceride Reference Ranges  (U.S. Department of Health and Human Services ATP III  Classifications)  Normal           <150 mg/dL  Borderline High  150-199 mg/dL  High             200-499 mg/dL  Very High        >500 mg/dL  HDL Reference Ranges  (U.S. Department of Health and Human Services ATP III  Classifications)  Low     <40 mg/dl (major risk factor for CHD)  High    >60 mg/dl ('negative' risk factor for CHD)  LDL Reference Ranges  (U.S. Department of Health and Human Services ATP III  Classifications)  Optimal          <100 mg/dL  Near Optimal     100-129 mg/dL  Borderline High  130-159 mg/dL  High             160-189 mg/dL  Very High        >189 mg/dL      Triglycerides 01/11/2024 143  0 - 150 mg/dL Final    HDL Cholesterol 01/11/2024 43  40 - 60 mg/dL Final    VLDL Cholesterol Dandre 01/11/2024 25  5 - 40 mg/dL Final    LDL Chol Calc (NIH) 01/11/2024 63  0 - 100 mg/dL Final    Chol/HDL Ratio 01/11/2024 3.05   Final    Glucose 01/11/2024 114 (H)  65 - 99 mg/dL Final    BUN 01/11/2024 22  8 - 23 mg/dL Final    Creatinine 01/11/2024 0.91  0.76 - 1.27 mg/dL Final    EGFR Result 01/11/2024 95.3  >60.0 mL/min/1.73 Final    Comment: GFR Normal >60  Chronic Kidney Disease <60  Kidney Failure <15      BUN/Creatinine Ratio 01/11/2024 24.2  7.0 - 25.0 Final    Sodium 01/11/2024 143  136 - 145 mmol/L Final    Potassium 01/11/2024 4.5  3.5 - 5.2 mmol/L Final    Chloride 01/11/2024 106  98 - 107 mmol/L Final    Total CO2 01/11/2024 24.1  22.0 - 29.0 mmol/L Final    Calcium 01/11/2024 10.0  8.6 -  10.5 mg/dL Final    Total Protein 01/11/2024 6.8  6.0 - 8.5 g/dL Final    Albumin 01/11/2024 4.7  3.5 - 5.2 g/dL Final    Globulin 01/11/2024 2.1  gm/dL Final    A/G Ratio 01/11/2024 2.2  g/dL Final    Total Bilirubin 01/11/2024 0.4  0.0 - 1.2 mg/dL Final    Alkaline Phosphatase 01/11/2024 69  39 - 117 U/L Final    AST (SGOT) 01/11/2024 30  1 - 40 U/L Final    ALT (SGPT) 01/11/2024 36  1 - 41 U/L Final    WBC 01/11/2024 5.50  3.40 - 10.80 10*3/mm3 Final    RBC 01/11/2024 5.21  4.14 - 5.80 10*6/mm3 Final    Hemoglobin 01/11/2024 15.8  13.0 - 17.7 g/dL Final    Hematocrit 01/11/2024 46.3  37.5 - 51.0 % Final    MCV 01/11/2024 88.9  79.0 - 97.0 fL Final    MCH 01/11/2024 30.3  26.6 - 33.0 pg Final    MCHC 01/11/2024 34.1  31.5 - 35.7 g/dL Final    RDW 01/11/2024 13.0  12.3 - 15.4 % Final    Platelets 01/11/2024 112 (L)  140 - 450 10*3/mm3 Final    Neutrophil Rel % 01/11/2024 50.6  42.7 - 76.0 % Final    Lymphocyte Rel % 01/11/2024 36.9  19.6 - 45.3 % Final    Monocyte Rel % 01/11/2024 7.6  5.0 - 12.0 % Final    Eosinophil Rel % 01/11/2024 4.0  0.3 - 6.2 % Final    Basophil Rel % 01/11/2024 0.7  0.0 - 1.5 % Final    Neutrophils Absolute 01/11/2024 2.78  1.70 - 7.00 10*3/mm3 Final    Lymphocytes Absolute 01/11/2024 2.03  0.70 - 3.10 10*3/mm3 Final    Monocytes Absolute 01/11/2024 0.42  0.10 - 0.90 10*3/mm3 Final    Eosinophils Absolute 01/11/2024 0.22  0.00 - 0.40 10*3/mm3 Final    Basophils Absolute 01/11/2024 0.04  0.00 - 0.20 10*3/mm3 Final    Immature Granulocyte Rel % 01/11/2024 0.2  0.0 - 0.5 % Final    Immature Grans Absolute 01/11/2024 0.01  0.00 - 0.05 10*3/mm3 Final     XR Shoulder 2+ View Right    Result Date: 7/10/2024  Impression: Impression: Reverse right shoulder arthroplasty with anterior dislocation of the humeral component. Electronically Signed: Ryan Issa MD  7/10/2024 2:09 PM EDT  Workstation ID: [id number]     Assessment:    * No active hospital problems. *           Plan:    64 yoM with R RTSA  anterior dislocation  R shoulder reduced under concious sedation (per ER provider)  Post reduction films show shoulder reduced  Follow up with Dr. Ozuna this week  Recommend sling in mean time  Ok to discharge home         Josh Grossman MD      Dictated utilizing Dragon dictation

## 2024-07-11 ENCOUNTER — PATIENT OUTREACH (OUTPATIENT)
Dept: CASE MANAGEMENT | Facility: OTHER | Age: 64
End: 2024-07-11
Payer: MEDICARE

## 2024-07-11 ENCOUNTER — TELEPHONE (OUTPATIENT)
Dept: ORTHOPEDIC SURGERY | Facility: CLINIC | Age: 64
End: 2024-07-11

## 2024-07-11 LAB
ALBUMIN SERPL-MCNC: 4.5 G/DL (ref 3.5–5.2)
ALBUMIN/GLOB SERPL: 2.1 G/DL
ALP SERPL-CCNC: 54 U/L (ref 39–117)
ALT SERPL-CCNC: 30 U/L (ref 1–41)
AST SERPL-CCNC: 27 U/L (ref 1–40)
BASOPHILS # BLD AUTO: 0.03 10*3/MM3 (ref 0–0.2)
BASOPHILS NFR BLD AUTO: 0.6 % (ref 0–1.5)
BILIRUB SERPL-MCNC: 0.3 MG/DL (ref 0–1.2)
BUN SERPL-MCNC: 21 MG/DL (ref 8–23)
BUN/CREAT SERPL: 22.1 (ref 7–25)
CALCIUM SERPL-MCNC: 9.8 MG/DL (ref 8.6–10.5)
CHLORIDE SERPL-SCNC: 106 MMOL/L (ref 98–107)
CHOLEST SERPL-MCNC: 111 MG/DL (ref 0–200)
CHOLEST/HDLC SERPL: 2.85 {RATIO}
CO2 SERPL-SCNC: 22.5 MMOL/L (ref 22–29)
CREAT SERPL-MCNC: 0.95 MG/DL (ref 0.76–1.27)
EGFRCR SERPLBLD CKD-EPI 2021: 89.9 ML/MIN/1.73
EOSINOPHIL # BLD AUTO: 0.18 10*3/MM3 (ref 0–0.4)
EOSINOPHIL NFR BLD AUTO: 3.8 % (ref 0.3–6.2)
ERYTHROCYTE [DISTWIDTH] IN BLOOD BY AUTOMATED COUNT: 14.1 % (ref 12.3–15.4)
GLOBULIN SER CALC-MCNC: 2.1 GM/DL
GLUCOSE SERPL-MCNC: 119 MG/DL (ref 65–99)
HBA1C MFR BLD: 6.2 % (ref 4.8–5.6)
HCT VFR BLD AUTO: 46.5 % (ref 37.5–51)
HDLC SERPL-MCNC: 39 MG/DL (ref 40–60)
HGB BLD-MCNC: 15.3 G/DL (ref 13–17.7)
IMM GRANULOCYTES # BLD AUTO: 0 10*3/MM3 (ref 0–0.05)
IMM GRANULOCYTES NFR BLD AUTO: 0 % (ref 0–0.5)
LDLC SERPL CALC-MCNC: 54 MG/DL (ref 0–100)
LYMPHOCYTES # BLD AUTO: 1.95 10*3/MM3 (ref 0.7–3.1)
LYMPHOCYTES NFR BLD AUTO: 41.5 % (ref 19.6–45.3)
MCH RBC QN AUTO: 30.2 PG (ref 26.6–33)
MCHC RBC AUTO-ENTMCNC: 32.9 G/DL (ref 31.5–35.7)
MCV RBC AUTO: 91.7 FL (ref 79–97)
MONOCYTES # BLD AUTO: 0.31 10*3/MM3 (ref 0.1–0.9)
MONOCYTES NFR BLD AUTO: 6.6 % (ref 5–12)
NEUTROPHILS # BLD AUTO: 2.23 10*3/MM3 (ref 1.7–7)
NEUTROPHILS NFR BLD AUTO: 47.5 % (ref 42.7–76)
PLATELET # BLD AUTO: 109 10*3/MM3 (ref 140–450)
POTASSIUM SERPL-SCNC: 4.4 MMOL/L (ref 3.5–5.2)
PROT SERPL-MCNC: 6.6 G/DL (ref 6–8.5)
RBC # BLD AUTO: 5.07 10*6/MM3 (ref 4.14–5.8)
SODIUM SERPL-SCNC: 141 MMOL/L (ref 136–145)
TRIGL SERPL-MCNC: 94 MG/DL (ref 0–150)
VLDLC SERPL CALC-MCNC: 18 MG/DL (ref 5–40)
WBC # BLD AUTO: 4.7 10*3/MM3 (ref 3.4–10.8)

## 2024-07-11 NOTE — TELEPHONE ENCOUNTER
Caller: Ruthy Spears    Relationship to patient: Emergency Contact    Best call back number: [phone]    Patient is needing: WIFE CALLED TO MAKE 2 WEEK FOLLOW UP FOR ED VISIT, TOLD TO SEE ARTURO BEGUM IN TWO WEEKS AT [place].   PLEASE CALL PATIENT OR WIFE TO GET WORKED IN

## 2024-07-11 NOTE — OP NOTE
OPERATIVE REPORT     Date of Surgery:   7/10/24    Attending Surgeon:  Josh Grossman MD, MSE     INDICATIONS:   Right reverse total shoulder arthroplasty anterior shoulder dislocation    PREOPERATIVE DIAGNOSIS:   Same    POSTOPERATIVE DIAGNOSIS:     Same.    PROCEDURE:   Right shoulder closed reduction 18754      ASSIST:   Dr. Ozuna    ANESTHESIA:    Conscious sedation administered by ER providers    IV FLUIDS AND URINE:   See anesthesia.    ESTIMATED BLOOD LOSS:   none mL.    IMPLANTS:   none    SPECIMENS:   none    DRAINS:   None.    COMPLICATIONS:   None.    DESCRIPTION OF PROCEDURE:   The patient was brought to the procedure room in the OR and placed supine on the stretcher.  The consent had been signed prior to the procedure and this was documented. The patient had the anesthesia placed by the ER providers.   Time-outs were performed to confirm that this was the correct patient, site, side and location.  The patient was positioned supine.     Following induction of adequate conscious sedation inline traction the right shoulder was pulled with a posteriorly directed force applied to the anterior aspect of the shoulder.  There is a palpable and audible pop when the shoulder reduced and the prior anterior deformity was corrected.  Postreduction radiographs done in the procedure bay in the emergency department demonstrated adequate reduction of the right reverse total shoulder arthroplasty.  The patient tolerated this procedure well and there were no complications.    POSTOPERATIVE PLAN:   1. Weightbearing status: non  2. Follow up with Dr. Ozuna this week    Josh Grossman MD, MSE

## 2024-07-11 NOTE — OUTREACH NOTE
AMBULATORY CASE MANAGEMENT NOTE    Names and Relationships of Patient/Support Persons: Contact: Sylwia Spears; Relationship: Self -     Patient Outreach  RN-ACM outreach with patient. Discussed  7/11/24 ED visit regarding dislocation of right shoulder. Patient treated and discharged . Patient states to be compliant with ED recommendations; wearing sling to right arm as ED directed and states to have 7/17/24 ortho and PCP appointment. Patient states no difficulty with fever; chest pain;SOB; appetite but having difficulty sleeping. Patient states to be compliant with medications and appointments. Reviewed with patient ED AVS recommendations; education; role of RN-ACM and HRCM case management services. Patient verbalized understanding. Patient states to appreciate outreach. No further questions voiced at this time.   Adult Patient Profile  Questions/Answers      Flowsheet Row Most Recent Value   Symptoms/Conditions Managed at Home musculoskeletal   Musculoskeletal Symptoms/Conditions other (see comments)  [Dislocation right shoulder]   Musculoskeletal Management Strategies other (see comments), medical device  [Physician follow up,  sling]   Barriers to Taking Medication as Prescribed none   Primary Source of Support/Comfort spouse   People in Home spouse        Social Work Assessment  Questions/Answers      Flowsheet Row Most Recent Value   People in Home spouse   Functional Status Comments Patient states to be independent with ADL's,  light meal preparation,  ambulation and receiving assistance with transportation.        Send Education  Questions/Answers      Flowsheet Row Most Recent Value   Annual Wellness Visit:  Patient Has Completed   Other Patient Education/Resources  24/7 [facility] Nurse Call Line, [place]   24/7 Nurse Call Line Education Method Verbal            Education Documentation  Unresolved/Worsening Symptoms, taught by Starr Law RN at 7/11/2024  4:25 PM.  Learner:  Patient  Readiness: Acceptance  Method: Explanation  Response: Verbalizes Understanding    Provider Follow-Up, taught by Starr Law RN at 7/11/2024  4:25 PM.  Learner: Patient  Readiness: Acceptance  Method: Explanation  Response: Verbalizes Understanding    Signs/Symptoms, taught by Starr Law RN at 7/11/2024  4:25 PM.  Learner: Patient  Readiness: Acceptance  Method: Explanation  Response: Verbalizes Understanding    Unresolved/Worsening Symptoms, taught by Starr Law RN at 7/11/2024  4:25 PM.  Learner: Patient  Readiness: Acceptance  Method: Explanation  Response: Verbalizes Understanding          Starr HIGHTOWER  Ambulatory Case Management    7/11/2024, 16:25 EDT

## 2024-07-15 DIAGNOSIS — S43.014A ANTERIOR DISLOCATION OF RIGHT SHOULDER, INITIAL ENCOUNTER: ICD-10-CM

## 2024-07-15 DIAGNOSIS — Z96.611 STATUS POST REVERSE TOTAL REPLACEMENT OF RIGHT SHOULDER: ICD-10-CM

## 2024-07-15 RX ORDER — OXYCODONE HYDROCHLORIDE AND ACETAMINOPHEN 5; 325 MG/1; MG/1
1 TABLET ORAL EVERY 4 HOURS PRN
Qty: 42 TABLET | Refills: 0 | Status: SHIPPED | OUTPATIENT
Start: 2024-07-15

## 2024-07-15 NOTE — TELEPHONE ENCOUNTER
History reverse total shoulder arthroplasty with open biceps tenodesis right shoulder 12/1/2023     Dislocated shoulder again on 7/10/2024 and went to ER [facility].    Patient said that he was unable to make his last Percocet prescription last two weeks due to the pain from the dislocation. Requesting refill.    Rx Refill Note  Requested Prescriptions     Pending Prescriptions Disp Refills    oxyCODONE-acetaminophen (PERCOCET) 5-325 MG per tablet 42 tablet 0     Sig: Take 1 tablet by mouth Every 4 (Four) Hours As Needed for Moderate Pain or Severe Pain.      Last office visit with prescribing clinician: 2/21/2024      Next office visit with prescribing clinician: 7/17/2024   Last Filled: 7/08/2024     Dislocation of right shoulder joint, initial encounter      Flaquita Pastrana MA  07/15/24, 15:59 EDT    Previous RX pended for your approval, change or denial.     {TIP  Encounters:    {TIP  Please add Last Relevant Lab Date if appropriate:  {TIP  Is Refill Pharmacy correct?:

## 2024-07-16 ENCOUNTER — HOSPITAL ENCOUNTER (OUTPATIENT)
Dept: CT IMAGING | Facility: HOSPITAL | Age: 64
Discharge: HOME | End: 2024-07-16
Admitting: NURSE PRACTITIONER
Payer: MEDICARE

## 2024-07-16 DIAGNOSIS — Z12.2 SCREENING FOR LUNG CANCER: ICD-10-CM

## 2024-07-16 PROCEDURE — 71250 CT THORAX DX C-: CPT

## 2024-07-17 ENCOUNTER — OFFICE VISIT (OUTPATIENT)
Dept: ORTHOPEDIC SURGERY | Facility: CLINIC | Age: 64
End: 2024-07-17
Payer: MEDICARE

## 2024-07-17 ENCOUNTER — OFFICE VISIT (OUTPATIENT)
Dept: INTERNAL MEDICINE | Facility: CLINIC | Age: 64
End: 2024-07-17
Payer: MEDICARE

## 2024-07-17 VITALS — HEIGHT: 74 IN | WEIGHT: 236 LBS | BODY MASS INDEX: 30.29 KG/M2

## 2024-07-17 VITALS
SYSTOLIC BLOOD PRESSURE: 120 MMHG | WEIGHT: 232 LBS | TEMPERATURE: 97.1 F | DIASTOLIC BLOOD PRESSURE: 78 MMHG | HEART RATE: 85 BPM | BODY MASS INDEX: 29.77 KG/M2 | OXYGEN SATURATION: 96 % | HEIGHT: 74 IN

## 2024-07-17 DIAGNOSIS — I10 BENIGN ESSENTIAL HTN: ICD-10-CM

## 2024-07-17 DIAGNOSIS — E11.9 TYPE 2 DIABETES MELLITUS WITHOUT COMPLICATION, WITHOUT LONG-TERM CURRENT USE OF INSULIN: ICD-10-CM

## 2024-07-17 DIAGNOSIS — Z00.00 ENCOUNTER FOR ANNUAL WELLNESS VISIT (AWV) IN MEDICARE PATIENT: Primary | ICD-10-CM

## 2024-07-17 DIAGNOSIS — Z96.611 STATUS POST REVERSE TOTAL REPLACEMENT OF RIGHT SHOULDER: ICD-10-CM

## 2024-07-17 DIAGNOSIS — S43.014A ANTERIOR DISLOCATION OF RIGHT SHOULDER, INITIAL ENCOUNTER: Primary | ICD-10-CM

## 2024-07-17 DIAGNOSIS — Z12.5 SCREENING FOR PROSTATE CANCER: ICD-10-CM

## 2024-07-17 DIAGNOSIS — E78.5 HYPERLIPIDEMIA LDL GOAL <70: ICD-10-CM

## 2024-07-17 PROCEDURE — 3044F HG A1C LEVEL LT 7.0%: CPT | Performed by: NURSE PRACTITIONER

## 2024-07-17 PROCEDURE — 3078F DIAST BP <80 MM HG: CPT | Performed by: NURSE PRACTITIONER

## 2024-07-17 PROCEDURE — 1126F AMNT PAIN NOTED NONE PRSNT: CPT | Performed by: NURSE PRACTITIONER

## 2024-07-17 PROCEDURE — 3074F SYST BP LT 130 MM HG: CPT | Performed by: NURSE PRACTITIONER

## 2024-07-17 PROCEDURE — G0439 PPPS, SUBSEQ VISIT: HCPCS | Performed by: NURSE PRACTITIONER

## 2024-07-17 PROCEDURE — 99214 OFFICE O/P EST MOD 30 MIN: CPT | Performed by: NURSE PRACTITIONER

## 2024-07-17 RX ORDER — OXYCODONE HYDROCHLORIDE AND ACETAMINOPHEN 5; 325 MG/1; MG/1
1 TABLET ORAL EVERY 4 HOURS PRN
Qty: 42 TABLET | Refills: 0 | Status: SHIPPED | OUTPATIENT
Start: 2024-07-17

## 2024-07-17 RX ORDER — CELECOXIB 200 MG/1
200 CAPSULE ORAL 2 TIMES DAILY
Qty: 60 CAPSULE | Refills: 0 | Status: SHIPPED | OUTPATIENT
Start: 2024-07-17

## 2024-07-17 NOTE — PROGRESS NOTES
Subjective   The ABCs of the Annual Wellness Visit  Medicare Wellness Visit      Sylwia Spears is a 64 y.o. patient who presents for a Medicare Wellness Visit.    The following portions of the patient's history were reviewed and   updated as appropriate: allergies, current medications, past family history, past medical history, past social history, past surgical history, and problem list.    Compared to one year ago, the patient's physical   health is the same.  Compared to one year ago, the patient's mental   health is the same.    Recent Hospitalizations:  He was not admitted to the hospital during the last year.     Current Medical Providers:  Patient Care Team:  Angie Munoz APRN as PCP - General (Family Medicine)  Angie Munoz APRN as Referring Physician (Family Medicine)  Starr Law RN as Ambulatory  ([facility])  David Ozuna MD as Surgeon (Orthopedic Surgery)    Outpatient Medications Prior to Visit   Medication Sig Dispense Refill    aspirin 81 MG EC tablet Take 1 tablet by mouth Daily. Indications: VTE Prophylaxis (Patient taking differently: Take 1 tablet by mouth Daily.) 14 tablet 0    atorvastatin (LIPITOR) 10 MG tablet TAKE 1 TABLET BY MOUTH DAILY 90 tablet 1    celecoxib (CeleBREX) 200 MG capsule Take 1 capsule by mouth 2 (Two) Times a Day. 60 capsule 0    Cholecalciferol (Vitamin D3) 30 MCG/15ML liquid Take  by mouth.      Chromic Chloride crystals Use Daily.      CINNAMON PO Take  by mouth.      docusate sodium (COLACE) 100 MG capsule TAKE 1 CAPSULE BY MOUTH TWICE A DAY AS NEEDED FOR CONSTIPATION 60 capsule 0    DULoxetine (CYMBALTA) 30 MG capsule TAKE 1 CAPSULE BY MOUTH DAILY 90 capsule 1    lisinopril (PRINIVIL,ZESTRIL) 20 MG tablet TAKE 1 TABLET BY MOUTH DAILY 90 tablet 1    naloxone (NARCAN) 4 MG/0.1ML nasal spray Call 911. Don't prime. [place] in 1 nostril for overdose. Repeat in 2-3 minutes in other nostril if no or minimal breathing/responsiveness. 2 each  0    ondansetron (ZOFRAN) 4 MG tablet Take 1 tablet by mouth Every 8 (Eight) Hours As Needed for Nausea or Vomiting. 20 tablet 0    oxyCODONE-acetaminophen (PERCOCET) 5-325 MG per tablet Take 1 tablet by mouth Every 4 (Four) Hours As Needed for Severe Pain. 18 tablet 0    Zinc Acetate, Oral, (ZINC ACETATE PO) Take  by mouth.      oxyCODONE-acetaminophen (PERCOCET) 5-325 MG per tablet Take 1 tablet by mouth Every 4 (Four) Hours As Needed for Moderate Pain or Severe Pain. 42 tablet 0    oxyCODONE-acetaminophen (PERCOCET) 5-325 MG per tablet Take 1 tablet by mouth Every 4 (Four) Hours As Needed for Moderate Pain or Severe Pain. 42 tablet 0    predniSONE (DELTASONE) 5 MG tablet 6 tabs day 1, 5 tabs day 2, 4 tabs day 3, 3 tabs day 4, 2 tabs day 5, 1 tab day 6 (Patient not taking: Reported on 7/17/2024) 21 tablet 0    pregabalin (Lyrica) 75 MG capsule Take 1 capsule by mouth 2 (Two) Times a Day. (Patient not taking: Reported on 6/20/2024) 60 capsule 0    famotidine (PEPCID) 40 MG tablet Take 1 tablet by mouth Daily. (Patient not taking: Reported on 7/17/2024) 14 tablet 0    meloxicam (MOBIC) 15 MG tablet Take 1 tablet by mouth Daily. (Patient not taking: Reported on 6/20/2024) 30 tablet 0     No facility-administered medications prior to visit.     Opioid medication/s are on active medication list.  and I have evaluated his active treatment plan and pain score trends (see table).  There were no vitals filed for this visit.  I have reviewed the chart for potential of high risk medication and harmful drug interactions in the elderly.        Aspirin is on active medication list. Aspirin use is indicated based on review of current medical condition/s. Pros and cons of this therapy have been discussed today. Benefits of this medication outweigh potential harm.  Patient has been encouraged to continue taking this medication.  .      Patient Active Problem List   Diagnosis    History of colon polyps    Type 2 diabetes mellitus  "without complication, without long-term current use of insulin    Benign essential HTN    Morbidly obese    Cervical spinal stenosis    Screening for prostate cancer    Personal history of colonic polyps    Family history of colon cancer    Hyperlipidemia LDL goal <70    Midline thoracic back pain    Erectile dysfunction due to diseases classified elsewhere    Personal history of nicotine dependence    Coronary artery calcification    Varicose veins of right lower extremity with other complications    DJD of shoulder    Rotator cuff arthropathy of right shoulder    Complete tear of right rotator cuff    Chronic right shoulder pain    Thrombocytopenia    Abnormal international normal ratio (INR)    Status post reverse total shoulder replacement, open biceps tenodesis, right, DOS 12/1/2023     Advance Care Planning (Click this link to access ACP Navigator)  Advance Directive is not on file.  ACP discussion was held with the patient during this visit. Patient has an advance directive (not in EMR), copy requested.        Objective   Vitals:    07/17/24 1523   BP: 120/78   BP Location: Left arm   Patient Position: Sitting   Cuff Size: Adult   Pulse: 85   Temp: 97.1 °F (36.2 °C)   TempSrc: Infrared   SpO2: 96%   Weight: 105 kg (232 lb)   Height: 188 cm (74\")       Estimated body mass index is 29.79 kg/m² as calculated from the following:    Height as of this encounter: 188 cm (74\").    Weight as of this encounter: 105 kg (232 lb).             Does the patient have evidence of cognitive impairment? No  Lab Results   Component Value Date    CHLPL 111 07/10/2024    TRIG 94 07/10/2024    HDL 39 (L) 07/10/2024    LDL 54 07/10/2024    VLDL 18 07/10/2024    HGBA1C 6.20 (H) 07/10/2024                                                                                                Health  Risk Assessment    Smoking Status:  Social History     Tobacco Use   Smoking Status Former    Current packs/day: 0.00    Average packs/day: 1 " pack/day for 47.0 years (47.0 ttl pk-yrs)    Types: Cigarettes    Start date:     Quit date: 2022    Years since quittin.6    Passive exposure: Past   Smokeless Tobacco Never     Alcohol Consumption:  Social History     Substance and Sexual Activity   Alcohol Use No    Comment: rare     Fall Risk Screen:  OSMAN Fall Risk Assessment was completed, and patient is at MODERATE risk for falls. Assessment completed on:2024    Depression Screenin/17/2024     3:30 PM   PHQ-2/PHQ-9 Depression Screening   Little Interest or Pleasure in Doing Things 0-->not at all   Feeling Down, Depressed or Hopeless 1-->several days   PHQ-9: Brief Depression Severity Measure Score 1     Health Habits and Functional and Cognitive Screenin/17/2024     3:25 PM   Functional & Cognitive Status   Do you have difficulty preparing food and eating? Yes   Do you have difficulty bathing yourself, getting dressed or grooming yourself? Yes   Do you have difficulty using the toilet? Yes   Do you have difficulty moving around from place to place? Yes   Do you have trouble with steps or getting out of a bed or a chair? No   Current Diet Well Balanced Diet   Dental Exam Up to date   Eye Exam Not up to date   Current Exercises Include Walking   Do you need help using the phone?  No   Are you deaf or do you have serious difficulty hearing?  No   Do you need help to go to places out of walking distance? No   Do you need help shopping? No   Do you need help preparing meals?  No   Do you need help with housework?  No   Do you need help with laundry? No   Do you need help taking your medications? No   Do you need help managing money? No   Do you ever drive or ride in a car without wearing a seat belt? No   Have you felt unusual stress, anger or loneliness in the last month? Yes   Who do you live with? Spouse   If you need help, do you have trouble finding someone available to you? No   Have you been bothered in the last four  weeks by sexual problems? No   Do you have difficulty concentrating, remembering or making decisions? No             Age-appropriate Screening Schedule:  Refer to the list below for future screening recommendations based on patient's age, sex and/or medical conditions. Orders for these recommended tests are listed in the plan section. The patient has been provided with a written plan.    Health Maintenance List  Health Maintenance   Topic Date Due    Pneumococcal Vaccine 0-64 (1 of 2 - PCV) Never done    TDAP/TD VACCINES (1 - Tdap) Never done    ZOSTER VACCINE (1 of 2) Never done    COVID-19 Vaccine (1 - 2023-24 season) Never done    ANNUAL WELLNESS VISIT  02/08/2024    DIABETIC FOOT EXAM  02/09/2024    DIABETIC EYE EXAM  08/11/2024    COLORECTAL CANCER SCREENING  09/02/2024    Hepatitis B (1 of 3 - Risk 3-dose series) 01/17/2025 (Originally 3/18/2021)    INFLUENZA VACCINE  08/01/2024    URINE MICROALBUMIN  10/17/2024    BMI FOLLOWUP  12/15/2024    HEMOGLOBIN A1C  01/10/2025    LIPID PANEL  07/10/2025    LUNG CANCER SCREENING  07/16/2025    HEPATITIS C SCREENING  Completed                                                                                                                                                CMS Preventative Services Quick Reference  Risk Factors Identified During Encounter  Inactivity/Sedentary: Patient was advised to exercise at least 150 minutes a week per CDC recommendations.    The above risks/problems have been discussed with the patient.  Pertinent information has been shared with the patient in the After Visit Summary.  An After Visit Summary and PPPS were made available to the patient.    Follow Up:   Next Medicare Wellness visit to be scheduled in 1 year.         Additional E&M Note during same encounter follows:  Patient has additional, significant, and separately identifiable condition(s)/problem(s) that require work above and beyond the Medicare Wellness Visit     Chief  "Complaint  Medicare Wellness-subsequent    Subjective   HPI  Sylwia is also being seen today for additional medical problem/s.    Review of Systems   Constitutional: Negative.  Negative for fatigue.   HENT: Negative.     Eyes: Negative.    Respiratory: Negative.     Cardiovascular: Negative.  Negative for chest pain, palpitations and leg swelling.   Endocrine: Negative.    Genitourinary: Negative.    Musculoskeletal:  Positive for arthralgias (right arm).   Allergic/Immunologic: Negative.    Neurological: Negative.    Hematological: Negative.  Negative for adenopathy. Does not bruise/bleed easily.   Psychiatric/Behavioral: Negative.     All other systems reviewed and are negative.       He has history of DM 2, HTN and hyperlipidemia. He was on Metformin XR 500mg daily for diabetes, but stopped this aftet weight loss. He has not been checking his glucose levels at home. He denies any hypoglycemic events unless he does not eat. He has lost about 60 pounds with better eating and avoiding sugars over the past 6 months, this is intentional weight loss. He weighed 296 pounds at one time.      He is on lisinopril 20mg daily for HTN. He tolerates this w/o cough. He denies CP, SOA swelling in legs.      He has daily pain from \"where I got electrocuted\" he has nerve pain. Daily pain rated a 5 of 10, usually, but he is on pain medication at this time due to shoulder surgery. He pain is mostly in upper back and shoulders. He takes duloxetine 60mg for this, and oxycodone.       He has a CT chest showing Coronary artery calcifications. He was started on Lipitor 10mg nightly for this. He denies chest pain, unilateral weakness.  He had a stress test 1-.      He has had low mildly platelet count for 3 years. He was referred to [facility] for eval. Consult with Uriel Fowler MD (11/16/2023) No excess bruising, no use of aspirin products.         Objective   Vital Signs:  /78 (BP Location: Left arm, Patient Position: " "Sitting, Cuff Size: Adult)   Pulse 85   Temp 97.1 °F (36.2 °C) (Infrared)   Ht 188 cm (74\")   Wt 105 kg (232 lb)   SpO2 96%   BMI 29.79 kg/m²   Physical Exam  Vitals reviewed.   Constitutional:       Appearance: Normal appearance.   HENT:      Head: Normocephalic.      Right Ear: Tympanic membrane normal.      Left Ear: Tympanic membrane normal.   Cardiovascular:      Rate and Rhythm: Normal rate and regular rhythm.      Pulses: Normal pulses.      Heart sounds: Normal heart sounds.   Pulmonary:      Effort: Pulmonary effort is normal.      Breath sounds: Normal breath sounds.   Musculoskeletal:      Right lower leg: No edema.      Left lower leg: No edema.   Skin:     General: Skin is warm and dry.   Neurological:      General: No focal deficit present.      Mental Status: He is alert and oriented to person, place, and time.         The following data was reviewed by: CELIA Peres on 07/17/2024:        Assessment and Plan Additional age appropriate preventative wellness advice topics were discussed during today's preventative wellness exam(some topics already addressed during AWV portion of the note above):    Physical Activity: Advised cardiovascular activity 150 minutes per week as tolerated. (example brisk walk for 30 minutes, 5 days a week).              Encounter for annual wellness visit (AWV) in Medicare patient    Benign essential HTN  Hypertension is stable and controlled  Continue current treatment regimen.  Blood pressure will be reassessed in 6 months.  Hyperlipidemia LDL goal <70   Lipid abnormalities are stable    Plan:  Continue same medication/s without change.      Discussed medication dosage, use, side effects, and goals of treatment in detail.    Counseled patient on lifestyle modifications to help control hyperlipidemia.     Patient Treatment Goals:   LDL goal is under 100    Followup in 6 months.  Type 2 diabetes mellitus without complication, without long-term current use of " insulin    Screening for prostate cancer    No orders of the defined types were placed in this encounter.     Diagnosis Plan   1. Encounter for annual wellness visit (AWV) in Medicare patient        2. Benign essential HTN  CBC & Differential    Comprehensive Metabolic Panel    Lipid Panel With / Chol / HDL Ratio    Comprehensive Metabolic Panel    Lipid Panel With / Chol / HDL Ratio    Hemoglobin A1c    CBC & Differential    TSH      3. Hyperlipidemia LDL goal <70  Comprehensive Metabolic Panel    Lipid Panel With / Chol / HDL Ratio    Lipid Panel With / Chol / HDL Ratio      4. Type 2 diabetes mellitus without complication, without long-term current use of insulin  Comprehensive Metabolic Panel    Hemoglobin A1c    Comprehensive Metabolic Panel    Hemoglobin A1c      5. Screening for prostate cancer  PSA Screen    Hemoglobin A1c                  6 months w labs  Patient was given instructions and counseling regarding his condition or for health maintenance advice. Please see specific information pulled into the AVS if appropriate.

## 2024-07-17 NOTE — PROGRESS NOTES
Subjective:     Patient ID: Sylwia Spears is a 64 y.o. male.    Chief Complaint:  Right shoulder pain and recurrent instability  Status post right reverse shoulder arthroplasty 2023    History of Present Illness  History of Present Illness  Bree returns to clinic today for follow-up evaluation regards to his right shoulder, he did have recurrent instability episode which happened on July 10, states that he was just try to move a stepladder that point, he noted his shoulder popped out of place.  Present Emergency Department for further evaluation was sedated for reduction.  He had a successful reduction has had moderate soreness of the shoulder since that time.  He is frustrated at the recurrent instability episodes.  Denies new numbness or tingling.  Localizes residual pain to the anterior lateral aspect of his right shoulder, mild radiation of pain on the lateral aspect of the arm but not below the elbow.     Social History     Occupational History    Not on file   Tobacco Use    Smoking status: Former     Current packs/day: 0.00     Average packs/day: 1 pack/day for 47.0 years (47.0 ttl pk-yrs)     Types: Cigarettes     Start date:      Quit date: 2022     Years since quittin.6     Passive exposure: Past    Smokeless tobacco: Never   Vaping Use    Vaping status: Never Used   Substance and Sexual Activity    Alcohol use: No     Comment: rare    Drug use: Yes     Frequency: 7.0 times per week     Types: Marijuana    Sexual activity: Defer      Past Medical History:   Diagnosis Date    Cervical disc disease     stenosis    Cirrhosis of liver     Colon polyp     Coronary artery calcification     Diabetes mellitus     Electrocution     Fell off a ladder and has chronic neck pain    Hyperlipidemia     Hypertension     Spleen enlarged     Thrombocytopenia      Past Surgical History:   Procedure Laterality Date    CHOLECYSTECTOMY      COLONOSCOPY      COLONOSCOPY N/A 2018    Procedure:  "COLONOSCOPY, polypectomy;  Surgeon: Raleigh Champagne MD;  Location: [facility] OR;  Service: Gastroenterology    COLONOSCOPY W/ POLYPECTOMY      COLONOSCOPY W/ POLYPECTOMY N/A 09/02/2021    Procedure: COLONOSCOPY; polypectomy;  Surgeon: Raleigh Champagne MD;  Location:  [facility] OR;  Service: Gastroenterology;  Laterality: N/A;  polyps   diverticulosis    LIPOMA EXCISION      groin    SHOULDER SURGERY Left     TOTAL SHOULDER ARTHROPLASTY W/ DISTAL CLAVICLE EXCISION Right 12/1/2023    Procedure: TOTAL SHOULDER REVERSE ARTHROPLASTY;  Surgeon: David Ozuna MD;  Location: [facility] OR;  Service: Orthopedics;  Laterality: Right;       Family History   Problem Relation Age of Onset    Hypertension Mother     Heart disease Mother     Colon cancer Mother     Stroke Mother     Cancer Mother     Hypertension Father     Crohn's disease Brother          Review of Systems        Objective:  Vitals:    07/17/24 1140   Weight: 107 kg (236 lb)   Height: 188 cm (74\")         07/17/24  1140   Weight: 107 kg (236 lb)     Body mass index is 30.3 kg/m².  General: No acute distress.  Resp: normal respiratory effort  Skin: no rashes or wounds; normal turgor  Psych: mood and affect appropriate; recent and remote memory intact          Physical Exam         Right shoulder-incision well-healed, active and passive forward flexion 120 degrees active and passive external rotation 25 degrees 4 out of 5 strength localized motion positive deltoid firing all 3 component center incision well-healed brisk cap refill all digits    Imaging:    XR Shoulder 2+ View Right    Result Date: 7/10/2024  Interval reduction of the right shoulder dislocation. Electronically Signed: Uriel Bailon MD  7/10/2024 4:29 PM EDT  Workstation ID: [id number]    XR Shoulder 2+ View Right    Result Date: 7/10/2024  Impression: Reverse right shoulder arthroplasty with anterior dislocation of the humeral component. Electronically Signed: Ryan Issa MD  " 7/10/2024 2:09 PM EDT  Workstation ID: [id number]    Review of x-rays from emergency department dated July 10 indicate anterior reverse shoulder arthroplasty dislocation with subsequent reduction, no evidence of periprosthetic fracture    Assessment:        1. Anterior dislocation of right shoulder, initial encounter    2. Status post reverse total replacement of right shoulder           Plan:          Assessment & Plan  Reviewed with patient that given his recurrent instability episodes I think at this point in time it would be most recommended to proceed with a revision shoulder arthroplasty.  I did discuss with him he will have to be careful afterwards both with sling immobilization as well as with his range of motion and position when he is doing activities because he still would be at risk for some instability though the only way for me to try to reduce his risk of this happening recurrently would be a revision surgery with revision of both the glenosphere and the polyethylene components likely at the time of surgery.  He was in agreement with this, he is frustrated at the overall situation but does not want to continue having instability episodes to what ever extent we can prevent it.  We will go ahead and get a CT scan and I will review with this over the phone and then discuss treatment option at that time.    Sylwia Spears was in agreement with plan and had all questions answered.     Orders:  No orders of the defined types were placed in this encounter.      Medications:  No orders of the defined types were placed in this encounter.      Followup:  No follow-ups on file.    Diagnoses and all orders for this visit:    1. Anterior dislocation of right shoulder, initial encounter (Primary)    2. Status post reverse total replacement of right shoulder          Dictated utilizing Dragon dictation     Patient or patient representative verbalized consent for the use of Ambient Listening during the visit with   David Ozuna MD for chart documentation. 7/17/2024  12:07 EDT

## 2024-07-25 DIAGNOSIS — S43.014A ANTERIOR DISLOCATION OF RIGHT SHOULDER, INITIAL ENCOUNTER: ICD-10-CM

## 2024-07-25 DIAGNOSIS — Z96.611 STATUS POST REVERSE TOTAL REPLACEMENT OF RIGHT SHOULDER: ICD-10-CM

## 2024-07-25 NOTE — TELEPHONE ENCOUNTER
Rx Refill Note  Requested Prescriptions      No prescriptions requested or ordered in this encounter      Last office visit with prescribing clinician: 7/17/2024      Next office visit with prescribing clinician: 11/25/2024   Last Filled:7/17/24    Right shoulder pain and recurrent instability  Status post right reverse shoulder arthroplasty 12/1/2023     Date of Surgery:      Scarlet Cohu MA  07/25/24, 11:19 EDT    Previous RX pended for your approval, change or denial.     {TIP  Encounters:    {TIP  Please add Last Relevant Lab Date if appropriate:  {TIP  Is Refill Pharmacy correct?:

## 2024-07-26 RX ORDER — OXYCODONE HYDROCHLORIDE AND ACETAMINOPHEN 5; 325 MG/1; MG/1
1 TABLET ORAL EVERY 4 HOURS PRN
Qty: 18 TABLET | Refills: 0 | Status: SHIPPED | OUTPATIENT
Start: 2024-07-26

## 2024-07-30 ENCOUNTER — HOSPITAL ENCOUNTER (OUTPATIENT)
Dept: CT IMAGING | Facility: HOSPITAL | Age: 64
Discharge: HOME | End: 2024-07-30
Admitting: ORTHOPAEDIC SURGERY
Payer: MEDICARE

## 2024-07-30 DIAGNOSIS — S43.014A ANTERIOR DISLOCATION OF RIGHT SHOULDER, INITIAL ENCOUNTER: ICD-10-CM

## 2024-07-30 DIAGNOSIS — Z96.611 STATUS POST REVERSE TOTAL REPLACEMENT OF RIGHT SHOULDER: ICD-10-CM

## 2024-07-30 PROCEDURE — 73200 CT UPPER EXTREMITY W/O DYE: CPT

## 2024-07-31 DIAGNOSIS — Z96.611 STATUS POST REVERSE TOTAL REPLACEMENT OF RIGHT SHOULDER: ICD-10-CM

## 2024-07-31 DIAGNOSIS — S43.014A ANTERIOR DISLOCATION OF RIGHT SHOULDER, INITIAL ENCOUNTER: ICD-10-CM

## 2024-07-31 RX ORDER — OXYCODONE HYDROCHLORIDE AND ACETAMINOPHEN 5; 325 MG/1; MG/1
1 TABLET ORAL EVERY 4 HOURS PRN
Qty: 42 TABLET | Refills: 0 | Status: SHIPPED | OUTPATIENT
Start: 2024-07-31

## 2024-08-05 ENCOUNTER — TELEPHONE (OUTPATIENT)
Dept: ORTHOPEDIC SURGERY | Facility: CLINIC | Age: 64
End: 2024-08-05
Payer: MEDICARE

## 2024-08-05 NOTE — TELEPHONE ENCOUNTER
Patient called wanting his CT R shoulder results. It has been done and results are in epic. Phone [phone].

## 2024-08-08 ENCOUNTER — TELEPHONE (OUTPATIENT)
Dept: ORTHOPEDIC SURGERY | Facility: CLINIC | Age: 64
End: 2024-08-08
Payer: MEDICARE

## 2024-08-08 DIAGNOSIS — S43.014A ANTERIOR DISLOCATION OF RIGHT SHOULDER, INITIAL ENCOUNTER: ICD-10-CM

## 2024-08-08 DIAGNOSIS — Z96.611 STATUS POST REVERSE TOTAL REPLACEMENT OF RIGHT SHOULDER: ICD-10-CM

## 2024-08-08 RX ORDER — OXYCODONE HYDROCHLORIDE AND ACETAMINOPHEN 5; 325 MG/1; MG/1
1 TABLET ORAL EVERY 4 HOURS PRN
Qty: 42 TABLET | Refills: 0 | Status: SHIPPED | OUTPATIENT
Start: 2024-08-08

## 2024-08-08 NOTE — TELEPHONE ENCOUNTER
Patient is aware, would like to proceed with revision.      ----- Message from David Ozuna sent at 8/8/2024 12:11 PM EDT -----  Please call the patient regarding his Imaging result-his CT scan showed no signs of any loosening or fracturing around the implant.  If he wants to proceed with surgery for revision and trying to get his shoulder more stable, I will put orders in to do so

## 2024-08-08 NOTE — TELEPHONE ENCOUNTER
Rx Refill Note  Requested Prescriptions      No prescriptions requested or ordered in this encounter      Last office visit with prescribing clinician: 7/17/2024      Next office visit with prescribing clinician: 11/25/2024   Last Filled:7/31/24    Right shoulder pain and recurrent instability  Status post right reverse shoulder arthroplasty 12/1/2023    Scarlet Chou MA  08/08/24, 13:35 EDT    Previous RX pended for your approval, change or denial.     {TIP  Encounters:    {TIP  Please add Last Relevant Lab Date if appropriate:  {TIP  Is Refill Pharmacy correct?:

## 2024-08-12 DIAGNOSIS — M48.02 CERVICAL SPINAL STENOSIS: ICD-10-CM

## 2024-08-12 RX ORDER — DULOXETIN HYDROCHLORIDE 30 MG/1
30 CAPSULE, DELAYED RELEASE ORAL DAILY
Qty: 90 CAPSULE | Refills: 1 | Status: SHIPPED | OUTPATIENT
Start: 2024-08-12

## 2024-08-12 RX ORDER — CELECOXIB 200 MG/1
200 CAPSULE ORAL 2 TIMES DAILY
Qty: 60 CAPSULE | Refills: 0 | Status: SHIPPED | OUTPATIENT
Start: 2024-08-12

## 2024-08-12 NOTE — TELEPHONE ENCOUNTER
Rx Refill Note  Requested Prescriptions     Pending Prescriptions Disp Refills    celecoxib (CeleBREX) 200 MG capsule [Pharmacy Med Name: CELECOXIB 200 MG CAPSULE] 60 capsule 0     Sig: TAKE 1 CAPSULE BY MOUTH 2 TIMES A DAY      Last office visit with prescribing clinician: 7/17/2024      Next office visit with prescribing clinician: 11/25/2024   Last Filled:07-17-24      No diagnosis found. Anterior dislocation of right shoulder, initial encounter +1 more   Date of Surgery:      Khushboo Jim MA  08/12/24, 07:49 EDT    {TIP  Encounters:    {TIP  Please add Last Relevant Lab Date if appropriate:  {TIP  Is Refill Pharmacy correct?:

## 2024-08-13 ENCOUNTER — HOSPITAL ENCOUNTER (EMERGENCY)
Facility: HOSPITAL | Age: 64
Discharge: HOME | End: 2024-08-13
Attending: STUDENT IN AN ORGANIZED HEALTH CARE EDUCATION/TRAINING PROGRAM
Payer: MEDICARE

## 2024-08-13 ENCOUNTER — APPOINTMENT (OUTPATIENT)
Dept: GENERAL RADIOLOGY | Facility: HOSPITAL | Age: 64
End: 2024-08-13
Payer: MEDICARE

## 2024-08-13 VITALS
RESPIRATION RATE: 20 BRPM | HEART RATE: 87 BPM | DIASTOLIC BLOOD PRESSURE: 107 MMHG | WEIGHT: 235 LBS | SYSTOLIC BLOOD PRESSURE: 148 MMHG | HEIGHT: 74 IN | TEMPERATURE: 98.2 F | OXYGEN SATURATION: 94 % | BODY MASS INDEX: 30.16 KG/M2

## 2024-08-13 DIAGNOSIS — S68.629A PARTIAL TRAUMATIC AMPUTATION OF FINGER THROUGH PHALANX, INITIAL ENCOUNTER: Primary | ICD-10-CM

## 2024-08-13 PROCEDURE — 73130 X-RAY EXAM OF HAND: CPT

## 2024-08-13 PROCEDURE — 90471 IMMUNIZATION ADMIN: CPT | Performed by: STUDENT IN AN ORGANIZED HEALTH CARE EDUCATION/TRAINING PROGRAM

## 2024-08-13 PROCEDURE — 99283 EMERGENCY DEPT VISIT LOW MDM: CPT

## 2024-08-13 PROCEDURE — 90715 TDAP VACCINE 7 YRS/> IM: CPT | Performed by: STUDENT IN AN ORGANIZED HEALTH CARE EDUCATION/TRAINING PROGRAM

## 2024-08-13 PROCEDURE — 25010000002 TETANUS-DIPHTH-ACELL PERTUSSIS 5-2.5-18.5 LF-MCG/0.5 SUSPENSION PREFILLED SYRINGE: Performed by: STUDENT IN AN ORGANIZED HEALTH CARE EDUCATION/TRAINING PROGRAM

## 2024-08-13 RX ORDER — CEPHALEXIN 500 MG/1
500 CAPSULE ORAL 2 TIMES DAILY
Qty: 20 CAPSULE | Refills: 0 | Status: SHIPPED | OUTPATIENT
Start: 2024-08-13 | End: 2024-08-23

## 2024-08-13 RX ORDER — HYDROCODONE BITARTRATE AND ACETAMINOPHEN 10; 325 MG/1; MG/1
1 TABLET ORAL EVERY 6 HOURS PRN
Qty: 15 TABLET | Refills: 0 | Status: SHIPPED | OUTPATIENT
Start: 2024-08-13

## 2024-08-13 RX ADMIN — TETANUS TOXOID, REDUCED DIPHTHERIA TOXOID AND ACELLULAR PERTUSSIS VACCINE, ADSORBED 0.5 ML: 5; 2.5; 8; 8; 2.5 SUSPENSION INTRAMUSCULAR at 19:15

## 2024-08-13 NOTE — DISCHARGE INSTRUCTIONS

## 2024-08-13 NOTE — ED PROVIDER NOTES
Subjective   History of Present Illness  64-year-old presents with a partial amputation/injury to the third digit on the left hand after he cut it on a  earlier today.  He admits to some tingling and pain to that area.  He states that the fingernail also was involved.  He needs a tetanus shot, does not have an up-to-date tetanus shot.  Denies any other injury, numbness or tingling.      Review of Systems    Past Medical History:   Diagnosis Date    Cervical disc disease 2012    stenosis    Cirrhosis of liver     Colon polyp     Coronary artery calcification     Diabetes mellitus     Electrocution 2012    Fell off a ladder and has chronic neck pain    Hyperlipidemia     Hypertension     Spleen enlarged     Thrombocytopenia        No Known Allergies    Past Surgical History:   Procedure Laterality Date    CHOLECYSTECTOMY      COLONOSCOPY      COLONOSCOPY N/A 08/17/2018    Procedure: COLONOSCOPY, polypectomy;  Surgeon: Raleigh Champagne MD;  Location: [facility] OR;  Service: Gastroenterology    COLONOSCOPY W/ POLYPECTOMY      COLONOSCOPY W/ POLYPECTOMY N/A 09/02/2021    Procedure: COLONOSCOPY; polypectomy;  Surgeon: Raleigh Champagne MD;  Location: [facility] OR;  Service: Gastroenterology;  Laterality: N/A;  polyps   diverticulosis    LIPOMA EXCISION      groin    SHOULDER SURGERY Left     TOTAL SHOULDER ARTHROPLASTY W/ DISTAL CLAVICLE EXCISION Right 12/1/2023    Procedure: TOTAL SHOULDER REVERSE ARTHROPLASTY;  Surgeon: David Ozuna MD;  Location: [facility];  Service: Orthopedics;  Laterality: Right;       Family History   Problem Relation Age of Onset    Hypertension Mother     Heart disease Mother     Colon cancer Mother     Stroke Mother     Cancer Mother     Hypertension Father     Crohn's disease Brother        Social History     Socioeconomic History    Marital status:    Tobacco Use    Smoking status: Former     Current packs/day: 0.00     Average packs/day: 1 pack/day for  47.0 years (47.0 ttl pk-yrs)     Types: Cigarettes     Start date:      Quit date: 2022     Years since quittin.7     Passive exposure: Past    Smokeless tobacco: Never   Vaping Use    Vaping status: Never Used   Substance and Sexual Activity    Alcohol use: No     Comment: rare    Drug use: Yes     Frequency: 7.0 times per week     Types: Marijuana    Sexual activity: Defer           Objective   Physical Exam  Vitals and nursing note reviewed.   Constitutional:       Appearance: Normal appearance. He is normal weight. He is not ill-appearing, toxic-appearing or diaphoretic.   HENT:      Head: Normocephalic and atraumatic.      Right Ear: External ear normal.      Left Ear: External ear normal.      Nose: Nose normal. No congestion or rhinorrhea.      Mouth/Throat:      Pharynx: No oropharyngeal exudate or posterior oropharyngeal erythema.   Eyes:      Extraocular Movements: Extraocular movements intact.      Conjunctiva/sclera: Conjunctivae normal.      Pupils: Pupils are equal, round, and reactive to light.   Cardiovascular:      Rate and Rhythm: Normal rate and regular rhythm.      Pulses: Normal pulses.   Pulmonary:      Effort: Pulmonary effort is normal.      Breath sounds: Normal breath sounds. No stridor. No wheezing, rhonchi or rales.   Chest:      Chest wall: No tenderness.   Abdominal:      General: There is no distension.      Palpations: Abdomen is soft. There is no mass.   Musculoskeletal:         General: No swelling, tenderness or signs of injury. Normal range of motion.        Hands:       Cervical back: Normal range of motion. No rigidity or tenderness.      Comments: No pulsatile or active bleeding   Skin:     General: Skin is warm.      Capillary Refill: Capillary refill takes less than 2 seconds.      Coloration: Skin is not jaundiced or pale.      Findings: No bruising.   Neurological:      General: No focal deficit present.      Mental Status: He is alert and oriented to person,  place, and time. Mental status is at baseline.      Motor: No weakness.         Procedures           ED Course                                             Medical Decision Making  Laceration Procedure Note    Time: 19:10  Confirmed correct: Patient, procedure, side, site  Consent: Patient, verbal     Description of repair: Length 5 cm  Location: 3rd digit  Shape: L shaped with avulsion/amputation  Depth: multilayer  Details: amputation  Neurovascular / tendon exam: intact overall  Anesthesia: digital block ml, 4cc 1% lidocaine without epi  Irrigation: Copious saline  Debridement: moderate to extensive to thoroughly cleanse and debride the area  Skin closure: # 12 sutures, flap created, multiple sutures through the nail    Complexity: Complex, partial avulsion/amputation     Post procedure exam: Circulation, motor, sensory intact  Complications: None  Patient tolerated: Well  Performed by: Steven Wiggins DO  Total time: 10 minutes  ============================    MDM:    Escalation of care including admission/observation considered    - Discussions of management with other providers:  None    - Discussed/reviewed with Radiology regarding test interpretation    - Independent interpretation: XR    - Additional patient history obtained from: [facility]    - Review of external non-ED record (if available):  Prior Inpt record, Office record, Outpt record, Prior Outpt labs, PCP record, Outside ED record, Other    - Chronic conditions affecting care: See HPI and medical Hx.    - Social Determinants of health significantly affecting care:  None        Medical Decision Making Discussion:    This is a well-appearing 64 who presents to the emergency department with concern for a partial amputation of his left third digit.  The wound itself was copiously irrigated and cleaned.  We discussed possible rongeur versus creation of a flap with the partial amputation still intact to save the distal pad of his finger.  The x-ray does not  show terrible evidence of a distal phalanx fracture but there is still a partial amputation noted on x-ray.  Patient has full range of motion and remains neurovascularly intact.  He would like to have the flap adjusted as a result of his injury and try to save the distal tip/pad of his finger.  I feel that this is very reasonable.  As result the wound was copiously irrigated, cleaned and then a flap was created with the skin and approximated very well.  He will have a small continued wound there and he is started on Keflex for home to prevent any infection but otherwise, the wound actually did approximate fairly well.  He will follow-up on an outpatient basis, recommend return to the ED with any acute change or worsening of his condition.  Stable for discharge.    The patient has been given very strict return precautions to return to the emergency department should there be any acute change or worsening of their condition.  I have explained my findings and the patient has expressed understanding to me.  I explained that the work-up performed in the ED has been based on the specific complaint and concern, as the nature of emergency medicine is complaint driven and they understand that new symptoms may arise.  I have told them that, should there be any new symptoms, worsening or changing symptoms, a new work-up may be indicated that they are encouraged to return to the emergency department or promptly contact their primary care physician. We have employed a shared decision-making process as the discussion of their disposition.  The patient has been educated as to the nature of the visit, the tests and work-up performed and the findings from today's visit. At this time, there does not appear to be any acute emergent process that necessitates admission to the hospital, however, the patient understands that this can change unexpectedly. At this time, the patient is stable for discharge home and agrees to follow-up with  her primary care physician in the next 24 to 48 hours or earlier should they be able to obtain an appointment.    The patient was counseled regarding diagnostic results and treatment plan and patient has indicated understanding of these instructions.      Amount and/or Complexity of Data Reviewed  Radiology: ordered.    Risk  Prescription drug management.        Final diagnoses:   Partial traumatic amputation of finger through phalanx, initial encounter       ED Disposition  ED Disposition       ED Disposition   Discharge    Condition   Good    Comment   --               Angie Munoz, APRN  [address]  [address]  [place] [address] [phone]               Medication List        New Prescriptions      cephalexin 500 MG capsule  Commonly known as: KEFLEX  Take 1 capsule by mouth 2 (Two) Times a Day for 10 days.     HYDROcodone-acetaminophen  MG per tablet  Commonly known as: NORCO  Take 1 tablet by mouth Every 6 (Six) Hours As Needed for Moderate Pain.               Where to Get Your Medications        These medications were sent to [facility] [id number] - [place] - [address] - [phone]  - [phone] FX  [address], [place] [address]      Phone: [phone]   cephalexin 500 MG capsule  HYDROcodone-acetaminophen  MG per tablet            Steven Wiggins,   08/13/24 212

## 2024-08-14 ENCOUNTER — PATIENT OUTREACH (OUTPATIENT)
Dept: CASE MANAGEMENT | Facility: OTHER | Age: 64
End: 2024-08-14
Payer: MEDICARE

## 2024-08-14 NOTE — OUTREACH NOTE
AMBULATORY CASE MANAGEMENT NOTE    Names and Relationships of Patient/Support Persons: Contact: Sylwia Spears; Relationship: Self -   Patient Outreach  RN-ACM outreach with patient. Discussed 8/13/24 ED visit regarding partial traumatic amputation fo finger through phalanx. Patient treated and discharged. Patient states to be compliant with 8/13/24 ED recommendations; states to have dressing dry and intact to finger; taking Cephalexin and Norco as ED directed. Patient states to have received tetanus shot and states to have no difficulty with tingling or numbness. Patient voiced intent to follow up with PCP as  ED directed. Patient states no difficulty with fever; chest pain; SOB; appetite or sleeping. Patient states to be compliant with medications. Reviewed with patient  ED AVS recommendations; education; role of RN-ACM and HRCM case management services. Patient verbalized understanding. Patient states to appreciate outreach and declines needs for further outreach at this time. No further questions voiced at this time.   Adult Patient Profile  Questions/Answers      Flowsheet Row Most Recent Value   Symptoms/Conditions Managed at Home skin, musculoskeletal, other (see comments), cardiovascular  [Partial traumatic amputation of finger through phalanx]   Cardiovascular Symptoms/Conditions hypertension   Cardiovascular Management Strategies medication therapy, other (see comments)  [Physician follow up]   Musculoskeletal Symptoms/Conditions joint pain, other (see comments)  [Partial traumatic amputation of finger through phalanx]   Musculoskeletal Management Strategies medication therapy  [Physician follow up]   Skin Symptoms/Conditions wound, other (see comments)  [Partial traumatic amputation of finger through phalanx]   Skin Management Strategies medication therapy, other (see comments)  [Physician follow up]   Barriers to Taking Medication as Prescribed none   Primary Source of Support/Comfort spouse   People  in Home spouse        Social Work Assessment  Questions/Answers      Flowsheet Row Most Recent Value   People in Home spouse   Functional Status Comments Patient states to be independent wtih ADL's,  ambulating without assistive device and receiving assistance as needed with transportation.        Send Education  Questions/Answers      Flowsheet Row Most Recent Value   Annual Wellness Visit:  Patient Has Completed   Other Patient Education/Resources  24/7 [facility] Nurse Call Line        SDOH updated and reviewed with the patient during this program:  --     Food Insecurity: No Food Insecurity (8/14/2024)    Hunger Vital Sign     Worried About Running Out of Food in the Last Year: Never true     Ran Out of Food in the Last Year: Never true      --     Transportation Needs: No Transportation Needs (8/14/2024)    PRAPARE - Transportation     Lack of Transportation (Medical): No     Lack of Transportation (Non-Medical): No       Education Documentation  Unresolved/Worsening Symptoms, taught by Starr Law RN at 8/14/2024 10:46 AM.  Learner: Patient  Readiness: Acceptance  Method: Explanation  Response: Verbalizes Understanding    Provider Follow-Up, taught by Starr Law RN at 8/14/2024 10:46 AM.  Learner: Patient  Readiness: Acceptance  Method: Explanation  Response: Verbalizes Understanding    Medication Management, taught by Starr Law RN at 8/14/2024 10:46 AM.  Learner: Patient  Readiness: Acceptance  Method: Explanation  Response: Verbalizes Understanding    Fluid/Food Intake, taught by Starr Law RN at 8/14/2024 10:46 AM.  Learner: Patient  Readiness: Acceptance  Method: Explanation  Response: Verbalizes Understanding    Signs/Symptoms, taught by Starr Law RN at 8/14/2024 10:46 AM.  Learner: Patient  Readiness: Acceptance  Method: Explanation  Response: Verbalizes Understanding    Unresolved/Worsening Symptoms, taught by Starr Law RN at 8/14/2024 10:46  AM.  Learner: Patient  Readiness: Acceptance  Method: Explanation  Response: Verbalizes Understanding    Provider Follow-Up, taught by Starr Law RN at 8/14/2024 10:46 AM.  Learner: Patient  Readiness: Acceptance  Method: Explanation  Response: Verbalizes Understanding    Medication Management, taught by Starr Law RN at 8/14/2024 10:46 AM.  Learner: Patient  Readiness: Acceptance  Method: Explanation  Response: Verbalizes Understanding    Signs/Symptoms, taught by Starr Law RN at 8/14/2024 10:46 AM.  Learner: Patient  Readiness: Acceptance  Method: Explanation  Response: Verbalizes Understanding          Starr HIGHTOWER  Ambulatory Case Management    8/14/2024, 10:46 EDT

## 2024-08-16 DIAGNOSIS — S43.014A ANTERIOR DISLOCATION OF RIGHT SHOULDER, INITIAL ENCOUNTER: ICD-10-CM

## 2024-08-16 DIAGNOSIS — Z96.611 STATUS POST REVERSE TOTAL REPLACEMENT OF RIGHT SHOULDER: ICD-10-CM

## 2024-08-16 RX ORDER — OXYCODONE HYDROCHLORIDE AND ACETAMINOPHEN 5; 325 MG/1; MG/1
1 TABLET ORAL EVERY 4 HOURS PRN
Qty: 18 TABLET | Refills: 0 | Status: SHIPPED | OUTPATIENT
Start: 2024-08-16

## 2024-08-16 NOTE — TELEPHONE ENCOUNTER
Dr. Ozuna out of office    Rx Refill Note  Requested Prescriptions      No prescriptions requested or ordered in this encounter      Last office visit with prescribing clinician: 7/17/2024      Next office visit with prescribing clinician: 11/25/2024   Last Filled:8/8/24    No diagnosis found.   Date of Surgery:Right shoulder pain and recurrent instability  Status post right reverse shoulder arthroplasty 12/1/2023         Scarlet Chou MA  08/16/24, 14:00 EDT    Previous RX pended for your approval, change or denial.     {TIP  Encounters:    {TIP  Please add Last Relevant Lab Date if appropriate:  {TIP  Is Refill Pharmacy correct?:

## 2024-08-23 DIAGNOSIS — S43.014A ANTERIOR DISLOCATION OF RIGHT SHOULDER, INITIAL ENCOUNTER: ICD-10-CM

## 2024-08-23 DIAGNOSIS — Z96.611 STATUS POST REVERSE TOTAL REPLACEMENT OF RIGHT SHOULDER: ICD-10-CM

## 2024-08-23 RX ORDER — OXYCODONE HYDROCHLORIDE AND ACETAMINOPHEN 5; 325 MG/1; MG/1
1 TABLET ORAL EVERY 4 HOURS PRN
Qty: 42 TABLET | Refills: 0 | Status: SHIPPED | OUTPATIENT
Start: 2024-08-23

## 2024-08-23 NOTE — TELEPHONE ENCOUNTER
Patient also asking about when surgery will be.            Rx Refill Note  Requested Prescriptions      No prescriptions requested or ordered in this encounter      Last office visit with prescribing clinician: 7/17/2024      Next office visit with prescribing clinician: 11/25/2024   Last Filled:8/16/24    Right shoulder pain and recurrent instability  Status post right reverse shoulder arthroplasty 12/1/2023  Date of Surgery:      Scarlet Chou MA  08/23/24, 08:40 EDT    Previous RX pended for your approval, change or denial.     {TIP  Encounters:    {TIP  Please add Last Relevant Lab Date if appropriate:  {TIP  Is Refill Pharmacy correct?:

## 2024-08-24 DIAGNOSIS — I25.10 CORONARY ARTERY CALCIFICATION: ICD-10-CM

## 2024-08-24 DIAGNOSIS — I25.84 CORONARY ARTERY CALCIFICATION: ICD-10-CM

## 2024-08-24 RX ORDER — ATORVASTATIN CALCIUM 10 MG/1
10 TABLET, FILM COATED ORAL DAILY
Qty: 90 TABLET | Refills: 1 | Status: SHIPPED | OUTPATIENT
Start: 2024-08-24

## 2024-08-25 ENCOUNTER — PREP FOR SURGERY (OUTPATIENT)
Dept: OTHER | Facility: HOSPITAL | Age: 64
End: 2024-08-25
Payer: MEDICARE

## 2024-08-25 DIAGNOSIS — Z96.611 STATUS POST REVERSE TOTAL REPLACEMENT OF RIGHT SHOULDER: Primary | ICD-10-CM

## 2024-08-25 DIAGNOSIS — T84.028A INSTABILITY OF REVERSE TOTAL ARTHROPLASTY OF RIGHT SHOULDER: ICD-10-CM

## 2024-08-25 DIAGNOSIS — Z96.611 INSTABILITY OF REVERSE TOTAL ARTHROPLASTY OF RIGHT SHOULDER: ICD-10-CM

## 2024-08-25 RX ORDER — TRANEXAMIC ACID 10 MG/ML
1000 INJECTION, SOLUTION INTRAVENOUS ONCE
OUTPATIENT
Start: 2024-08-25 | End: 2024-08-25

## 2024-08-25 RX ORDER — PREGABALIN 75 MG/1
150 CAPSULE ORAL ONCE
OUTPATIENT
Start: 2024-08-25 | End: 2024-08-25

## 2024-08-25 RX ORDER — ACETAMINOPHEN 500 MG
1000 TABLET ORAL ONCE
OUTPATIENT
Start: 2024-08-25 | End: 2024-08-25

## 2024-08-25 RX ORDER — MELOXICAM 7.5 MG/1
15 TABLET ORAL ONCE
OUTPATIENT
Start: 2024-08-25 | End: 2024-08-25

## 2024-08-26 PROBLEM — T84.028A INSTABILITY OF REVERSE TOTAL ARTHROPLASTY OF RIGHT SHOULDER: Status: ACTIVE | Noted: 2024-08-25

## 2024-08-26 PROBLEM — Z96.611 STATUS POST REVERSE TOTAL REPLACEMENT OF RIGHT SHOULDER: Status: ACTIVE | Noted: 2024-08-25

## 2024-08-26 PROBLEM — Z96.611 INSTABILITY OF REVERSE TOTAL ARTHROPLASTY OF RIGHT SHOULDER: Status: ACTIVE | Noted: 2024-08-25

## 2024-08-27 ENCOUNTER — TELEPHONE (OUTPATIENT)
Dept: ORTHOPEDIC SURGERY | Facility: CLINIC | Age: 64
End: 2024-08-27
Payer: MEDICARE

## 2024-08-28 ENCOUNTER — PRE-ADMISSION TESTING (OUTPATIENT)
Dept: PREADMISSION TESTING | Facility: HOSPITAL | Age: 64
End: 2024-08-28
Payer: MEDICARE

## 2024-08-28 VITALS
OXYGEN SATURATION: 99 % | SYSTOLIC BLOOD PRESSURE: 130 MMHG | RESPIRATION RATE: 20 BRPM | HEART RATE: 68 BPM | DIASTOLIC BLOOD PRESSURE: 84 MMHG

## 2024-08-28 DIAGNOSIS — T84.028A INSTABILITY OF REVERSE TOTAL ARTHROPLASTY OF RIGHT SHOULDER: ICD-10-CM

## 2024-08-28 DIAGNOSIS — Z96.611 INSTABILITY OF REVERSE TOTAL ARTHROPLASTY OF RIGHT SHOULDER: ICD-10-CM

## 2024-08-28 DIAGNOSIS — Z96.611 STATUS POST REVERSE TOTAL REPLACEMENT OF RIGHT SHOULDER: ICD-10-CM

## 2024-08-28 LAB
ABO GROUP BLD: NORMAL
ANION GAP SERPL CALCULATED.3IONS-SCNC: 8.2 MMOL/L (ref 5–15)
APTT PPP: 35.5 SECONDS (ref 24.3–38.1)
BASOPHILS # BLD AUTO: 0.02 10*3/MM3 (ref 0–0.2)
BASOPHILS NFR BLD AUTO: 0.4 % (ref 0–1.5)
BILIRUB UR QL STRIP: NEGATIVE
BLD GP AB SCN SERPL QL: NEGATIVE
BUN SERPL-MCNC: 24 MG/DL (ref 8–23)
BUN/CREAT SERPL: 30.8 (ref 7–25)
CALCIUM SPEC-SCNC: 9.3 MG/DL (ref 8.6–10.5)
CHLORIDE SERPL-SCNC: 102 MMOL/L (ref 98–107)
CLARITY UR: CLEAR
CO2 SERPL-SCNC: 23.8 MMOL/L (ref 22–29)
COLOR UR: YELLOW
CREAT SERPL-MCNC: 0.78 MG/DL (ref 0.76–1.27)
DEPRECATED RDW RBC AUTO: 46.9 FL (ref 37–54)
EGFRCR SERPLBLD CKD-EPI 2021: 100.2 ML/MIN/1.73
EOSINOPHIL # BLD AUTO: 0.18 10*3/MM3 (ref 0–0.4)
EOSINOPHIL NFR BLD AUTO: 3.9 % (ref 0.3–6.2)
ERYTHROCYTE [DISTWIDTH] IN BLOOD BY AUTOMATED COUNT: 14.1 % (ref 12.3–15.4)
GLUCOSE SERPL-MCNC: 126 MG/DL (ref 65–99)
GLUCOSE UR STRIP-MCNC: NEGATIVE MG/DL
HCT VFR BLD AUTO: 43.6 % (ref 37.5–51)
HGB BLD-MCNC: 14.6 G/DL (ref 13–17.7)
HGB UR QL STRIP.AUTO: NEGATIVE
IMM GRANULOCYTES # BLD AUTO: 0 10*3/MM3 (ref 0–0.05)
IMM GRANULOCYTES NFR BLD AUTO: 0 % (ref 0–0.5)
INR PPP: 1.11 (ref 0.9–1.1)
KETONES UR QL STRIP: NEGATIVE
LEUKOCYTE ESTERASE UR QL STRIP.AUTO: NEGATIVE
LYMPHOCYTES # BLD AUTO: 1.66 10*3/MM3 (ref 0.7–3.1)
LYMPHOCYTES NFR BLD AUTO: 36.3 % (ref 19.6–45.3)
MCH RBC QN AUTO: 30 PG (ref 26.6–33)
MCHC RBC AUTO-ENTMCNC: 33.5 G/DL (ref 31.5–35.7)
MCV RBC AUTO: 89.5 FL (ref 79–97)
MONOCYTES # BLD AUTO: 0.28 10*3/MM3 (ref 0.1–0.9)
MONOCYTES NFR BLD AUTO: 6.1 % (ref 5–12)
NEUTROPHILS NFR BLD AUTO: 2.43 10*3/MM3 (ref 1.7–7)
NEUTROPHILS NFR BLD AUTO: 53.3 % (ref 42.7–76)
NITRITE UR QL STRIP: NEGATIVE
PH UR STRIP.AUTO: 5.5 [PH] (ref 4.5–8)
PLATELET # BLD AUTO: 108 10*3/MM3 (ref 140–450)
PMV BLD AUTO: 11.3 FL (ref 6–12)
POTASSIUM SERPL-SCNC: 4.3 MMOL/L (ref 3.5–5.2)
PROT UR QL STRIP: ABNORMAL
PROTHROMBIN TIME: 14.8 SECONDS (ref 12.1–15)
QT INTERVAL: 419 MS
QTC INTERVAL: 412 MS
RBC # BLD AUTO: 4.87 10*6/MM3 (ref 4.14–5.8)
RH BLD: POSITIVE
SODIUM SERPL-SCNC: 134 MMOL/L (ref 136–145)
SP GR UR STRIP: 1.02 (ref 1–1.03)
T&S EXPIRATION DATE: NORMAL
UROBILINOGEN UR QL STRIP: ABNORMAL
WBC NRBC COR # BLD AUTO: 4.57 10*3/MM3 (ref 3.4–10.8)

## 2024-08-28 PROCEDURE — 36415 COLL VENOUS BLD VENIPUNCTURE: CPT

## 2024-08-28 PROCEDURE — 86900 BLOOD TYPING SEROLOGIC ABO: CPT | Performed by: ORTHOPAEDIC SURGERY

## 2024-08-28 PROCEDURE — 80048 BASIC METABOLIC PNL TOTAL CA: CPT | Performed by: ORTHOPAEDIC SURGERY

## 2024-08-28 PROCEDURE — 85730 THROMBOPLASTIN TIME PARTIAL: CPT | Performed by: ORTHOPAEDIC SURGERY

## 2024-08-28 PROCEDURE — 93005 ELECTROCARDIOGRAM TRACING: CPT

## 2024-08-28 PROCEDURE — 85025 COMPLETE CBC W/AUTO DIFF WBC: CPT | Performed by: ORTHOPAEDIC SURGERY

## 2024-08-28 PROCEDURE — 81003 URINALYSIS AUTO W/O SCOPE: CPT | Performed by: ORTHOPAEDIC SURGERY

## 2024-08-28 PROCEDURE — 86850 RBC ANTIBODY SCREEN: CPT | Performed by: ORTHOPAEDIC SURGERY

## 2024-08-28 PROCEDURE — 86901 BLOOD TYPING SEROLOGIC RH(D): CPT | Performed by: ORTHOPAEDIC SURGERY

## 2024-08-28 PROCEDURE — 85610 PROTHROMBIN TIME: CPT | Performed by: ORTHOPAEDIC SURGERY

## 2024-08-28 PROCEDURE — 87081 CULTURE SCREEN ONLY: CPT | Performed by: ORTHOPAEDIC SURGERY

## 2024-08-28 NOTE — DISCHARGE INSTRUCTIONS
PRE-ADMISSION TESTING INSTRUCTIONS FOR TOTAL JOINT PATIENTS    Take these medications the morning of surgery with a small sip of water:  nothing       No aspirin, advil, aleve, ibuprofen, naproxen, diet pills, decongestants, or vitamin/herbal supplements for a week prior to your surgery.     Tylenol/Acetaminophen ok to take if needed.    Do not take any insulin or diabetes medications the morning of surgery.    Your surgeon may give you Bactroban to use prior to surgery. This will be started 5 days prior to surgery, follow the directions on your prescription from your surgeon for use.      General Instructions:    DO NOT EAT SOLID FOOD AFTER MIDNIGHT THE NIGHT BEFORE SURGERY. No gum, mints, or hard candy after midnight the night before surgery.  You may drink clear liquids the day of surgery up until 2 hours before your arrival time.  Clear liquids are liquids you can see through. Nothing RED in color.    Plain water    Sports drinks      Gelatin (Jell-O)  Fruit juices without pulp such as white grape juice and apple juice  Popsicles that contain no fruit or yogurt  Tea or coffee (no cream or milk added)    It is beneficial for you to have a clear drink that contains carbohydrates 2 hours prior to your arrival time.  DRINK A BOTTLE OF SPORTS DRINK 2 HOURS BEFORE YOUR ARRIVAL TIME. IF YOU ARE DIABETIC, DRINK A LOW OR NO SUGAR SPORTS DRINK. ANY COLOR EXCEPT RED.    Patients who avoid smoking, chewing tobacco and alcohol for 4 weeks prior to surgery have a reduced risk of post-operative complications.  If at all possible, quit smoking as many days before surgery as you can.    Do not smoke, use chewing tobacco or drink alcohol after midnight the day of surgery.    Bring your C-PAP/ BI-PAP machine if you use one.  Wear clean comfortable clothes.  Do not wear contact lenses, lotion, deodorant, or make-up.  Bring a case for your glasses if applicable.   You may brush your teeth the morning of surgery.  You may wear  dentures/partials, do not put adhesive/glue on them.  Leave all other jewelry and valuables at home.  NOTIFY YOUR SURGEON IF YOU BECOME ILL, HAVE A FEVER, PRODUCTIVE COUGH, OR CANNOT BE HERE THE DAY OF SURGERY.      Preventing a Surgical Site Infection:    Shower the night before and on the morning of surgery using the chlorhexidine soap you were given.  Use a clean washcloth with the soap.  Place clean sheets on your bed after showering the night before surgery. Do not use the CHG soap on your hair, face, or private areas. Wash your body gently for five (5) minutes. Do not scrub your skin.  Dry with a clean towel and dress in clean clothing.    Do not shave the surgical area for 10 days-2 weeks prior to surgery  because the razor can irritate skin and make it easier to develop an infection.  Make sure you, your family, and all healthcare providers clean their hands with soap and water or an alcohol based hand  before caring for you or your wound.      Day of surgery:    Your surgeon’s office will advise you of your arrival time for the day of surgery.    Upon arrival, a Pre-op nurse and Anesthesia provider will review your health history, obtain vital signs, and answer questions you may have. The anesthesia provider will also discuss the type of anesthesia that will be needed for your procedure, which may include general anesthesia. The only belongings needed at this time will be your home medications and if applicable your C-PAP/BI-PAP machine.  If you are staying overnight your family can leave the rest of your belongings in the car and bring them to your room later.  A Pre-op nurse will start an IV and you may receive medication in preparation for surgery, including something to help you relax.  Your family will be able to see you in the Pre-op area.  While you are in surgery your family should notify the waiting room  if they leave the waiting room area and provide a contact phone  number.    If you have any questions, you can call the Pre-Admission Department at [phone] or your surgeon's office.    Please be aware that surgery does come with discomfort.  We want to make every effort to control your discomfort so please discuss any uncontrolled symptoms with your nurse.   Your doctor will most likely have prescribed pain medications.     You may have bruising or discomfort from the tourniquet used in surgery.     Please leave all luggage in the car the morning of surgery.  You will be transported to your hospital room following the recovery period.  Your family can get your luggage at that time.      You may receive a survey regarding the care you received. Your feedback is very important and will be used to collect the necessary data to help us to continue to provide excellent care.

## 2024-08-28 NOTE — PAT
Pt here for PAT visit.  Pre-op tests completed, chg soap given, and instructions reviewed.  Instructed clears until 2 hrs prior to arrival time, voiced understanding. ERAs reveiwed with pt , handout for benzoyl peroxide given and pt instructed on use

## 2024-08-29 LAB — MRSA SPEC QL CULT: NORMAL

## 2024-08-30 ENCOUNTER — ANESTHESIA EVENT (OUTPATIENT)
Dept: PERIOP | Facility: HOSPITAL | Age: 64
End: 2024-08-30
Payer: MEDICARE

## 2024-08-30 ENCOUNTER — PREP FOR SURGERY (OUTPATIENT)
Dept: OTHER | Facility: HOSPITAL | Age: 64
End: 2024-08-30
Payer: MEDICARE

## 2024-08-30 ENCOUNTER — OFFICE VISIT (OUTPATIENT)
Dept: INTERNAL MEDICINE | Facility: CLINIC | Age: 64
End: 2024-08-30
Payer: MEDICARE

## 2024-08-30 VITALS
OXYGEN SATURATION: 98 % | HEART RATE: 83 BPM | BODY MASS INDEX: 29.39 KG/M2 | HEIGHT: 74 IN | DIASTOLIC BLOOD PRESSURE: 78 MMHG | WEIGHT: 229 LBS | SYSTOLIC BLOOD PRESSURE: 128 MMHG | TEMPERATURE: 98.6 F

## 2024-08-30 DIAGNOSIS — Z96.611 INSTABILITY OF REVERSE TOTAL ARTHROPLASTY OF RIGHT SHOULDER: ICD-10-CM

## 2024-08-30 DIAGNOSIS — E78.5 HYPERLIPIDEMIA LDL GOAL <70: ICD-10-CM

## 2024-08-30 DIAGNOSIS — I10 BENIGN ESSENTIAL HTN: ICD-10-CM

## 2024-08-30 DIAGNOSIS — Z01.818 PRE-OPERATIVE CLEARANCE: Primary | ICD-10-CM

## 2024-08-30 DIAGNOSIS — S43.014A ANTERIOR DISLOCATION OF RIGHT SHOULDER, INITIAL ENCOUNTER: ICD-10-CM

## 2024-08-30 DIAGNOSIS — Z96.611 STATUS POST REVERSE TOTAL REPLACEMENT OF RIGHT SHOULDER: ICD-10-CM

## 2024-08-30 DIAGNOSIS — D69.6 THROMBOCYTOPENIA: ICD-10-CM

## 2024-08-30 DIAGNOSIS — T84.028A INSTABILITY OF REVERSE TOTAL ARTHROPLASTY OF RIGHT SHOULDER: ICD-10-CM

## 2024-08-30 PROCEDURE — 99214 OFFICE O/P EST MOD 30 MIN: CPT | Performed by: NURSE PRACTITIONER

## 2024-08-30 PROCEDURE — 3078F DIAST BP <80 MM HG: CPT | Performed by: NURSE PRACTITIONER

## 2024-08-30 PROCEDURE — 3044F HG A1C LEVEL LT 7.0%: CPT | Performed by: NURSE PRACTITIONER

## 2024-08-30 PROCEDURE — 3074F SYST BP LT 130 MM HG: CPT | Performed by: NURSE PRACTITIONER

## 2024-08-30 PROCEDURE — 1126F AMNT PAIN NOTED NONE PRSNT: CPT | Performed by: NURSE PRACTITIONER

## 2024-08-30 RX ORDER — OXYCODONE AND ACETAMINOPHEN 5; 325 MG/1; MG/1
1 TABLET ORAL EVERY 4 HOURS PRN
Qty: 42 TABLET | Refills: 0 | Status: SHIPPED | OUTPATIENT
Start: 2024-08-30

## 2024-08-30 NOTE — PROGRESS NOTES
Chief Complaint   Patient presents with    Pre-op Exam     Right shoulder revision       Subjective     Sylwia Spears is a 64 y.o. male being seen for a pre-op clearance. He is scheduled with  for reverse total replacement of right shoulder    History of Present Illness     No Known Allergies      Current Outpatient Medications:     atorvastatin (LIPITOR) 10 MG tablet, TAKE 1 TABLET BY MOUTH DAILY, Disp: 90 tablet, Rfl: 1    Cholecalciferol (Vitamin D3) 30 MCG/15ML liquid, Take  by mouth., Disp: , Rfl:     CINNAMON PO, Take  by mouth., Disp: , Rfl:     docusate sodium (COLACE) 100 MG capsule, TAKE 1 CAPSULE BY MOUTH TWICE A DAY AS NEEDED FOR CONSTIPATION, Disp: 60 capsule, Rfl: 0    DULoxetine (CYMBALTA) 30 MG capsule, TAKE 1 CAPSULE BY MOUTH DAILY, Disp: 90 capsule, Rfl: 1    lisinopril (PRINIVIL,ZESTRIL) 20 MG tablet, TAKE 1 TABLET BY MOUTH DAILY, Disp: 90 tablet, Rfl: 1    naloxone (NARCAN) 4 MG/0.1ML nasal spray, Call 911. Don't prime. [place] in 1 nostril for overdose. Repeat in 2-3 minutes in other nostril if no or minimal breathing/responsiveness., Disp: 2 each, Rfl: 0    ondansetron (ZOFRAN) 4 MG tablet, Take 1 tablet by mouth Every 8 (Eight) Hours As Needed for Nausea or Vomiting., Disp: 20 tablet, Rfl: 0    oxyCODONE-acetaminophen (PERCOCET) 5-325 MG per tablet, Take 1 tablet by mouth Every 4 (Four) Hours As Needed for Moderate Pain or Severe Pain., Disp: 42 tablet, Rfl: 0    Zinc Acetate, Oral, (ZINC ACETATE PO), Take  by mouth., Disp: , Rfl:     Chromic Chloride crystals, Use Daily., Disp: , Rfl:     oxyCODONE-acetaminophen (PERCOCET) 5-325 MG per tablet, Take 1 tablet by mouth Every 4 (Four) Hours As Needed for Moderate Pain or Severe Pain., Disp: 42 tablet, Rfl: 0    The following portions of the patient's history were reviewed and updated as appropriate: allergies, current medications, past family history, past medical history, past social history, past surgical history, and problem  list.    Review of Systems   Constitutional: Negative.  Negative for fatigue and fever.   HENT: Negative.     Eyes: Negative.    Respiratory: Negative.     Cardiovascular: Negative.  Negative for chest pain, palpitations and leg swelling.   Endocrine: Negative.    Musculoskeletal:  Positive for arthralgias.   Allergic/Immunologic: Negative.    Neurological: Negative.    Hematological: Negative.    Psychiatric/Behavioral: Negative.     All other systems reviewed and are negative.      Assessment     Physical Exam  Vitals reviewed.   Constitutional:       Appearance: Normal appearance. He is not ill-appearing.   HENT:      Head: Normocephalic.   Cardiovascular:      Rate and Rhythm: Normal rate and regular rhythm.      Pulses: Normal pulses.      Heart sounds: Normal heart sounds.   Pulmonary:      Effort: Pulmonary effort is normal.      Breath sounds: Normal breath sounds.   Musculoskeletal:      Right shoulder: No bony tenderness. Decreased range of motion. Normal strength. Normal pulse.      Left hand: Normal range of motion. Normal capillary refill.      Comments: Left middle finger with well approximated partial amputation of distal finger. Dissolvable sutures in place. No erythema.    Neurological:      Mental Status: He is alert.         Plan     His fasting pre-op test were reviewed with the patient from last week.   Urinalysis With Culture If Indicated - Urine, Clean Catch (08/28/2024 15:08)   ECG 12 Lead (08/28/2024 09:25)   MRSA Screen Culture (Outpatient) - Swab, Nares (08/28/2024 10:02)   CBC and Differential (08/28/2024 10:02)   Basic metabolic panel (08/28/2024 10:02)   Protime-INR (08/28/2024 10:02)   APTT (08/28/2024 10:02)   Type and screen (08/28/2024 09:30)       Diagnoses and all orders for this visit:    1. Pre-operative clearance (Primary)    2. 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 Benign essential HTN  Comments:  BP well controlled on lisinopril 20mg daily    4.  Hyperlipidemia LDL goal <70  Comments:  LDL goal < 100    5. Thrombocytopenia  Comments:  Consult with Uriel Fowler MD (11/16/2023). Stable, consult with Dr. Fowler        Low risk for surgery, medically cleared.    Follow up as scheduled

## 2024-08-30 NOTE — TELEPHONE ENCOUNTER
Rx Refill Note  Requested Prescriptions      No prescriptions requested or ordered in this encounter      Last office visit with prescribing clinician: 7/17/2024      Next office visit with prescribing clinician: 11/25/2024   Last Filled:8/23/24    No diagnosis found.   Date of Surgery:Right shoulder pain and recurrent instability  Status post right reverse shoulder arthroplasty 12/1/2023         Scarlet Chou MA  08/30/24, 15:10 EDT    Previous RX pended for your approval, change or denial.     {TIP  Encounters:    {TIP  Please add Last Relevant Lab Date if appropriate:  {TIP  Is Refill Pharmacy correct?:

## 2024-09-01 ENCOUNTER — HOSPITAL ENCOUNTER (OUTPATIENT)
Dept: ULTRASOUND IMAGING | Facility: HOSPITAL | Age: 64
Discharge: HOME | End: 2024-09-01
Payer: MEDICARE

## 2024-09-03 ENCOUNTER — APPOINTMENT (OUTPATIENT)
Dept: GENERAL RADIOLOGY | Facility: HOSPITAL | Age: 64
End: 2024-09-03
Payer: MEDICARE

## 2024-09-03 ENCOUNTER — ANESTHESIA (OUTPATIENT)
Dept: PERIOP | Facility: HOSPITAL | Age: 64
End: 2024-09-03
Payer: MEDICARE

## 2024-09-03 ENCOUNTER — HOSPITAL ENCOUNTER (INPATIENT)
Facility: HOSPITAL | Age: 64
LOS: 1 days | Discharge: HOME | End: 2024-09-04
Attending: ORTHOPAEDIC SURGERY | Admitting: ORTHOPAEDIC SURGERY
Payer: MEDICARE

## 2024-09-03 DIAGNOSIS — Z96.611 INSTABILITY OF REVERSE TOTAL ARTHROPLASTY OF RIGHT SHOULDER: ICD-10-CM

## 2024-09-03 DIAGNOSIS — Z96.611 STATUS POST REVERSE TOTAL REPLACEMENT OF RIGHT SHOULDER: ICD-10-CM

## 2024-09-03 DIAGNOSIS — T84.028A INSTABILITY OF REVERSE TOTAL ARTHROPLASTY OF RIGHT SHOULDER: ICD-10-CM

## 2024-09-03 LAB — GLUCOSE BLDC GLUCOMTR-MCNC: 115 MG/DL (ref 70–130)

## 2024-09-03 PROCEDURE — 25010000002 SUCCINYLCHOLINE PER 20 MG

## 2024-09-03 PROCEDURE — 0RUJ0JZ SUPPLEMENT RIGHT SHOULDER JOINT WITH SYNTHETIC SUBSTITUTE, OPEN APPROACH: ICD-10-PCS | Performed by: ORTHOPAEDIC SURGERY

## 2024-09-03 PROCEDURE — 23474 REVIS RECONST SHOULDER JOINT: CPT | Performed by: SPECIALIST/TECHNOLOGIST, OTHER

## 2024-09-03 PROCEDURE — 82948 REAGENT STRIP/BLOOD GLUCOSE: CPT

## 2024-09-03 PROCEDURE — C1776 JOINT DEVICE (IMPLANTABLE): HCPCS | Performed by: ORTHOPAEDIC SURGERY

## 2024-09-03 PROCEDURE — 25010000002 DEXAMETHASONE PER 1 MG: Performed by: NURSE ANESTHETIST, CERTIFIED REGISTERED

## 2024-09-03 PROCEDURE — 73020 X-RAY EXAM OF SHOULDER: CPT

## 2024-09-03 PROCEDURE — 0RPJ08Z REMOVAL OF SPACER FROM RIGHT SHOULDER JOINT, OPEN APPROACH: ICD-10-PCS | Performed by: ORTHOPAEDIC SURGERY

## 2024-09-03 PROCEDURE — 25010000002 BUPIVACAINE (PF) 0.5 % SOLUTION: Performed by: NURSE ANESTHETIST, CERTIFIED REGISTERED

## 2024-09-03 PROCEDURE — 87070 CULTURE OTHR SPECIMN AEROBIC: CPT | Performed by: ORTHOPAEDIC SURGERY

## 2024-09-03 PROCEDURE — 25010000002 SUGAMMADEX 200 MG/2ML SOLUTION

## 2024-09-03 PROCEDURE — 25010000002 PROPOFOL 200 MG/20ML EMULSION

## 2024-09-03 PROCEDURE — 25010000002 VANCOMYCIN 1.5 G RECONSTITUTED SOLUTION 1 EACH VIAL: Performed by: ORTHOPAEDIC SURGERY

## 2024-09-03 PROCEDURE — 23474 REVIS RECONST SHOULDER JOINT: CPT | Performed by: ORTHOPAEDIC SURGERY

## 2024-09-03 PROCEDURE — 0RRJ0J7 REPLACEMENT OF RIGHT SHOULDER JOINT WITH SYNTHETIC SUBSTITUTE, GLENOID SURFACE, OPEN APPROACH: ICD-10-PCS | Performed by: ORTHOPAEDIC SURGERY

## 2024-09-03 PROCEDURE — 87176 TISSUE HOMOGENIZATION CULTR: CPT | Performed by: ORTHOPAEDIC SURGERY

## 2024-09-03 PROCEDURE — 25810000003 SODIUM CHLORIDE 0.9 % SOLUTION: Performed by: ORTHOPAEDIC SURGERY

## 2024-09-03 PROCEDURE — 25010000002 MIDAZOLAM PER 1MG: Performed by: NURSE ANESTHETIST, CERTIFIED REGISTERED

## 2024-09-03 PROCEDURE — 25810000003 SODIUM CHLORIDE 0.9 % SOLUTION 500 ML FLEX CONT: Performed by: ORTHOPAEDIC SURGERY

## 2024-09-03 PROCEDURE — 25010000002 FENTANYL CITRATE (PF) 50 MCG/ML SOLUTION

## 2024-09-03 PROCEDURE — 87205 SMEAR GRAM STAIN: CPT | Performed by: ORTHOPAEDIC SURGERY

## 2024-09-03 PROCEDURE — 87015 SPECIMEN INFECT AGNT CONCNTJ: CPT | Performed by: ORTHOPAEDIC SURGERY

## 2024-09-03 PROCEDURE — 0RPJ0J7 REMOVAL OF SYNTHETIC SUBSTITUTE FROM RIGHT SHOULDER JOINT, GLENOID SURFACE, OPEN APPROACH: ICD-10-PCS | Performed by: ORTHOPAEDIC SURGERY

## 2024-09-03 PROCEDURE — 25810000003 LACTATED RINGERS PER 1000 ML: Performed by: NURSE ANESTHETIST, CERTIFIED REGISTERED

## 2024-09-03 PROCEDURE — 94761 N-INVAS EAR/PLS OXIMETRY MLT: CPT

## 2024-09-03 PROCEDURE — 25010000002 ONDANSETRON PER 1 MG: Performed by: NURSE ANESTHETIST, CERTIFIED REGISTERED

## 2024-09-03 PROCEDURE — 88305 TISSUE EXAM BY PATHOLOGIST: CPT | Performed by: ORTHOPAEDIC SURGERY

## 2024-09-03 PROCEDURE — 25010000002 VANCOMYCIN 1 G RECONSTITUTED SOLUTION: Performed by: ORTHOPAEDIC SURGERY

## 2024-09-03 DEVICE — KNOTLESS TISSUE CONTROL DEVICE, UNDYED UNIDIRECTIONAL (ANTIBACTERIAL) SYNTHETIC ABSORBABLE DEVICE
Type: IMPLANTABLE DEVICE | Site: SHOULDER | Status: FUNCTIONAL
Brand: STRATAFIX

## 2024-09-03 DEVICE — SPACR HUM TRABECULAR REV PLS12MM: Type: IMPLANTABLE DEVICE | Site: SHOULDER | Status: FUNCTIONAL

## 2024-09-03 DEVICE — FW,BPB #2 SUTR,BLU W/NDL
Type: IMPLANTABLE DEVICE | Site: SHOULDER | Status: FUNCTIONAL
Brand: ARTHREX®

## 2024-09-03 DEVICE — IMPLANTABLE DEVICE: Type: IMPLANTABLE DEVICE | Site: SHOULDER | Status: FUNCTIONAL

## 2024-09-03 DEVICE — LINER HUM/SHLDR TRABECULARMETAL REV POLY 60DEG 40MM PLS3: Type: IMPLANTABLE DEVICE | Site: SHOULDER | Status: FUNCTIONAL

## 2024-09-03 DEVICE — IMPLANTABLE DEVICE
Type: IMPLANTABLE DEVICE | Site: SHOULDER | Status: FUNCTIONAL
Brand: COMPREHENSIVE SHOULDER SYSTEM

## 2024-09-03 RX ORDER — ASPIRIN 81 MG/1
81 TABLET ORAL DAILY
Status: DISCONTINUED | OUTPATIENT
Start: 2024-09-04 | End: 2024-09-04 | Stop reason: HOSPADM

## 2024-09-03 RX ORDER — LIDOCAINE HYDROCHLORIDE 20 MG/ML
INJECTION, SOLUTION INFILTRATION; PERINEURAL AS NEEDED
Status: DISCONTINUED | OUTPATIENT
Start: 2024-09-03 | End: 2024-09-03 | Stop reason: SURG

## 2024-09-03 RX ORDER — BUPIVACAINE HYDROCHLORIDE 5 MG/ML
INJECTION, SOLUTION EPIDURAL; INTRACAUDAL
Status: COMPLETED | OUTPATIENT
Start: 2024-09-03 | End: 2024-09-03

## 2024-09-03 RX ORDER — OXYCODONE HYDROCHLORIDE 5 MG/1
5 TABLET ORAL EVERY 4 HOURS PRN
Status: DISCONTINUED | OUTPATIENT
Start: 2024-09-03 | End: 2024-09-04 | Stop reason: HOSPADM

## 2024-09-03 RX ORDER — OXYCODONE HYDROCHLORIDE 5 MG/1
10 TABLET ORAL EVERY 4 HOURS PRN
Status: DISCONTINUED | OUTPATIENT
Start: 2024-09-03 | End: 2024-09-04 | Stop reason: HOSPADM

## 2024-09-03 RX ORDER — OXYCODONE AND ACETAMINOPHEN 5; 325 MG/1; MG/1
1 TABLET ORAL EVERY 4 HOURS PRN
Status: DISCONTINUED | OUTPATIENT
Start: 2024-09-03 | End: 2024-09-03 | Stop reason: SDUPTHER

## 2024-09-03 RX ORDER — PROPOFOL 10 MG/ML
INJECTION, EMULSION INTRAVENOUS AS NEEDED
Status: DISCONTINUED | OUTPATIENT
Start: 2024-09-03 | End: 2024-09-03 | Stop reason: SURG

## 2024-09-03 RX ORDER — ONDANSETRON 4 MG/1
4 TABLET, ORALLY DISINTEGRATING ORAL EVERY 6 HOURS PRN
Status: DISCONTINUED | OUTPATIENT
Start: 2024-09-03 | End: 2024-09-04 | Stop reason: HOSPADM

## 2024-09-03 RX ORDER — DEXMEDETOMIDINE HYDROCHLORIDE 100 UG/ML
INJECTION, SOLUTION INTRAVENOUS
Status: COMPLETED | OUTPATIENT
Start: 2024-09-03 | End: 2024-09-03

## 2024-09-03 RX ORDER — FENTANYL CITRATE 50 UG/ML
INJECTION, SOLUTION INTRAMUSCULAR; INTRAVENOUS AS NEEDED
Status: DISCONTINUED | OUTPATIENT
Start: 2024-09-03 | End: 2024-09-03 | Stop reason: SURG

## 2024-09-03 RX ORDER — SODIUM CHLORIDE 0.9 % (FLUSH) 0.9 %
10 SYRINGE (ML) INJECTION AS NEEDED
Status: DISCONTINUED | OUTPATIENT
Start: 2024-09-03 | End: 2024-09-03 | Stop reason: HOSPADM

## 2024-09-03 RX ORDER — TRANEXAMIC ACID 10 MG/ML
1000 INJECTION, SOLUTION INTRAVENOUS ONCE
Status: COMPLETED | OUTPATIENT
Start: 2024-09-03 | End: 2024-09-03

## 2024-09-03 RX ORDER — MAGNESIUM HYDROXIDE 1200 MG/15ML
LIQUID ORAL AS NEEDED
Status: DISCONTINUED | OUTPATIENT
Start: 2024-09-03 | End: 2024-09-03 | Stop reason: HOSPADM

## 2024-09-03 RX ORDER — LIDOCAINE HYDROCHLORIDE 20 MG/ML
INJECTION, SOLUTION EPIDURAL; INFILTRATION; INTRACAUDAL; PERINEURAL
Status: COMPLETED | OUTPATIENT
Start: 2024-09-03 | End: 2024-09-03

## 2024-09-03 RX ORDER — ONDANSETRON 2 MG/ML
4 INJECTION INTRAMUSCULAR; INTRAVENOUS EVERY 6 HOURS PRN
Status: DISCONTINUED | OUTPATIENT
Start: 2024-09-03 | End: 2024-09-04 | Stop reason: HOSPADM

## 2024-09-03 RX ORDER — ATORVASTATIN CALCIUM 10 MG/1
10 TABLET, FILM COATED ORAL NIGHTLY
Status: DISCONTINUED | OUTPATIENT
Start: 2024-09-03 | End: 2024-09-04 | Stop reason: HOSPADM

## 2024-09-03 RX ORDER — ONDANSETRON 4 MG/1
4 TABLET, ORALLY DISINTEGRATING ORAL EVERY 8 HOURS PRN
Status: DISCONTINUED | OUTPATIENT
Start: 2024-09-03 | End: 2024-09-03 | Stop reason: SDUPTHER

## 2024-09-03 RX ORDER — SODIUM CHLORIDE, SODIUM LACTATE, POTASSIUM CHLORIDE, CALCIUM CHLORIDE 600; 310; 30; 20 MG/100ML; MG/100ML; MG/100ML; MG/100ML
9 INJECTION, SOLUTION INTRAVENOUS CONTINUOUS PRN
Status: DISCONTINUED | OUTPATIENT
Start: 2024-09-03 | End: 2024-09-03 | Stop reason: HOSPADM

## 2024-09-03 RX ORDER — DEXAMETHASONE SODIUM PHOSPHATE 10 MG/ML
8 INJECTION INTRAMUSCULAR; INTRAVENOUS ONCE AS NEEDED
Status: COMPLETED | OUTPATIENT
Start: 2024-09-03 | End: 2024-09-03

## 2024-09-03 RX ORDER — MIDAZOLAM HYDROCHLORIDE 2 MG/2ML
1 INJECTION, SOLUTION INTRAMUSCULAR; INTRAVENOUS
Status: COMPLETED | OUTPATIENT
Start: 2024-09-03 | End: 2024-09-03

## 2024-09-03 RX ORDER — MELOXICAM 7.5 MG/1
15 TABLET ORAL DAILY
Status: DISCONTINUED | OUTPATIENT
Start: 2024-09-04 | End: 2024-09-04 | Stop reason: HOSPADM

## 2024-09-03 RX ORDER — VANCOMYCIN HYDROCHLORIDE 1 G/20ML
INJECTION, POWDER, LYOPHILIZED, FOR SOLUTION INTRAVENOUS AS NEEDED
Status: DISCONTINUED | OUTPATIENT
Start: 2024-09-03 | End: 2024-09-03 | Stop reason: HOSPADM

## 2024-09-03 RX ORDER — PREGABALIN 75 MG/1
150 CAPSULE ORAL ONCE
Status: COMPLETED | OUTPATIENT
Start: 2024-09-03 | End: 2024-09-03

## 2024-09-03 RX ORDER — ACETAMINOPHEN 325 MG/1
975 TABLET ORAL 4 TIMES DAILY
Status: DISCONTINUED | OUTPATIENT
Start: 2024-09-03 | End: 2024-09-04 | Stop reason: HOSPADM

## 2024-09-03 RX ORDER — NALOXONE HCL 0.4 MG/ML
0.4 VIAL (ML) INJECTION
Status: DISCONTINUED | OUTPATIENT
Start: 2024-09-03 | End: 2024-09-04 | Stop reason: HOSPADM

## 2024-09-03 RX ORDER — DOCUSATE SODIUM 100 MG/1
100 CAPSULE, LIQUID FILLED ORAL 2 TIMES DAILY
Status: DISCONTINUED | OUTPATIENT
Start: 2024-09-03 | End: 2024-09-04 | Stop reason: HOSPADM

## 2024-09-03 RX ORDER — SUCCINYLCHOLINE CHLORIDE 20 MG/ML
INJECTION INTRAMUSCULAR; INTRAVENOUS AS NEEDED
Status: DISCONTINUED | OUTPATIENT
Start: 2024-09-03 | End: 2024-09-03 | Stop reason: SURG

## 2024-09-03 RX ORDER — SODIUM CHLORIDE 9 MG/ML
100 INJECTION, SOLUTION INTRAVENOUS CONTINUOUS
Status: DISCONTINUED | OUTPATIENT
Start: 2024-09-03 | End: 2024-09-04

## 2024-09-03 RX ORDER — ONDANSETRON 2 MG/ML
4 INJECTION INTRAMUSCULAR; INTRAVENOUS ONCE AS NEEDED
Status: COMPLETED | OUTPATIENT
Start: 2024-09-03 | End: 2024-09-03

## 2024-09-03 RX ORDER — FAMOTIDINE 20 MG/1
40 TABLET, FILM COATED ORAL DAILY
Status: DISCONTINUED | OUTPATIENT
Start: 2024-09-04 | End: 2024-09-04 | Stop reason: HOSPADM

## 2024-09-03 RX ORDER — LIDOCAINE HYDROCHLORIDE 10 MG/ML
0.5 INJECTION, SOLUTION INFILTRATION; PERINEURAL ONCE AS NEEDED
Status: DISCONTINUED | OUTPATIENT
Start: 2024-09-03 | End: 2024-09-03 | Stop reason: HOSPADM

## 2024-09-03 RX ORDER — MEPERIDINE HYDROCHLORIDE 25 MG/ML
12.5 INJECTION INTRAMUSCULAR; INTRAVENOUS; SUBCUTANEOUS
Status: DISCONTINUED | OUTPATIENT
Start: 2024-09-03 | End: 2024-09-03 | Stop reason: HOSPADM

## 2024-09-03 RX ORDER — DULOXETIN HYDROCHLORIDE 30 MG/1
30 CAPSULE, DELAYED RELEASE ORAL DAILY
Status: DISCONTINUED | OUTPATIENT
Start: 2024-09-03 | End: 2024-09-04 | Stop reason: HOSPADM

## 2024-09-03 RX ORDER — ROCURONIUM BROMIDE 10 MG/ML
INJECTION, SOLUTION INTRAVENOUS AS NEEDED
Status: DISCONTINUED | OUTPATIENT
Start: 2024-09-03 | End: 2024-09-03 | Stop reason: SURG

## 2024-09-03 RX ORDER — ACETAMINOPHEN 500 MG
1000 TABLET ORAL ONCE
Status: COMPLETED | OUTPATIENT
Start: 2024-09-03 | End: 2024-09-03

## 2024-09-03 RX ORDER — LISINOPRIL 20 MG/1
20 TABLET ORAL DAILY
Status: DISCONTINUED | OUTPATIENT
Start: 2024-09-04 | End: 2024-09-04 | Stop reason: HOSPADM

## 2024-09-03 RX ORDER — ONDANSETRON 2 MG/ML
4 INJECTION INTRAMUSCULAR; INTRAVENOUS ONCE AS NEEDED
Status: DISCONTINUED | OUTPATIENT
Start: 2024-09-03 | End: 2024-09-03 | Stop reason: HOSPADM

## 2024-09-03 RX ORDER — SODIUM CHLORIDE 0.9 % (FLUSH) 0.9 %
10 SYRINGE (ML) INJECTION EVERY 12 HOURS SCHEDULED
Status: DISCONTINUED | OUTPATIENT
Start: 2024-09-03 | End: 2024-09-03 | Stop reason: HOSPADM

## 2024-09-03 RX ORDER — SODIUM CHLORIDE, SODIUM LACTATE, POTASSIUM CHLORIDE, CALCIUM CHLORIDE 600; 310; 30; 20 MG/100ML; MG/100ML; MG/100ML; MG/100ML
100 INJECTION, SOLUTION INTRAVENOUS ONCE
Status: DISCONTINUED | OUTPATIENT
Start: 2024-09-03 | End: 2024-09-03 | Stop reason: HOSPADM

## 2024-09-03 RX ORDER — FAMOTIDINE 10 MG/ML
20 INJECTION, SOLUTION INTRAVENOUS
Status: COMPLETED | OUTPATIENT
Start: 2024-09-03 | End: 2024-09-03

## 2024-09-03 RX ORDER — MELOXICAM 7.5 MG/1
15 TABLET ORAL ONCE
Status: COMPLETED | OUTPATIENT
Start: 2024-09-03 | End: 2024-09-03

## 2024-09-03 RX ADMIN — ACETAMINOPHEN 1000 MG: 500 TABLET ORAL at 08:41

## 2024-09-03 RX ADMIN — MELOXICAM 15 MG: 7.5 TABLET ORAL at 08:41

## 2024-09-03 RX ADMIN — DOCUSATE SODIUM 100 MG: 100 CAPSULE, LIQUID FILLED ORAL at 20:30

## 2024-09-03 RX ADMIN — LIDOCAINE HYDROCHLORIDE 3 ML: 20 INJECTION, SOLUTION EPIDURAL; INFILTRATION; INTRACAUDAL; PERINEURAL at 08:38

## 2024-09-03 RX ADMIN — ROCURONIUM 50 MG: 50 INJECTION, SOLUTION INTRAVENOUS at 09:49

## 2024-09-03 RX ADMIN — DEXMEDETOMIDINE 20 MCG: 100 INJECTION, SOLUTION, CONCENTRATE INTRAVENOUS at 08:38

## 2024-09-03 RX ADMIN — DULOXETINE HYDROCHLORIDE 30 MG: 30 CAPSULE, DELAYED RELEASE ORAL at 15:10

## 2024-09-03 RX ADMIN — ROCURONIUM 10 MG: 50 INJECTION, SOLUTION INTRAVENOUS at 10:28

## 2024-09-03 RX ADMIN — LIDOCAINE HYDROCHLORIDE 80 MG: 20 INJECTION, SOLUTION INFILTRATION; PERINEURAL at 09:41

## 2024-09-03 RX ADMIN — MIDAZOLAM HYDROCHLORIDE 1 MG: 1 INJECTION, SOLUTION INTRAMUSCULAR; INTRAVENOUS at 08:23

## 2024-09-03 RX ADMIN — OXYCODONE HYDROCHLORIDE 5 MG: 5 TABLET ORAL at 15:09

## 2024-09-03 RX ADMIN — PROPOFOL 170 MG: 10 INJECTION, EMULSION INTRAVENOUS at 09:42

## 2024-09-03 RX ADMIN — VANCOMYCIN HYDROCHLORIDE 1500 MG: 1.5 INJECTION, POWDER, LYOPHILIZED, FOR SOLUTION INTRAVENOUS at 08:03

## 2024-09-03 RX ADMIN — VANCOMYCIN HYDROCHLORIDE 1500 MG: 1.5 INJECTION, POWDER, LYOPHILIZED, FOR SOLUTION INTRAVENOUS at 19:32

## 2024-09-03 RX ADMIN — TRANEXAMIC ACID 1000 MG: 10 INJECTION, SOLUTION INTRAVENOUS at 09:52

## 2024-09-03 RX ADMIN — TRANEXAMIC ACID 1000 MG: 10 INJECTION, SOLUTION INTRAVENOUS at 11:01

## 2024-09-03 RX ADMIN — ATORVASTATIN CALCIUM 10 MG: 10 TABLET, FILM COATED ORAL at 20:30

## 2024-09-03 RX ADMIN — SODIUM CHLORIDE 100 ML/HR: 9 INJECTION, SOLUTION INTRAVENOUS at 15:18

## 2024-09-03 RX ADMIN — PREGABALIN 150 MG: 75 CAPSULE ORAL at 08:41

## 2024-09-03 RX ADMIN — ACETAMINOPHEN 975 MG: 325 TABLET ORAL at 20:30

## 2024-09-03 RX ADMIN — SUCCINYLCHOLINE CHLORIDE 140 MG: 20 INJECTION, SOLUTION INTRAMUSCULAR; INTRAVENOUS at 10:53

## 2024-09-03 RX ADMIN — ACETAMINOPHEN 975 MG: 325 TABLET ORAL at 15:09

## 2024-09-03 RX ADMIN — SUCCINYLCHOLINE CHLORIDE 120 MG: 20 INJECTION, SOLUTION INTRAMUSCULAR; INTRAVENOUS at 09:43

## 2024-09-03 RX ADMIN — FAMOTIDINE 20 MG: 10 INJECTION, SOLUTION INTRAVENOUS at 08:47

## 2024-09-03 RX ADMIN — BUPIVACAINE HYDROCHLORIDE 20 ML: 5 INJECTION, SOLUTION EPIDURAL; INTRACAUDAL; PERINEURAL at 08:38

## 2024-09-03 RX ADMIN — ONDANSETRON 4 MG: 2 INJECTION INTRAMUSCULAR; INTRAVENOUS at 08:47

## 2024-09-03 RX ADMIN — MIDAZOLAM HYDROCHLORIDE 1 MG: 1 INJECTION, SOLUTION INTRAMUSCULAR; INTRAVENOUS at 08:14

## 2024-09-03 RX ADMIN — SODIUM CHLORIDE, POTASSIUM CHLORIDE, SODIUM LACTATE AND CALCIUM CHLORIDE 9 ML/HR: 600; 310; 30; 20 INJECTION, SOLUTION INTRAVENOUS at 08:23

## 2024-09-03 RX ADMIN — OXYCODONE HYDROCHLORIDE 10 MG: 5 TABLET ORAL at 19:31

## 2024-09-03 RX ADMIN — PROPOFOL 70 MG: 10 INJECTION, EMULSION INTRAVENOUS at 10:51

## 2024-09-03 RX ADMIN — SUGAMMADEX 425 MG: 100 INJECTION, SOLUTION INTRAVENOUS at 10:42

## 2024-09-03 RX ADMIN — FENTANYL CITRATE 50 MCG: 50 INJECTION, SOLUTION INTRAMUSCULAR; INTRAVENOUS at 09:41

## 2024-09-03 RX ADMIN — OXYCODONE HYDROCHLORIDE 10 MG: 5 TABLET ORAL at 23:23

## 2024-09-03 RX ADMIN — DEXAMETHASONE SODIUM PHOSPHATE 8 MG: 10 INJECTION INTRAMUSCULAR; INTRAVENOUS at 08:49

## 2024-09-03 NOTE — ANESTHESIA PROCEDURE NOTES
Peripheral Block    Pre-sedation assessment completed: 9/3/2024 8:13 AM    Patient reassessed immediately prior to procedure    Patient location during procedure: pre-op  Start time: 9/3/2024 8:21 AM  Stop time: 9/3/2024 8:38 AM  Reason for block: at surgeon's request and post-op pain management  Performed by  BENJAMIN/CAA: Evi Palomares CRNA  Preanesthetic Checklist  Completed: patient identified, IV checked, site marked, risks and benefits discussed, surgical consent, monitors and equipment checked, pre-op evaluation and timeout performed  Prep:  Pt Position: supine  Sterile barriers:cap, gloves, mask and washed/disinfected hands  Prep: ChloraPrep  Patient monitoring: blood pressure monitoring, continuous pulse oximetry and EKG  Procedure    Sedation: yes  Performed under: local infiltration  Guidance:ultrasound guided    ULTRASOUND INTERPRETATION.  Using ultrasound guidance a 21 G gauge needle was placed in close proximity to the brachial plexus nerve, at which point, under ultrasound guidance anesthetic was injected in the area of the nerve and spread of the anesthesia was seen on ultrasound in close proximity thereto.  There were no abnormalities seen on ultrasound; a digital image was taken; and the patient tolerated the procedure with no complications. Images:still images obtained, printed/placed on chart    Laterality:right  Block Type:interscalene  Injection Technique:single-shot  Needle Type:echogenic  Needle Gauge:21 G  Resistance on Injection: none    Medications Used: bupivacaine PF (MARCAINE) injection 0.5% - Injection   20 mL - 9/3/2024 8:38:00 AM  dexmedetomidine HCl (PRECEDEX) injection - Perineural   20 mcg - 9/3/2024 8:38:00 AM  lidocaine PF 2% (XYLOCAINE) injection - Infiltration   3 mL - 9/3/2024 8:38:00 AM      Post Assessment  Injection Assessment: negative aspiration for heme, no paresthesia on injection and incremental injection  Patient Tolerance:comfortable throughout  block  Complications:no  Performed by: Evi aPlomares, CRNA

## 2024-09-03 NOTE — ANESTHESIA PROCEDURE NOTES
Airway  Urgency: elective    Date/Time: 9/3/2024 9:44 AM  Airway not difficult    General Information and Staff    Patient location during procedure: OR  CRNA/CAA: Marita Dale CRNA    Indications and Patient Condition  Indications for airway management: airway protection    Preoxygenated: yes  Mask difficulty assessment: 3 - difficult mask (inadequate, unstable or two providers) +/- NMBA (large beard, edentulous)    Final Airway Details  Final airway type: endotracheal airway      Successful airway: ETT and reinforced tube  Cuffed: yes   Successful intubation technique: direct laryngoscopy  Facilitating devices/methods: intubating stylet  Endotracheal tube insertion site: oral  Blade: Rolon  Blade size: 2  ETT size (mm): 7.5 (reinforced ETT)  Cormack-Lehane Classification: grade I - full view of glottis  Placement verified by: chest auscultation, capnometry and palpation of cuff   Cuff volume (mL): 8  Measured from: lips  ETT/EBT  to lips (cm): 23  Number of attempts at approach: 1  Assessment: lips, teeth, and gum same as pre-op and atraumatic intubation

## 2024-09-03 NOTE — PLAN OF CARE
Goal Outcome Evaluation:  Plan of Care Reviewed With: patient        Progress: improving  Outcome Evaluation: s/p R shoulder revision. sling in place. vss. room air. family in room assisting with adls. pain controlled with prn regimen. pt ambulated in hallway. Saline locked. Pt tolerating po intake. no other complaints at this time. pt currently resting in room.

## 2024-09-03 NOTE — OP NOTE
Date of Surgery: 9/3/2024    PREOPERATIVE DIAGNOSES: Right reverse shoulder arthroplasty recurrent instability    POSTOPERATIVE DIAGNOSES: Right reverse shoulder arthroplasty recurrent instability    PROCEDURE PERFORMED: Revision Right reverse total shoulder arthroplasty with revision of 2 components for recurrent instability    SURGEON: David Ozuna MD     ASSISTANT:   Assistant: Graham Conklin, KIMBERLY was responsible for performing the following activities: Retraction, Irrigation, Closing, and Placing Dressing and their skilled assistance was necessary for the success of this case.    ANESTHESIA:   general with block     ESTIMATED BLOOD LOSS:100 mL     DRAIN: None.     COMPLICATION: None.     SPECIMEN: Sent ovule fluid fluid culture and tissue specimen culture x 1 for microbiology    FLUID: per anesthesia    URINE OUTPUT: Not recorded.     IMPLANTS: Mckenna reverse shoulder 12 mm spacer +3 mm offset polyethylene liner,  Biomet 40 mm +6 lateral glenosphere with 1.5 inferior offset.     FINDINGS: Examination under anesthesia: Passive forward flexion to 160; passive external rotation to 40'; no anterior, posterior, or inferior laxity noted; no open wounds, lacerations, or abrasions over the left shoulder; 2+ radial pulse right wrist.     INDICATIONS: The patient is a pleasant 64 y.o. male who had recurrent instability following previous right reverse shoulder arthroplasty done in December 2023. Given recurrence of instability as well as failure of improvement with conservative treatment, including but not limited to physical therapy, injections, activity modification, anti-inflammatory medications, wished to proceed with the above-mentioned procedure.     INFORMED CONSENT: Patient was explained details of the procedure, as well as risks, benefits, and alternatives as documented on the history and physical, and had all questions answered prior to signing the operative consent form. No guarantees were given in  regards to results of the surgery.     DESCRIPTION OF PROCEDURE: The patient was seen, evaluated, and cleared for surgery by Anesthesia. Was met in the preoperative holding area. Operative site was marked, consent was reviewed, history and physical was updated, and preoperative labs were reviewed. Regional block was then placed per Anesthesia and the patient was taken to the operating room and placed in a supine position on the beach chair table. After successful intubation per Anesthesia, the patient was elevated into a slightly seated position approximately 30 degrees of elevation. Head was secured with a facemask. Neck was noted to be in normal anatomic alignment. The patient was secured to the table with a waist strap and all bony prominences were well-padded. Examination under anesthesia was carried out at this point in time. The right arm was then sterilely prepped and draped in a standard fashion.     A formal timeout was completed, including confirmation of history and physical, operative consent, surgical site, patient identification number, and preoperative antibiotic administration. The procedure was then begun following prior anterior longitudinal incision over the deltopectoral interval heading towards the coracoid proximally with incision through the skin only with a #15 blade followed by subcutaneous dissection with Metzenbaum scissors. Deltopectoral interval was identified at this point in time. Interval was opened, the cephalic vein was protected, and a retractor was placed with adhesions being bluntly dissected in the subacromial and subdeltoid spaces. Brown retractor was placed at this point in time.     Shoulder arthroplasty was then dislocated at this point in time with moderate ease and gapping between the humeral head polyethylene liner and glenosphere.  Polyethylene was disengaged at this point in time followed by removal of the 9 mm spacer with screw and insertion tool.  Once this was  completed, adhesions were excised and exposure of the glenosphere was completed at this point in time.  Fork removal device was then used to remove the glenosphere without difficulty.  Humeral stem and baseplate were clearly identified and noted to be intact and stable with no evidence of loosening, no evidence of failure of screws through the baseplate.  Reactive tissue was excised from both the proximal humeral stem as well as the glenoid baseplate and sent to pathology for culture specimen.    Attention was then turned to thorough irrigation with Betadine lavage followed normal saline.  40 mm +6 lateral glenosphere was then opened on the back table and impacted into position on the baseplate with good stability noted at this point in time and no evidence of disengagement with lateral directed traction with a 90 degree instrument.    Trial components were then placed on the proximal humerus with a +12 spacer and +3 polyethylene trial.  Shoulder was reduced at this point in time and noted to be significantly improved in regards to stability with no evidence of gapping in any position with toleration of passive forward flexion to 170 degrees of passive external rotation of 55 degrees.  Muscle relaxation had been reversed at this point in time and with this, there was noted to be significant difficulty in trying to re-dislocate the humerus.  Thus paralytic was once again added, shoulder was dislocated and final +12 spacer and +3 polyethylene components were opened on the back table and assembled at this time.  These were then impacted onto the proximal humeral stem with good fit noted and no evidence of disengagement with lateral directed pressure.  Shoulder was once again reduced and no to be stable throughout full range of motion with no evidence of gapping at this time.    The joint was then thoroughly irrigated once again with normal saline, followed by a Betadine wash, followed by normal saline once again.  1 g  of vancomycin powder was placed in the deep and subcutaneous tissue at this time.    Attention was then turned to closure of the wound with 0 Vicryl used for closure of the deltopectoral layer, 2-0 and 3-0 Stratafix for subcutaneous closure, followed by mesh and glue for skin closure. The wound was dressed with Telfa, 4 x 4, Tegaderm, ABD pad, and Medipore tape. The patient was placed in a sling with an abduction pillow to the operative side.     At the end of the procedure, all lap, needle, and sponge counts were correct x2. The patient had brisk capillary refill to all digits of the operative extremity. Compartments were soft and easily compressible at the end of the procedure.     DISPOSITION: The patient was extubated per Anesthesia and taken to the recovery room in stable condition. Will be admitted to the Orthopedic service for pain control and kept in a sling for 4 weeks with range of motion of the elbow, wrist, hand initiated within the 1st week as well as gentle pendulum exercises of shoulder. Results of the procedure were discussed immediately postoperatively with the patient's family, and they had all questions answered at that time.     David Ozuna M.D.*

## 2024-09-03 NOTE — H&P
Orthopedic H&P    Subjective:     Patient ID: Sylwia Spears is a 64 y.o. male.    Chief Complaint:  Right shoulder pain and recurrent instability  Status post right reverse shoulder arthroplasty 12/1/2023    History of Present Illness  History of Present Illness  Sylwia presents for surgical treatment of recurrent right shoulder instability status post reverse shoulder arthroplasty.  He had a recurrent instability episode which happened on July 10, states that he was just try to move a stepladder that point, he noted his shoulder popped out of place.  Presented to Emergency Department for further evaluation was sedated for reduction.  He had a successful reduction has had moderate soreness of the shoulder since that time.  He is frustrated at the recurrent instability episodes.  Denies new numbness or tingling.  Localizes residual pain to the anterior lateral aspect of his right shoulder, mild radiation of pain on the lateral aspect of the arm but not below the elbow.    No current facility-administered medications on file prior to encounter.     Current Outpatient Medications on File Prior to Encounter   Medication Sig Dispense Refill    atorvastatin (LIPITOR) 10 MG tablet TAKE 1 TABLET BY MOUTH DAILY 90 tablet 1    Cholecalciferol (Vitamin D3) 30 MCG/15ML liquid Take  by mouth.      Chromic Chloride crystals Use Daily.      CINNAMON PO Take  by mouth.      docusate sodium (COLACE) 100 MG capsule TAKE 1 CAPSULE BY MOUTH TWICE A DAY AS NEEDED FOR CONSTIPATION 60 capsule 0    DULoxetine (CYMBALTA) 30 MG capsule TAKE 1 CAPSULE BY MOUTH DAILY 90 capsule 1    lisinopril (PRINIVIL,ZESTRIL) 20 MG tablet TAKE 1 TABLET BY MOUTH DAILY 90 tablet 1    naloxone (NARCAN) 4 MG/0.1ML nasal spray Call 911. Don't prime. [place] in 1 nostril for overdose. Repeat in 2-3 minutes in other nostril if no or minimal breathing/responsiveness. 2 each 0    ondansetron (ZOFRAN) 4 MG tablet Take 1 tablet by mouth Every 8 (Eight) Hours As Needed  for Nausea or Vomiting. 20 tablet 0    oxyCODONE-acetaminophen (PERCOCET) 5-325 MG per tablet Take 1 tablet by mouth Every 4 (Four) Hours As Needed for Moderate Pain or Severe Pain. 42 tablet 0    Zinc Acetate, Oral, (ZINC ACETATE PO) Take  by mouth.       There were no vitals filed for this visit.       Social History     Occupational History    Not on file   Tobacco Use    Smoking status: Former     Current packs/day: 0.00     Average packs/day: 1 pack/day for 47.0 years (47.0 ttl pk-yrs)     Types: Cigarettes     Start date:      Quit date: 2022     Years since quittin.7     Passive exposure: Past    Smokeless tobacco: Never   Vaping Use    Vaping status: Never Used   Substance and Sexual Activity    Alcohol use: No     Comment: rare    Drug use: Yes     Frequency: 7.0 times per week     Types: Marijuana    Sexual activity: Defer      Past Medical History:   Diagnosis Date    Cervical disc disease     stenosis    Cirrhosis of liver     Colon polyp     Coronary artery calcification     Diabetes mellitus     Electrocution     Fell off a ladder and has chronic neck pain    Hyperlipidemia     Hypertension     Spleen enlarged     Thrombocytopenia      Past Surgical History:   Procedure Laterality Date    CHOLECYSTECTOMY      COLONOSCOPY      COLONOSCOPY N/A 2018    Procedure: COLONOSCOPY, polypectomy;  Surgeon: Raleigh Champagne MD;  Location: formerly [facility] OR;  Service: Gastroenterology    COLONOSCOPY W/ POLYPECTOMY      COLONOSCOPY W/ POLYPECTOMY N/A 2021    Procedure: COLONOSCOPY; polypectomy;  Surgeon: Raleigh Champagne MD;  Location: formerly [facility] OR;  Service: Gastroenterology;  Laterality: N/A;  polyps   diverticulosis    LIPOMA EXCISION      groin    SHOULDER SURGERY Left     TOTAL SHOULDER ARTHROPLASTY W/ DISTAL CLAVICLE EXCISION Right 2023    Procedure: TOTAL SHOULDER REVERSE ARTHROPLASTY;  Surgeon: David Ozuna MD;  Location: formerly [facility] OR;  Service: Orthopedics;   Laterality: Right;       Family History   Problem Relation Age of Onset    Hypertension Mother     Heart disease Mother     Colon cancer Mother     Stroke Mother     Cancer Mother     Hypertension Father     Crohn's disease Brother          Review of Systems        Objective:  Vitals:    09/03/24 0713   Temp: 97.8 °F (36.6 °C)   TempSrc: Oral   Weight: 106 kg (233 lb 3.2 oz)         09/03/24 0713   Weight: 106 kg (233 lb 3.2 oz)     Body mass index is 29.94 kg/m².  Physical Exam    Vital signs reviewed.   General: No acute distress, alert and oriented  Eyes: conjunctiva clear; pupils equally round and reactive  ENT: external ears and nose atraumatic; oropharynx clear  CV: no peripheral edema  Resp: normal respiratory effort  Skin: no rashes or wounds; normal turgor  Psych: mood and affect appropriate; recent and remote memory intact  Debilities: None            Physical Exam         Right shoulder-incision well-healed, active and passive forward flexion 120 degrees active and passive external rotation 25 degrees 4 out of 5 strength localized motion positive deltoid firing all 3 component center incision well-healed brisk cap refill all digits    Imaging:    XR Shoulder 2+ View Right    Result Date: 7/10/2024  Interval reduction of the right shoulder dislocation. Electronically Signed: Uriel Bailon MD  7/10/2024 4:29 PM EDT  Workstation ID: [id number]    XR Shoulder 2+ View Right    Result Date: 7/10/2024  Impression: Reverse right shoulder arthroplasty with anterior dislocation of the humeral component. Electronically Signed: Ryan Issa MD  7/10/2024 2:09 PM EDT  Workstation ID: [id number]    Review of x-rays from emergency department dated July 10 indicate anterior reverse shoulder arthroplasty dislocation with subsequent reduction, no evidence of periprosthetic fracture    Assessment:        1. Anterior dislocation of right shoulder, initial encounter    2. Status post reverse total replacement of right shoulder            Plan:          Assessment & Plan  Reviewed with patient that given his recurrent instability episodes I think at this point in time it would be most recommended to proceed with a revision shoulder arthroplasty.  I did discuss with him he will have to be careful afterwards both with sling immobilization as well as with his range of motion and position when he is doing activities because he still would be at risk for some instability though the only way for me to try to reduce his risk of this happening recurrently would be a revision surgery with revision of both the glenosphere and the polyethylene components likely at the time of surgery.  He was in agreement with this, he is frustrated at the overall situation but does not want to continue having instability episodes to what ever extent we can prevent it.     Patient wishes to proceed with revision right reverse shoulder arthroplasty at this point in time. I reviewed details of the procedure as well as risks, benefits, and alternatives with the risks including but not limited to neurovascular damage, bleeding, infection, periprosthetic fracture, chronic pain, recurrent instability, loosening of implant, failure of implant, loss of motion, weakness, stiffness, DVT, pulmonary embolus, death, stroke, complex regional pain syndrome, myocardial infarction, need for additional procedures.  Patient understood all these had all questions answered today. No guarantees were given in regards to results of the surgery.      Sylwia Spears was in agreement with plan and had all questions answered.

## 2024-09-03 NOTE — ANESTHESIA PREPROCEDURE EVALUATION
Anesthesia Evaluation     Patient summary reviewed and Nursing notes reviewed   no history of anesthetic complications:   NPO Solid Status: > 8 hours  NPO Liquid Status: > 2 hours           Airway   Mallampati: I  TM distance: >3 FB  Neck ROM: full  No difficulty expected  Comment: Full Beard  Dental - normal exam   (+) lower dentures, upper dentures and edentulous    Pulmonary - normal exam    breath sounds clear to auscultation  (+) ,sleep apnea  Cardiovascular - normal exam  Exercise tolerance: good (4-7 METS)    ECG reviewed  Rhythm: regular  Rate: normal    (+) hypertension well controlled less than 2 medications, CAD, hyperlipidemia    ROS comment: Progress Note    HEART RATE=58  bpm  RR Fnwnlmxt=1769  ms  NC Vcobwjxj=115  ms  P Horizontal Axis=15  deg  P Front Axis=13  deg  QRSD Bdzdexqy=878  ms  QT Jfrjqrxr=683  ms  BJoU=082  ms  QRS Axis=42  deg  T Wave Axis=13  deg  - NORMAL ECG -  Sinus rhythm  No change from prior tracing  Electronically Signed By: Nacho Loza ([facility]) 2024-08-28 16:38:06  Date and Time of Study:2024-08-28 09:25:02        Neuro/Psych  GI/Hepatic/Renal/Endo    (+) obesity, liver disease cirrhosis, diabetes mellitus (off meds for year after losing weight) type 2 well controlled  (-) morbid obesity    Musculoskeletal     (+) back pain, chronic pain  Abdominal   (+) obese   Substance History   (+) alcohol use (rarely), drug use (THC daily)     OB/GYN negative ob/gyn ROS         Other   arthritis,                     Anesthesia Plan    ASA 2     general with block     intravenous induction     Anesthetic plan, risks, benefits, and alternatives have been provided, discussed and informed consent has been obtained with: patient.  Pre-procedure education provided  Use of blood products discussed with patient  Consented to blood products.    Plan discussed with CRNA.        CODE STATUS:

## 2024-09-04 ENCOUNTER — READMISSION MANAGEMENT (OUTPATIENT)
Dept: CALL CENTER | Facility: HOSPITAL | Age: 64
End: 2024-09-04
Payer: MEDICARE

## 2024-09-04 VITALS
HEART RATE: 64 BPM | SYSTOLIC BLOOD PRESSURE: 153 MMHG | WEIGHT: 233.2 LBS | TEMPERATURE: 98.5 F | HEIGHT: 75 IN | RESPIRATION RATE: 20 BRPM | OXYGEN SATURATION: 94 % | BODY MASS INDEX: 29 KG/M2 | DIASTOLIC BLOOD PRESSURE: 72 MMHG

## 2024-09-04 LAB
ANION GAP SERPL CALCULATED.3IONS-SCNC: 9.9 MMOL/L (ref 5–15)
BUN SERPL-MCNC: 16 MG/DL (ref 8–23)
BUN/CREAT SERPL: 21.6 (ref 7–25)
CALCIUM SPEC-SCNC: 8.9 MG/DL (ref 8.6–10.5)
CHLORIDE SERPL-SCNC: 105 MMOL/L (ref 98–107)
CO2 SERPL-SCNC: 23.1 MMOL/L (ref 22–29)
CREAT SERPL-MCNC: 0.74 MG/DL (ref 0.76–1.27)
CYTO UR: NORMAL
DEPRECATED RDW RBC AUTO: 46.6 FL (ref 37–54)
EGFRCR SERPLBLD CKD-EPI 2021: 101.8 ML/MIN/1.73
ERYTHROCYTE [DISTWIDTH] IN BLOOD BY AUTOMATED COUNT: 14.1 % (ref 12.3–15.4)
GLUCOSE SERPL-MCNC: 105 MG/DL (ref 65–99)
HCT VFR BLD AUTO: 40.3 % (ref 37.5–51)
HGB BLD-MCNC: 13.7 G/DL (ref 13–17.7)
LAB AP CASE REPORT: NORMAL
MCH RBC QN AUTO: 30.6 PG (ref 26.6–33)
MCHC RBC AUTO-ENTMCNC: 34 G/DL (ref 31.5–35.7)
MCV RBC AUTO: 90 FL (ref 79–97)
PATH REPORT.FINAL DX SPEC: NORMAL
PATH REPORT.GROSS SPEC: NORMAL
PLATELET # BLD AUTO: 108 10*3/MM3 (ref 140–450)
PMV BLD AUTO: 11.9 FL (ref 6–12)
POTASSIUM SERPL-SCNC: 4.2 MMOL/L (ref 3.5–5.2)
RBC # BLD AUTO: 4.48 10*6/MM3 (ref 4.14–5.8)
SODIUM SERPL-SCNC: 138 MMOL/L (ref 136–145)
WBC NRBC COR # BLD AUTO: 10.21 10*3/MM3 (ref 3.4–10.8)

## 2024-09-04 PROCEDURE — 85027 COMPLETE CBC AUTOMATED: CPT | Performed by: ORTHOPAEDIC SURGERY

## 2024-09-04 PROCEDURE — 97165 OT EVAL LOW COMPLEX 30 MIN: CPT

## 2024-09-04 PROCEDURE — 80048 BASIC METABOLIC PNL TOTAL CA: CPT | Performed by: ORTHOPAEDIC SURGERY

## 2024-09-04 RX ORDER — OXYCODONE AND ACETAMINOPHEN 5; 325 MG/1; MG/1
1 TABLET ORAL EVERY 4 HOURS PRN
Qty: 42 TABLET | Refills: 0 | Status: SHIPPED | OUTPATIENT
Start: 2024-09-04 | End: 2024-09-11 | Stop reason: SDUPTHER

## 2024-09-04 RX ORDER — ASPIRIN 81 MG/1
81 TABLET ORAL DAILY
Qty: 14 TABLET | Refills: 0 | Status: SHIPPED | OUTPATIENT
Start: 2024-09-05

## 2024-09-04 RX ORDER — MELOXICAM 15 MG/1
15 TABLET ORAL DAILY
Qty: 30 TABLET | Refills: 0 | Status: SHIPPED | OUTPATIENT
Start: 2024-09-05

## 2024-09-04 RX ORDER — PSEUDOEPHEDRINE HCL 30 MG
100 TABLET ORAL 2 TIMES DAILY
Qty: 60 CAPSULE | Refills: 0 | Status: SHIPPED | OUTPATIENT
Start: 2024-09-04

## 2024-09-04 RX ORDER — ONDANSETRON 4 MG/1
4 TABLET, FILM COATED ORAL EVERY 8 HOURS PRN
Qty: 20 TABLET | Refills: 0 | Status: SHIPPED | OUTPATIENT
Start: 2024-09-04

## 2024-09-04 RX ADMIN — FAMOTIDINE 40 MG: 20 TABLET ORAL at 09:16

## 2024-09-04 RX ADMIN — OXYCODONE HYDROCHLORIDE 10 MG: 5 TABLET ORAL at 13:18

## 2024-09-04 RX ADMIN — ACETAMINOPHEN 975 MG: 325 TABLET ORAL at 09:16

## 2024-09-04 RX ADMIN — ASPIRIN 81 MG: 81 TABLET, COATED ORAL at 09:16

## 2024-09-04 RX ADMIN — DULOXETINE HYDROCHLORIDE 30 MG: 30 CAPSULE, DELAYED RELEASE ORAL at 09:16

## 2024-09-04 RX ADMIN — MELOXICAM 15 MG: 7.5 TABLET ORAL at 09:16

## 2024-09-04 RX ADMIN — DOCUSATE SODIUM 100 MG: 100 CAPSULE, LIQUID FILLED ORAL at 09:16

## 2024-09-04 RX ADMIN — ACETAMINOPHEN 975 MG: 325 TABLET ORAL at 12:44

## 2024-09-04 RX ADMIN — OXYCODONE HYDROCHLORIDE 10 MG: 5 TABLET ORAL at 09:16

## 2024-09-04 RX ADMIN — LISINOPRIL 20 MG: 20 TABLET ORAL at 09:16

## 2024-09-04 RX ADMIN — OXYCODONE HYDROCHLORIDE 10 MG: 5 TABLET ORAL at 04:09
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
## 2024-09-04 NOTE — THERAPY DISCHARGE NOTE
Acute Care - Occupational Therapy Discharge   Reina Boggs    Patient Name: Sylwia Spears  : 1960    MRN: [id number]                              Today's Date: 2024       Admit Date: 9/3/2024    Visit Dx:     ICD-10-CM ICD-9-CM   1. Status post reverse total replacement of right shoulder  Z96.611 V43.61   2. 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  T84.028A 996.42    Z96.611 V43.61     Patient Active Problem List   Diagnosis    History of colon polyps    Type 2 diabetes mellitus without complication, without long-term current use of insulin    Benign essential HTN    Morbidly obese    Cervical spinal stenosis    Encounter for annual wellness visit (AWV) in Medicare patient    Personal history of colonic polyps    Family history of colon cancer    Hyperlipidemia LDL goal <70    Midline thoracic back pain    Erectile dysfunction due to diseases classified elsewhere    Personal history of nicotine dependence    Coronary artery calcification    Varicose veins of right lower extremity with other complications    DJD of shoulder    Rotator cuff arthropathy of right shoulder    Complete tear of right rotator cuff    Chronic right shoulder pain    Thrombocytopenia    Abnormal international normal ratio (INR)    Status post reverse total shoulder replacement, open biceps tenodesis, right, DOS 2023    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    Status post reverse total replacement of right shoulder    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    Pre-operative clearance     Past Medical History:   Diagnosis Date    Cervical disc disease     stenosis    Cirrhosis of liver     Colon polyp     Coronary artery calcification     Diabetes mellitus     Electrocution     Fell off a ladder and has chronic neck pain    Hyperlipidemia     Hypertension     Spleen enlarged     Thrombocytopenia      Past Surgical History:   Procedure Laterality Date     CHOLECYSTECTOMY      COLONOSCOPY      COLONOSCOPY N/A 08/17/2018    Procedure: COLONOSCOPY, polypectomy;  Surgeon: Raleigh Champagne MD;  Location:  [facility];  Service: Gastroenterology    COLONOSCOPY W/ POLYPECTOMY      COLONOSCOPY W/ POLYPECTOMY N/A 09/02/2021    Procedure: COLONOSCOPY; polypectomy;  Surgeon: Raleigh Champagne MD;  Location:  [facility];  Service: Gastroenterology;  Laterality: N/A;  polyps   diverticulosis    LIPOMA EXCISION      groin    SHOULDER SURGERY Left     TOTAL SHOULDER ARTHROPLASTY W/ DISTAL CLAVICLE EXCISION Right 12/1/2023    Procedure: TOTAL SHOULDER REVERSE ARTHROPLASTY;  Surgeon: David Ozuna MD;  Location:  [facility];  Service: Orthopedics;  Laterality: Right;      General Information       Row Name 09/04/24 0818          OT Time and Intention    Document Type discharge evaluation/summary  -SD     Mode of Treatment occupational therapy  -SD       Row Name 09/04/24 0818          General Information    Patient Profile Reviewed yes  Pt s/p revision of right reverse total shoulder arthroplasty. Pt's with initial sx in 12/23. Pt doing well and experienced dislocation of shoulder in 5/24 while sleeping. Pt with another dislocation in 7/24.  -SD     Prior Level of Function independent:;ADL's;all household mobility  -SD     Existing Precautions/Restrictions shoulder;right;other (see comments);brace on at all times  wear sling x weeks. right shoulder rom limited to gentle modified pendulums until follow up with physician.  -SD     Barriers to Rehab none identified  -SD       Row Name 09/04/24 0818          Occupational Profile    Reason for Services/Referral (Occupational Profile) s/p right shoulder sx  -SD     Successful Occupations (Occupational Profile) I with daily tasks  -SD     Occupational History/Life Experiences (Occupational Profile) 12/23 shoulder sx. Pt doing well, then experied dislocation of shoulder 5/24 while sleeping. Pt experienced another  dislocation in 7/24.  -SD     Environmental Supports and Barriers (Occupational Profile) spouse  -SD     Patient Goals (Occupational Profile) go [employer]  -SD       Row Name 09/04/24 0818          Living Environment    People in Home spouse  -SD       Row Name 09/04/24 0818          Home Main Entrance    Number of Stairs, Main Entrance three  -SD       Row Name 09/04/24 0818          Stairs Within Home, Primary    Stairs, Within Home, Primary one level home  -SD       Row Name 09/04/24 0818          Cognition    Orientation Status (Cognition) oriented x 4  -SD               User Key  (r) = Recorded By, (t) = Taken By, (c) = Cosigned By      Initials Name Provider Type    SD Lokesh Valentine, OTR Occupational Therapist                   Mobility/ADL's       Row Name 09/04/24 0823          Bed Mobility    Comment, (Bed Mobility) Pt has no concerns with bed mobility, transfers and mobility. Pt reports independence while in hospital after sx  -SD       Row Name 09/04/24 0823          Activities of Daily Living    BADL Assessment/Intervention upper body dressing;bathing  -SD       Row Name 09/04/24 0823          Mobility    Extremity Weight-bearing Status right upper extremity  -SD     Right Upper Extremity (Weight-bearing Status) non weight-bearing (NWB)  -SD       Row Name 09/04/24 0823          Upper Body Dressing Assessment/Training    Comment, (Upper Body Dressing) Education with rom/activity restrictions of RUE after shoulder sx. Pt familiar with compensatory strategies from recent shoulder sx. Pt has no concerns for adl management. Spouse at home to assist as needed.  -SD       Row Name 09/04/24 0823          Bathing Assessment/Intervention    Comment, (Bathing) Education with rom/activity restrictions of RUE after shoulder sx. Pt familiar with compensatory strategies from recent shoulder sx. Pt has no concerns for adl management. Spouse at home to assist as needed.  -SD               User Key  (r) = Recorded By, (t)  = Taken By, (c) = Cosigned By      Initials Name Provider Type    Lokesh Osborne, DEDRICK Occupational Therapist                   Obj/Interventions       Row Name 09/04/24 0825          Range of Motion Comprehensive    Comment, General Range of Motion Education provided regarding arom program of non-involved joints of RUE (elbow/wrist/hand) and gentle modified pendulums of RUE. Pt states his familiar with rom program from prior sx and has no questions/concerns.  -SD               User Key  (r) = Recorded By, (t) = Taken By, (c) = Cosigned By      Initials Name Provider Type    Lokesh Osborne, OTR Occupational Therapist                   Goals/Plan    No documentation.                  Clinical Impression       Row Name 09/04/24 0826          Pain Assessment    Pre/Posttreatment Pain Comment no pain reported  -SD       Row Name 09/04/24 0826          Plan of Care Review    Plan of Care Reviewed With patient;spouse  -SD     Outcome Evaluation Occupational Therapy. Pt s/p revision of right reverse total shoulder arthroplasty. Education with rom/activity restrictions of RUE after shoulder sx. Pt is familiar with compensatory strategies for management of adl's from recent shoulder sx and has no concerns with support of spouse available at  home. Education provided regarding arom program of non-involved joints of RUE (elbow/wrist/hand) and gentle modified pendulums of RUE. Pt has no questions/concerns for discharge to home. Anticipate outpt physical therapy services after follow up appointment with ortho.  -SD       Row Name 09/04/24 0826          Therapy Assessment/Plan (OT)    Patient/Family Therapy Goal Statement (OT) go home  -SD     Criteria for Skilled Therapeutic Interventions Met (OT) no problems identified which require skilled intervention  -SD     Therapy Frequency (OT) evaluation only  -SD       Row Name 09/04/24 0826          Therapy Plan Review/Discharge Plan (OT)    Anticipated Discharge  Disposition (OT) home with assist  anticipate outpt patient physical therapy services in a few weeks (after follow up with ortho)  -SD       Row Name 09/04/24 0826          Positioning and Restraints    Pre-Treatment Position in bed  -SD     Post Treatment Position bed  -SD     In Bed supine;call light within reach;encouraged to call for assist;with family/caregiver  pt wearing brace on right shoulder  -SD               User Key  (r) = Recorded By, (t) = Taken By, (c) = Cosigned By      Initials Name Provider Type    Lokesh Osborne OTR Occupational Therapist                   Outcome Measures       Row Name 09/04/24 0833          How much help from another is currently needed...    Putting on and taking off regular lower body clothing? 3  -SD     Bathing (including washing, rinsing, and drying) 3  -SD     Toileting (which includes using toilet bed pan or urinal) 4  -SD     Putting on and taking off regular upper body clothing 3  -SD     Taking care of personal grooming (such as brushing teeth) 4  -SD     Eating meals 4  -SD     AM-PAC 6 Clicks Score (OT) 21  -SD       Row Name 09/04/24 0833          Functional Assessment    Outcome Measure Options AM-PAC 6 Clicks Daily Activity (OT)  -SD               User Key  (r) = Recorded By, (t) = Taken By, (c) = Cosigned By      Initials Name Provider Type    Lokesh Osborne OTR Occupational Therapist                  Occupational Therapy Education       Title: PT OT SLP Therapies (In Progress)       Topic: Occupational Therapy (Resolved)       Point: ADL training (Resolved)       Description:   Instruct learner(s) on proper safety adaptation and remediation techniques during self care or transfers.   Instruct in proper use of assistive devices.                  Learning Progress Summary             Patient Acceptance, E,TB,D,H, VU by SD at 9/4/2024 0834    Comment: Education with rom/activity restrictions, use of brace for RUE, compensatory strategies for adl's,  arom program of non-involved joints of RUE (elbow/wrist/hand) and gentle modified pendulums. Pt declined handout for rom (has handout at home).                         Point: Home exercise program (Resolved)       Description:   Instruct learner(s) on appropriate technique for monitoring, assisting and/or progressing therapeutic exercises/activities.                  Learning Progress Summary             Patient Acceptance, E,TB,D,H, VU by SD at 9/4/2024 0834    Comment: Education with rom/activity restrictions, use of brace for RUE, compensatory strategies for adl's, arom program of non-involved joints of RUE (elbow/wrist/hand) and gentle modified pendulums. Pt declined handout for rom (has handout at home).                                         User Key       Initials Effective Dates Name Provider Type Discipline    SD 06/16/21 -  Lokesh Valentine OTR Occupational Therapist OT                  OT Recommendation and Plan  Therapy Frequency (OT): evaluation only  Plan of Care Review  Plan of Care Reviewed With: patient, spouse  Outcome Evaluation: Occupational Therapy. Pt s/p revision of right reverse total shoulder arthroplasty. Education with rom/activity restrictions of RUE after shoulder sx. Pt is familiar with compensatory strategies for management of adl's from recent shoulder sx and has no concerns with support of spouse available at  home. Education provided regarding arom program of non-involved joints of RUE (elbow/wrist/hand) and gentle modified pendulums of RUE. Pt has no questions/concerns for discharge to home. Anticipate outpt physical therapy services after follow up appointment with ortho.     Time Calculation:   Evaluation Complexity (OT)  Review Occupational Profile/Medical/Therapy History Complexity: brief/low complexity  Assessment, Occupational Performance/Identification of Deficit Complexity: 1-3 performance deficits  Clinical Decision Making Complexity (OT): problem focused assessment/low  complexity  Overall Complexity of Evaluation (OT): low complexity     Time Calculation- OT       Row Name 09/04/24 0839             Time Calculation- OT    OT Start Time 0810  -SD         Untimed Charges    OT Eval/Re-eval Minutes 15  -SD         Total Minutes    Untimed Charges Total Minutes 15  -SD       Total Minutes 15  -SD                User Key  (r) = Recorded By, (t) = Taken By, (c) = Cosigned By      Initials Name Provider Type    SD Lokesh Valentine OTR Occupational Therapist                  Therapy Charges for Today       Code Description Service Date Service Provider Modifiers Qty    [id number]  OT EVAL LOW COMPLEXITY 1 9/4/2024 Lokesh Valentine OTR GO 1               OT Discharge Summary  Anticipated Discharge Disposition (OT): home with assist  Reason for Discharge: other (comment) (Pt has no concerns for discharge to home)    DEDRICK Steven  9/4/2024

## 2024-09-04 NOTE — OUTREACH NOTE
Prep Survey      Flowsheet Row Responses   [facility] facility patient discharged from? [place]   Is LACE score < 7 ? No   Eligibility [facility]   Date of Admission 09/03/24   Date of Discharge 09/04/24   Discharge Disposition Home or Self Care   Discharge diagnosis Total shoulder revision   Does the patient have one of the following disease processes/diagnoses(primary or secondary)? Total Joint Replacement   Does the patient have Home health ordered? No   Is there a DME ordered? No   Prep survey completed? Yes            JANET FLORES - Registered Nurse

## 2024-09-04 NOTE — SIGNIFICANT NOTE
09/04/24 0951   OTHER   Discipline physical therapist   Rehab Time/Intention   Session Not Performed other (see comments)  (pt ambulating in hallway independently without assistance/use of device.  pt reports no concerns regarding return home at this time.  Patient familiar with restrictions per ortho MD, anticipates returning home today with spouse assistance.)

## 2024-09-04 NOTE — PLAN OF CARE
Problem: Adult Inpatient Plan of Care  Goal: Plan of Care Review  Recent Flowsheet Documentation  Taken 9/4/2024 0826 by Lokehs Valentine OTR  Plan of Care Reviewed With:   patient   spouse  Outcome Evaluation: Occupational Therapy. Pt s/p revision of right reverse total shoulder arthroplasty. Education with use of shoulder sling x 4 weeks and rom/activity restrictions of RUE after shoulder sx. Pt is familiar with compensatory strategies for management of adl's from recent shoulder sx and has no concerns with support of spouse available at  home. Education provided regarding arom program of non-involved joints of RUE (elbow/wrist/hand) and gentle modified pendulums of RUE. Pt has no questions/concerns for discharge to home. Anticipate outpt physical therapy services after follow up appointment with ortho.

## 2024-09-04 NOTE — DISCHARGE INSTRUCTIONS
Total Shoulder Joint Replacement Discharge Instructions:    I. ACTIVITIES:  1. Exercises:  Complete exercise program as taught by the hospital physical therapist 2 times per day  Wear sling when in bed and when out of bed (except when doing exercises)  Exercise program will be advanced by the physical therapist  During the day be up ambulating every 2 hours (while awake) for short distances  Complete the ankle pump exercises at least 10 times per hour (while awake)  Elevate operative arm when in bed and for at least 30 minutes during the day.   Use cold packs 20-30 minutes approximately 5 times per day. This should be done before and after completing your exercises and at any time you are experiencing pain/ stiffness in your operative extremity.    2. Activities of Daily Living:  No tub baths, hot tubs, or swimming pools for 4 weeks  May shower and let water run over the adhesive dressing on post-operative day #7 if no drainage. If dressing does come off, cover incision with waterproof band-aids while showering until first post-op visit in the office.     II. RESTRICTIONS  No pushing, pulling, lifting, or weight bearing on operative extremity  Avoid pushing yourself up out of a chair with the operative extremity  Continue to follow shoulder precautions as instructed by hospital physical therapist  Your surgeon will discuss with you when you will be able to drive again.  First week stay inside on even terrain. May go up and down stairs one stair at a time utilizing the hand rail  After one week, you may venture outside.    III. PRECAUTIONS:  Everyone that comes near you should wash their hands  No elective dental, genital-urinary, or colon procedures or surgical procedures for 12 weeks after surgery unless absolutely necessary.   If dental work or surgical procedure is deemed absolutely necessary, you will need to contact your surgeon as you will need to take antibiotics 1 hour prior to any dental work (including  teeth cleanings).  Please discuss with your surgeon prophylactic antibiotics as the length of time this intervention will be necessary for you varies with each patient’s health history and situation.  Avoid sick people. If you must be around someone who is ill, they should wear a mask.  Avoid visits to the Emergency Room or Urgent Care unless you are having a life threatening event.     IV. INCISION CARE:  Keep clear adhesive dressing and gauze in place until follow up visit. If this dressing does come off, then cover with dry gauze and paper tape daily.Wash your hands prior to dressing changes  No creams or ointments to the incisionMay remove dressing once the incision is free of drainage  Do not touch or pick at the incision  Check dressing every day and notify surgeon immediately if any of the following signs or symptoms are noted:  Increase in redness  Increase in swelling around the incision and of the entire extremity  Increase in pain  Drainage oozing from the incision  Pulling apart of the edges of the incision  Increase in overall body temperature (greater than 100.5 degrees)  Any visible sutures or staples will be removed at your 2 week follow up as long as the incision is healing appropriately.     V. MEDICATIONS:     1. Stool Softeners: You will be at greater risk of constipation after surgery due to being less mobile and the pain medications.   Take stool softeners as instructed by your surgeon while on pain medications. Over the counter Colace 100 mg 1-2 capsules twice daily.   If stools become too loose or too frequent, please decreases the dosage or stop the stool softener.  If constipation occurs despite use of stool softeners, you are to continue the stool softeners and add a laxative (Milk of Magnesia 1 ounce daily as needed)  Drink plenty of fluids, and eat fruits and vegetables during your recovery time    2. Pain Medications utilized after surgery are narcotics and the law requires that the  following information be given to all patients that are prescribed narcotics:  CLASSIFICATION: Pain medications are called Opioids and are narcotics  LEGALITIES: It is illegal to share narcotics with others and to drive within 24 hours of taking narcotics  POTENTIAL SIDE EFFECTS: Potential side effects of opioids include: nausea, vomiting, itching, dizziness, drowsiness, dry mouth, constipation, and difficulty urinating.  POTENTIAL ADVERSE EFFECTS:   Opioid tolerance can develop with use of pain medications and this simply means that it requires more and more of the medication to control pain; however, this is seen more in patients that use opioids for longer periods of time.  Opioid dependence can develop with use of Opioids and this simply means that to stop the medication can cause withdrawal symptoms; however, this is seen with patients that use Opioids for longer periods of time.  Opioid addiction can develop with use of Opioids and the incidence of this is very unlikely in patients who take the medications as ordered and stop the medications as instructed.  Opioid overdose can be dangerous, but is unlikely when the medication is taken as ordered and stopped when ordered. It is important not to mix opioids with alcohol or with and type of sedative such as Benadryl as this can lead to over sedation and respiratory difficulty.  DOSAGE:   Pain medications will need to be taken consistently for the first week to decrease pain and promote adequate pain relief and participation in physical therapy.  After the initial surgical pain begins to resolve, you may begin to decrease the pain medication. By the end of 6 weeks, you should be off of pain medications.  Refills will not be given by the office during evening hours, on weekends, or after 12 weeks post-op.  To seek refills on pain medications during the initial 6 week post-operative period, you must call the office 48 hours in advance to request the refill. The  office will then notify you when to  the prescription. DO NOT wait until you are out of the medication to request a refill.    VI. FOLLOW-UP VISITS:  You will need to follow up in the office with CELAI Colby in 2 weeks. Please call [phone] to schedule this appointment.  You will need to follow up with your primary care physician within 4 weeks.  If you have any concerns or suspected complications prior to your follow up visit, please call your surgeons office. Do not wait until your appointment time if you suspect complications. These will need to be addressed in the office promptly.

## 2024-09-04 NOTE — PLAN OF CARE
Goal Outcome Evaluation:  Plan of Care Reviewed With: patient        Progress: improving  Outcome Evaluation: POD #1. Vital signs stable. Oxygen stable on room air. Tolerating PRN Roxicodone for pain management. Dressing C/D/I. IV antibiotic given, IV currently saline locked d/t adequate PO intake. No complaints at this time.

## 2024-09-04 NOTE — CASE MANAGEMENT/SOCIAL WORK
Case Management Discharge Note      Final Note: Discharged home.         Selected Continued Care - Discharged on 9/4/2024 Admission date: 9/3/2024 - Discharge disposition: Home or Self Care      Destination    No services have been selected for the patient.                Durable Medical Equipment    No services have been selected for the patient.                Dialysis/Infusion    No services have been selected for the patient.                Home Medical Care    No services have been selected for the patient.                Therapy    No services have been selected for the patient.                Community Resources    No services have been selected for the patient.                Community & DME    No services have been selected for the patient.                    Selected Continued Care - Episodes Includes continued care and service providers with selected services from the active episodes listed below      High Risk Care Management Episode start date: 8/14/2024   There are no active outsourced providers for this episode.                      Final Discharge Disposition Code: 01 - home or self-care

## 2024-09-05 ENCOUNTER — TRANSITIONAL CARE MANAGEMENT TELEPHONE ENCOUNTER (OUTPATIENT)
Dept: CALL CENTER | Facility: HOSPITAL | Age: 64
End: 2024-09-05
Payer: MEDICARE

## 2024-09-05 NOTE — OUTREACH NOTE
Call Center TCM Note      Flowsheet Row Responses   [facility] patient discharged from? [place]   Does the patient have one of the following disease processes/diagnoses(primary or secondary)? Total Joint Replacement   Joint surgery performed? Shoulder   TCM attempt successful? Yes   Call start time 1421   Call end time 1423   Discharge diagnosis Total shoulder revision   Does the patient have all medications related to this admission filled (includes all antibiotics, pain medications, etc.) Yes   Is the patient taking all medications as directed (includes completed medication regime)? Yes   Is the patient able to teach back alternate methods of pain control? Ice, Reposition, Correct alignment, Knee-elevation/no pillow under knee, Shoulder-elevate above heart/ keep in sling as advised, Short, frequent activity   Comments TCM APPT WITH PCP CELIA GUEVARA IS 09/09/2024   Does the patient have an appointment with their PCP within 7-14 days of discharge? Yes   Has home health visited the patient within 72 hours of discharge? N/A   Psychosocial issues? No   Has the patient began therapy sessions (either in the home or as an out patient)? No   Does the patient have a wound vac in place? N/A   Has the patient fallen since discharge? No   Did the patient receive a copy of their discharge instructions? Yes   Nursing interventions Reviewed instructions with patient   What is the patient's perception of their functional status since discharge? Improving   Is the patient able to teach back signs and symptoms of infection? Temp >100.4 for 24h or longer, Blisters around incision, Severe discomfort or pain, Shortness of breath or chest pain, Changes in mobility, Increased swelling or redness around incision (not associated with surgical edema), Incisional drainage   Is the patient able to teach back how to prevent infection? Check incision daily, Shower only as directed by surgeon, No lotion or creams, Wash hands before  and after touching incision, Monitor blood sugar if diabetic, Eat well-balanced diet, Keep incision covered if drainage, Keep incision covered if pets in house, No tub baths, hot tub or swimming   Is the patient able to teach back signs and symptoms of DVT? Redness in calf, Swelling in calf, Area hot to touch, Severe pain in calf, Shortness of breath or chest pain   Is the patient able to teach back home safety measures? Ability to shower, Modifications with ADLs such as dressing, cooking, toileting, Accessibility to necessary areas in home, Modifications to reach items   Did the patient implement home safety suggestions from pre-surgery classes if attended? N/A   If the patient is a current smoker, are they able to teach back resources for cessation? Not a smoker   Is the patient/caregiver able to teach back the hierarchy of who to call/visit for symptoms/problems? PCP, Specialist, Home health nurse, Urgent Care, ED, 911 Yes   TCM call completed? Yes   Wrap up additional comments D/C DX: TS Revision Rt - Pt managing well. No questions. Surgical site good. First POST OP appt is 09/18/2024. TCM APPT [facility] PCP CELIA Munoz is 09/09/2024.   Call end time [id number]            Ruthy Busch MA    9/5/2024, 14:26 EDT

## 2024-09-06 LAB
BACTERIA SPEC AEROBE CULT: NORMAL
GRAM STN SPEC: NORMAL

## 2024-09-08 LAB
BACTERIA FLD CULT: NORMAL
GRAM STN SPEC: NORMAL
GRAM STN SPEC: NORMAL

## 2024-09-09 ENCOUNTER — OFFICE VISIT (OUTPATIENT)
Dept: INTERNAL MEDICINE | Facility: CLINIC | Age: 64
End: 2024-09-09
Payer: MEDICARE

## 2024-09-09 ENCOUNTER — TELEPHONE (OUTPATIENT)
Dept: ORTHOPEDIC SURGERY | Facility: CLINIC | Age: 64
End: 2024-09-09
Payer: MEDICARE

## 2024-09-09 VITALS
HEART RATE: 67 BPM | SYSTOLIC BLOOD PRESSURE: 128 MMHG | TEMPERATURE: 98.7 F | WEIGHT: 234 LBS | OXYGEN SATURATION: 96 % | HEIGHT: 75 IN | BODY MASS INDEX: 29.09 KG/M2 | DIASTOLIC BLOOD PRESSURE: 76 MMHG

## 2024-09-09 DIAGNOSIS — Z09 HOSPITAL DISCHARGE FOLLOW-UP: Primary | ICD-10-CM

## 2024-09-09 DIAGNOSIS — D69.6 THROMBOCYTOPENIA: ICD-10-CM

## 2024-09-09 DIAGNOSIS — I10 BENIGN ESSENTIAL HTN: ICD-10-CM

## 2024-09-09 DIAGNOSIS — E11.9 TYPE 2 DIABETES MELLITUS WITHOUT COMPLICATION, WITHOUT LONG-TERM CURRENT USE OF INSULIN: ICD-10-CM

## 2024-09-09 DIAGNOSIS — M48.02 CERVICAL SPINAL STENOSIS: ICD-10-CM

## 2024-09-09 PROBLEM — Z86.0100 HISTORY OF COLON POLYPS: Status: RESOLVED | Noted: 2018-07-02 | Resolved: 2024-09-09

## 2024-09-09 PROBLEM — Z86.010 HISTORY OF COLON POLYPS: Status: RESOLVED | Noted: 2018-07-02 | Resolved: 2024-09-09

## 2024-09-09 PROBLEM — Z01.818 PRE-OPERATIVE CLEARANCE: Status: RESOLVED | Noted: 2024-08-30 | Resolved: 2024-09-09

## 2024-09-09 PROCEDURE — 3074F SYST BP LT 130 MM HG: CPT | Performed by: NURSE PRACTITIONER

## 2024-09-09 PROCEDURE — 1126F AMNT PAIN NOTED NONE PRSNT: CPT | Performed by: NURSE PRACTITIONER

## 2024-09-09 PROCEDURE — 3078F DIAST BP <80 MM HG: CPT | Performed by: NURSE PRACTITIONER

## 2024-09-09 PROCEDURE — 1111F DSCHRG MED/CURRENT MED MERGE: CPT | Performed by: NURSE PRACTITIONER

## 2024-09-09 PROCEDURE — 3044F HG A1C LEVEL LT 7.0%: CPT | Performed by: NURSE PRACTITIONER

## 2024-09-09 PROCEDURE — 99214 OFFICE O/P EST MOD 30 MIN: CPT | Performed by: NURSE PRACTITIONER

## 2024-09-09 RX ORDER — CELECOXIB 200 MG/1
200 CAPSULE ORAL 2 TIMES DAILY
Qty: 60 CAPSULE | Refills: 0 | OUTPATIENT
Start: 2024-09-09

## 2024-09-09 NOTE — TELEPHONE ENCOUNTER
Patient is aware.      ----- Message from David Ozuna sent at 9/8/2024  3:48 PM EDT -----  Please call the patient and let him know that we did take cultures during his surgery just to make sure there were no signs of infection and he has no evidence of infection with no growth on the cultures.

## 2024-09-09 NOTE — PROGRESS NOTES
Transitional Care Follow Up Visit  Subjective     Sylwia Spears is a 64 y.o. male who presents for a transitional care management visit.    Within 48 business hours after discharge our office contacted him via telephone to coordinate his care and needs.      I reviewed and discussed the details of that call along with the discharge summary, hospital problems, inpatient lab results, inpatient diagnostic studies, and consultation reports with Sylwia.     Current outpatient and discharge medications have been reconciled for the patient.  Reviewed by: CELIA Peres          9/4/2024     6:46 PM   Date of TCM Phone Call   [facility]   Date of Admission 9/3/2024   Date of Discharge 9/4/2024   Discharge Disposition Home or Self Care     Risk for Readmission (LACE) Score: 8 (9/4/2024  6:00 AM)      History of Present Illness   Course During Hospital Stay:  He was admitted from 9-3-2024 to 9-4-2024 for a reverse right total shoulder surgery revision with Dr Ozuna.     Admission (Discharged) with David Ozuna MD (09/03/2024)     He has hx of Type 2 DM, HTN, cervical spinal stenosis, thrombocytopenia, and hyperlipidemia     The following portions of the patient's history were reviewed and updated as appropriate: allergies, current medications, past family history, past medical history, past social history, past surgical history, and problem list.    Review of Systems   Constitutional: Negative.    Eyes: Negative.    Musculoskeletal:  Positive for arthralgias.   Hematological: Negative.    All other systems reviewed and are negative.      Objective   There were no vitals taken for this visit.  Physical Exam  Vitals reviewed.   Constitutional:       Appearance: Normal appearance.   Cardiovascular:      Rate and Rhythm: Normal rate and regular rhythm.      Pulses: Normal pulses.      Heart sounds: Normal heart sounds.   Musculoskeletal:      Comments: Yuriy WASHINGTON   Skin:     General: Skin is warm and dry.    Neurological:      General: No focal deficit present.      Mental Status: He is alert and oriented to person, place, and time.   Psychiatric:         Mood and Affect: Mood normal.         Behavior: Behavior normal.         Thought Content: Thought content normal.         Assessment & Plan   Diagnoses and all orders for this visit:    1. Hospital discharge follow-up (Primary)    2. Type 2 diabetes mellitus without complication, without long-term current use of insulin  Comments:  Diet controlled    3. Benign essential HTN  Comments:  BP at goal on lisinorpil 20mg daily    4. Thrombocytopenia  Comments:  REviewed CBC, plateelt cunt is stable.    5. Cervical spinal stenosis      Hospital records and labs reviewed    Follow up as scheduled

## 2024-09-10 ENCOUNTER — ANESTHESIA EVENT (OUTPATIENT)
Dept: PERIOP | Facility: HOSPITAL | Age: 64
End: 2024-09-10
Payer: MEDICARE

## 2024-09-11 ENCOUNTER — ANESTHESIA (OUTPATIENT)
Dept: PERIOP | Facility: HOSPITAL | Age: 64
End: 2024-09-11
Payer: MEDICARE

## 2024-09-11 ENCOUNTER — HOSPITAL ENCOUNTER (OUTPATIENT)
Facility: HOSPITAL | Age: 64
Setting detail: HOSPITAL OUTPATIENT SURGERY
Discharge: HOME | End: 2024-09-11
Attending: INTERNAL MEDICINE | Admitting: INTERNAL MEDICINE
Payer: MEDICARE

## 2024-09-11 VITALS
DIASTOLIC BLOOD PRESSURE: 78 MMHG | HEART RATE: 67 BPM | RESPIRATION RATE: 13 BRPM | TEMPERATURE: 98.2 F | BODY MASS INDEX: 28.78 KG/M2 | SYSTOLIC BLOOD PRESSURE: 128 MMHG | WEIGHT: 227.2 LBS | OXYGEN SATURATION: 100 %

## 2024-09-11 DIAGNOSIS — Z96.611 STATUS POST REVERSE TOTAL REPLACEMENT OF RIGHT SHOULDER: ICD-10-CM

## 2024-09-11 DIAGNOSIS — T84.028A INSTABILITY OF REVERSE TOTAL ARTHROPLASTY OF RIGHT SHOULDER: ICD-10-CM

## 2024-09-11 DIAGNOSIS — Z96.611 INSTABILITY OF REVERSE TOTAL ARTHROPLASTY OF RIGHT SHOULDER: ICD-10-CM

## 2024-09-11 DIAGNOSIS — Z86.010 PERSONAL HISTORY OF COLONIC POLYPS: ICD-10-CM

## 2024-09-11 LAB — GLUCOSE BLDC GLUCOMTR-MCNC: 109 MG/DL (ref 70–130)

## 2024-09-11 PROCEDURE — 45380 COLONOSCOPY AND BIOPSY: CPT | Performed by: INTERNAL MEDICINE

## 2024-09-11 PROCEDURE — 88305 TISSUE EXAM BY PATHOLOGIST: CPT | Performed by: INTERNAL MEDICINE

## 2024-09-11 PROCEDURE — 82948 REAGENT STRIP/BLOOD GLUCOSE: CPT

## 2024-09-11 PROCEDURE — 45385 COLONOSCOPY W/LESION REMOVAL: CPT | Performed by: INTERNAL MEDICINE

## 2024-09-11 PROCEDURE — 25010000002 PROPOFOL 200 MG/20ML EMULSION

## 2024-09-11 PROCEDURE — 25810000003 LACTATED RINGERS PER 1000 ML

## 2024-09-11 RX ORDER — OXYCODONE AND ACETAMINOPHEN 5; 325 MG/1; MG/1
1 TABLET ORAL EVERY 4 HOURS PRN
Qty: 42 TABLET | Refills: 0 | Status: SHIPPED | OUTPATIENT
Start: 2024-09-11

## 2024-09-11 RX ORDER — SODIUM CHLORIDE, SODIUM LACTATE, POTASSIUM CHLORIDE, CALCIUM CHLORIDE 600; 310; 30; 20 MG/100ML; MG/100ML; MG/100ML; MG/100ML
9 INJECTION, SOLUTION INTRAVENOUS CONTINUOUS
Status: DISCONTINUED | OUTPATIENT
Start: 2024-09-11 | End: 2024-09-11 | Stop reason: HOSPADM

## 2024-09-11 RX ORDER — SODIUM CHLORIDE 0.9 % (FLUSH) 0.9 %
10 SYRINGE (ML) INJECTION AS NEEDED
Status: DISCONTINUED | OUTPATIENT
Start: 2024-09-11 | End: 2024-09-11 | Stop reason: HOSPADM

## 2024-09-11 RX ORDER — SODIUM CHLORIDE, SODIUM LACTATE, POTASSIUM CHLORIDE, CALCIUM CHLORIDE 600; 310; 30; 20 MG/100ML; MG/100ML; MG/100ML; MG/100ML
100 INJECTION, SOLUTION INTRAVENOUS ONCE
Status: DISCONTINUED | OUTPATIENT
Start: 2024-09-11 | End: 2024-09-11 | Stop reason: HOSPADM

## 2024-09-11 RX ORDER — LIDOCAINE HYDROCHLORIDE 20 MG/ML
INJECTION, SOLUTION INFILTRATION; PERINEURAL AS NEEDED
Status: DISCONTINUED | OUTPATIENT
Start: 2024-09-11 | End: 2024-09-11 | Stop reason: SURG

## 2024-09-11 RX ORDER — ONDANSETRON 2 MG/ML
4 INJECTION INTRAMUSCULAR; INTRAVENOUS ONCE AS NEEDED
Status: DISCONTINUED | OUTPATIENT
Start: 2024-09-11 | End: 2024-09-11 | Stop reason: HOSPADM

## 2024-09-11 RX ORDER — SODIUM CHLORIDE 9 MG/ML
40 INJECTION, SOLUTION INTRAVENOUS AS NEEDED
Status: DISCONTINUED | OUTPATIENT
Start: 2024-09-11 | End: 2024-09-11 | Stop reason: HOSPADM

## 2024-09-11 RX ORDER — SODIUM CHLORIDE 0.9 % (FLUSH) 0.9 %
10 SYRINGE (ML) INJECTION EVERY 12 HOURS SCHEDULED
Status: DISCONTINUED | OUTPATIENT
Start: 2024-09-11 | End: 2024-09-11 | Stop reason: HOSPADM

## 2024-09-11 RX ORDER — PROPOFOL 10 MG/ML
INJECTION, EMULSION INTRAVENOUS AS NEEDED
Status: DISCONTINUED | OUTPATIENT
Start: 2024-09-11 | End: 2024-09-11 | Stop reason: SURG

## 2024-09-11 RX ORDER — LIDOCAINE HYDROCHLORIDE 10 MG/ML
0.5 INJECTION, SOLUTION EPIDURAL; INFILTRATION; INTRACAUDAL; PERINEURAL ONCE AS NEEDED
Status: DISCONTINUED | OUTPATIENT
Start: 2024-09-11 | End: 2024-09-11 | Stop reason: HOSPADM

## 2024-09-11 RX ADMIN — SODIUM CHLORIDE, POTASSIUM CHLORIDE, SODIUM LACTATE AND CALCIUM CHLORIDE 9 ML/HR: 600; 310; 30; 20 INJECTION, SOLUTION INTRAVENOUS at 10:21

## 2024-09-11 RX ADMIN — PROPOFOL 80 MG: 10 INJECTION, EMULSION INTRAVENOUS at 10:32

## 2024-09-11 RX ADMIN — PROPOFOL 160 MG: 10 INJECTION, EMULSION INTRAVENOUS at 10:37

## 2024-09-11 RX ADMIN — LIDOCAINE HYDROCHLORIDE 60 MG: 20 INJECTION, SOLUTION INFILTRATION; PERINEURAL at 10:32

## 2024-09-11 NOTE — TELEPHONE ENCOUNTER
Rx Refill Note  Requested Prescriptions      No prescriptions requested or ordered in this encounter      Last office visit with prescribing clinician: 7/17/2024      Next office visit with prescribing clinician: 11/25/2024   Last Filled:9/4/24    S/p total shoulder  Date of Surgery:9/3/24      Scarlet Chou MA  09/11/24, 15:42 EDT    Previous RX pended for your approval, change or denial.     {TIP  Encounters:    {TIP  Please add Last Relevant Lab Date if appropriate:  {TIP  Is Refill Pharmacy correct?:

## 2024-09-11 NOTE — H&P
Patient Care Team:  Angie Munoz APRN as PCP - General (Family Medicine)  Angie Munoz APRN as Referring Physician (Family Medicine)  David Ozuna MD as Surgeon (Orthopedic Surgery)  Starr Law, RN as Ambulatory  ([facility] [place])    CHIEF COMPLAINT: Personal hx colon polyps    HISTORY OF PRESENT ILLNESS:  Last exam was 2021    Past Medical History:   Diagnosis Date    Cervical disc disease 2012    stenosis    Cirrhosis of liver     Colon polyp     Coronary artery calcification     Diabetes mellitus     Electrocution 2012    Fell off a ladder and has chronic neck pain    Hyperlipidemia     Hypertension     Spleen enlarged     Thrombocytopenia      Past Surgical History:   Procedure Laterality Date    CHOLECYSTECTOMY      COLONOSCOPY      COLONOSCOPY N/A 08/17/2018    Procedure: COLONOSCOPY, polypectomy;  Surgeon: Raleigh Champagne MD;  Location:  [facility] OR;  Service: Gastroenterology    COLONOSCOPY W/ POLYPECTOMY      COLONOSCOPY W/ POLYPECTOMY N/A 09/02/2021    Procedure: COLONOSCOPY; polypectomy;  Surgeon: Raleigh Champagne MD;  Location:  [facility] OR;  Service: Gastroenterology;  Laterality: N/A;  polyps   diverticulosis    LIPOMA EXCISION      groin    SHOULDER SURGERY Left     TOTAL SHOULDER ARTHROPLASTY W/ DISTAL CLAVICLE EXCISION Right 12/1/2023    Procedure: TOTAL SHOULDER REVERSE ARTHROPLASTY;  Surgeon: David Ozuna MD;  Location:  [facility] OR;  Service: Orthopedics;  Laterality: Right;    TOTAL SHOULDER REVISION Right 9/3/2024    Procedure: TOTAL SHOULDER REVISION;  Surgeon: David Ozuna MD;  Location:  [facility] OR;  Service: Orthopedics;  Laterality: Right;     Family History   Problem Relation Age of Onset    Hypertension Mother     Heart disease Mother     Colon cancer Mother     Stroke Mother     Cancer Mother     Hypertension Father     Crohn's disease Brother     Malig Hyperthermia Neg Hx      Social History     Tobacco Use    Smoking status:  Former     Current packs/day: 0.00     Average packs/day: 1 pack/day for 47.0 years (47.0 ttl pk-yrs)     Types: Cigarettes     Start date:      Quit date: 2022     Years since quittin.7     Passive exposure: Past    Smokeless tobacco: Never   Vaping Use    Vaping status: Never Used   Substance Use Topics    Alcohol use: No     Comment: rare    Drug use: Yes     Frequency: 7.0 times per week     Types: Marijuana     Comment: last used 24     Medications Prior to Admission   Medication Sig Dispense Refill Last Dose    Chromic Chloride crystals Use Daily.   Past Week    DULoxetine (CYMBALTA) 30 MG capsule TAKE 1 CAPSULE BY MOUTH DAILY 90 capsule 1 2024    lisinopril (PRINIVIL,ZESTRIL) 20 MG tablet TAKE 1 TABLET BY MOUTH DAILY 90 tablet 1 2024    oxyCODONE-acetaminophen (PERCOCET) 5-325 MG per tablet Take 1 tablet by mouth Every 4 (Four) Hours As Needed for Moderate Pain or Severe Pain. 42 tablet 0 9/10/2024    aspirin 81 MG EC tablet Take 1 tablet by mouth Daily. Indications: VTE Prophylaxis 14 tablet 0 2024    atorvastatin (LIPITOR) 10 MG tablet TAKE 1 TABLET BY MOUTH DAILY 90 tablet 1 2024    Cholecalciferol (Vitamin D3) 30 MCG/15ML liquid Take  by mouth.   2024    CINNAMON PO Take  by mouth.   Unknown    docusate sodium 100 MG capsule Take 1 capsule by mouth 2 (Two) Times a Day. 60 capsule 0 2024    meloxicam (MOBIC) 15 MG tablet Take 1 tablet by mouth Daily. 30 tablet 0 2024    naloxone (NARCAN) 4 MG/0.1ML nasal spray Call 911. Don't prime. [place] in 1 nostril for overdose. Repeat in 2-3 minutes in other nostril if no or minimal breathing/responsiveness. 2 each 0     ondansetron (Zofran) 4 MG tablet Take 1 tablet by mouth Every 8 (Eight) Hours As Needed for Vomiting or Nausea. 20 tablet 0 Unknown    Zinc Acetate, Oral, (ZINC ACETATE PO) Take  by mouth.   2024     Allergies:  Patient has no known allergies.    REVIEW OF SYSTEMS:  Please see the above history of present  illness for pertinent positives and negatives.  The remainder of the patient's systems have been reviewed and are negative.     Vital Signs  Temp:  [98.2 °F (36.8 °C)] 98.2 °F (36.8 °C)  Heart Rate:  [65] 65  Resp:  [12] 12  BP: (133)/(82) 133/82    Flowsheet Rows      Flowsheet Row First Filed Value   Admission Height --   Admission Weight 103 kg (227 lb 3.2 oz) Documented at 09/11/2024 0959             Physical Exam:  Physical Exam   Constitutional: Patient appears well-developed and well-nourished and in no acute distress   HEENT:   Head: Normocephalic and atraumatic.   Eyes:  Pupils are equal, round, and reactive to light. EOM are intact. Sclerae are anicteric and non-injected.  Mouth and Throat: Patient has moist mucous membranes. Oropharynx is clear of any erythema or exudate.     Neck: Neck supple. No JVD present. No thyromegaly present. No lymphadenopathy present.  Cardiovascular: Regular rate, regular rhythm, S1 normal and S2 normal.  Exam reveals no gallop and no friction rub.  No murmur heard.  Pulmonary/Chest: Lungs are clear to auscultation bilaterally. No respiratory distress. No wheezes. No rhonchi. No rales.   Abdominal: Soft. Bowel sounds are normal. No distension and no mass. There is no hepatosplenomegaly. There is no tenderness.   Musculoskeletal: Normal Muscle tone  Extremities: No edema. Pulses are palpable in all 4 extremities.  Neurological: Patient is alert and oriented to person, place, and time. Cranial nerves II-XII are grossly intact with no focal deficits.  Skin: Skin is warm. No rash noted. Nails show no clubbing.  No cyanosis or erythema.    Debilities/Disabilities Identified: None  Emotional Behavior: Appropriate     Results Review:   I reviewed the patient's new clinical results.    Lab Results (most recent)       None            Imaging Results (Most Recent)       None          reviewed    ECG/EMG Results (most recent)       None          reviewed    Assessment & Plan   Personal hx  colon polyps/  colonoscopy      I discussed the patient's findings and my recommendations with patient.     Raleigh Champagne MD  09/11/24  10:23 EDT    Time: 10 min prior to procedure.

## 2024-09-11 NOTE — BRIEF OP NOTE
COLONOSCOPY WITH POLYPECTOMY  Progress Note    Sylwia Spears  9/11/2024    Pre-op Diagnosis:   Personal history of colonic polyps [Z86.010]       Post-Op Diagnosis Codes:     * Personal history of colonic polyps [Z86.010]     * Diverticulosis [K57.90]     * Colon polyp [K63.5]    Procedure/CPT® Codes:        Procedure(s):  COLONOSCOPY WITH POLYPECTOMY              Surgeon(s):  Raleigh Champagne MD    Anesthesia: Monitored Anesthesia Care    Staff:   Circulator: Rebecca Junior RN  Scrub Person: Lucina Damon         Estimated Blood Loss: none    Urine Voided: * No values recorded between 9/11/2024 10:26 AM and 9/11/2024 10:49 AM *    Specimens:                Specimens       ID Source Type Tests Collected By Collected At Frozen?    A Large Intestine, Right / Ascending Colon Polyp TISSUE PATHOLOGY EXAM   Raleigh Champagne MD 9/11/24 1041     Description: x1    This specimen was not marked as sent.    B Large Intestine, Transverse Colon Polyp TISSUE PATHOLOGY EXAM   Raleigh Champagne MD 9/11/24 1044     Description: x2    This specimen was not marked as sent.    C Large Intestine, Left / Descending Colon Polyp TISSUE PATHOLOGY EXAM   Raleigh Champagne MD 9/11/24 1047     Description: x1    This specimen was not marked as sent.    D Large Intestine, Sigmoid Colon Polyp TISSUE PATHOLOGY EXAM   Raleigh Champagne MD 9/11/24 1049     Description: x1    This specimen was not marked as sent.                  Drains: * No LDAs found *    Findings: Colon to Cecum Poor Prep  Sigmoid Diverticulosis  Polyps-5-Cold Snare x2, Biopsy        Complications: none          Raleigh Champagne MD     Date: 9/11/2024  Time: 10:52 EDT

## 2024-09-11 NOTE — ANESTHESIA PREPROCEDURE EVALUATION
Anesthesia Evaluation     Patient summary reviewed and Nursing notes reviewed   no history of anesthetic complications:   NPO Solid Status: > 8 hours  NPO Liquid Status: > 8 hours           Airway   Mallampati: I  TM distance: >3 FB  Neck ROM: full  No difficulty expected  Comment: Full Beard  Dental - normal exam   (+) lower dentures, upper dentures and edentulous    Pulmonary - normal exam    breath sounds clear to auscultation  (+) ,sleep apnea  Cardiovascular - normal exam  Exercise tolerance: good (4-7 METS)    ECG reviewed  Rhythm: regular  Rate: normal    (+) hypertension well controlled less than 2 medications, CAD, hyperlipidemia    ROS comment: Progress Note    HEART RATE=58  bpm  RR Vmlkojcx=9040  ms  VT Nldkcaqh=045  ms  P Horizontal Axis=15  deg  P Front Axis=13  deg  QRSD Aswunqvo=861  ms  QT Oxwvqznt=747  ms  AOhA=233  ms  QRS Axis=42  deg  T Wave Axis=13  deg  - NORMAL ECG -  Sinus rhythm  No change from prior tracing  Electronically Signed By: Nacho Loza ([facility]) 2024-08-28 16:38:06  Date and Time of Study:2024-08-28 09:25:02        Neuro/Psych  GI/Hepatic/Renal/Endo    (+) obesity, liver disease cirrhosis, diabetes mellitus (off meds for year after losing weight) type 2 well controlled  (-) morbid obesity    Musculoskeletal     (+) back pain, chronic pain  Abdominal   (+) obese   Substance History   (+) alcohol use (rarely), drug use (THC daily)     OB/GYN negative ob/gyn ROS         Other   arthritis,                     Anesthesia Plan    ASA 2     MAC     intravenous induction     Anesthetic plan, risks, benefits, and alternatives have been provided, discussed and informed consent has been obtained with: patient.  Pre-procedure education provided  Use of blood products discussed with patient  Consented to blood products.    Plan discussed with CRNA.        CODE STATUS:

## 2024-09-11 NOTE — ANESTHESIA POSTPROCEDURE EVALUATION
Patient: Sylwia Spears    Procedure Summary       Date: 09/11/24 Room / Location: [facility] ENDOSCOPY 1 /  [facility] OR    Anesthesia Start: 1025 Anesthesia Stop: 1053    Procedure: COLONOSCOPY WITH POLYPECTOMY Diagnosis:       Personal history of colonic polyps      Diverticulosis      Colon polyp      (Personal history of colonic polyps [Z86.010])    Surgeons: Raleigh Champagne MD Provider:     Anesthesia Type: MAC ASA Status: 2            Anesthesia Type: MAC    Vitals  Vitals Value Taken Time   /84 09/11/24 1120   Temp     Pulse 56 09/11/24 1125   Resp 12 09/11/24 1125   SpO2 100 % 09/11/24 1125   Vitals shown include unfiled device data.        Post Anesthesia Care and Evaluation    Patient location during evaluation: bedside  Patient participation: complete - patient participated  Level of consciousness: awake and alert  Pain score: 0  Pain management: adequate    Airway patency: patent  Anesthetic complications: No anesthetic complications  PONV Status: none  Cardiovascular status: acceptable  Respiratory status: acceptable  Hydration status: acceptable

## 2024-09-13 ENCOUNTER — READMISSION MANAGEMENT (OUTPATIENT)
Dept: CALL CENTER | Facility: HOSPITAL | Age: 64
End: 2024-09-13
Payer: MEDICARE

## 2024-09-13 NOTE — OUTREACH NOTE
Total Joint Week 2 Survey      Flowsheet Row Responses   [facility] patient discharged from? [place]   Does the patient have one of the following disease processes/diagnoses(primary or secondary)? Total Joint Replacement   Joint surgery performed? Shoulder   Week 2 attempt successful? Yes   Call start time 1339   Call end time 1341   Has the patient been back in either the hospital or Emergency Department since discharge? No   Discharge diagnosis Total shoulder revision   Person spoke with today (if not patient) and relationship patient   Does the patient have all medications related to this admission filled (includes all antibiotics, pain medications, etc.) Yes   Is the patient taking all medications as directed (includes completed medication regime)? Yes   Does the patient have a follow up appointment with their surgeon? Yes   Has the patient kept scheduled appointments due by today? Yes   Psychosocial issues? No   Has the patient began therapy sessions (either in the home or as an out patient)? No   Has the patient fallen since discharge? No   If the patient has fallen, were there any injuries? No   Did the patient receive a copy of their discharge instructions? Yes   Nursing interventions Reviewed instructions with patient   What is the patient's perception of their functional status since discharge? Improving   Is the patient/caregiver able to teach back the hierarchy of who to call/visit for symptoms/problems? PCP, Specialist, Home health nurse, Urgent Care, ED, 911 Yes   Week 2 call completed? Yes   Wrap up additional comments Pt states he is doing well. He has not concerns at this time.  He is aware of upcoming appt.   Call end time 1341            JANET FLORES - Registered Nurse

## 2024-09-18 ENCOUNTER — OFFICE VISIT (OUTPATIENT)
Dept: ORTHOPEDIC SURGERY | Facility: CLINIC | Age: 64
End: 2024-09-18
Payer: MEDICARE

## 2024-09-18 VITALS — WEIGHT: 227 LBS | BODY MASS INDEX: 28.23 KG/M2 | HEIGHT: 75 IN

## 2024-09-18 DIAGNOSIS — T84.028A INSTABILITY OF REVERSE TOTAL ARTHROPLASTY OF RIGHT SHOULDER: ICD-10-CM

## 2024-09-18 DIAGNOSIS — Z96.611 INSTABILITY OF REVERSE TOTAL ARTHROPLASTY OF RIGHT SHOULDER: ICD-10-CM

## 2024-09-18 DIAGNOSIS — Z96.611 STATUS POST REVERSE TOTAL SHOULDER REPLACEMENT, RIGHT: Primary | ICD-10-CM

## 2024-09-18 DIAGNOSIS — Z96.611 STATUS POST REVERSE TOTAL REPLACEMENT OF RIGHT SHOULDER: ICD-10-CM

## 2024-09-18 PROCEDURE — 99024 POSTOP FOLLOW-UP VISIT: CPT | Performed by: NURSE PRACTITIONER

## 2024-09-18 RX ORDER — OXYCODONE AND ACETAMINOPHEN 5; 325 MG/1; MG/1
1 TABLET ORAL EVERY 4 HOURS PRN
Qty: 42 TABLET | Refills: 0 | Status: SHIPPED | OUTPATIENT
Start: 2024-09-18

## 2024-09-24 DIAGNOSIS — Z96.611 INSTABILITY OF REVERSE TOTAL ARTHROPLASTY OF RIGHT SHOULDER: ICD-10-CM

## 2024-09-24 DIAGNOSIS — T84.028A INSTABILITY OF REVERSE TOTAL ARTHROPLASTY OF RIGHT SHOULDER: ICD-10-CM

## 2024-09-24 DIAGNOSIS — Z96.611 STATUS POST REVERSE TOTAL REPLACEMENT OF RIGHT SHOULDER: ICD-10-CM

## 2024-09-24 RX ORDER — OXYCODONE AND ACETAMINOPHEN 5; 325 MG/1; MG/1
1 TABLET ORAL EVERY 4 HOURS PRN
Qty: 42 TABLET | Refills: 0 | Status: SHIPPED | OUTPATIENT
Start: 2024-09-24

## 2024-10-01 ENCOUNTER — READMISSION MANAGEMENT (OUTPATIENT)
Dept: CALL CENTER | Facility: HOSPITAL | Age: 64
End: 2024-10-01
Payer: MEDICARE

## 2024-10-01 DIAGNOSIS — Z96.611 STATUS POST REVERSE TOTAL REPLACEMENT OF RIGHT SHOULDER: ICD-10-CM

## 2024-10-01 DIAGNOSIS — T84.028A INSTABILITY OF REVERSE TOTAL ARTHROPLASTY OF RIGHT SHOULDER: ICD-10-CM

## 2024-10-01 DIAGNOSIS — Z96.611 INSTABILITY OF REVERSE TOTAL ARTHROPLASTY OF RIGHT SHOULDER: ICD-10-CM

## 2024-10-01 RX ORDER — OXYCODONE AND ACETAMINOPHEN 5; 325 MG/1; MG/1
1 TABLET ORAL EVERY 4 HOURS PRN
Qty: 42 TABLET | Refills: 0 | Status: SHIPPED | OUTPATIENT
Start: 2024-10-01

## 2024-10-01 NOTE — TELEPHONE ENCOUNTER
Rx Refill Note  Requested Prescriptions     Pending Prescriptions Disp Refills    oxyCODONE-acetaminophen (PERCOCET) 5-325 MG per tablet 42 tablet 0     Sig: Take 1 tablet by mouth Every 4 (Four) Hours As Needed for Moderate Pain or Severe Pain.      Last office visit with prescribing clinician: 7/17/2024      Next office visit with prescribing clinician: 10/16/2024   Last Filled: 9/24/2024    Encounter Diagnoses   Name Primary?    Status post reverse total replacement of right shoulder     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       Date of Surgery: 9/3/2024      Nya Sotelo MA  10/01/24, 08:56 EDT    Previous RX pended for your approval, change or denial.     {TIP  Encounters:    {TIP  Please add Last Relevant Lab Date if appropriate:  {TIP  Is Refill Pharmacy correct?:

## 2024-10-01 NOTE — OUTREACH NOTE
Total Joint Month 1 Survey      Flowsheet Row Responses   [facility] discharged from? [place]   Does the patient have one of the following disease processes/diagnoses(primary or secondary)? Total Joint Replacement   Joint surgery performed? Shoulder   Month 1 attempt successful? No   Unsuccessful attempts Attempt 1  [UTR both numbers]            BALA HIGHTOWER - Registered Nurse

## 2024-10-02 ENCOUNTER — HOSPITAL ENCOUNTER (OUTPATIENT)
Dept: PHYSICAL THERAPY | Facility: HOSPITAL | Age: 64
Setting detail: THERAPIES SERIES
Discharge: HOME | End: 2024-10-02
Payer: MEDICARE

## 2024-10-02 DIAGNOSIS — Z96.611 STATUS POST REVERSE TOTAL SHOULDER REPLACEMENT, RIGHT: Primary | ICD-10-CM

## 2024-10-02 PROCEDURE — 97161 PT EVAL LOW COMPLEX 20 MIN: CPT

## 2024-10-02 NOTE — THERAPY EVALUATION
Outpatient Physical Therapy Ortho Initial Evaluation  DIANNE Bertrand     Patient Name: Sylwia Spears  : 1960  MRN: [id number]  Today's Date: 10/2/2024      Visit Date: 10/02/2024    Patient Active Problem List   Diagnosis    Type 2 diabetes mellitus without complication, without long-term current use of insulin    Benign essential HTN    Morbidly obese    Cervical spinal stenosis    Encounter for annual wellness visit (AWV) in Medicare patient    Personal history of colonic polyps    Family history of colon cancer    Hyperlipidemia LDL goal <70    Midline thoracic back pain    Erectile dysfunction due to diseases classified elsewhere    Personal history of nicotine dependence    Coronary artery calcification    Varicose veins of right lower extremity with other complications    DJD of shoulder    Rotator cuff arthropathy of right shoulder    Complete tear of right rotator cuff    Chronic right shoulder pain    Thrombocytopenia    Abnormal international normal ratio (INR)    Status post reverse total shoulder replacement, open biceps tenodesis, right, DOS 2023    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    Status post reverse total replacement of right shoulder    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    Hospital discharge follow-up        Past Medical History:   Diagnosis Date    Cervical disc disease     stenosis    Cirrhosis of liver     Colon polyp     Coronary artery calcification     Diabetes mellitus     Electrocution     Fell off a ladder and has chronic neck pain    Hyperlipidemia     Hypertension     Spleen enlarged     Thrombocytopenia         Past Surgical History:   Procedure Laterality Date    CHOLECYSTECTOMY      COLONOSCOPY      COLONOSCOPY N/A 2018    Procedure: COLONOSCOPY, polypectomy;  Surgeon: Raleigh Champagne MD;  Location: [facility];  Service: Gastroenterology    COLONOSCOPY W/ POLYPECTOMY      COLONOSCOPY W/  POLYPECTOMY N/A 09/02/2021    Procedure: COLONOSCOPY; polypectomy;  Surgeon: Raleigh Champagne MD;  Location:  [facility];  Service: Gastroenterology;  Laterality: N/A;  polyps   diverticulosis    COLONOSCOPY W/ POLYPECTOMY N/A 9/11/2024    Procedure: COLONOSCOPY WITH POLYPECTOMY;  Surgeon: Raleigh Champagne MD;  Location:  [facility];  Service: Gastroenterology;  Laterality: N/A;  diverticulosis  ascending polyp x1 (cold snare)  transverse polyp x2 (cold snare x2)  descending polyp x1  sigmoid polyp x1    LIPOMA EXCISION      groin    SHOULDER SURGERY Left     TOTAL SHOULDER ARTHROPLASTY W/ DISTAL CLAVICLE EXCISION Right 12/1/2023    Procedure: TOTAL SHOULDER REVERSE ARTHROPLASTY;  Surgeon: David Ozuna MD;  Location:  [facility];  Service: Orthopedics;  Laterality: Right;    TOTAL SHOULDER REVISION Right 9/3/2024    Procedure: TOTAL SHOULDER REVISION;  Surgeon: David Ozuna MD;  Location:  [facility];  Service: Orthopedics;  Laterality: Right;       Visit Dx:     ICD-10-CM ICD-9-CM   1. Status post reverse total shoulder replacement, open biceps tenodesis, right, DOS 12/1/2023  Z96.611 V43.61          Patient History       Row Name 10/02/24 1100             History    Chief Complaint Difficulty with daily activities;Joint stiffness;Muscle tenderness;Muscle weakness;Pain  -AS      Type of Pain Shoulder pain  Right  -AS      Date Current Problem(s) Began 09/03/24  -AS      Brief Description of Current Complaint Sylwia Spears presents to outpatient PT s/p revision of right reverse TSA. Surgery was performed on 9-3-24 by Dr. Ozuna. Patient was D/C home the next day with a referral for outpatient PT to start at week 3 post op. Patient is to wear sling for 4 weeks post op and he has been compliant with this. Patient reports pain has been well controlled with pain medication and the use of ice.  -AS      Previous treatment for THIS PROBLEM Surgery  -AS      Surgery Date: 09/03/24  -AS       Patient/Caregiver Goals Relieve pain;Return to prior level of function;Improve mobility;Improve strength  -AS      Patient's Rating of General Health Good  -AS      Hand Dominance right-handed  -AS      Patient seeing anyone else for problem(s)? Dr. Ozuna  -AS      How has patient tried to help current problem? rest, ice, medication, sling  -AS      Surgery/Hospitalization Right reverse TSA 12-2-23  -AS         Pain     Pain Location Shoulder  -AS      Pain at Present 7  -AS      Pain at Best 2  -AS      Pain at Worst 8  -AS      Pain Frequency Constant/continuous  -AS      Pain Description Aching  -AS      What Performance Factors Make the Current Problem(s) WORSE? Activity  -AS      What Performance Factors Make the Current Problem(s) BETTER? Rest  -AS         Daily Activities    Primary Language English  -AS      Are you able to read Yes  -AS      Are you able to write Yes  -AS      How does patient learn best? Listening;Reading;Demonstration  -AS      Teaching needs identified Home Exercise Program;Management of Condition  -AS      Patient is concerned about/has problems with Difficulty with self care (i.e. bathing, dressing, toileting:;Flexibility;Performing home management (household chores, shopping, care of dependents);Performing job responsibilities/community activities (work, school,;Performing sports, recreation, and play activities;Reaching over head;Repetitive movements of the hand, arm, shoulder  -AS      Does patient have problems with the following? Depression  -AS      Barriers to learning None  -AS      Pt Participated in POC and Goals Yes  -AS         Safety    Are you being hurt, hit, or frightened by anyone at home or in your life? No  -AS      Are you being neglected by a caregiver No  -AS                User Key  (r) = Recorded By, (t) = Taken By, (c) = Cosigned By      Initials Name Provider Type    AS Royer Pedroza, PT Physical Therapist                     PT Ortho       Row Name  10/02/24 1100       Precautions and Contraindications    Precautions/Limitations shoulder precautions  Revision of Right TSA 9-3-24  -AS       Subjective Pain    Able to rate subjective pain? yes  -AS    Pre-Treatment Pain Level 7  -AS    Post-Treatment Pain Level 7  -AS       Posture/Observations    Posture- WNL Posture is WNL  -AS    Observations Incision healing  -AS       Right Upper Ext    Rt Shoulder Abduction PROM 91  -AS    Rt Shoulder Flexion PROM 96  -AS    Rt Shoulder External Rotation PROM 18  -AS    Rt Shoulder Internal Rotation PROM 39  -AS       MMT Right Upper Ext    Rt Shoulder Flexion MMT, Gross Movement --  Not tested at IE due to recent surgery  -AS    Rt Shoulder ABduction MMT, Gross Movement --  Not tested at IE due to recent surgery  -AS    Rt Shoulder Internal Rotation MMT, Gross Movement --  Not tested at IE due to recent surgery  -AS    Rt Shoulder External Rotation MMT, Gross Movement --  Not tested at IE due to recent surgery  -AS       Sensation    Sensation WNL? WNL  -AS    Light Touch No apparent deficits  -AS              User Key  (r) = Recorded By, (t) = Taken By, (c) = Cosigned By      Initials Name Provider Type    AS Royer Pedroza, PT Physical Therapist                                Therapy Education  Given: HEP  Program: New, Reinforced  How Provided: Verbal, Demonstration, Written  Provided to: Patient  Level of Understanding: Teach back education performed, Verbalized, Demonstrated      PT OP Goals       Row Name 10/02/24 1100          PT Short Term Goals    STG 1 Patient to demonstrate compliance with initial HEP for flexibility, ROM and strengthening.  -AS     STG 2 Patient to report right shoulder pain on VAS of 4-5/10 at worst with activity.  -AS     STG 3 Patient to demonstrate improved right shoulder strength to 4/5 in all planes.  -AS     STG 4 Patient to demonstrate improved right shoulder PROM to within 10 degrees of contralateral shoulder.  -AS        Long  Term Goals    LTG 1 Patient to demonstrate compliance with advanced HEP for flexibility, ROM and strengthening.  -AS     LTG 2 Patient to report right shoulder pain on VAS of 0-1/10 at worst with activity.  -AS     LTG 3 Patient to demonstrate improved right shoulder strength to 4+/5 in all planes.  -AS     LTG 4 Patient to demonstrate improved right shoulder AROM to within 10 degrees of contralateral shoulder.  -AS     LTG 5 Patient to report improved function and decreased pain Quick DASH by >10-15 points.  -AS               User Key  (r) = Recorded By, (t) = Taken By, (c) = Cosigned By      Initials Name Provider Type    AS Royer Pedroza, PT Physical Therapist                     PT Assessment/Plan       Row Name 10/02/24 1100          PT Assessment    Functional Limitations Limitation in home management;Limitations in community activities;Performance in leisure activities;Performance in sport activities;Performance in work activities  -AS     Impairments Muscle strength;Pain;Range of motion  -AS     Assessment Comments Sylwia Spears presents to outpatient PT s/p revision of right reverse TSA. Surgery was performed on 9-3-24 by Dr. Ozuna. Patient was D/C home the next day with a referral for outpatient PT to start at week 3 post op. Patient is to wear sling for 4 weeks post op and he has been compliant with this. Patient reports pain has been well controlled with pain medication and the use of ice. Patient has limited right shoulder ROM, limited right shoulder and RUE strength, and increased pain and discomfort with activity. Patient has limited function at this time secondary to the above.  -AS     Please refer to paper survey for additional self-reported information Yes  -AS     Rehab Potential Good  -AS     Patient/caregiver participated in establishment of treatment plan and goals Yes  -AS     Patient would benefit from skilled therapy intervention Yes  -AS        PT Plan    PT Frequency 2x/week   -AS     Predicted Duration of Therapy Intervention (PT) 6-8 weeks  -AS     Planned CPT's? PT RE-EVAL: 37543;PT THER PROC EA 15 MIN: 90024;PT THER ACT EA 15 MIN: 34779;PT MANUAL THERAPY EA 15 MIN: 53090;PT NEUROMUSC RE-EDUCATION EA 15 MIN: 68300  -AS               User Key  (r) = Recorded By, (t) = Taken By, (c) = Cosigned By      Initials Name Provider Type    AS Royer Pedroza, PT Physical Therapist                     Modalities       Row Name 10/02/24 1100             Moist Heat    MH Applied Yes  -AS      Location Right Shoulder - Sitting  -AS      PT Moist Heat Minutes 10  -AS      MH Prior to Rx Yes  -AS         Functional Testing    Outcome Measure Options Quick DASH  -AS                User Key  (r) = Recorded By, (t) = Taken By, (c) = Cosigned By      Initials Name Provider Type    AS Royer Pedroza, PT Physical Therapist                   OP Exercises       Row Name 10/02/24 1100             Subjective Pain    Able to rate subjective pain? yes  -AS      Pre-Treatment Pain Level 7  -AS      Post-Treatment Pain Level 7  -AS         Exercise 1    Exercise Name 1 3-Way Cane  -AS      Reps 1 15  -AS      Time 1 5 sec hold each  -AS         Exercise 2    Exercise Name 2 Pulley's - Flex & ABD  -AS      Time 2 3 min each  -AS         Exercise 3    Exercise Name 3 Supine Clasp Hands for Flexion  -AS      Reps 3 15  -AS         Exercise 4    Exercise Name 4 S/L ER  -AS         Exercise 5    Exercise Name 5 Empty/Full Can  -AS         Exercise 6    Exercise Name 6 Rows  -AS         Exercise 7    Exercise Name 7 Extensions  -AS         Exercise 8    Exercise Name 8 IR  -AS         Exercise 9    Exercise Name 9 ER  -AS                User Key  (r) = Recorded By, (t) = Taken By, (c) = Cosigned By      Initials Name Provider Type    AS Royer Pedroza, PT Physical Therapist                  Manual Rx (Last 36 Hours)       Manual Treatments       Row Name 10/02/24 1100             Manual Rx 1    Manual  Rx 1 Location Right Shoulder  -AS      Manual Rx 1 Type PROM - Flex, ABD  -AS      Manual Rx 1 Duration 15 min  -AS                User Key  (r) = Recorded By, (t) = Taken By, (c) = Cosigned By      Initials Name Provider Type    AS Royer Pedroza, PT Physical Therapist                                Outcome Measure Options: Quick DASH  Quick DASH  Open a tight or new jar.: Severe Difficulty  Do heavy household chores (e.g., wash walls, wash floors): Severe Difficulty  Carry a shopping bag or briefcase: Moderate Difficulty  Wash your back: Unable  Use a knife to cut food: Unable  Recreational activities in which you take some force or impact through your arm, should or hand (e.g. golf, hammering, tennis, etc.): Unable  During the past week, to what extent has your arm, shoulder, or hand problem interfered with your normal social activites with family, friends, neighbors or groups?: Quite a bit  During the past week, were you limited in your work or other regular daily activities as a result of your arm, shoulder or hand problem?: Very limited  Arm, Shoulder, or hand pain: Severe  Tingling (pins and needles) in your arm, shoulder, or hand: Mild  During the past week, how much difficulty have you had sleeping because of the pain in your arm, shoulder or hand?: So much Difficulty that I can't sleep  Number of Questions Answered: 11  Quick DASH Score: 77.27         Time Calculation:     Start Time: 1047  Stop Time: 1150  Time Calculation (min): 63 min  Untimed Charges  PT Moist Heat Minutes: 10  Total Minutes  Untimed Charges Total Minutes: 10   Total Minutes: 10     Therapy Charges for Today       Code Description Service Date Service Provider Modifiers Qty    89504099327  PT EVAL LOW COMPLEXITY 4 10/2/2024 Royer Pedroza, PT GP 1            PT G-Codes  Outcome Measure Options: Quick DASH  Quick DASH Score: 77.27         Royer Pedroza, PT  10/2/2024

## 2024-10-03 ENCOUNTER — READMISSION MANAGEMENT (OUTPATIENT)
Dept: CALL CENTER | Facility: HOSPITAL | Age: 64
End: 2024-10-03
Payer: MEDICARE

## 2024-10-03 NOTE — OUTREACH NOTE
Total Joint Month 1 Survey      Flowsheet Row Responses   [facility] patient discharged from? [place]   Does the patient have one of the following disease processes/diagnoses(primary or secondary)? Total Joint Replacement   Joint surgery performed? Shoulder   Month 1 attempt successful? Yes   Call start time 1027   Call end time 1033   Has the patient been back in either the hospital or Emergency Department since discharge? No   Is the patient taking all medications as directed (includes completed medication regime)? Yes   Is the patient able to teach back alternate methods of pain control? Ice, Shoulder-elevate above heart/ keep in sling as advised, Reposition, Correct alignment, Short, frequent activity   Has the patient kept scheduled appointments due by today? Yes   Is the patient still receiving Home Health Services? N/A   Is the patient still attending therapy sessions(either in the home or as an outpatient)? Yes   Has the patient fallen since discharge? No   What is the patient's perception of their functional status since discharge? Improving   Is the patient able to teach back signs and symptoms of infection? Temp >100.4 for 24h or longer, Incisional drainage, Blisters around incision, Increased swelling or redness around incision (not associated with surgical edema), Severe discomfort or pain, Changes in mobility, Shortness of breath or chest pain   Is the patient/caregiver able to teach back the hierarchy of who to call/visit for symptoms/problems? PCP, Specialist, Home health nurse, Urgent Care, ED, 911 Yes   Month 1 call completed? Yes   Graduated Yes   Is the patient interested in additional calls from an ambulatory ? No   Would this patient benefit from a Referral to [facility] Social Work? No   Graduated/Revoked comments pt states healing well, starting outpt PT, no more calls needed   Call end time 1033            Toyin RAWLS - Registered Nurse

## 2024-10-07 ENCOUNTER — HOSPITAL ENCOUNTER (OUTPATIENT)
Dept: PHYSICAL THERAPY | Facility: HOSPITAL | Age: 64
Setting detail: THERAPIES SERIES
Discharge: HOME | End: 2024-10-07
Payer: MEDICARE

## 2024-10-07 DIAGNOSIS — Z96.611 STATUS POST REVERSE TOTAL SHOULDER REPLACEMENT, RIGHT: Primary | ICD-10-CM

## 2024-10-07 PROCEDURE — 97140 MANUAL THERAPY 1/> REGIONS: CPT

## 2024-10-07 NOTE — THERAPY TREATMENT NOTE
Outpatient Physical Therapy Ortho Treatment Note  DIANNE Bertrand     Patient Name: Sylwia Spears  : 1960  MRN: [id number]  Today's Date: 10/7/2024      Visit Date: 10/07/2024    Visit Dx:    ICD-10-CM ICD-9-CM   1. Status post reverse total shoulder replacement, open biceps tenodesis, right, DOS 2023  Z96.611 V43.61       Patient Active Problem List   Diagnosis    Type 2 diabetes mellitus without complication, without long-term current use of insulin    Benign essential HTN    Morbidly obese    Cervical spinal stenosis    Encounter for annual wellness visit (AWV) in Medicare patient    Personal history of colonic polyps    Family history of colon cancer    Hyperlipidemia LDL goal <70    Midline thoracic back pain    Erectile dysfunction due to diseases classified elsewhere    Personal history of nicotine dependence    Coronary artery calcification    Varicose veins of right lower extremity with other complications    DJD of shoulder    Rotator cuff arthropathy of right shoulder    Complete tear of right rotator cuff    Chronic right shoulder pain    Thrombocytopenia    Abnormal international normal ratio (INR)    Status post reverse total shoulder replacement, open biceps tenodesis, right, DOS 2023    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    Status post reverse total replacement of right shoulder    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    Hospital discharge follow-up        Past Medical History:   Diagnosis Date    Cervical disc disease     stenosis    Cirrhosis of liver     Colon polyp     Coronary artery calcification     Diabetes mellitus     Electrocution     Fell off a ladder and has chronic neck pain    Hyperlipidemia     Hypertension     Spleen enlarged     Thrombocytopenia         Past Surgical History:   Procedure Laterality Date    CHOLECYSTECTOMY      COLONOSCOPY      COLONOSCOPY N/A 2018    Procedure: COLONOSCOPY, polypectomy;   Surgeon: Raleigh Champagne MD;  Location: [facility] OR;  Service: Gastroenterology    COLONOSCOPY W/ POLYPECTOMY      COLONOSCOPY W/ POLYPECTOMY N/A 09/02/2021    Procedure: COLONOSCOPY; polypectomy;  Surgeon: Raleigh Champagne MD;  Location: [facility] OR;  Service: Gastroenterology;  Laterality: N/A;  polyps   diverticulosis    COLONOSCOPY W/ POLYPECTOMY N/A 9/11/2024    Procedure: COLONOSCOPY WITH POLYPECTOMY;  Surgeon: Raleigh Champagne MD;  Location: [facility] OR;  Service: Gastroenterology;  Laterality: N/A;  diverticulosis  ascending polyp x1 (cold snare)  transverse polyp x2 (cold snare x2)  descending polyp x1  sigmoid polyp x1    LIPOMA EXCISION      groin    SHOULDER SURGERY Left     TOTAL SHOULDER ARTHROPLASTY W/ DISTAL CLAVICLE EXCISION Right 12/1/2023    Procedure: TOTAL SHOULDER REVERSE ARTHROPLASTY;  Surgeon: David Ozuna MD;  Location: [facility] OR;  Service: Orthopedics;  Laterality: Right;    TOTAL SHOULDER REVISION Right 9/3/2024    Procedure: TOTAL SHOULDER REVISION;  Surgeon: David Ozuna MD;  Location: [facility] OR;  Service: Orthopedics;  Laterality: Right;                        PT Assessment/Plan       Row Name 10/07/24 1200          PT Assessment    Assessment Comments Patient presents with increased right shoulder pain and decreased ROM. Patient unable to complete exercises due to pain. He reports he did well with his exercises until sometime on Friday. He states ever since Friday afternoon he has not been able to do his exercises. He denies any reason for this to occur.  -AS        PT Plan    PT Plan Comments Continue with current treatment plan.  -AS               User Key  (r) = Recorded By, (t) = Taken By, (c) = Cosigned By      Initials Name Provider Type    AS Royer Pedroza, PT Physical Therapist                     Modalities       Row Name 10/07/24 1200             Moist Heat    MH Applied Yes  -AS      Location Right Shoulder - Sitting  -AS       PT Moist Heat Minutes 10  -AS      MH Prior to Rx Yes  -AS         Functional Testing    Outcome Measure Options Quick DASH  -AS                User Key  (r) = Recorded By, (t) = Taken By, (c) = Cosigned By      Initials Name Provider Type    AS Royer Pedroza, PT Physical Therapist                   OP Exercises       Row Name 10/07/24 1247 10/07/24 1200          Subjective    Subjective Comments -- Patient presents to outpatient PT today with increased right shoulder pain and limited ROM. He reports he did well with his exercises until sometime on Friday. He states ever since Friday afternoon he has not been able to do his exercises. He denies any reason for this to occur.  -AS        Total Minutes    91372 - PT Manual Therapy Minutes 15  -AS --        Exercise 1    Exercise Name 1 -- 3-Way Cane  -AS     Reps 1 -- 15  -AS     Time 1 -- 5 sec hold each  -AS        Exercise 2    Exercise Name 2 -- Pulley's - Flex & ABD  -AS     Time 2 -- 3 min each  -AS        Exercise 3    Exercise Name 3 -- Supine Clasp Hands for Flexion  -AS     Reps 3 -- 15  -AS        Exercise 4    Exercise Name 4 -- S/L ER  -AS        Exercise 5    Exercise Name 5 -- Empty/Full Can  -AS        Exercise 6    Exercise Name 6 -- Rows  -AS        Exercise 7    Exercise Name 7 -- Extensions  -AS        Exercise 8    Exercise Name 8 -- IR  -AS        Exercise 9    Exercise Name 9 -- ER  -AS               User Key  (r) = Recorded By, (t) = Taken By, (c) = Cosigned By      Initials Name Provider Type    AS Royer Pedroza, PT Physical Therapist                             Manual Rx (Last 36 Hours)       Manual Treatments       Row Name 10/07/24 1247 10/07/24 1100          Total Minutes    46833 - PT Manual Therapy Minutes 15  -AS --        Manual Rx 1    Manual Rx 1 Location -- Right Shoulder  -AS     Manual Rx 1 Type -- PROM - Flex, ABD  -AS     Manual Rx 1 Duration -- 15 min  -AS               User Key  (r) = Recorded By, (t) = Taken  By, (c) = Cosigned By      Initials Name Provider Type    AS Royer Pedroza, PT Physical Therapist                             Outcome Measure Options: Quick DASH         Time Calculation:   Start Time: 1203  Stop Time: 1236  Time Calculation (min): 33 min  Timed Charges  21830 - PT Manual Therapy Minutes: 15  Untimed Charges  PT Moist Heat Minutes: 10  Total Minutes  Timed Charges Total Minutes: 15  Untimed Charges Total Minutes: 10   Total Minutes: 25  Therapy Charges for Today       Code Description Service Date Service Provider Modifiers Qty    99261847312 HC PT MANUAL THERAPY EA 15 MIN 10/7/2024 Royer Pedroza, PT GP 1            PT G-Codes  Outcome Measure Options: Quick SOPHY Pedroza, PT  10/7/2024

## 2024-10-08 DIAGNOSIS — Z96.611 STATUS POST REVERSE TOTAL REPLACEMENT OF RIGHT SHOULDER: ICD-10-CM

## 2024-10-08 DIAGNOSIS — T84.028A INSTABILITY OF REVERSE TOTAL ARTHROPLASTY OF RIGHT SHOULDER: ICD-10-CM

## 2024-10-08 DIAGNOSIS — Z96.611 INSTABILITY OF REVERSE TOTAL ARTHROPLASTY OF RIGHT SHOULDER: ICD-10-CM

## 2024-10-08 RX ORDER — OXYCODONE AND ACETAMINOPHEN 5; 325 MG/1; MG/1
1 TABLET ORAL EVERY 8 HOURS PRN
Qty: 42 TABLET | Refills: 0 | Status: SHIPPED | OUTPATIENT
Start: 2024-10-08

## 2024-10-08 NOTE — TELEPHONE ENCOUNTER
Rx Refill Note  Requested Prescriptions     Pending Prescriptions Disp Refills    oxyCODONE-acetaminophen (PERCOCET) 5-325 MG per tablet 42 tablet 0     Sig: Take 1 tablet by mouth Every 4 (Four) Hours As Needed for Moderate Pain or Severe Pain.      Last office visit with prescribing clinician: 7/17/2024      Next office visit with prescribing clinician: 10/16/2024   Last Filled: 10/01/2024    Encounter Diagnoses   Name Primary?    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     {TIP  Encounters:    {TIP  Please add Last Relevant Lab Date if appropriate:  {TIP  Is Refill Pharmacy correct?:   
complains of pain/discomfort

## 2024-10-10 ENCOUNTER — PATIENT OUTREACH (OUTPATIENT)
Dept: CASE MANAGEMENT | Facility: OTHER | Age: 64
End: 2024-10-10
Payer: MEDICARE

## 2024-10-10 ENCOUNTER — HOSPITAL ENCOUNTER (OUTPATIENT)
Dept: PHYSICAL THERAPY | Facility: HOSPITAL | Age: 64
Setting detail: THERAPIES SERIES
Discharge: HOME | End: 2024-10-10
Payer: MEDICARE

## 2024-10-10 DIAGNOSIS — Z96.611 STATUS POST REVERSE TOTAL SHOULDER REPLACEMENT, RIGHT: Primary | ICD-10-CM

## 2024-10-10 PROCEDURE — 97140 MANUAL THERAPY 1/> REGIONS: CPT

## 2024-10-10 PROCEDURE — 97110 THERAPEUTIC EXERCISES: CPT

## 2024-10-10 NOTE — OUTREACH NOTE
AMBULATORY CASE MANAGEMENT NOTE    Names and Relationships of Patient/Support Persons: Contact: Sylwia Spears; Relationship: Self -     Patient Outreach  RN-ACM outreach with patient following Call Center Handoff. Patient states to be doing well following revision of right total shoulder arthroplasty. Patient states to be receiving physical therapy and no longer wearing sling as directed. Patient states to have 10/16/24 ortho appointment; compliant with medications; medical appointments and recommendations.Reviewed with patient education and verbalized understanding. Patient states to appreciate outreach. No further questions voiced at this time.     Education Documentation  unresolved or worsening symptoms, taught by Starr Law, RN at 10/10/2024  1:12 PM.  Learner: Patient  Readiness: Acceptance  Method: Explanation  Response: Verbalizes Understanding    Safety, taught by Starr Law RN at 10/10/2024  1:12 PM.  Learner: Patient  Readiness: Acceptance  Method: Explanation  Response: Verbalizes Understanding    Provider Follow-Up, taught by Starr Law, ALEA at 10/10/2024  1:12 PM.  Learner: Patient  Readiness: Acceptance  Method: Explanation  Response: Verbalizes Understanding          Starr HIGHTOWER  Ambulatory Case Management    10/10/2024, 13:12 EDT

## 2024-10-10 NOTE — THERAPY TREATMENT NOTE
Outpatient Physical Therapy Ortho Treatment Note  DIANNE Bertrand     Patient Name: Sylwia Spears  : 1960  MRN: [id number]  Today's Date: 10/10/2024      Visit Date: 10/10/2024    Visit Dx:    ICD-10-CM ICD-9-CM   1. Status post reverse total shoulder replacement, open biceps tenodesis, right, DOS 2023  Z96.611 V43.61       Patient Active Problem List   Diagnosis    Type 2 diabetes mellitus without complication, without long-term current use of insulin    Benign essential HTN    Morbidly obese    Cervical spinal stenosis    Encounter for annual wellness visit (AWV) in Medicare patient    Personal history of colonic polyps    Family history of colon cancer    Hyperlipidemia LDL goal <70    Midline thoracic back pain    Erectile dysfunction due to diseases classified elsewhere    Personal history of nicotine dependence    Coronary artery calcification    Varicose veins of right lower extremity with other complications    DJD of shoulder    Rotator cuff arthropathy of right shoulder    Complete tear of right rotator cuff    Chronic right shoulder pain    Thrombocytopenia    Abnormal international normal ratio (INR)    Status post reverse total shoulder replacement, open biceps tenodesis, right, DOS 2023    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    Status post reverse total replacement of right shoulder    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    Hospital discharge follow-up        Past Medical History:   Diagnosis Date    Cervical disc disease     stenosis    Cirrhosis of liver     Colon polyp     Coronary artery calcification     Diabetes mellitus     Electrocution     Fell off a ladder and has chronic neck pain    Hyperlipidemia     Hypertension     Spleen enlarged     Thrombocytopenia         Past Surgical History:   Procedure Laterality Date    CHOLECYSTECTOMY      COLONOSCOPY      COLONOSCOPY N/A 2018    Procedure: COLONOSCOPY, polypectomy;   Surgeon: Raleigh Champagne MD;  Location: [facility] OR;  Service: Gastroenterology    COLONOSCOPY W/ POLYPECTOMY      COLONOSCOPY W/ POLYPECTOMY N/A 09/02/2021    Procedure: COLONOSCOPY; polypectomy;  Surgeon: Raleigh Champagne MD;  Location: [facility] OR;  Service: Gastroenterology;  Laterality: N/A;  polyps   diverticulosis    COLONOSCOPY W/ POLYPECTOMY N/A 9/11/2024    Procedure: COLONOSCOPY WITH POLYPECTOMY;  Surgeon: Raleigh Champagne MD;  Location: [facility] OR;  Service: Gastroenterology;  Laterality: N/A;  diverticulosis  ascending polyp x1 (cold snare)  transverse polyp x2 (cold snare x2)  descending polyp x1  sigmoid polyp x1    LIPOMA EXCISION      groin    SHOULDER SURGERY Left     TOTAL SHOULDER ARTHROPLASTY W/ DISTAL CLAVICLE EXCISION Right 12/1/2023    Procedure: TOTAL SHOULDER REVERSE ARTHROPLASTY;  Surgeon: David Ozuna MD;  Location: [facility] OR;  Service: Orthopedics;  Laterality: Right;    TOTAL SHOULDER REVISION Right 9/3/2024    Procedure: TOTAL SHOULDER REVISION;  Surgeon: David Ozuna MD;  Location: [facility] OR;  Service: Orthopedics;  Laterality: Right;                        PT Assessment/Plan       Row Name 10/10/24 1200          PT Assessment    Assessment Comments Patient with much improved symptoms in RUE. Patient with better tolerance to manual therapy and exercises today compared to his last treatment session. Plan to progress patient as tolerated. He is scheduled to see his Ortho next week.  -AS        PT Plan    PT Plan Comments Continue with current treatment plan.  -AS               User Key  (r) = Recorded By, (t) = Taken By, (c) = Cosigned By      Initials Name Provider Type    AS Royer Pedroza, PT Physical Therapist                     Modalities       Row Name 10/10/24 1200             Moist Heat    MH Applied Yes  -AS      Location Right Shoulder - Sitting  -AS      PT Moist Heat Minutes 10  -AS      MH Prior to Rx Yes  -AS          "Functional Testing    Outcome Measure Options Quick DASH  -AS                User Key  (r) = Recorded By, (t) = Taken By, (c) = Cosigned By      Initials Name Provider Type    AS Royer Pedroza, PT Physical Therapist                   OP Exercises       Row Name 10/10/24 1249 10/10/24 1200          Subjective    Subjective Comments -- Patient states his shoulder \"may be a little bit better but still not good.\" He states he is scheduled to see his Ortho next week.  -AS        Total Minutes    44497 - PT Therapeutic Exercise Minutes 20  -AS --     13441 - PT Manual Therapy Minutes 15  -AS --        Exercise 1    Exercise Name 1 -- 3-Way Cane  -AS     Reps 1 -- 15  -AS     Time 1 -- 5 sec hold each  -AS        Exercise 2    Exercise Name 2 -- Pulley's - Flex & ABD  -AS     Time 2 -- 4 min each  -AS        Exercise 3    Exercise Name 3 -- Supine Clasp Hands for Flexion  -AS     Reps 3 -- 15  -AS        Exercise 4    Exercise Name 4 -- S/L ER  -AS        Exercise 5    Exercise Name 5 -- Empty/Full Can  -AS        Exercise 6    Exercise Name 6 -- Rows  -AS        Exercise 7    Exercise Name 7 -- Extensions  -AS        Exercise 8    Exercise Name 8 -- IR  -AS        Exercise 9    Exercise Name 9 -- ER  -AS               User Key  (r) = Recorded By, (t) = Taken By, (c) = Cosigned By      Initials Name Provider Type    AS Royer Pedroza, PT Physical Therapist                             Manual Rx (Last 36 Hours)       Manual Treatments       Row Name 10/10/24 1249 10/10/24 1100          Total Minutes    92613 - PT Manual Therapy Minutes 15  -AS --        Manual Rx 1    Manual Rx 1 Location -- Right Shoulder  -AS     Manual Rx 1 Type -- PROM - Flex, ABD  -AS     Manual Rx 1 Duration -- 15 min  -AS               User Key  (r) = Recorded By, (t) = Taken By, (c) = Cosigned By      Initials Name Provider Type    AS Royer Pedroza, PT Physical Therapist                             Outcome Measure Options: " Quick DASH         Time Calculation:   Start Time: 1203  Stop Time: 1249  Time Calculation (min): 46 min  Timed Charges  32139 - PT Therapeutic Exercise Minutes: 20  70662 - PT Manual Therapy Minutes: 15  Untimed Charges  PT Moist Heat Minutes: 10  Total Minutes  Timed Charges Total Minutes: 35  Untimed Charges Total Minutes: 10   Total Minutes: 45  Therapy Charges for Today       Code Description Service Date Service Provider Modifiers Qty    07085527710 HC PT THER PROC EA 15 MIN 10/10/2024 Royer Pedroza, PT GP 1    63659241641 HC PT MANUAL THERAPY EA 15 MIN 10/10/2024 Royer Pedroza, PT GP 1            PT G-Codes  Outcome Measure Options: Pema Pedroza, PT  10/10/2024

## 2024-10-14 ENCOUNTER — HOSPITAL ENCOUNTER (OUTPATIENT)
Dept: PHYSICAL THERAPY | Facility: HOSPITAL | Age: 64
Setting detail: THERAPIES SERIES
Discharge: HOME | End: 2024-10-14
Payer: MEDICARE

## 2024-10-14 DIAGNOSIS — Z96.611 STATUS POST REVERSE TOTAL SHOULDER REPLACEMENT, RIGHT: Primary | ICD-10-CM

## 2024-10-14 PROCEDURE — 97110 THERAPEUTIC EXERCISES: CPT

## 2024-10-14 PROCEDURE — 97140 MANUAL THERAPY 1/> REGIONS: CPT

## 2024-10-14 NOTE — THERAPY TREATMENT NOTE
Outpatient Physical Therapy Ortho Treatment Note  DIANNE Bertrand     Patient Name: Sylwia Spears  : 1960  MRN: [id number]  Today's Date: 10/14/2024      Visit Date: 10/14/2024    Visit Dx:    ICD-10-CM ICD-9-CM   1. Status post reverse total shoulder replacement, open biceps tenodesis, right, DOS 2023  Z96.611 V43.61       Patient Active Problem List   Diagnosis    Type 2 diabetes mellitus without complication, without long-term current use of insulin    Benign essential HTN    Morbidly obese    Cervical spinal stenosis    Encounter for annual wellness visit (AWV) in Medicare patient    Personal history of colonic polyps    Family history of colon cancer    Hyperlipidemia LDL goal <70    Midline thoracic back pain    Erectile dysfunction due to diseases classified elsewhere    Personal history of nicotine dependence    Coronary artery calcification    Varicose veins of right lower extremity with other complications    DJD of shoulder    Rotator cuff arthropathy of right shoulder    Complete tear of right rotator cuff    Chronic right shoulder pain    Thrombocytopenia    Abnormal international normal ratio (INR)    Status post reverse total shoulder replacement, open biceps tenodesis, right, DOS 2023    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    Status post reverse total replacement of right shoulder    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 OR;  Service: Gastroenterology    COLONOSCOPY W/ POLYPECTOMY      COLONOSCOPY W/ POLYPECTOMY N/A 09/02/2021    Procedure: COLONOSCOPY; polypectomy;  Surgeon: Raleigh Champagne MD;  Location: [facility] OR;  Service: Gastroenterology;  Laterality: N/A;  polyps   diverticulosis    COLONOSCOPY W/ POLYPECTOMY N/A 9/11/2024    Procedure: COLONOSCOPY WITH POLYPECTOMY;  Surgeon: Raleigh Champagne MD;  Location: [facility] OR;  Service: Gastroenterology;  Laterality: N/A;  diverticulosis  ascending polyp x1 (cold snare)  transverse polyp x2 (cold snare x2)  descending polyp x1  sigmoid polyp x1    LIPOMA EXCISION      groin    SHOULDER SURGERY Left     TOTAL SHOULDER ARTHROPLASTY W/ DISTAL CLAVICLE EXCISION Right 12/1/2023    Procedure: TOTAL SHOULDER REVERSE ARTHROPLASTY;  Surgeon: David Ozuna MD;  Location: [facility] OR;  Service: Orthopedics;  Laterality: Right;    TOTAL SHOULDER REVISION Right 9/3/2024    Procedure: TOTAL SHOULDER REVISION;  Surgeon: David Ozuna MD;  Location: [facility] OR;  Service: Orthopedics;  Laterality: Right;                        PT Assessment/Plan       Row Name 10/14/24 1200          PT Assessment    Assessment Comments Patient continues to have improved tolerance with exercises. Plan to progress patient as tolerated per his post op protocol.  -AS        PT Plan    PT Plan Comments Continue with current treatment plan.  -AS               User Key  (r) = Recorded By, (t) = Taken By, (c) = Cosigned By      Initials Name Provider Type    AS Royer Pedroza, PT Physical Therapist                     Modalities       Row Name 10/14/24 1200             Moist Heat    MH Applied Yes  -AS      Location Right Shoulder - Sitting  -AS      PT Moist Heat Minutes 10  -AS      MH Prior to Rx Yes  -AS         Functional Testing    Outcome Measure Options Quick DASH  -AS                User Key  (r) = Recorded By, (t) =  "Taken By, (c) = Cosigned By      Initials Name Provider Type    AS Royer Pedroza, PT Physical Therapist                   OP Exercises       Row Name 10/14/24 1233 10/14/24 1200          Subjective    Subjective Comments -- Patient states his shoulder is \"a little better\". He states \"the pain is manageable.\"  -AS        Total Minutes    25394 - PT Therapeutic Exercise Minutes 15  -AS --     85240 - PT Manual Therapy Minutes 15  -AS --        Exercise 1    Exercise Name 1 -- 3-Way Cane  -AS     Reps 1 -- 15  -AS     Time 1 -- 5 sec hold each  -AS        Exercise 2    Exercise Name 2 -- Pulley's - Flex & ABD  -AS     Time 2 -- 4 min each  -AS        Exercise 3    Exercise Name 3 -- Supine Clasp Hands for Flexion  -AS     Reps 3 -- 15  -AS        Exercise 4    Exercise Name 4 -- S/L ER  -AS        Exercise 5    Exercise Name 5 -- Empty/Full Can  -AS        Exercise 6    Exercise Name 6 -- Rows  -AS        Exercise 7    Exercise Name 7 -- Extensions  -AS        Exercise 8    Exercise Name 8 -- IR  -AS        Exercise 9    Exercise Name 9 -- ER  -AS               User Key  (r) = Recorded By, (t) = Taken By, (c) = Cosigned By      Initials Name Provider Type    AS Royer Pedroza, PT Physical Therapist                             Manual Rx (Last 36 Hours)       Manual Treatments       Row Name 10/14/24 1233 10/14/24 1100          Total Minutes    46691 - PT Manual Therapy Minutes 15  -AS --        Manual Rx 1    Manual Rx 1 Location -- Right Shoulder  -AS     Manual Rx 1 Type -- PROM - Flex, ABD  -AS     Manual Rx 1 Duration -- 15 min  -AS               User Key  (r) = Recorded By, (t) = Taken By, (c) = Cosigned By      Initials Name Provider Type    AS Royer Pedroza, PT Physical Therapist                             Outcome Measure Options: Quick DASH         Time Calculation:   Start Time: 1151  Stop Time: 1233  Time Calculation (min): 42 min  Timed Charges  51855 - PT Therapeutic Exercise Minutes: " 15  95230 - PT Manual Therapy Minutes: 15  Untimed Charges  PT Moist Heat Minutes: 10  Total Minutes  Timed Charges Total Minutes: 30  Untimed Charges Total Minutes: 10   Total Minutes: 40  Therapy Charges for Today       Code Description Service Date Service Provider Modifiers Qty    05441215594 HC PT THER PROC EA 15 MIN 10/14/2024 Royer Pedroza, PT GP 1    13960526994 HC PT MANUAL THERAPY EA 15 MIN 10/14/2024 Royer Pedroza, PT GP 1            PT G-Codes  Outcome Measure Options: Quick DASH         Royer Pedroza, PT  10/14/2024

## 2024-10-16 ENCOUNTER — HOSPITAL ENCOUNTER (OUTPATIENT)
Dept: PHYSICAL THERAPY | Facility: HOSPITAL | Age: 64
Setting detail: THERAPIES SERIES
Discharge: HOME | End: 2024-10-16
Payer: MEDICARE

## 2024-10-16 ENCOUNTER — OFFICE VISIT (OUTPATIENT)
Dept: ORTHOPEDIC SURGERY | Facility: CLINIC | Age: 64
End: 2024-10-16
Payer: MEDICARE

## 2024-10-16 VITALS — BODY MASS INDEX: 29.13 KG/M2 | WEIGHT: 227 LBS | HEIGHT: 74 IN

## 2024-10-16 DIAGNOSIS — Z96.611 STATUS POST REVERSE TOTAL REPLACEMENT OF RIGHT SHOULDER: Primary | ICD-10-CM

## 2024-10-16 DIAGNOSIS — Z96.611 STATUS POST REVERSE TOTAL SHOULDER REPLACEMENT, RIGHT: Primary | ICD-10-CM

## 2024-10-16 PROCEDURE — 99024 POSTOP FOLLOW-UP VISIT: CPT | Performed by: ORTHOPAEDIC SURGERY

## 2024-10-16 PROCEDURE — 97110 THERAPEUTIC EXERCISES: CPT

## 2024-10-16 PROCEDURE — 97140 MANUAL THERAPY 1/> REGIONS: CPT

## 2024-10-16 RX ORDER — METHYLPREDNISOLONE 4 MG
TABLET, DOSE PACK ORAL
Qty: 1 EACH | Refills: 0 | Status: SHIPPED | OUTPATIENT
Start: 2024-10-16

## 2024-10-16 RX ORDER — MELOXICAM 15 MG/1
15 TABLET ORAL DAILY
Qty: 30 TABLET | Refills: 0 | Status: SHIPPED | OUTPATIENT
Start: 2024-10-16

## 2024-10-16 NOTE — PROGRESS NOTES
Name: Sylwia Spears  MRN: [id number]  Diagnosis: s/p right revision reverse TSA Arthroplasty for instability date of surgery 9/3/2024    Interval History: Sylwia Spears returns for 6-week postoperative visit stating doing reasonable at this point in time, he is making progress in regards to his motion, he states he is working on his exercises on his own at home.  He is being evaluated with therapy.  He is out of his sling at this point in time.  Still is noting moderate pain but he has been able to decrease his pain medication intake    Physical Examination:   right shoulder-   Active and passive forward flexion 95 degrees, active and passive external rotation 20 degrees, positive deltoid firing all 3 components  Incision is well-healed  There is no active drainage, erythema, or evidence of infection.   Distal motor and sensory exam is grossly intact. Hand is well perfused.    Radiographic review: Radiographs of the right shoulder 3 views, AP Grashey, scapular Y and axillary lateral, ordered and reviewed by me today, indication s/p shoulder arthroplasty, shows that the implant is in good position. There is no evidence of implant loosening, dislocations or fractures. Compared to immediate postop xrays from hospital.     Assessment: Status post right revision reverse TSA Arthroplasty for instability    Plan:  Discussed with patient that he is stiff at this point in time, recommended increasing his work on his own at home with motion exercises as well as continue to work with physical therapy.  Medrol Dosepak given to help with stiffness.  If this is unsuccessful may consider prednisone taper.  Follow-up in 6 weeks for reassessment, no x-rays needed

## 2024-10-16 NOTE — THERAPY TREATMENT NOTE
Outpatient Physical Therapy Ortho Treatment Note  DIANNE Bertrand     Patient Name: Sylwia Spears  : 1960  MRN: [id number]  Today's Date: 10/16/2024      Visit Date: 10/16/2024    Visit Dx:    ICD-10-CM ICD-9-CM   1. Status post reverse total shoulder replacement, open biceps tenodesis, right, DOS 2023  Z96.611 V43.61       Patient Active Problem List   Diagnosis    Type 2 diabetes mellitus without complication, without long-term current use of insulin    Benign essential HTN    Morbidly obese    Cervical spinal stenosis    Encounter for annual wellness visit (AWV) in Medicare patient    Personal history of colonic polyps    Family history of colon cancer    Hyperlipidemia LDL goal <70    Midline thoracic back pain    Erectile dysfunction due to diseases classified elsewhere    Personal history of nicotine dependence    Coronary artery calcification    Varicose veins of right lower extremity with other complications    DJD of shoulder    Rotator cuff arthropathy of right shoulder    Complete tear of right rotator cuff    Chronic right shoulder pain    Thrombocytopenia    Abnormal international normal ratio (INR)    Status post reverse total shoulder replacement, open biceps tenodesis, right, DOS 2023    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    Status post reverse total replacement of right shoulder    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 OR;  Service: Gastroenterology    COLONOSCOPY W/ POLYPECTOMY      COLONOSCOPY W/ POLYPECTOMY N/A 09/02/2021    Procedure: COLONOSCOPY; polypectomy;  Surgeon: Raleigh Champagne MD;  Location: [facility] OR;  Service: Gastroenterology;  Laterality: N/A;  polyps   diverticulosis    COLONOSCOPY W/ POLYPECTOMY N/A 9/11/2024    Procedure: COLONOSCOPY WITH POLYPECTOMY;  Surgeon: Raleigh Champagne MD;  Location: [facility] OR;  Service: Gastroenterology;  Laterality: N/A;  diverticulosis  ascending polyp x1 (cold snare)  transverse polyp x2 (cold snare x2)  descending polyp x1  sigmoid polyp x1    LIPOMA EXCISION      groin    SHOULDER SURGERY Left     TOTAL SHOULDER ARTHROPLASTY W/ DISTAL CLAVICLE EXCISION Right 12/1/2023    Procedure: TOTAL SHOULDER REVERSE ARTHROPLASTY;  Surgeon: David Ozuna MD;  Location: [facility] OR;  Service: Orthopedics;  Laterality: Right;    TOTAL SHOULDER REVISION Right 9/3/2024    Procedure: TOTAL SHOULDER REVISION;  Surgeon: David Ozuna MD;  Location: [facility] OR;  Service: Orthopedics;  Laterality: Right;                        PT Assessment/Plan       Row Name 10/16/24 1200          PT Assessment    Assessment Comments Patient is now 6 weeks post-op so AROM and gentle strengthening exercises were initiated today. Patient tolerate dthis well. Plan to continue to progress patient as tolerated.  -AS        PT Plan    PT Plan Comments Continue with current treatment plan.  -AS               User Key  (r) = Recorded By, (t) = Taken By, (c) = Cosigned By      Initials Name Provider Type    AS Royer Pedroza, PT Physical Therapist                       OP Exercises       Row Name 10/16/24 1253 10/16/24 1200          Subjective    Subjective Comments -- Patient states his MD took x-rays today and said \"everything looks good.\" He states his shoulder is feeling better overall.  -AS        Total Minutes    88727 - " PT Therapeutic Exercise Minutes 10  -AS --     81592 - PT Manual Therapy Minutes 15  -AS --        Exercise 1    Exercise Name 1 -- 3-Way Cane  -AS     Reps 1 -- 15  -AS     Time 1 -- 5 sec hold each  -AS        Exercise 2    Exercise Name 2 -- Pulley's - Flex & ABD  -AS     Time 2 -- 4 min each  -AS        Exercise 3    Exercise Name 3 -- Supine Clasp Hands for Flexion  -AS     Reps 3 -- 15  -AS        Exercise 4    Exercise Name 4 -- S/L ER  -AS     Reps 4 -- 25  -AS        Exercise 5    Exercise Name 5 -- Empty/Full Can  -AS        Exercise 6    Exercise Name 6 -- Rows  -AS     Reps 6 -- 25  -AS     Time 6 -- Blue  -AS        Exercise 7    Exercise Name 7 -- Extensions  -AS     Reps 7 -- 25  -AS     Time 7 -- Blue  -AS        Exercise 8    Exercise Name 8 -- IR  -AS        Exercise 9    Exercise Name 9 -- ER  -AS               User Key  (r) = Recorded By, (t) = Taken By, (c) = Cosigned By      Initials Name Provider Type    AS Royer Pedroza, PT Physical Therapist                             Manual Rx (Last 36 Hours)       Manual Treatments       Row Name 10/16/24 1253 10/16/24 1100          Total Minutes    22874 - PT Manual Therapy Minutes 15  -AS --        Manual Rx 1    Manual Rx 1 Location -- Right Shoulder  -AS     Manual Rx 1 Type -- PROM - Flex, ABD  -AS     Manual Rx 1 Duration -- 15 min  -AS               User Key  (r) = Recorded By, (t) = Taken By, (c) = Cosigned By      Initials Name Provider Type    AS Royer Pedroza, PT Physical Therapist                                       Time Calculation:   Start Time: 1225  Stop Time: 1253  Time Calculation (min): 28 min  Timed Charges  93096 - PT Therapeutic Exercise Minutes: 10  79875 - PT Manual Therapy Minutes: 15  Total Minutes  Timed Charges Total Minutes: 25   Total Minutes: 25  Therapy Charges for Today       Code Description Service Date Service Provider Modifiers Qty    28453234233  PT THER PROC EA 15 MIN 10/16/2024 Royer Pedroza  Uriel, PT GP 1    [id number]  PT MANUAL THERAPY EA 15 MIN 10/16/2024 Royer Pedroza, PT GP 1                      Royer Pedroza, PT  10/16/2024

## 2024-10-21 ENCOUNTER — HOSPITAL ENCOUNTER (OUTPATIENT)
Dept: PHYSICAL THERAPY | Facility: HOSPITAL | Age: 64
Setting detail: THERAPIES SERIES
Discharge: HOME | End: 2024-10-21
Payer: MEDICARE

## 2024-10-21 DIAGNOSIS — Z96.611 STATUS POST REVERSE TOTAL SHOULDER REPLACEMENT, RIGHT: Primary | ICD-10-CM

## 2024-10-21 DIAGNOSIS — Z96.611 STATUS POST REVERSE TOTAL REPLACEMENT OF RIGHT SHOULDER: ICD-10-CM

## 2024-10-21 DIAGNOSIS — Z96.611 INSTABILITY OF REVERSE TOTAL ARTHROPLASTY OF RIGHT SHOULDER: ICD-10-CM

## 2024-10-21 DIAGNOSIS — T84.028A INSTABILITY OF REVERSE TOTAL ARTHROPLASTY OF RIGHT SHOULDER: ICD-10-CM

## 2024-10-21 PROCEDURE — 97110 THERAPEUTIC EXERCISES: CPT | Performed by: SPECIALIST/TECHNOLOGIST

## 2024-10-21 RX ORDER — OXYCODONE AND ACETAMINOPHEN 5; 325 MG/1; MG/1
1 TABLET ORAL EVERY 8 HOURS PRN
Qty: 42 TABLET | Refills: 0 | Status: SHIPPED | OUTPATIENT
Start: 2024-10-21

## 2024-10-21 NOTE — TELEPHONE ENCOUNTER
Rx Refill Note  Requested Prescriptions     Pending Prescriptions Disp Refills    oxyCODONE-acetaminophen (PERCOCET) 5-325 MG per tablet 42 tablet 0     Sig: Take 1 tablet by mouth Every 8 (Eight) Hours As Needed for Moderate Pain or Severe Pain.      Last office visit with prescribing clinician: 10/16/2024      Next office visit with prescribing clinician: 11/25/2024   Last Filled:10/8/24    Encounter Diagnoses   Name Primary?    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     {TIP  Encounters:    {TIP  Please add Last Relevant Lab Date if appropriate:  {TIP  Is Refill Pharmacy correct?:

## 2024-10-21 NOTE — THERAPY TREATMENT NOTE
Outpatient Physical Therapy Ortho Treatment Note  DIANNE Bertrand     Patient Name: Sylwia Spears  : 1960  MRN: [id number]  Today's Date: 10/21/2024      Visit Date: 10/21/2024    Visit Dx:    ICD-10-CM ICD-9-CM   1. Status post reverse total shoulder replacement, open biceps tenodesis, right, DOS 2023  Z96.611 V43.61       Patient Active Problem List   Diagnosis    Type 2 diabetes mellitus without complication, without long-term current use of insulin    Benign essential HTN    Morbidly obese    Cervical spinal stenosis    Encounter for annual wellness visit (AWV) in Medicare patient    Personal history of colonic polyps    Family history of colon cancer    Hyperlipidemia LDL goal <70    Midline thoracic back pain    Erectile dysfunction due to diseases classified elsewhere    Personal history of nicotine dependence    Coronary artery calcification    Varicose veins of right lower extremity with other complications    DJD of shoulder    Rotator cuff arthropathy of right shoulder    Complete tear of right rotator cuff    Chronic right shoulder pain    Thrombocytopenia    Abnormal international normal ratio (INR)    Status post reverse total shoulder replacement, open biceps tenodesis, right, DOS 2023    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    Status post reverse total replacement of right shoulder    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    Hospital discharge follow-up        Past Medical History:   Diagnosis Date    Cervical disc disease     stenosis    Cirrhosis of liver     Colon polyp     Coronary artery calcification     Diabetes mellitus     Electrocution     Fell off a ladder and has chronic neck pain    Hyperlipidemia     Hypertension     Spleen enlarged     Thrombocytopenia         Past Surgical History:   Procedure Laterality Date    CHOLECYSTECTOMY      COLONOSCOPY      COLONOSCOPY N/A 2018    Procedure: COLONOSCOPY, polypectomy;   Surgeon: Raleigh Champagne MD;  Location: [facility] OR;  Service: Gastroenterology    COLONOSCOPY W/ POLYPECTOMY      COLONOSCOPY W/ POLYPECTOMY N/A 09/02/2021    Procedure: COLONOSCOPY; polypectomy;  Surgeon: Raleigh Champagne MD;  Location: [facility] OR;  Service: Gastroenterology;  Laterality: N/A;  polyps   diverticulosis    COLONOSCOPY W/ POLYPECTOMY N/A 9/11/2024    Procedure: COLONOSCOPY WITH POLYPECTOMY;  Surgeon: Raleigh Champagne MD;  Location: [facility] OR;  Service: Gastroenterology;  Laterality: N/A;  diverticulosis  ascending polyp x1 (cold snare)  transverse polyp x2 (cold snare x2)  descending polyp x1  sigmoid polyp x1    LIPOMA EXCISION      groin    SHOULDER SURGERY Left     TOTAL SHOULDER ARTHROPLASTY W/ DISTAL CLAVICLE EXCISION Right 12/1/2023    Procedure: TOTAL SHOULDER REVERSE ARTHROPLASTY;  Surgeon: David Ozuna MD;  Location: [facility] OR;  Service: Orthopedics;  Laterality: Right;    TOTAL SHOULDER REVISION Right 9/3/2024    Procedure: TOTAL SHOULDER REVISION;  Surgeon: David Ozuna MD;  Location: [facility] OR;  Service: Orthopedics;  Laterality: Right;                        PT Assessment/Plan       Row Name 10/21/24 4886          PT Assessment    Assessment Comments Pt maintains good ROM and tolerated progression of ther ex well.  -KF        PT Plan    PT Plan Comments Continue per POC  -KF               User Key  (r) = Recorded By, (t) = Taken By, (c) = Cosigned By      Initials Name Provider Type    Chantal Greene PTA Physical Therapist Assistant                     Modalities       Row Name 10/21/24 1158             Moist Heat    Location Right Shoulder - Sitting  -KF      PT Moist Heat Minutes 10  -KF      MH Prior to Rx Yes  -KF         Functional Testing    Outcome Measure Options Quick DASH  -KF                User Key  (r) = Recorded By, (t) = Taken By, (c) = Cosigned By      Initials Name Provider Type    Chantal Greene PTA Physical Therapist  Assistant                   OP Exercises       Row Name 10/21/24 1155             Subjective    Subjective Comments Pt states his shoulder is sore after using it a little more with daily activities.  -KF         Exercise 1    Exercise Name 1 3-Way Cane  -KF      Reps 1 15  -KF      Time 1 5 sec hold each  -KF         Exercise 2    Exercise Name 2 Pulley's - Flex & ABD  -KF      Time 2 4 min each  -KF         Exercise 3    Exercise Name 3 Supine Clasp Hands for Flexion  -KF      Reps 3 15  -KF         Exercise 4    Exercise Name 4 S/L ER  -KF      Reps 4 25  -KF         Exercise 5    Exercise Name 5 Empty/Full Can  -KF      Reps 5 15 each  -KF         Exercise 6    Exercise Name 6 Rows  -KF      Reps 6 25  -KF      Time 6 Blue  -KF         Exercise 7    Exercise Name 7 Extensions  -KF      Reps 7 25  -KF      Time 7 Blue  -KF         Exercise 8    Exercise Name 8 IR  -KF         Exercise 9    Exercise Name 9 ER  -KF                User Key  (r) = Recorded By, (t) = Taken By, (c) = Cosigned By      Initials Name Provider Type    Chantal Greene PTA Physical Therapist Assistant                             Manual Rx (Last 36 Hours)       Manual Treatments       Row Name 10/21/24 1155             Manual Rx 1    Manual Rx 1 Location Right Shoulder  -KF      Manual Rx 1 Type PROM - Flex, ABD  -KF      Manual Rx 1 Duration 15 min  -KF                User Key  (r) = Recorded By, (t) = Taken By, (c) = Cosigned By      Initials Name Provider Type    Chantal Greene PTA Physical Therapist Assistant                             Outcome Measure Options: Quick DASH         Time Calculation:   Start Time: 1155  Stop Time: 1240  Time Calculation (min): 45 min  Untimed Charges  PT Moist Heat Minutes: 10  Total Minutes  Untimed Charges Total Minutes: 10   Total Minutes: 10  Therapy Charges for Today       Code Description Service Date Service Provider Modifiers Qty    10148846189 HC PT THER PROC EA 15 MIN 10/21/2024 Corrie  Chantal, PTA GP 2            PT G-Codes  Outcome Measure Options: Pema Denton PTA  10/21/2024

## 2024-10-24 ENCOUNTER — HOSPITAL ENCOUNTER (OUTPATIENT)
Dept: PHYSICAL THERAPY | Facility: HOSPITAL | Age: 64
Setting detail: THERAPIES SERIES
Discharge: HOME | End: 2024-10-24
Payer: MEDICARE

## 2024-10-24 DIAGNOSIS — Z96.611 STATUS POST REVERSE TOTAL SHOULDER REPLACEMENT, RIGHT: Primary | ICD-10-CM

## 2024-10-24 PROCEDURE — 97110 THERAPEUTIC EXERCISES: CPT | Performed by: SPECIALIST/TECHNOLOGIST

## 2024-10-24 NOTE — THERAPY TREATMENT NOTE
Outpatient Physical Therapy Ortho Treatment Note  DIANNE Bertrand     Patient Name: Sylwia Spears  : 1960  MRN: [id number]  Today's Date: 10/24/2024      Visit Date: 10/24/2024    Visit Dx:    ICD-10-CM ICD-9-CM   1. Status post reverse total shoulder replacement, open biceps tenodesis, right, DOS 2023  Z96.611 V43.61       Patient Active Problem List   Diagnosis    Type 2 diabetes mellitus without complication, without long-term current use of insulin    Benign essential HTN    Morbidly obese    Cervical spinal stenosis    Encounter for annual wellness visit (AWV) in Medicare patient    Personal history of colonic polyps    Family history of colon cancer    Hyperlipidemia LDL goal <70    Midline thoracic back pain    Erectile dysfunction due to diseases classified elsewhere    Personal history of nicotine dependence    Coronary artery calcification    Varicose veins of right lower extremity with other complications    DJD of shoulder    Rotator cuff arthropathy of right shoulder    Complete tear of right rotator cuff    Chronic right shoulder pain    Thrombocytopenia    Abnormal international normal ratio (INR)    Status post reverse total shoulder replacement, open biceps tenodesis, right, DOS 2023    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    Status post reverse total replacement of right shoulder    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    Hospital discharge follow-up        Past Medical History:   Diagnosis Date    Cervical disc disease     stenosis    Cirrhosis of liver     Colon polyp     Coronary artery calcification     Diabetes mellitus     Electrocution     Fell off a ladder and has chronic neck pain    Hyperlipidemia     Hypertension     Spleen enlarged     Thrombocytopenia         Past Surgical History:   Procedure Laterality Date    CHOLECYSTECTOMY      COLONOSCOPY      COLONOSCOPY N/A 2018    Procedure: COLONOSCOPY, polypectomy;   Surgeon: Raleigh Champagne MD;  Location: [facility] OR;  Service: Gastroenterology    COLONOSCOPY W/ POLYPECTOMY      COLONOSCOPY W/ POLYPECTOMY N/A 09/02/2021    Procedure: COLONOSCOPY; polypectomy;  Surgeon: Raleigh Champagne MD;  Location: [facility] OR;  Service: Gastroenterology;  Laterality: N/A;  polyps   diverticulosis    COLONOSCOPY W/ POLYPECTOMY N/A 9/11/2024    Procedure: COLONOSCOPY WITH POLYPECTOMY;  Surgeon: Raleigh Champagne MD;  Location: [facility] OR;  Service: Gastroenterology;  Laterality: N/A;  diverticulosis  ascending polyp x1 (cold snare)  transverse polyp x2 (cold snare x2)  descending polyp x1  sigmoid polyp x1    LIPOMA EXCISION      groin    SHOULDER SURGERY Left     TOTAL SHOULDER ARTHROPLASTY W/ DISTAL CLAVICLE EXCISION Right 12/1/2023    Procedure: TOTAL SHOULDER REVERSE ARTHROPLASTY;  Surgeon: David Ozuna MD;  Location: [facility] OR;  Service: Orthopedics;  Laterality: Right;    TOTAL SHOULDER REVISION Right 9/3/2024    Procedure: TOTAL SHOULDER REVISION;  Surgeon: David Ozuna MD;  Location: [facility] OR;  Service: Orthopedics;  Laterality: Right;                        PT Assessment/Plan       Row Name 10/24/24 1220          PT Assessment    Assessment Comments Pt with improved tolerance to progression of ther ex.  -KF        PT Plan    PT Plan Comments Continue per POC  -KF               User Key  (r) = Recorded By, (t) = Taken By, (c) = Cosigned By      Initials Name Provider Type    Chantal Greene PTA Physical Therapist Assistant                     Modalities       Row Name 10/24/24 1220             Subjective    Subjective Comments Pt states his shoulder is doing well.  -KF         Moist Heat    Location Right Shoulder - Sitting  -KF      PT Moist Heat Minutes 10  -KF      MH Prior to Rx Yes  -KF         Functional Testing    Outcome Measure Options Quick DASH  -KF                User Key  (r) = Recorded By, (t) = Taken By, (c) = Cosigned By       Initials Name Provider Type    Chantal Greene PTA Physical Therapist Assistant                   OP Exercises       Row Name 10/24/24 1220             Subjective    Subjective Comments Pt states his shoulder is doing well.  -KF         Exercise 1    Exercise Name 1 3-Way Cane  -KF      Reps 1 15  -KF      Time 1 5 sec hold each  -KF         Exercise 2    Exercise Name 2 Pulley's - Flex & ABD  -KF      Time 2 4 min each  -KF         Exercise 3    Exercise Name 3 Supine Clasp Hands for Flexion  -KF      Reps 3 15  -KF         Exercise 4    Exercise Name 4 S/L ER  -KF      Reps 4 25  -KF         Exercise 5    Exercise Name 5 Empty/Full Can  -KF      Reps 5 25 each  -KF         Exercise 6    Exercise Name 6 Rows  -KF      Reps 6 25  -KF      Time 6 Blue  -KF         Exercise 7    Exercise Name 7 Extensions  -KF      Reps 7 25  -KF      Time 7 Blue  -KF         Exercise 8    Exercise Name 8 IR  -KF      Reps 8 25  -KF      Time 8 Blue  -KF         Exercise 9    Exercise Name 9 ER  -KF      Reps 9 25  -KF      Time 9 Green  -KF                User Key  (r) = Recorded By, (t) = Taken By, (c) = Cosigned By      Initials Name Provider Type    Chantal Greene PTA Physical Therapist Assistant                             Manual Rx (Last 36 Hours)       Manual Treatments       Row Name 10/24/24 1220             Manual Rx 1    Manual Rx 1 Location Right Shoulder  -KF      Manual Rx 1 Type PROM - Flex, ABD  -KF      Manual Rx 1 Duration 15 min  -KF                User Key  (r) = Recorded By, (t) = Taken By, (c) = Cosigned By      Initials Name Provider Type    Chantal Greene PTA Physical Therapist Assistant                             Outcome Measure Options: Quick DASH         Time Calculation:   Start Time: 1222  Stop Time: 1314  Time Calculation (min): 52 min  Untimed Charges  PT Moist Heat Minutes: 10  Total Minutes  Untimed Charges Total Minutes: 10   Total Minutes: 10  Therapy Charges for Today       Code  Description Service Date Service Provider Modifiers Qty    83951725532 HC PT THER PROC EA 15 MIN 10/24/2024 Chantal Denton PTA GP 1            PT G-Codes  Outcome Measure Options: Pema Denton PTA  10/24/2024

## 2024-10-28 ENCOUNTER — HOSPITAL ENCOUNTER (OUTPATIENT)
Dept: PHYSICAL THERAPY | Facility: HOSPITAL | Age: 64
Setting detail: THERAPIES SERIES
Discharge: HOME | End: 2024-10-28
Payer: MEDICARE

## 2024-10-28 DIAGNOSIS — Z96.611 STATUS POST REVERSE TOTAL SHOULDER REPLACEMENT, RIGHT: Primary | ICD-10-CM

## 2024-10-28 PROCEDURE — 97110 THERAPEUTIC EXERCISES: CPT

## 2024-10-28 PROCEDURE — 97140 MANUAL THERAPY 1/> REGIONS: CPT

## 2024-10-28 NOTE — THERAPY TREATMENT NOTE
Outpatient Physical Therapy Ortho Treatment Note  DIANNE Bertrand     Patient Name: Sylwia Spears  : 1960  MRN: [id number]  Today's Date: 10/28/2024      Visit Date: 10/28/2024    Visit Dx:    ICD-10-CM ICD-9-CM   1. Status post reverse total shoulder replacement, open biceps tenodesis, right, DOS 2023  Z96.611 V43.61       Patient Active Problem List   Diagnosis    Type 2 diabetes mellitus without complication, without long-term current use of insulin    Benign essential HTN    Morbidly obese    Cervical spinal stenosis    Encounter for annual wellness visit (AWV) in Medicare patient    Personal history of colonic polyps    Family history of colon cancer    Hyperlipidemia LDL goal <70    Midline thoracic back pain    Erectile dysfunction due to diseases classified elsewhere    Personal history of nicotine dependence    Coronary artery calcification    Varicose veins of right lower extremity with other complications    DJD of shoulder    Rotator cuff arthropathy of right shoulder    Complete tear of right rotator cuff    Chronic right shoulder pain    Thrombocytopenia    Abnormal international normal ratio (INR)    Status post reverse total shoulder replacement, open biceps tenodesis, right, DOS 2023    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    Status post reverse total replacement of right shoulder    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    Hospital discharge follow-up        Past Medical History:   Diagnosis Date    Cervical disc disease     stenosis    Cirrhosis of liver     Colon polyp     Coronary artery calcification     Diabetes mellitus     Electrocution     Fell off a ladder and has chronic neck pain    Hyperlipidemia     Hypertension     Spleen enlarged     Thrombocytopenia         Past Surgical History:   Procedure Laterality Date    CHOLECYSTECTOMY      COLONOSCOPY      COLONOSCOPY N/A 2018    Procedure: COLONOSCOPY, polypectomy;   Surgeon: Raleigh Champagne MD;  Location: [facility] OR;  Service: Gastroenterology    COLONOSCOPY W/ POLYPECTOMY      COLONOSCOPY W/ POLYPECTOMY N/A 09/02/2021    Procedure: COLONOSCOPY; polypectomy;  Surgeon: Raleigh Champagne MD;  Location:  [facility] OR;  Service: Gastroenterology;  Laterality: N/A;  polyps   diverticulosis    COLONOSCOPY W/ POLYPECTOMY N/A 9/11/2024    Procedure: COLONOSCOPY WITH POLYPECTOMY;  Surgeon: Raleigh Champagne MD;  Location:  [facility] OR;  Service: Gastroenterology;  Laterality: N/A;  diverticulosis  ascending polyp x1 (cold snare)  transverse polyp x2 (cold snare x2)  descending polyp x1  sigmoid polyp x1    LIPOMA EXCISION      groin    SHOULDER SURGERY Left     TOTAL SHOULDER ARTHROPLASTY W/ DISTAL CLAVICLE EXCISION Right 12/1/2023    Procedure: TOTAL SHOULDER REVERSE ARTHROPLASTY;  Surgeon: David Ozuna MD;  Location: [facility] OR;  Service: Orthopedics;  Laterality: Right;    TOTAL SHOULDER REVISION Right 9/3/2024    Procedure: TOTAL SHOULDER REVISION;  Surgeon: David Ozuna MD;  Location: [facility] OR;  Service: Orthopedics;  Laterality: Right;                        PT Assessment/Plan       Row Name 10/28/24 1200          PT Assessment    Assessment Comments Patient continues to do well with PT at this time.  -AS        PT Plan    PT Plan Comments Continue with current treatment plan.  -AS               User Key  (r) = Recorded By, (t) = Taken By, (c) = Cosigned By      Initials Name Provider Type    AS Royer Pedroza, PT Physical Therapist                     Modalities       Row Name 10/28/24 1200             Moist Heat    MH Applied Yes  -AS      Location Right Shoulder - Sitting  -AS      PT Moist Heat Minutes 10  -AS      MH Prior to Rx Yes  -AS         Functional Testing    Outcome Measure Options Quick DASH  -AS                User Key  (r) = Recorded By, (t) = Taken By, (c) = Cosigned By      Initials Name Provider Type    AS Yovany  "Royer Trevino, PT Physical Therapist                   OP Exercises       Row Name 10/28/24 1254 10/28/24 1200          Subjective    Subjective Comments -- Patient states \"I tweaked my shoulder yesterday folding laundry.\" He states he is sore today. He also reports he is scheduled to follow up with his Ortho sometime next month.  -AS        Total Minutes    67558 - PT Therapeutic Exercise Minutes 25  -AS --     10852 - PT Manual Therapy Minutes 15  -AS --        Exercise 1    Exercise Name 1 -- 3-Way Cane  -AS     Reps 1 -- 15  -AS     Time 1 -- 5 sec hold each  -AS        Exercise 2    Exercise Name 2 -- Pulley's - Flex & ABD  -AS     Time 2 -- 4 min each  -AS        Exercise 3    Exercise Name 3 -- Supine Clasp Hands for Flexion  -AS     Reps 3 -- 15  -AS        Exercise 4    Exercise Name 4 -- S/L ER  -AS     Reps 4 -- 25  -AS     Time 4 -- 1#  -AS        Exercise 5    Exercise Name 5 -- Empty/Full Can  -AS     Reps 5 -- 25 each  -AS        Exercise 6    Exercise Name 6 -- Rows  -AS     Reps 6 -- 25  -AS     Time 6 -- Blue  -AS        Exercise 7    Exercise Name 7 -- Extensions  -AS     Reps 7 -- 25  -AS     Time 7 -- Blue  -AS        Exercise 8    Exercise Name 8 -- IR  -AS     Reps 8 -- 25  -AS     Time 8 -- Blue  -AS        Exercise 9    Exercise Name 9 -- ER  -AS     Reps 9 -- 25  -AS     Time 9 -- Green  -AS               User Key  (r) = Recorded By, (t) = Taken By, (c) = Cosigned By      Initials Name Provider Type    AS Royer Pedroza, PT Physical Therapist                             Manual Rx (Last 36 Hours)       Manual Treatments       Row Name 10/28/24 1254 10/28/24 1100          Total Minutes    13671 - PT Manual Therapy Minutes 15  -AS --        Manual Rx 1    Manual Rx 1 Location -- Right Shoulder  -AS     Manual Rx 1 Type -- PROM - Flex, ABD  -AS     Manual Rx 1 Duration -- 15 min  -AS               User Key  (r) = Recorded By, (t) = Taken By, (c) = Cosigned By      Initials Name Provider " Type    AS Royer Pedroza, PT Physical Therapist                             Outcome Measure Options: Quick DASH         Time Calculation:   Start Time: 1200  Stop Time: 1254  Time Calculation (min): 54 min  Timed Charges  05105 - PT Therapeutic Exercise Minutes: 25  18315 - PT Manual Therapy Minutes: 15  Untimed Charges  PT Moist Heat Minutes: 10  Total Minutes  Timed Charges Total Minutes: 40  Untimed Charges Total Minutes: 10   Total Minutes: 50  Therapy Charges for Today       Code Description Service Date Service Provider Modifiers Qty    70453958495 HC PT THER PROC EA 15 MIN 10/28/2024 Royer Pedroza, PT GP 2    36100801509 HC PT MANUAL THERAPY EA 15 MIN 10/28/2024 Royer Pedroza, PT GP 1            PT G-Codes  Outcome Measure Options: Pema Pedroza, PT  10/28/2024

## 2024-11-04 ENCOUNTER — APPOINTMENT (OUTPATIENT)
Dept: PHYSICAL THERAPY | Facility: HOSPITAL | Age: 64
End: 2024-11-04
Payer: MEDICARE

## 2024-11-04 DIAGNOSIS — Z96.611 INSTABILITY OF REVERSE TOTAL ARTHROPLASTY OF RIGHT SHOULDER: ICD-10-CM

## 2024-11-04 DIAGNOSIS — Z96.611 STATUS POST REVERSE TOTAL REPLACEMENT OF RIGHT SHOULDER: ICD-10-CM

## 2024-11-04 DIAGNOSIS — T84.028A INSTABILITY OF REVERSE TOTAL ARTHROPLASTY OF RIGHT SHOULDER: ICD-10-CM

## 2024-11-04 RX ORDER — OXYCODONE AND ACETAMINOPHEN 5; 325 MG/1; MG/1
1 TABLET ORAL EVERY 8 HOURS PRN
Qty: 42 TABLET | Refills: 0 | Status: SHIPPED | OUTPATIENT
Start: 2024-11-04

## 2024-11-04 NOTE — TELEPHONE ENCOUNTER
Rx Refill Note  Requested Prescriptions     Pending Prescriptions Disp Refills    oxyCODONE-acetaminophen (PERCOCET) 5-325 MG per tablet 42 tablet 0     Sig: Take 1 tablet by mouth Every 8 (Eight) Hours As Needed for Moderate Pain or Severe Pain.      Last office visit with prescribing clinician: 10/16/2024      Next office visit with prescribing clinician: 11/25/2024   Last Filled: 10/21/2024    Encounter Diagnoses   Name Primary?    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     {TIP  Encounters:    {TIP  Please add Last Relevant Lab Date if appropriate:  {TIP  Is Refill Pharmacy correct?:

## 2024-11-05 ENCOUNTER — HOSPITAL ENCOUNTER (OUTPATIENT)
Dept: PHYSICAL THERAPY | Facility: HOSPITAL | Age: 64
Setting detail: THERAPIES SERIES
Discharge: HOME | End: 2024-11-05
Payer: MEDICARE

## 2024-11-05 DIAGNOSIS — Z96.611 STATUS POST REVERSE TOTAL SHOULDER REPLACEMENT, RIGHT: Primary | ICD-10-CM

## 2024-11-05 PROCEDURE — 97140 MANUAL THERAPY 1/> REGIONS: CPT

## 2024-11-05 PROCEDURE — 97110 THERAPEUTIC EXERCISES: CPT

## 2024-11-05 NOTE — THERAPY RE-EVALUATION
Outpatient Physical Therapy Ortho Re-Evaluation  DIANNE Bertrand     Patient Name: Sylwia Spears  : 1960  MRN: [id number]  Today's Date: 2024      Visit Date: 2024    Patient Active Problem List   Diagnosis    Type 2 diabetes mellitus without complication, without long-term current use of insulin    Benign essential HTN    Morbidly obese    Cervical spinal stenosis    Encounter for annual wellness visit (AWV) in Medicare patient    Personal history of colonic polyps    Family history of colon cancer    Hyperlipidemia LDL goal <70    Midline thoracic back pain    Erectile dysfunction due to diseases classified elsewhere    Personal history of nicotine dependence    Coronary artery calcification    Varicose veins of right lower extremity with other complications    DJD of shoulder    Rotator cuff arthropathy of right shoulder    Complete tear of right rotator cuff    Chronic right shoulder pain    Thrombocytopenia    Abnormal international normal ratio (INR)    Status post reverse total shoulder replacement, open biceps tenodesis, right, DOS 2023    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    Status post reverse total replacement of right shoulder    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    Hospital discharge follow-up        Past Medical History:   Diagnosis Date    Cervical disc disease     stenosis    Cirrhosis of liver     Colon polyp     Coronary artery calcification     Diabetes mellitus     Electrocution     Fell off a ladder and has chronic neck pain    Hyperlipidemia     Hypertension     Spleen enlarged     Thrombocytopenia         Past Surgical History:   Procedure Laterality Date    CHOLECYSTECTOMY      COLONOSCOPY      COLONOSCOPY N/A 2018    Procedure: COLONOSCOPY, polypectomy;  Surgeon: Raleigh Champagne MD;  Location: [facility];  Service: Gastroenterology    COLONOSCOPY W/ POLYPECTOMY      COLONOSCOPY W/ POLYPECTOMY  N/A 09/02/2021    Procedure: COLONOSCOPY; polypectomy;  Surgeon: Raleigh Champagne MD;  Location:  [facility];  Service: Gastroenterology;  Laterality: N/A;  polyps   diverticulosis    COLONOSCOPY W/ POLYPECTOMY N/A 9/11/2024    Procedure: COLONOSCOPY WITH POLYPECTOMY;  Surgeon: Raleigh Champagne MD;  Location:  [facility];  Service: Gastroenterology;  Laterality: N/A;  diverticulosis  ascending polyp x1 (cold snare)  transverse polyp x2 (cold snare x2)  descending polyp x1  sigmoid polyp x1    LIPOMA EXCISION      groin    SHOULDER SURGERY Left     TOTAL SHOULDER ARTHROPLASTY W/ DISTAL CLAVICLE EXCISION Right 12/1/2023    Procedure: TOTAL SHOULDER REVERSE ARTHROPLASTY;  Surgeon: David Ozuna MD;  Location:  [facility];  Service: Orthopedics;  Laterality: Right;    TOTAL SHOULDER REVISION Right 9/3/2024    Procedure: TOTAL SHOULDER REVISION;  Surgeon: David Ozuna MD;  Location:  [facility];  Service: Orthopedics;  Laterality: Right;       Visit Dx:     ICD-10-CM ICD-9-CM   1. Status post reverse total shoulder replacement, open biceps tenodesis, right, DOS 12/1/2023  Z96.611 V43.61              PT Ortho       Row Name 11/05/24 1200       Precautions and Contraindications    Precautions/Limitations no known precautions/limitations  -AS       Subjective Pain    Able to rate subjective pain? yes  -AS    Pre-Treatment Pain Level 1  -AS    Post-Treatment Pain Level 1  -AS       Posture/Observations    Posture- WNL Posture is WNL  -AS    Observations Incision healing  -AS       Right Upper Ext    Rt Shoulder Abduction PROM WFL  -AS    Rt Shoulder Flexion PROM WFL  -AS    Rt Shoulder External Rotation PROM WFL  -AS    Rt Shoulder Internal Rotation PROM WFL  -AS       MMT Right Upper Ext    Rt Shoulder Flexion MMT, Gross Movement (4-/5) good minus  -AS    Rt Shoulder ABduction MMT, Gross Movement (4-/5) good minus  -AS    Rt Shoulder Internal Rotation MMT, Gross Movement (4-/5) good minus  -AS     Rt Shoulder External Rotation MMT, Gross Movement (3+/5) fair plus  -AS       Sensation    Sensation WNL? WNL  -AS    Light Touch No apparent deficits  -AS              User Key  (r) = Recorded By, (t) = Taken By, (c) = Cosigned By      Initials Name Provider Type    AS Royer Pedroza, PT Physical Therapist                                       PT OP Goals       Row Name 11/05/24 1200          PT Short Term Goals    STG 1 Patient to demonstrate compliance with initial HEP for flexibility, ROM and strengthening.  -AS     STG 1 Progress Met  -AS     STG 2 Patient to report right shoulder pain on VAS of 4-5/10 at worst with activity.  -AS     STG 2 Progress Ongoing;Progressing  -AS     STG 3 Patient to demonstrate improved right shoulder strength to 4/5 in all planes.  -AS     STG 3 Progress Ongoing;Progressing  -AS     STG 4 Patient to demonstrate improved right shoulder PROM to within 10 degrees of contralateral shoulder.  -AS     STG 4 Progress Met  -AS        Long Term Goals    LTG 1 Patient to demonstrate compliance with advanced HEP for flexibility, ROM and strengthening.  -AS     LTG 1 Progress Ongoing;Progressing  -AS     LTG 2 Patient to report right shoulder pain on VAS of 0-1/10 at worst with activity.  -AS     LTG 2 Progress Ongoing;Progressing  -AS     LTG 3 Patient to demonstrate improved right shoulder strength to 4+/5 in all planes.  -AS     LTG 3 Progress Ongoing;Progressing  -AS     LTG 4 Patient to demonstrate improved right shoulder AROM to within 10 degrees of contralateral shoulder.  -AS     LTG 4 Progress Ongoing;Progressing  -AS     LTG 5 Patient to report improved function and decreased pain Quick DASH by >10-15 points.  -AS     LTG 5 Progress Ongoing;Progressing  -AS               User Key  (r) = Recorded By, (t) = Taken By, (c) = Cosigned By      Initials Name Provider Type    AS Royer Pedroza, PT Physical Therapist                     PT Assessment/Plan       Row Name 11/05/24  1200          PT Assessment    Functional Limitations Limitation in home management;Limitations in community activities;Performance in self-care ADL;Performance in leisure activities;Performance in sport activities;Performance in work activities  -AS     Impairments Impaired flexibility;Muscle strength;Pain;Range of motion  -AS     Assessment Comments Patient continues to do well with PT at this time. Patient with improved right shoulder ROM, strength, pain, and function. Plan to continue to progress patientr as tolerated.  -AS     Please refer to paper survey for additional self-reported information Yes  -AS     Rehab Potential Good  -AS     Patient/caregiver participated in establishment of treatment plan and goals Yes  -AS     Patient would benefit from skilled therapy intervention Yes  -AS        PT Plan    PT Frequency 2x/week  -AS     Predicted Duration of Therapy Intervention (PT) 4-8 weeks  -AS     Planned CPT's? PT THER PROC EA 15 MIN: 18115;PT RE-EVAL: 91979;PT THER ACT EA 15 MIN: 91820;PT MANUAL THERAPY EA 15 MIN: 68242;PT NEUROMUSC RE-EDUCATION EA 15 MIN: 85388  -AS     PT Plan Comments Continue with current treatment plan.  -AS               User Key  (r) = Recorded By, (t) = Taken By, (c) = Cosigned By      Initials Name Provider Type    AS Royer Pedroza, PT Physical Therapist                     Modalities       Row Name 11/05/24 1200             Moist Heat    MH Applied Yes  -AS      Location Right Shoulder - Sitting  -AS      PT Moist Heat Minutes 10  -AS      MH Prior to Rx Yes  -AS         Functional Testing    Outcome Measure Options Quick DASH  -AS                User Key  (r) = Recorded By, (t) = Taken By, (c) = Cosigned By      Initials Name Provider Type    AS Royer Pedroza, PT Physical Therapist                   OP Exercises       Row Name 11/05/24 1300 11/05/24 1200          Subjective    Subjective Comments -- Patient states he is doing well.  -AS        Subjective Pain     Able to rate subjective pain? -- yes  -AS     Pre-Treatment Pain Level -- 1  -AS     Post-Treatment Pain Level -- 1  -AS        Total Minutes    34974 - PT Therapeutic Exercise Minutes 25  -AS --     76079 - PT Manual Therapy Minutes 15  -AS --        Exercise 1    Exercise Name 1 -- 3-Way Cane  -AS     Reps 1 -- 15  -AS     Time 1 -- 5 sec hold each  -AS        Exercise 2    Exercise Name 2 -- Pulley's - Flex & ABD  -AS     Time 2 -- 4 min each  -AS        Exercise 3    Exercise Name 3 -- Supine Clasp Hands for Flexion  -AS     Reps 3 -- 15  -AS        Exercise 4    Exercise Name 4 -- S/L ER  -AS     Reps 4 -- 25  -AS     Time 4 -- 1#  -AS        Exercise 5    Exercise Name 5 -- Empty/Full Can  -AS     Reps 5 -- 25 each  -AS        Exercise 6    Exercise Name 6 -- Rows  -AS     Reps 6 -- 25  -AS     Time 6 -- Blue  -AS        Exercise 7    Exercise Name 7 -- Extensions  -AS     Reps 7 -- 25  -AS     Time 7 -- Blue  -AS        Exercise 8    Exercise Name 8 -- IR  -AS     Reps 8 -- 25  -AS     Time 8 -- Blue  -AS        Exercise 9    Exercise Name 9 -- ER  -AS     Reps 9 -- 25  -AS     Time 9 -- Green  -AS               User Key  (r) = Recorded By, (t) = Taken By, (c) = Cosigned By      Initials Name Provider Type    AS Royer Pedroza, PT Physical Therapist                  Manual Rx (Last 36 Hours)       Manual Treatments       Row Name 11/05/24 1300 11/05/24 1100          Total Minutes    08950 - PT Manual Therapy Minutes 15  -AS --        Manual Rx 1    Manual Rx 1 Location -- Right Shoulder  -AS     Manual Rx 1 Type -- PROM - Flex, ABD  -AS     Manual Rx 1 Duration -- 15 min  -AS               User Key  (r) = Recorded By, (t) = Taken By, (c) = Cosigned By      Initials Name Provider Type    AS Royer Pedroza, PT Physical Therapist                                Outcome Measure Options: Quick DASH         Time Calculation:     Start Time: 1203  Stop Time: 1256  Time Calculation (min): 53 min  Timed  Charges  64049 - PT Therapeutic Exercise Minutes: 25  18137 - PT Manual Therapy Minutes: 15  Untimed Charges  PT Moist Heat Minutes: 10  Total Minutes  Timed Charges Total Minutes: 40  Untimed Charges Total Minutes: 10   Total Minutes: 50     Therapy Charges for Today       Code Description Service Date Service Provider Modifiers Qty    23896864137 HC PT THER PROC EA 15 MIN 11/5/2024 Royer Pedroza, PT GP 1    43088650198 HC PT MANUAL THERAPY EA 15 MIN 11/5/2024 Royer Pedroza, PT GP 1            PT G-Codes  Outcome Measure Options: Quick DASH         Royer Pedroza, PT  11/5/2024

## 2024-11-07 ENCOUNTER — HOSPITAL ENCOUNTER (OUTPATIENT)
Dept: PHYSICAL THERAPY | Facility: HOSPITAL | Age: 64
Setting detail: THERAPIES SERIES
Discharge: HOME | End: 2024-11-07
Payer: MEDICARE

## 2024-11-07 DIAGNOSIS — Z96.611 STATUS POST REVERSE TOTAL SHOULDER REPLACEMENT, RIGHT: Primary | ICD-10-CM

## 2024-11-07 PROCEDURE — 97140 MANUAL THERAPY 1/> REGIONS: CPT

## 2024-11-07 PROCEDURE — 97110 THERAPEUTIC EXERCISES: CPT

## 2024-11-07 NOTE — THERAPY TREATMENT NOTE
Outpatient Physical Therapy Ortho Treatment Note  DIANNE Bertrand     Patient Name: Sylwia Spears  : 1960  MRN: [id number]  Today's Date: 2024      Visit Date: 2024    Visit Dx:    ICD-10-CM ICD-9-CM   1. Status post reverse total shoulder replacement, open biceps tenodesis, right, DOS 2023  Z96.611 V43.61       Patient Active Problem List   Diagnosis    Type 2 diabetes mellitus without complication, without long-term current use of insulin    Benign essential HTN    Morbidly obese    Cervical spinal stenosis    Encounter for annual wellness visit (AWV) in Medicare patient    Personal history of colonic polyps    Family history of colon cancer    Hyperlipidemia LDL goal <70    Midline thoracic back pain    Erectile dysfunction due to diseases classified elsewhere    Personal history of nicotine dependence    Coronary artery calcification    Varicose veins of right lower extremity with other complications    DJD of shoulder    Rotator cuff arthropathy of right shoulder    Complete tear of right rotator cuff    Chronic right shoulder pain    Thrombocytopenia    Abnormal international normal ratio (INR)    Status post reverse total shoulder replacement, open biceps tenodesis, right, DOS 2023    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    Status post reverse total replacement of right shoulder    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    Hospital discharge follow-up        Past Medical History:   Diagnosis Date    Cervical disc disease     stenosis    Cirrhosis of liver     Colon polyp     Coronary artery calcification     Diabetes mellitus     Electrocution     Fell off a ladder and has chronic neck pain    Hyperlipidemia     Hypertension     Spleen enlarged     Thrombocytopenia         Past Surgical History:   Procedure Laterality Date    CHOLECYSTECTOMY      COLONOSCOPY      COLONOSCOPY N/A 2018    Procedure: COLONOSCOPY, polypectomy;   Surgeon: Raleigh Champagne MD;  Location: [facility];  Service: Gastroenterology    COLONOSCOPY W/ POLYPECTOMY      COLONOSCOPY W/ POLYPECTOMY N/A 09/02/2021    Procedure: COLONOSCOPY; polypectomy;  Surgeon: Raleigh Champagne MD;  Location: [facility];  Service: Gastroenterology;  Laterality: N/A;  polyps   diverticulosis    COLONOSCOPY W/ POLYPECTOMY N/A 9/11/2024    Procedure: COLONOSCOPY WITH POLYPECTOMY;  Surgeon: Raleigh Champagne MD;  Location: [facility] OR;  Service: Gastroenterology;  Laterality: N/A;  diverticulosis  ascending polyp x1 (cold snare)  transverse polyp x2 (cold snare x2)  descending polyp x1  sigmoid polyp x1    LIPOMA EXCISION      groin    SHOULDER SURGERY Left     TOTAL SHOULDER ARTHROPLASTY W/ DISTAL CLAVICLE EXCISION Right 12/1/2023    Procedure: TOTAL SHOULDER REVERSE ARTHROPLASTY;  Surgeon: David Ozuna MD;  Location: [facility] OR;  Service: Orthopedics;  Laterality: Right;    TOTAL SHOULDER REVISION Right 9/3/2024    Procedure: TOTAL SHOULDER REVISION;  Surgeon: David Ozuna MD;  Location: [facility] OR;  Service: Orthopedics;  Laterality: Right;                        PT Assessment/Plan       Row Name 11/07/24 1200          PT Assessment    Assessment Comments Patient continues to do well with PT at this time. Progressed patient with strengthening and AROM exercises today and he tolerated this well. Plan to continue to progress patient as tolerated.  -AS        PT Plan    PT Plan Comments Continue with current treatment plan.  -AS               User Key  (r) = Recorded By, (t) = Taken By, (c) = Cosigned By      Initials Name Provider Type    AS Royer Pedroza, PT Physical Therapist                     Modalities       Row Name 11/07/24 1200             Moist Heat    MH Applied Yes  -AS      Location Right Shoulder - Sitting  -AS      PT Moist Heat Minutes 10  -AS      MH Prior to Rx Yes  -AS         Functional Testing    Outcome Measure Options  Quick DASH  -AS                User Key  (r) = Recorded By, (t) = Taken By, (c) = Cosigned By      Initials Name Provider Type    AS Royer Pedroza, PT Physical Therapist                   OP Exercises       Row Name 11/07/24 1241 11/07/24 1200          Subjective    Subjective Comments -- Patient states his shoulder is doing well and he has no complaints.  -AS        Total Minutes    71999 - PT Therapeutic Exercise Minutes 20  -AS --     03324 - PT Manual Therapy Minutes 10  -AS --        Exercise 1    Exercise Name 1 -- 3-Way Cane  -AS     Reps 1 -- 15  -AS     Time 1 -- 5 sec hold each  -AS        Exercise 2    Exercise Name 2 -- Pulley's - Flex & ABD  -AS     Time 2 -- 4 min each  -AS        Exercise 3    Exercise Name 3 -- Supine AROM for Flexion  -AS     Reps 3 -- 15  -AS        Exercise 4    Exercise Name 4 -- S/L ER  -AS     Reps 4 -- 30  -AS     Time 4 -- 1#  -AS        Exercise 5    Exercise Name 5 -- Empty/Full Can  -AS     Reps 5 -- 30 each  -AS     Time 5 -- 1#  -AS        Exercise 6    Exercise Name 6 -- Rows  -AS     Reps 6 -- 30  -AS     Time 6 -- Blue  -AS        Exercise 7    Exercise Name 7 -- Extensions  -AS     Reps 7 -- 30  -AS     Time 7 -- Blue  -AS        Exercise 8    Exercise Name 8 -- IR  -AS     Reps 8 -- 30  -AS     Time 8 -- Blue  -AS        Exercise 9    Exercise Name 9 -- ER  -AS     Reps 9 -- 30  -AS     Time 9 -- Green  -AS               User Key  (r) = Recorded By, (t) = Taken By, (c) = Cosigned By      Initials Name Provider Type    AS Royer Pedroza, PT Physical Therapist                             Manual Rx (Last 36 Hours)       Manual Treatments       Row Name 11/07/24 1241 11/07/24 1100          Total Minutes    42411 - PT Manual Therapy Minutes 10  -AS --        Manual Rx 1    Manual Rx 1 Location -- Right Shoulder  -AS     Manual Rx 1 Type -- PROM - Flex, ABD  -AS     Manual Rx 1 Duration -- 10 min  -AS               User Key  (r) = Recorded By, (t) = Taken  By, (c) = Cosigned By      Initials Name Provider Type    AS Royer Pedroza, PT Physical Therapist                             Outcome Measure Options: Quick DASH         Time Calculation:   Start Time: 1158  Stop Time: 1241  Time Calculation (min): 43 min  Timed Charges  04637 - PT Therapeutic Exercise Minutes: 20  85726 - PT Manual Therapy Minutes: 10  Untimed Charges  PT Moist Heat Minutes: 10  Total Minutes  Timed Charges Total Minutes: 30  Untimed Charges Total Minutes: 10   Total Minutes: 40  Therapy Charges for Today       Code Description Service Date Service Provider Modifiers Qty    56471890649 HC PT THER PROC EA 15 MIN 11/7/2024 Royer Pedroza, PT GP 1    68189653846 HC PT MANUAL THERAPY EA 15 MIN 11/7/2024 Royer Pedroza, PT GP 1            PT G-Codes  Outcome Measure Options: Pema Pedroza, PT  11/7/2024

## 2024-11-11 ENCOUNTER — HOSPITAL ENCOUNTER (OUTPATIENT)
Dept: PHYSICAL THERAPY | Facility: HOSPITAL | Age: 64
Setting detail: THERAPIES SERIES
Discharge: HOME | End: 2024-11-11
Payer: MEDICARE

## 2024-11-11 DIAGNOSIS — Z96.611 STATUS POST REVERSE TOTAL SHOULDER REPLACEMENT, RIGHT: Primary | ICD-10-CM

## 2024-11-11 PROCEDURE — 97110 THERAPEUTIC EXERCISES: CPT

## 2024-11-11 NOTE — THERAPY TREATMENT NOTE
Outpatient Physical Therapy Ortho Treatment Note  DIANNE Bertrand     Patient Name: Sylwia Spears  : 1960  MRN: [id number]  Today's Date: 2024      Visit Date: 2024    Visit Dx:    ICD-10-CM ICD-9-CM   1. Status post reverse total shoulder replacement, open biceps tenodesis, right, DOS 2023  Z96.611 V43.61       Patient Active Problem List   Diagnosis    Type 2 diabetes mellitus without complication, without long-term current use of insulin    Benign essential HTN    Morbidly obese    Cervical spinal stenosis    Encounter for annual wellness visit (AWV) in Medicare patient    Personal history of colonic polyps    Family history of colon cancer    Hyperlipidemia LDL goal <70    Midline thoracic back pain    Erectile dysfunction due to diseases classified elsewhere    Personal history of nicotine dependence    Coronary artery calcification    Varicose veins of right lower extremity with other complications    DJD of shoulder    Rotator cuff arthropathy of right shoulder    Complete tear of right rotator cuff    Chronic right shoulder pain    Thrombocytopenia    Abnormal international normal ratio (INR)    Status post reverse total shoulder replacement, open biceps tenodesis, right, DOS 2023    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    Status post reverse total replacement of right shoulder    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    Hospital discharge follow-up        Past Medical History:   Diagnosis Date    Cervical disc disease     stenosis    Cirrhosis of liver     Colon polyp     Coronary artery calcification     Diabetes mellitus     Electrocution     Fell off a ladder and has chronic neck pain    Hyperlipidemia     Hypertension     Spleen enlarged     Thrombocytopenia         Past Surgical History:   Procedure Laterality Date    CHOLECYSTECTOMY      COLONOSCOPY      COLONOSCOPY N/A 2018    Procedure: COLONOSCOPY, polypectomy;   Surgeon: Raleigh Champagne MD;  Location: [facility] OR;  Service: Gastroenterology    COLONOSCOPY W/ POLYPECTOMY      COLONOSCOPY W/ POLYPECTOMY N/A 09/02/2021    Procedure: COLONOSCOPY; polypectomy;  Surgeon: Raleigh Champagne MD;  Location:  [facility] OR;  Service: Gastroenterology;  Laterality: N/A;  polyps   diverticulosis    COLONOSCOPY W/ POLYPECTOMY N/A 9/11/2024    Procedure: COLONOSCOPY WITH POLYPECTOMY;  Surgeon: Raleigh Champagne MD;  Location: [facility] OR;  Service: Gastroenterology;  Laterality: N/A;  diverticulosis  ascending polyp x1 (cold snare)  transverse polyp x2 (cold snare x2)  descending polyp x1  sigmoid polyp x1    LIPOMA EXCISION      groin    SHOULDER SURGERY Left     TOTAL SHOULDER ARTHROPLASTY W/ DISTAL CLAVICLE EXCISION Right 12/1/2023    Procedure: TOTAL SHOULDER REVERSE ARTHROPLASTY;  Surgeon: David Ozuna MD;  Location: [facility] OR;  Service: Orthopedics;  Laterality: Right;    TOTAL SHOULDER REVISION Right 9/3/2024    Procedure: TOTAL SHOULDER REVISION;  Surgeon: David Ozuna MD;  Location: [facility] OR;  Service: Orthopedics;  Laterality: Right;                        PT Assessment/Plan       Row Name 11/11/24 1200          PT Assessment    Assessment Comments Patient continues to do well with PT at this time. Progressed patient with strengthening exercises today and he tolerated this well. Plan to continue to progress patient as tolerated.  -AS        PT Plan    PT Plan Comments Continue with current treatment plan.  -AS               User Key  (r) = Recorded By, (t) = Taken By, (c) = Cosigned By      Initials Name Provider Type    AS Royer Pedroza, PT Physical Therapist                     Modalities       Row Name 11/11/24 1200             Moist Heat    MH Applied Yes  -AS      Location Right Shoulder - Sitting  -AS      PT Moist Heat Minutes 10  -AS      MH Prior to Rx Yes  -AS         Functional Testing    Outcome Measure Options Quick DASH   -AS                User Key  (r) = Recorded By, (t) = Taken By, (c) = Cosigned By      Initials Name Provider Type    AS Royer Pedroza, PT Physical Therapist                   OP Exercises       Row Name 11/11/24 1247 11/11/24 1200          Subjective    Subjective Comments -- Patient states his shoulder is doing well.  -AS        Total Minutes    83172 - PT Therapeutic Exercise Minutes 25  -AS --     03867 - PT Manual Therapy Minutes 10  -AS --        Exercise 1    Exercise Name 1 -- 3-Way Cane  -AS     Reps 1 -- 15  -AS     Time 1 -- 5 sec hold each  -AS        Exercise 2    Exercise Name 2 -- Pulley's - Flex & ABD  -AS     Time 2 -- 4 min each  -AS        Exercise 3    Exercise Name 3 -- Supine AROM for Flexion  -AS     Reps 3 -- 15  -AS        Exercise 4    Exercise Name 4 -- S/L ER  -AS     Reps 4 -- 40  -AS     Time 4 -- 1#  -AS        Exercise 5    Exercise Name 5 -- Empty/Full Can  -AS     Reps 5 -- 40 each  -AS     Time 5 -- 1#  -AS        Exercise 6    Exercise Name 6 -- Rows  -AS     Reps 6 -- 40  -AS     Time 6 -- Blue  -AS        Exercise 7    Exercise Name 7 -- Extensions  -AS     Reps 7 -- 40  -AS     Time 7 -- Blue  -AS        Exercise 8    Exercise Name 8 -- IR  -AS     Reps 8 -- 40  -AS     Time 8 -- Blue  -AS        Exercise 9    Exercise Name 9 -- ER  -AS     Reps 9 -- 40  -AS     Time 9 -- Green  -AS               User Key  (r) = Recorded By, (t) = Taken By, (c) = Cosigned By      Initials Name Provider Type    AS Royer Pedroza, PT Physical Therapist                             Manual Rx (Last 36 Hours)       Manual Treatments       Row Name 11/11/24 1247 11/11/24 1100          Total Minutes    26921 - PT Manual Therapy Minutes 10  -AS --        Manual Rx 1    Manual Rx 1 Location -- Right Shoulder  -AS     Manual Rx 1 Type -- PROM - Flex, ABD  -AS     Manual Rx 1 Duration -- 10 min  -AS               User Key  (r) = Recorded By, (t) = Taken By, (c) = Cosigned By      Initials  Name Provider Type    AS Royer Pedroza, PT Physical Therapist                             Outcome Measure Options: Quick DASH         Time Calculation:   Start Time: 1200  Stop Time: 1247  Time Calculation (min): 47 min  Timed Charges  55641 - PT Therapeutic Exercise Minutes: 25  10344 - PT Manual Therapy Minutes: 10  Untimed Charges  PT Moist Heat Minutes: 10  Total Minutes  Timed Charges Total Minutes: 35  Untimed Charges Total Minutes: 10   Total Minutes: 45  Therapy Charges for Today       Code Description Service Date Service Provider Modifiers Qty    92882119830 HC PT THER PROC EA 15 MIN 11/11/2024 Royer Pedroza, PT GP 2            PT G-Codes  Outcome Measure Options: Pema Pedroza, PT  11/11/2024

## 2024-11-14 ENCOUNTER — HOSPITAL ENCOUNTER (OUTPATIENT)
Dept: PHYSICAL THERAPY | Facility: HOSPITAL | Age: 64
Setting detail: THERAPIES SERIES
Discharge: HOME | End: 2024-11-14
Payer: MEDICARE

## 2024-11-14 DIAGNOSIS — Z96.611 STATUS POST REVERSE TOTAL SHOULDER REPLACEMENT, RIGHT: Primary | ICD-10-CM

## 2024-11-14 PROCEDURE — 97110 THERAPEUTIC EXERCISES: CPT

## 2024-11-14 NOTE — THERAPY TREATMENT NOTE
Outpatient Physical Therapy Ortho Treatment Note  DIANNE Bertrand     Patient Name: Sylwia Spears  : 1960  MRN: [id number]  Today's Date: 2024      Visit Date: 2024    Visit Dx:    ICD-10-CM ICD-9-CM   1. Status post reverse total shoulder replacement, open biceps tenodesis, right, DOS 2023  Z96.611 V43.61       Patient Active Problem List   Diagnosis    Type 2 diabetes mellitus without complication, without long-term current use of insulin    Benign essential HTN    Morbidly obese    Cervical spinal stenosis    Encounter for annual wellness visit (AWV) in Medicare patient    Personal history of colonic polyps    Family history of colon cancer    Hyperlipidemia LDL goal <70    Midline thoracic back pain    Erectile dysfunction due to diseases classified elsewhere    Personal history of nicotine dependence    Coronary artery calcification    Varicose veins of right lower extremity with other complications    DJD of shoulder    Rotator cuff arthropathy of right shoulder    Complete tear of right rotator cuff    Chronic right shoulder pain    Thrombocytopenia    Abnormal international normal ratio (INR)    Status post reverse total shoulder replacement, open biceps tenodesis, right, DOS 2023    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    Status post reverse total replacement of right shoulder    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    Hospital discharge follow-up        Past Medical History:   Diagnosis Date    Cervical disc disease     stenosis    Cirrhosis of liver     Colon polyp     Coronary artery calcification     Diabetes mellitus     Electrocution     Fell off a ladder and has chronic neck pain    Hyperlipidemia     Hypertension     Spleen enlarged     Thrombocytopenia         Past Surgical History:   Procedure Laterality Date    CHOLECYSTECTOMY      COLONOSCOPY      COLONOSCOPY N/A 2018    Procedure: COLONOSCOPY, polypectomy;   Surgeon: Raleigh Champagne MD;  Location: [facility] OR;  Service: Gastroenterology    COLONOSCOPY W/ POLYPECTOMY      COLONOSCOPY W/ POLYPECTOMY N/A 09/02/2021    Procedure: COLONOSCOPY; polypectomy;  Surgeon: Raleigh Champagne MD;  Location:  [facility] OR;  Service: Gastroenterology;  Laterality: N/A;  polyps   diverticulosis    COLONOSCOPY W/ POLYPECTOMY N/A 9/11/2024    Procedure: COLONOSCOPY WITH POLYPECTOMY;  Surgeon: Raleigh Champagne MD;  Location: [facility] OR;  Service: Gastroenterology;  Laterality: N/A;  diverticulosis  ascending polyp x1 (cold snare)  transverse polyp x2 (cold snare x2)  descending polyp x1  sigmoid polyp x1    LIPOMA EXCISION      groin    SHOULDER SURGERY Left     TOTAL SHOULDER ARTHROPLASTY W/ DISTAL CLAVICLE EXCISION Right 12/1/2023    Procedure: TOTAL SHOULDER REVERSE ARTHROPLASTY;  Surgeon: David Ozuna MD;  Location: [facility] OR;  Service: Orthopedics;  Laterality: Right;    TOTAL SHOULDER REVISION Right 9/3/2024    Procedure: TOTAL SHOULDER REVISION;  Surgeon: David Ozuna MD;  Location: [facility] OR;  Service: Orthopedics;  Laterality: Right;                        PT Assessment/Plan       Row Name 11/14/24 1200          PT Assessment    Assessment Comments Patient continues to do well with PT at this time. Progressed patient with strengthening exercises today and he tolerated this well. Plan to continue to progress patient as tolerated.  -AS        PT Plan    PT Plan Comments Continue with current treatment plan.  -AS               User Key  (r) = Recorded By, (t) = Taken By, (c) = Cosigned By      Initials Name Provider Type    AS Royer Pedroza, PT Physical Therapist                     Modalities       Row Name 11/14/24 1200             Moist Heat    MH Applied Yes  -AS      Location Right Shoulder - Sitting  -AS      PT Moist Heat Minutes 10  -AS      MH Prior to Rx Yes  -AS         Functional Testing    Outcome Measure Options Quick DASH   -AS                User Key  (r) = Recorded By, (t) = Taken By, (c) = Cosigned By      Initials Name Provider Type    AS Royer Pedroza, PT Physical Therapist                   OP Exercises       Row Name 11/14/24 1237 11/14/24 1200          Subjective    Subjective Comments -- Patient states his shoulder is doing well and continues to slowly improve.  -AS        Total Minutes    10662 - PT Therapeutic Exercise Minutes 25  -AS --     10712 - PT Manual Therapy Minutes 10  -AS --        Exercise 1    Exercise Name 1 -- 3-Way Cane  -AS     Reps 1 -- 15  -AS     Time 1 -- 5 sec hold each  -AS        Exercise 2    Exercise Name 2 -- Pulley's - Flex & ABD  -AS     Time 2 -- 4 min each  -AS        Exercise 3    Exercise Name 3 -- Supine AROM for Flexion  -AS     Reps 3 -- 15  -AS        Exercise 4    Exercise Name 4 -- S/L ER  -AS     Reps 4 -- 25  -AS     Time 4 -- 2#  -AS        Exercise 5    Exercise Name 5 -- Empty/Full Can  -AS     Reps 5 -- 25 each  -AS     Time 5 -- 2#  -AS        Exercise 6    Exercise Name 6 -- Rows  -AS     Reps 6 -- 25  -AS     Time 6 -- Black  -AS        Exercise 7    Exercise Name 7 -- Extensions  -AS     Reps 7 -- 25  -AS     Time 7 -- Black  -AS        Exercise 8    Exercise Name 8 -- IR  -AS     Reps 8 -- 25  -AS     Time 8 -- Black  -AS        Exercise 9    Exercise Name 9 -- ER  -AS     Reps 9 -- 25  -AS     Time 9 -- Blue  -AS               User Key  (r) = Recorded By, (t) = Taken By, (c) = Cosigned By      Initials Name Provider Type    AS Royer Pedroza, PT Physical Therapist                             Manual Rx (Last 36 Hours)       Manual Treatments       Row Name 11/14/24 1237 11/14/24 1100          Total Minutes    87797 - PT Manual Therapy Minutes 10  -AS --        Manual Rx 1    Manual Rx 1 Location -- Right Shoulder  -AS     Manual Rx 1 Type -- PROM - Flex, ABD  -AS     Manual Rx 1 Duration -- 10 min  -AS               User Key  (r) = Recorded By, (t) = Taken By,  (c) = Cosigned By      Initials Name Provider Type    AS Royer Pedroza, PT Physical Therapist                             Outcome Measure Options: Quick DASH         Time Calculation:   Start Time: 1151  Stop Time: 1237  Time Calculation (min): 46 min  Timed Charges  69454 - PT Therapeutic Exercise Minutes: 25  55955 - PT Manual Therapy Minutes: 10  Untimed Charges  PT Moist Heat Minutes: 10  Total Minutes  Timed Charges Total Minutes: 35  Untimed Charges Total Minutes: 10   Total Minutes: 45  Therapy Charges for Today       Code Description Service Date Service Provider Modifiers Qty    92303863312 HC PT THER PROC EA 15 MIN 11/14/2024 Royer Pedroza, PT GP 2            PT G-Codes  Outcome Measure Options: Pema Pedroza, PT  11/14/2024

## 2024-11-18 ENCOUNTER — HOSPITAL ENCOUNTER (OUTPATIENT)
Dept: PHYSICAL THERAPY | Facility: HOSPITAL | Age: 64
Setting detail: THERAPIES SERIES
Discharge: HOME | End: 2024-11-18
Payer: MEDICARE

## 2024-11-18 DIAGNOSIS — Z96.611 STATUS POST REVERSE TOTAL REPLACEMENT OF RIGHT SHOULDER: ICD-10-CM

## 2024-11-18 DIAGNOSIS — Z96.611 STATUS POST REVERSE TOTAL SHOULDER REPLACEMENT, RIGHT: Primary | ICD-10-CM

## 2024-11-18 DIAGNOSIS — T84.028A INSTABILITY OF REVERSE TOTAL ARTHROPLASTY OF RIGHT SHOULDER: ICD-10-CM

## 2024-11-18 DIAGNOSIS — Z96.611 INSTABILITY OF REVERSE TOTAL ARTHROPLASTY OF RIGHT SHOULDER: ICD-10-CM

## 2024-11-18 PROCEDURE — 97110 THERAPEUTIC EXERCISES: CPT

## 2024-11-18 RX ORDER — OXYCODONE AND ACETAMINOPHEN 5; 325 MG/1; MG/1
1 TABLET ORAL EVERY 8 HOURS PRN
Qty: 42 TABLET | Refills: 0 | Status: SHIPPED | OUTPATIENT
Start: 2024-11-18

## 2024-11-18 NOTE — THERAPY TREATMENT NOTE
Outpatient Physical Therapy Ortho Treatment Note  DIANNE Bertrand     Patient Name: Sylwia Spears  : 1960  MRN: [id number]  Today's Date: 2024      Visit Date: 2024    Visit Dx:    ICD-10-CM ICD-9-CM   1. Status post reverse total shoulder replacement, open biceps tenodesis, right, DOS 2023  Z96.611 V43.61       Patient Active Problem List   Diagnosis    Type 2 diabetes mellitus without complication, without long-term current use of insulin    Benign essential HTN    Morbidly obese    Cervical spinal stenosis    Encounter for annual wellness visit (AWV) in Medicare patient    Personal history of colonic polyps    Family history of colon cancer    Hyperlipidemia LDL goal <70    Midline thoracic back pain    Erectile dysfunction due to diseases classified elsewhere    Personal history of nicotine dependence    Coronary artery calcification    Varicose veins of right lower extremity with other complications    DJD of shoulder    Rotator cuff arthropathy of right shoulder    Complete tear of right rotator cuff    Chronic right shoulder pain    Thrombocytopenia    Abnormal international normal ratio (INR)    Status post reverse total shoulder replacement, open biceps tenodesis, right, DOS 2023    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    Status post reverse total replacement of right shoulder    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    Hospital discharge follow-up        Past Medical History:   Diagnosis Date    Cervical disc disease     stenosis    Cirrhosis of liver     Colon polyp     Coronary artery calcification     Diabetes mellitus     Electrocution     Fell off a ladder and has chronic neck pain    Hyperlipidemia     Hypertension     Spleen enlarged     Thrombocytopenia         Past Surgical History:   Procedure Laterality Date    CHOLECYSTECTOMY      COLONOSCOPY      COLONOSCOPY N/A 2018    Procedure: COLONOSCOPY, polypectomy;   Surgeon: Raleigh Champagne MD;  Location: [facility] OR;  Service: Gastroenterology    COLONOSCOPY W/ POLYPECTOMY      COLONOSCOPY W/ POLYPECTOMY N/A 09/02/2021    Procedure: COLONOSCOPY; polypectomy;  Surgeon: Raleigh Champagne MD;  Location:  [facility] OR;  Service: Gastroenterology;  Laterality: N/A;  polyps   diverticulosis    COLONOSCOPY W/ POLYPECTOMY N/A 9/11/2024    Procedure: COLONOSCOPY WITH POLYPECTOMY;  Surgeon: Raleigh Champagne MD;  Location: [facility] OR;  Service: Gastroenterology;  Laterality: N/A;  diverticulosis  ascending polyp x1 (cold snare)  transverse polyp x2 (cold snare x2)  descending polyp x1  sigmoid polyp x1    LIPOMA EXCISION      groin    SHOULDER SURGERY Left     TOTAL SHOULDER ARTHROPLASTY W/ DISTAL CLAVICLE EXCISION Right 12/1/2023    Procedure: TOTAL SHOULDER REVERSE ARTHROPLASTY;  Surgeon: David Ozuna MD;  Location: [facility] OR;  Service: Orthopedics;  Laterality: Right;    TOTAL SHOULDER REVISION Right 9/3/2024    Procedure: TOTAL SHOULDER REVISION;  Surgeon: David Ozuna MD;  Location: [facility] OR;  Service: Orthopedics;  Laterality: Right;                        PT Assessment/Plan       Row Name 11/18/24 1200          PT Assessment    Assessment Comments Patient continues to do well with PT at this time. Progressed patient with strengthening exercises today and he tolerated this well. Plan to continue to progress patient as tolerated.  -AS        PT Plan    PT Plan Comments Continue with current treatment plan.  -AS               User Key  (r) = Recorded By, (t) = Taken By, (c) = Cosigned By      Initials Name Provider Type    AS Royer Pedroza, PT Physical Therapist                     Modalities       Row Name 11/18/24 1200             Moist Heat    MH Applied Yes  -AS      Location Right Shoulder - Sitting  -AS      PT Moist Heat Minutes 10  -AS      MH Prior to Rx Yes  -AS         Functional Testing    Outcome Measure Options Quick DASH  " -AS                User Key  (r) = Recorded By, (t) = Taken By, (c) = Cosigned By      Initials Name Provider Type    AS Royer Pedroza, PT Physical Therapist                   OP Exercises       Row Name 11/18/24 1233 11/18/24 1200          Subjective    Subjective Comments -- Patient states his shoulder is \"doing ok.\" He reports he still has issues \"with certain movements.\"  -AS        Total Minutes    74430 - PT Therapeutic Exercise Minutes 25  -AS --     01931 - PT Manual Therapy Minutes 10  -AS --        Exercise 1    Exercise Name 1 -- 3-Way Cane  -AS     Reps 1 -- 15  -AS     Time 1 -- 5 sec hold each  -AS        Exercise 2    Exercise Name 2 -- Pulley's - Flex & ABD  -AS     Time 2 -- 4 min each  -AS        Exercise 3    Exercise Name 3 -- Supine AROM for Flexion  -AS     Reps 3 -- 15  -AS        Exercise 4    Exercise Name 4 -- S/L ER  -AS     Reps 4 -- 25  -AS     Time 4 -- 2#  -AS        Exercise 5    Exercise Name 5 -- Empty/Full Can  -AS     Reps 5 -- 25 each  -AS     Time 5 -- 2#  -AS        Exercise 6    Exercise Name 6 -- Rows  -AS     Reps 6 -- 25  -AS     Time 6 -- Black  -AS        Exercise 7    Exercise Name 7 -- Extensions  -AS     Reps 7 -- 25  -AS     Time 7 -- Black  -AS        Exercise 8    Exercise Name 8 -- IR  -AS     Reps 8 -- 25  -AS     Time 8 -- Black  -AS        Exercise 9    Exercise Name 9 -- ER  -AS     Reps 9 -- 25  -AS     Time 9 -- Blue  -AS               User Key  (r) = Recorded By, (t) = Taken By, (c) = Cosigned By      Initials Name Provider Type    AS Royer Pedroza, PT Physical Therapist                             Manual Rx (Last 36 Hours)       Manual Treatments       Row Name 11/18/24 1233 11/18/24 1100          Total Minutes    72642 - PT Manual Therapy Minutes 10  -AS --        Manual Rx 1    Manual Rx 1 Location -- Right Shoulder  -AS     Manual Rx 1 Type -- PROM - Flex, ABD  -AS     Manual Rx 1 Duration -- 10 min  -AS               User Key  (r) = " Recorded By, (t) = Taken By, (c) = Cosigned By      Initials Name Provider Type    AS Royer Pedroza, PT Physical Therapist                             Outcome Measure Options: Quick DASH         Time Calculation:   Start Time: 1147  Stop Time: 1233  Time Calculation (min): 46 min  Timed Charges  15925 - PT Therapeutic Exercise Minutes: 25  42075 - PT Manual Therapy Minutes: 10  Untimed Charges  PT Moist Heat Minutes: 10  Total Minutes  Timed Charges Total Minutes: 35  Untimed Charges Total Minutes: 10   Total Minutes: 45  Therapy Charges for Today       Code Description Service Date Service Provider Modifiers Qty    82754033322 HC PT THER PROC EA 15 MIN 11/18/2024 Royer Pedroza, PT GP 2            PT G-Codes  Outcome Measure Options: Pema Pedroza, PT  11/18/2024

## 2024-11-18 NOTE — TELEPHONE ENCOUNTER
Rx Refill Note  Requested Prescriptions      No prescriptions requested or ordered in this encounter      Last office visit with prescribing clinician: 10/16/2024      Next office visit with prescribing clinician: 11/25/2024   Last Filled:11-04-24    No diagnosis found. Status post reverse total replacement of right shoulder   Date of Surgery:09-03-24      Khushboo Jim MA  11/18/24, 08:52 EST    Previous RX pended for your approval, change or denial.     {TIP  Encounters:    {TIP  Please add Last Relevant Lab Date if appropriate:  {TIP  Is Refill Pharmacy correct?:

## 2024-11-20 ENCOUNTER — HOSPITAL ENCOUNTER (OUTPATIENT)
Dept: PHYSICAL THERAPY | Facility: HOSPITAL | Age: 64
Setting detail: THERAPIES SERIES
Discharge: HOME | End: 2024-11-20
Payer: MEDICARE

## 2024-11-20 DIAGNOSIS — Z96.611 STATUS POST REVERSE TOTAL SHOULDER REPLACEMENT, RIGHT: Primary | ICD-10-CM

## 2024-11-20 PROCEDURE — 97110 THERAPEUTIC EXERCISES: CPT

## 2024-11-20 NOTE — THERAPY TREATMENT NOTE
Outpatient Physical Therapy Ortho Treatment Note  DIANNE Bertrand     Patient Name: Sylwia Spears  : 1960  MRN: [id number]  Today's Date: 2024      Visit Date: 2024    Visit Dx:    ICD-10-CM ICD-9-CM   1. Status post reverse total shoulder replacement, open biceps tenodesis, right, DOS 2023  Z96.611 V43.61       Patient Active Problem List   Diagnosis    Type 2 diabetes mellitus without complication, without long-term current use of insulin    Benign essential HTN    Morbidly obese    Cervical spinal stenosis    Encounter for annual wellness visit (AWV) in Medicare patient    Personal history of colonic polyps    Family history of colon cancer    Hyperlipidemia LDL goal <70    Midline thoracic back pain    Erectile dysfunction due to diseases classified elsewhere    Personal history of nicotine dependence    Coronary artery calcification    Varicose veins of right lower extremity with other complications    DJD of shoulder    Rotator cuff arthropathy of right shoulder    Complete tear of right rotator cuff    Chronic right shoulder pain    Thrombocytopenia    Abnormal international normal ratio (INR)    Status post reverse total shoulder replacement, open biceps tenodesis, right, DOS 2023    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    Status post reverse total replacement of right shoulder    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    Hospital discharge follow-up        Past Medical History:   Diagnosis Date    Cervical disc disease     stenosis    Cirrhosis of liver     Colon polyp     Coronary artery calcification     Diabetes mellitus     Electrocution     Fell off a ladder and has chronic neck pain    Hyperlipidemia     Hypertension     Spleen enlarged     Thrombocytopenia         Past Surgical History:   Procedure Laterality Date    CHOLECYSTECTOMY      COLONOSCOPY      COLONOSCOPY N/A 2018    Procedure: COLONOSCOPY, polypectomy;   Surgeon: aRleigh Champagne MD;  Location: [facility] OR;  Service: Gastroenterology    COLONOSCOPY W/ POLYPECTOMY      COLONOSCOPY W/ POLYPECTOMY N/A 09/02/2021    Procedure: COLONOSCOPY; polypectomy;  Surgeon: Raleigh Champagne MD;  Location:  [facility] OR;  Service: Gastroenterology;  Laterality: N/A;  polyps   diverticulosis    COLONOSCOPY W/ POLYPECTOMY N/A 9/11/2024    Procedure: COLONOSCOPY WITH POLYPECTOMY;  Surgeon: Raleigh Champagne MD;  Location: [facility] OR;  Service: Gastroenterology;  Laterality: N/A;  diverticulosis  ascending polyp x1 (cold snare)  transverse polyp x2 (cold snare x2)  descending polyp x1  sigmoid polyp x1    LIPOMA EXCISION      groin    SHOULDER SURGERY Left     TOTAL SHOULDER ARTHROPLASTY W/ DISTAL CLAVICLE EXCISION Right 12/1/2023    Procedure: TOTAL SHOULDER REVERSE ARTHROPLASTY;  Surgeon: David Ozuna MD;  Location: [facility] OR;  Service: Orthopedics;  Laterality: Right;    TOTAL SHOULDER REVISION Right 9/3/2024    Procedure: TOTAL SHOULDER REVISION;  Surgeon: David Ozuna MD;  Location: [facility] OR;  Service: Orthopedics;  Laterality: Right;                        PT Assessment/Plan       Row Name 11/20/24 1200          PT Assessment    Assessment Comments Patient continues to do well with PT at this time. Progressed patient with strengthening exercises today and he tolerated this well. Plan to continue to progress patient as tolerated.  -AS        PT Plan    PT Plan Comments Continue with current treatment plan.  -AS               User Key  (r) = Recorded By, (t) = Taken By, (c) = Cosigned By      Initials Name Provider Type    AS Royer Pedroza, PT Physical Therapist                     Modalities       Row Name 11/20/24 1200             Moist Heat    MH Applied Yes  -AS      Location Right Shoulder - Sitting  -AS      PT Moist Heat Minutes 10  -AS      MH Prior to Rx Yes  -AS         Functional Testing    Outcome Measure Options Quick DASH  " -AS                User Key  (r) = Recorded By, (t) = Taken By, (c) = Cosigned By      Initials Name Provider Type    AS Royer Pedroza, PT Physical Therapist                   OP Exercises       Row Name 11/20/24 1242 11/20/24 1200          Subjective    Subjective Comments -- Patient states his shoulder is \"ok\".  -AS        Total Minutes    13923 - PT Therapeutic Exercise Minutes 30  -AS --        Exercise 1    Exercise Name 1 -- 3-Way Cane  -AS     Reps 1 -- 15  -AS     Time 1 -- 5 sec hold each  -AS        Exercise 2    Exercise Name 2 -- Pulley's - Flex & ABD  -AS     Time 2 -- 4 min each  -AS        Exercise 3    Exercise Name 3 -- Supine AROM for Flexion  -AS     Reps 3 -- 15  -AS        Exercise 4    Exercise Name 4 -- S/L ER  -AS     Reps 4 -- 30  -AS     Time 4 -- 2#  -AS        Exercise 5    Exercise Name 5 -- Empty/Full Can  -AS     Reps 5 -- 30 each  -AS     Time 5 -- 2#  -AS        Exercise 6    Exercise Name 6 -- Rows  -AS     Reps 6 -- 30  -AS     Time 6 -- Black  -AS        Exercise 7    Exercise Name 7 -- Extensions  -AS     Reps 7 -- 30  -AS     Time 7 -- Black  -AS        Exercise 8    Exercise Name 8 -- IR  -AS     Reps 8 -- 30  -AS     Time 8 -- Black  -AS        Exercise 9    Exercise Name 9 -- ER  -AS     Reps 9 -- 30  -AS     Time 9 -- Blue  -AS               User Key  (r) = Recorded By, (t) = Taken By, (c) = Cosigned By      Initials Name Provider Type    AS Royer Pedroza, PT Physical Therapist                             Manual Rx (Last 36 Hours)       Manual Treatments       Row Name 11/20/24 1100             Manual Rx 1    Manual Rx 1 Location Right Shoulder  -AS      Manual Rx 1 Type PROM - Flex, ABD  -AS      Manual Rx 1 Duration 10 min  -AS                User Key  (r) = Recorded By, (t) = Taken By, (c) = Cosigned By      Initials Name Provider Type    AS Royer Pedroza, PT Physical Therapist                             Outcome Measure Options: Quick DASH   "       Time Calculation:   Start Time: 1200  Stop Time: 1242  Time Calculation (min): 42 min  Timed Charges  82529 - PT Therapeutic Exercise Minutes: 30  Untimed Charges  PT Moist Heat Minutes: 10  Total Minutes  Timed Charges Total Minutes: 30  Untimed Charges Total Minutes: 10   Total Minutes: 40  Therapy Charges for Today       Code Description Service Date Service Provider Modifiers Qty    70777577607 HC PT THER PROC EA 15 MIN 11/20/2024 Royer Pedroza, PT GP 2            PT G-Codes  Outcome Measure Options: Quick DASH         Royer Pedroza, PT  11/20/2024

## 2024-11-21 ENCOUNTER — APPOINTMENT (OUTPATIENT)
Dept: PHYSICAL THERAPY | Facility: HOSPITAL | Age: 64
End: 2024-11-21
Payer: MEDICARE

## 2024-11-25 ENCOUNTER — HOSPITAL ENCOUNTER (OUTPATIENT)
Dept: PHYSICAL THERAPY | Facility: HOSPITAL | Age: 64
Setting detail: THERAPIES SERIES
Discharge: HOME | End: 2024-11-25
Payer: MEDICARE

## 2024-11-25 DIAGNOSIS — Z96.611 STATUS POST REVERSE TOTAL SHOULDER REPLACEMENT, RIGHT: Primary | ICD-10-CM

## 2024-11-25 DIAGNOSIS — I10 BENIGN ESSENTIAL HTN: ICD-10-CM

## 2024-11-25 PROCEDURE — 97110 THERAPEUTIC EXERCISES: CPT

## 2024-11-25 RX ORDER — LISINOPRIL 20 MG/1
20 TABLET ORAL DAILY
Qty: 90 TABLET | Refills: 1 | Status: SHIPPED | OUTPATIENT
Start: 2024-11-25

## 2024-11-25 NOTE — THERAPY TREATMENT NOTE
Outpatient Physical Therapy Ortho Treatment Note  DIANNE Bertrand     Patient Name: Sylwia Spears  : 1960  MRN: [id number]  Today's Date: 2024      Visit Date: 2024    Visit Dx:    ICD-10-CM ICD-9-CM   1. Status post reverse total shoulder replacement, open biceps tenodesis, right, DOS 2023  Z96.611 V43.61       Patient Active Problem List   Diagnosis    Type 2 diabetes mellitus without complication, without long-term current use of insulin    Benign essential HTN    Morbidly obese    Cervical spinal stenosis    Encounter for annual wellness visit (AWV) in Medicare patient    Personal history of colonic polyps    Family history of colon cancer    Hyperlipidemia LDL goal <70    Midline thoracic back pain    Erectile dysfunction due to diseases classified elsewhere    Personal history of nicotine dependence    Coronary artery calcification    Varicose veins of right lower extremity with other complications    DJD of shoulder    Rotator cuff arthropathy of right shoulder    Complete tear of right rotator cuff    Chronic right shoulder pain    Thrombocytopenia    Abnormal international normal ratio (INR)    Status post reverse total shoulder replacement, open biceps tenodesis, right, DOS 2023    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    Status post reverse total replacement of right shoulder    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    Hospital discharge follow-up        Past Medical History:   Diagnosis Date    Cervical disc disease     stenosis    Cirrhosis of liver     Colon polyp     Coronary artery calcification     Diabetes mellitus     Electrocution     Fell off a ladder and has chronic neck pain    Hyperlipidemia     Hypertension     Spleen enlarged     Thrombocytopenia         Past Surgical History:   Procedure Laterality Date    CHOLECYSTECTOMY      COLONOSCOPY      COLONOSCOPY N/A 2018    Procedure: COLONOSCOPY, polypectomy;   Surgeon: Raleigh Champagne MD;  Location: [facility] OR;  Service: Gastroenterology    COLONOSCOPY W/ POLYPECTOMY      COLONOSCOPY W/ POLYPECTOMY N/A 09/02/2021    Procedure: COLONOSCOPY; polypectomy;  Surgeon: Raleigh Champagne MD;  Location:  [facility] OR;  Service: Gastroenterology;  Laterality: N/A;  polyps   diverticulosis    COLONOSCOPY W/ POLYPECTOMY N/A 9/11/2024    Procedure: COLONOSCOPY WITH POLYPECTOMY;  Surgeon: Raleigh Champagne MD;  Location: [facility] OR;  Service: Gastroenterology;  Laterality: N/A;  diverticulosis  ascending polyp x1 (cold snare)  transverse polyp x2 (cold snare x2)  descending polyp x1  sigmoid polyp x1    LIPOMA EXCISION      groin    SHOULDER SURGERY Left     TOTAL SHOULDER ARTHROPLASTY W/ DISTAL CLAVICLE EXCISION Right 12/1/2023    Procedure: TOTAL SHOULDER REVERSE ARTHROPLASTY;  Surgeon: David Ozuna MD;  Location: [facility] OR;  Service: Orthopedics;  Laterality: Right;    TOTAL SHOULDER REVISION Right 9/3/2024    Procedure: TOTAL SHOULDER REVISION;  Surgeon: David Ozuna MD;  Location: [facility] OR;  Service: Orthopedics;  Laterality: Right;                        PT Assessment/Plan       Row Name 11/25/24 1200          PT Assessment    Assessment Comments Patient continues to do well with PT at this time. Progressed patient with strengthening exercises today and he tolerated this well. Plan to continue to progress patient as tolerated.  -AS        PT Plan    PT Plan Comments Continue with current treatment plan.  -AS               User Key  (r) = Recorded By, (t) = Taken By, (c) = Cosigned By      Initials Name Provider Type    AS Royer Pedroza, PT Physical Therapist                     Modalities       Row Name 11/25/24 1200             Moist Heat    MH Applied Yes  -AS      Location Right Shoulder - Sitting  -AS      PT Moist Heat Minutes 10  -AS      MH Prior to Rx Yes  -AS         Functional Testing    Outcome Measure Options Quick DASH   -AS                User Key  (r) = Recorded By, (t) = Taken By, (c) = Cosigned By      Initials Name Provider Type    AS Royer Pedroza, PT Physical Therapist                   OP Exercises       Row Name 11/25/24 1240 11/25/24 1200          Subjective    Subjective Comments -- Patient states he is doing well today.  -AS        Total Minutes    32444 - PT Therapeutic Exercise Minutes 30  -AS --        Exercise 1    Exercise Name 1 -- 3-Way Cane  -AS     Reps 1 -- 15  -AS     Time 1 -- 5 sec hold each  -AS        Exercise 2    Exercise Name 2 -- Pulley's - Flex & ABD  -AS     Time 2 -- 4 min each  -AS        Exercise 3    Exercise Name 3 -- Supine AROM for Flexion  -AS     Reps 3 -- 15  -AS        Exercise 4    Exercise Name 4 -- S/L ER  -AS     Reps 4 -- 30  -AS     Time 4 -- 2#  -AS        Exercise 5    Exercise Name 5 -- Empty/Full Can  -AS     Reps 5 -- 30 each  -AS     Time 5 -- 2#  -AS        Exercise 6    Exercise Name 6 -- Rows  -AS     Reps 6 -- 30  -AS     Time 6 -- Black  -AS        Exercise 7    Exercise Name 7 -- Extensions  -AS     Reps 7 -- 30  -AS     Time 7 -- Black  -AS        Exercise 8    Exercise Name 8 -- IR  -AS     Reps 8 -- 30  -AS     Time 8 -- Black  -AS        Exercise 9    Exercise Name 9 -- ER  -AS     Reps 9 -- 30  -AS     Time 9 -- Blue  -AS               User Key  (r) = Recorded By, (t) = Taken By, (c) = Cosigned By      Initials Name Provider Type    AS Royer Pedroza, PT Physical Therapist                             Manual Rx (Last 36 Hours)       Manual Treatments       Row Name 11/25/24 1100             Manual Rx 1    Manual Rx 1 Location Right Shoulder  -AS      Manual Rx 1 Type PROM - Flex, ABD  -AS      Manual Rx 1 Duration 10 min  -AS                User Key  (r) = Recorded By, (t) = Taken By, (c) = Cosigned By      Initials Name Provider Type    AS Royer Pedroza, PT Physical Therapist                             Outcome Measure Options: Quick DASH          Time Calculation:   Start Time: 1200  Stop Time: 1240  Time Calculation (min): 40 min  Timed Charges  75629 - PT Therapeutic Exercise Minutes: 30  Untimed Charges  PT Moist Heat Minutes: 10  Total Minutes  Timed Charges Total Minutes: 30  Untimed Charges Total Minutes: 10   Total Minutes: 40  Therapy Charges for Today       Code Description Service Date Service Provider Modifiers Qty    51608899710 HC PT THER PROC EA 15 MIN 11/25/2024 Royer Pedroza, PT GP 2            PT G-Codes  Outcome Measure Options: Quick DASH         Royer Pedroza, PT  11/25/2024

## 2024-11-27 ENCOUNTER — HOSPITAL ENCOUNTER (OUTPATIENT)
Dept: PHYSICAL THERAPY | Facility: HOSPITAL | Age: 64
Setting detail: THERAPIES SERIES
Discharge: HOME | End: 2024-11-27
Payer: MEDICARE

## 2024-11-27 ENCOUNTER — OFFICE VISIT (OUTPATIENT)
Dept: ORTHOPEDIC SURGERY | Facility: CLINIC | Age: 64
End: 2024-11-27
Payer: MEDICARE

## 2024-11-27 VITALS — BODY MASS INDEX: 28.76 KG/M2 | HEIGHT: 74 IN

## 2024-11-27 DIAGNOSIS — Z96.611 STATUS POST REVERSE TOTAL REPLACEMENT OF RIGHT SHOULDER: Primary | ICD-10-CM

## 2024-11-27 DIAGNOSIS — Z96.611 STATUS POST REVERSE TOTAL SHOULDER REPLACEMENT, RIGHT: ICD-10-CM

## 2024-11-27 DIAGNOSIS — Z96.611 INSTABILITY OF REVERSE TOTAL ARTHROPLASTY OF RIGHT SHOULDER: ICD-10-CM

## 2024-11-27 DIAGNOSIS — T84.028A INSTABILITY OF REVERSE TOTAL ARTHROPLASTY OF RIGHT SHOULDER: ICD-10-CM

## 2024-11-27 DIAGNOSIS — Z96.611 STATUS POST REVERSE TOTAL SHOULDER REPLACEMENT, RIGHT: Primary | ICD-10-CM

## 2024-11-27 PROCEDURE — 97110 THERAPEUTIC EXERCISES: CPT

## 2024-11-27 PROCEDURE — 1159F MED LIST DOCD IN RCRD: CPT | Performed by: ORTHOPAEDIC SURGERY

## 2024-11-27 PROCEDURE — 99024 POSTOP FOLLOW-UP VISIT: CPT | Performed by: ORTHOPAEDIC SURGERY

## 2024-11-27 PROCEDURE — 1160F RVW MEDS BY RX/DR IN RCRD: CPT | Performed by: ORTHOPAEDIC SURGERY

## 2024-11-27 NOTE — PROGRESS NOTES
Subjective:     Patient ID: Sylwia Spears is a 64 y.o. male.    Chief Complaint:  s/p right revision reverse TSA Arthroplasty for instability date of surgery 9/3/2024   History of Present Illness  History of Present Illness  The patient returns to clinic today for follow-up evaluation in regards to his right shoulder.    He reports that his shoulder is currently in a satisfactory condition. He has been focusing on improving its range of motion and strength as much as he can tolerate. He does not experience any numbness or tingling sensations. He also denies having any fevers, chills, or sweats. He does not report any feelings of recurrent instability.     Social History     Occupational History    Not on file   Tobacco Use    Smoking status: Former     Current packs/day: 0.00     Average packs/day: 1 pack/day for 47.0 years (47.0 ttl pk-yrs)     Types: Cigarettes     Start date:      Quit date: 2022     Years since quittin.0     Passive exposure: Past    Smokeless tobacco: Never   Vaping Use    Vaping status: Never Used   Substance and Sexual Activity    Alcohol use: No     Comment: rare    Drug use: Yes     Frequency: 7.0 times per week     Types: Marijuana     Comment: last used 24    Sexual activity: Defer      Past Medical History:   Diagnosis Date    Cervical disc disease     stenosis    Cirrhosis of liver     Colon polyp     Coronary artery calcification     Diabetes mellitus     Electrocution     Fell off a ladder and has chronic neck pain    Hyperlipidemia     Hypertension     Spleen enlarged     Thrombocytopenia      Past Surgical History:   Procedure Laterality Date    CHOLECYSTECTOMY      COLONOSCOPY      COLONOSCOPY N/A 2018    Procedure: COLONOSCOPY, polypectomy;  Surgeon: Raleigh Champagne MD;  Location: [facility] OR;  Service: Gastroenterology    COLONOSCOPY W/ POLYPECTOMY      COLONOSCOPY W/ POLYPECTOMY N/A 2021    Procedure: COLONOSCOPY; polypectomy;   "Surgeon: Raleigh Champagne MD;  Location: [facility] OR;  Service: Gastroenterology;  Laterality: N/A;  polyps   diverticulosis    COLONOSCOPY W/ POLYPECTOMY N/A 9/11/2024    Procedure: COLONOSCOPY WITH POLYPECTOMY;  Surgeon: Raleigh Champagne MD;  Location: [facility] OR;  Service: Gastroenterology;  Laterality: N/A;  diverticulosis  ascending polyp x1 (cold snare)  transverse polyp x2 (cold snare x2)  descending polyp x1  sigmoid polyp x1    LIPOMA EXCISION      groin    SHOULDER SURGERY Left     TOTAL SHOULDER ARTHROPLASTY W/ DISTAL CLAVICLE EXCISION Right 12/1/2023    Procedure: TOTAL SHOULDER REVERSE ARTHROPLASTY;  Surgeon: David Ozuna MD;  Location:  [facility] OR;  Service: Orthopedics;  Laterality: Right;    TOTAL SHOULDER REVISION Right 9/3/2024    Procedure: TOTAL SHOULDER REVISION;  Surgeon: David Ozuna MD;  Location: [facility] OR;  Service: Orthopedics;  Laterality: Right;       Family History   Problem Relation Age of Onset    Hypertension Mother     Heart disease Mother     Colon cancer Mother     Stroke Mother     Cancer Mother     Hypertension Father     Crohn's disease Brother     Malig Hyperthermia Neg Hx          Review of Systems        Objective:  Vitals:    11/27/24 1259   Height: 189.2 cm (74.49\")     There were no vitals filed for this visit.  Body mass index is 28.76 kg/m².  General: No acute distress.  Resp: normal respiratory effort  Skin: no rashes or wounds; normal turgor  Psych: mood and affect appropriate; recent and remote memory intact          Physical Exam  Right shoulder shows a well-healed anterior incision. Active forward flexion is 125 degrees with 4 out of 5 strength, active external rotation is 30 degrees, 4 out of 5 strength, internal rotation L3, 4 out of 5 strength, belly press test. Positive deltoid and L3 components. Brisk cap refill to all digits, 2+ radial pulse. Positive sensation proximal lateral arm in all distributions, symmetric to the left. " Positive deltoid firing in all three components.         Imaging:  None today  Assessment:        1. Status post reverse total replacement of right shoulder    2. 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 revision right total shoulder arthroplasty with revision of 2 components for recurrent instability, DOS 9/3/2024           Plan:          Assessment & Plan  1. Right shoulder follow-up.  He is making progress, though his motion and strength are still a bit slow. He is encouraged to increase his work routine with strengthening activities, particularly using therapy bands and free weights if needed. He should avoid full extension of the shoulder with weight until his strength improves.     Follow-up  Return in 3 months for follow up, repeat x-rays right shoulder or sooner if needed.    Sylwia Spears was in agreement with plan and had all questions answered.     Orders:  No orders of the defined types were placed in this encounter.      Medications:  No orders of the defined types were placed in this encounter.      Followup:  Return in about 3 months (around 2/27/2025) for xrays needed at follow up.    Diagnoses and all orders for this visit:    1. Status post reverse total replacement of right shoulder (Primary)    2. 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 revision right total shoulder arthroplasty with revision of 2 components for recurrent instability, DOS 9/3/2024                  Dictated utilizing Dragon dictation     Patient or patient representative verbalized consent for the use of Ambient Listening during the visit with  David Ozuna MD for chart documentation. 11/30/2024  13:43 EST

## 2024-11-27 NOTE — THERAPY RE-EVALUATION
Outpatient Physical Therapy Ortho Re-Evaluation  DIANNE Bertrand     Patient Name: Sylwia Spears  : 1960  MRN: [id number]  Today's Date: 2024      Visit Date: 2024    Patient Active Problem List   Diagnosis    Type 2 diabetes mellitus without complication, without long-term current use of insulin    Benign essential HTN    Morbidly obese    Cervical spinal stenosis    Encounter for annual wellness visit (AWV) in Medicare patient    Personal history of colonic polyps    Family history of colon cancer    Hyperlipidemia LDL goal <70    Midline thoracic back pain    Erectile dysfunction due to diseases classified elsewhere    Personal history of nicotine dependence    Coronary artery calcification    Varicose veins of right lower extremity with other complications    DJD of shoulder    Rotator cuff arthropathy of right shoulder    Complete tear of right rotator cuff    Chronic right shoulder pain    Thrombocytopenia    Abnormal international normal ratio (INR)    Status post reverse total shoulder replacement, open biceps tenodesis, right, DOS 2023    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    Status post reverse total replacement of right shoulder    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    Hospital discharge follow-up        Past Medical History:   Diagnosis Date    Cervical disc disease     stenosis    Cirrhosis of liver     Colon polyp     Coronary artery calcification     Diabetes mellitus     Electrocution     Fell off a ladder and has chronic neck pain    Hyperlipidemia     Hypertension     Spleen enlarged     Thrombocytopenia         Past Surgical History:   Procedure Laterality Date    CHOLECYSTECTOMY      COLONOSCOPY      COLONOSCOPY N/A 2018    Procedure: COLONOSCOPY, polypectomy;  Surgeon: Raleigh Champagne MD;  Location: [facility];  Service: Gastroenterology    COLONOSCOPY W/ POLYPECTOMY      COLONOSCOPY W/ POLYPECTOMY  N/A 09/02/2021    Procedure: COLONOSCOPY; polypectomy;  Surgeon: Raleigh Champagne MD;  Location:  [facility] OR;  Service: Gastroenterology;  Laterality: N/A;  polyps   diverticulosis    COLONOSCOPY W/ POLYPECTOMY N/A 9/11/2024    Procedure: COLONOSCOPY WITH POLYPECTOMY;  Surgeon: Raleigh Champagne MD;  Location:  [facility] OR;  Service: Gastroenterology;  Laterality: N/A;  diverticulosis  ascending polyp x1 (cold snare)  transverse polyp x2 (cold snare x2)  descending polyp x1  sigmoid polyp x1    LIPOMA EXCISION      groin    SHOULDER SURGERY Left     TOTAL SHOULDER ARTHROPLASTY W/ DISTAL CLAVICLE EXCISION Right 12/1/2023    Procedure: TOTAL SHOULDER REVERSE ARTHROPLASTY;  Surgeon: David Ozuna MD;  Location:  [facility] OR;  Service: Orthopedics;  Laterality: Right;    TOTAL SHOULDER REVISION Right 9/3/2024    Procedure: TOTAL SHOULDER REVISION;  Surgeon: David Ozuna MD;  Location:  [facility] OR;  Service: Orthopedics;  Laterality: Right;       Visit Dx:     ICD-10-CM ICD-9-CM   1. Status post reverse total shoulder replacement, open biceps tenodesis, right, DOS 12/1/2023  Z96.611 V43.61              PT Ortho       Row Name 11/27/24 1200       Precautions and Contraindications    Precautions/Limitations no known precautions/limitations  -AS       Subjective Pain    Able to rate subjective pain? yes  -AS    Pre-Treatment Pain Level 2  -AS    Post-Treatment Pain Level 2  -AS       Posture/Observations    Posture- WNL Posture is WNL  -AS    Observations Incision healing  -AS       Right Upper Ext    Rt Shoulder Abduction AROM 100  -AS    Rt Shoulder Abduction PROM WNL  -AS    Rt Shoulder Flexion AROM 120  -AS    Rt Shoulder Flexion PROM WNL  -AS    Rt Shoulder External Rotation PROM WNL  -AS    Rt Shoulder Internal Rotation PROM WNL  -AS       MMT Right Upper Ext    Rt Shoulder Flexion MMT, Gross Movement (4/5) good  -AS    Rt Shoulder ABduction MMT, Gross Movement (4/5) good  -AS    Rt Shoulder  Internal Rotation MMT, Gross Movement (4/5) good  -AS    Rt Shoulder External Rotation MMT, Gross Movement (4-/5) good minus  -AS       Sensation    Sensation WNL? WNL  -AS    Light Touch No apparent deficits  -AS              User Key  (r) = Recorded By, (t) = Taken By, (c) = Cosigned By      Initials Name Provider Type    AS Royer Pedroza, PT Physical Therapist                                       PT OP Goals       Row Name 11/27/24 1200          PT Short Term Goals    STG 1 Patient to demonstrate compliance with initial HEP for flexibility, ROM and strengthening.  -AS     STG 1 Progress Met  -AS     STG 2 Patient to report right shoulder pain on VAS of 4-5/10 at worst with activity.  -AS     STG 2 Progress Ongoing;Progressing  -AS     STG 3 Patient to demonstrate improved right shoulder strength to 4+/5 in all planes.  -AS     STG 3 Progress New;Ongoing;Progressing  -AS     STG 4 Patient to demonstrate improved right shoulder PROM to within 10 degrees of contralateral shoulder.  -AS     STG 4 Progress Met  -AS        Long Term Goals    LTG 1 Patient to demonstrate compliance with advanced HEP for flexibility, ROM and strengthening.  -AS     LTG 1 Progress Ongoing;Progressing  -AS     LTG 2 Patient to report right shoulder pain on VAS of 0-1/10 at worst with activity.  -AS     LTG 2 Progress Ongoing;Progressing  -AS     LTG 3 Patient to demonstrate improved right shoulder strength to 5/5 in all planes.  -AS     LTG 3 Progress New;Ongoing;Progressing  -AS     LTG 4 Patient to demonstrate improved right shoulder AROM to within 10 degrees of contralateral shoulder.  -AS     LTG 4 Progress Ongoing;Progressing  -AS     LTG 5 Patient to report improved function and decreased pain Quick DASH by >10-15 points.  -AS     LTG 5 Progress Ongoing;Progressing  -AS               User Key  (r) = Recorded By, (t) = Taken By, (c) = Cosigned By      Initials Name Provider Type    AS Royer Pedroza, PT Physical  Therapist                     PT Assessment/Plan       Row Name 11/27/24 1200          PT Assessment    Functional Limitations Limitation in home management;Limitations in community activities;Performance in self-care ADL;Performance in leisure activities;Performance in sport activities;Performance in work activities  -AS     Impairments Impaired flexibility;Muscle strength;Pain;Range of motion  -AS     Assessment Comments Patient continues to do well with PT at this time. Patient with improved right shoulder ROM, strength, pain, and function. Plan to continue to progress patient as tolerated.  -AS     Please refer to paper survey for additional self-reported information Yes  -AS     Rehab Potential Good  -AS     Patient/caregiver participated in establishment of treatment plan and goals Yes  -AS     Patient would benefit from skilled therapy intervention Yes  -AS        PT Plan    PT Frequency 2x/week  -AS     Predicted Duration of Therapy Intervention (PT) 4-6 weeks  -AS     Planned CPT's? PT RE-EVAL: 24097;PT THER PROC EA 15 MIN: 04217;PT THER ACT EA 15 MIN: 29910;PT MANUAL THERAPY EA 15 MIN: 57883;PT NEUROMUSC RE-EDUCATION EA 15 MIN: 92866  -AS     PT Plan Comments Patient to see his Ortho this afternoon. Will continue with outpatient PT as advised.  -AS               User Key  (r) = Recorded By, (t) = Taken By, (c) = Cosigned By      Initials Name Provider Type    AS Royer Pedroza, PT Physical Therapist                     Modalities       Row Name 11/27/24 1200             Moist Heat    MH Applied Yes  -AS      Location Right Shoulder - Sitting  -AS      PT Moist Heat Minutes 10  -AS      MH Prior to Rx Yes  -AS         Functional Testing    Outcome Measure Options Quick DASH  -AS                User Key  (r) = Recorded By, (t) = Taken By, (c) = Cosigned By      Initials Name Provider Type    AS Royer Pedroza, PT Physical Therapist                   OP Exercises       Row Name 11/27/24 0740  11/27/24 1200          Subjective    Subjective Comments -- Patient states his shoulder is doing ok. He states at times it seems to be good and other times it can be really painful and he has difficulty using his right UE.  -AS        Subjective Pain    Able to rate subjective pain? -- yes  -AS     Pre-Treatment Pain Level -- 2  -AS     Post-Treatment Pain Level -- 2  -AS        Total Minutes    78324 - PT Therapeutic Exercise Minutes 30  -AS --        Exercise 1    Exercise Name 1 -- 3-Way Cane  -AS     Reps 1 -- 15  -AS     Time 1 -- 5 sec hold each  -AS        Exercise 2    Exercise Name 2 -- Pulley's - Flex & ABD  -AS     Time 2 -- 4 min each  -AS        Exercise 3    Exercise Name 3 -- Supine AROM for Flexion  -AS     Reps 3 -- 15  -AS        Exercise 4    Exercise Name 4 -- S/L ER  -AS     Reps 4 -- 30  -AS     Time 4 -- 2#  -AS        Exercise 5    Exercise Name 5 -- Empty/Full Can  -AS     Reps 5 -- 30 each  -AS     Time 5 -- 2#  -AS        Exercise 6    Exercise Name 6 -- Rows  -AS     Reps 6 -- 30  -AS     Time 6 -- Black  -AS        Exercise 7    Exercise Name 7 -- Extensions  -AS     Reps 7 -- 30  -AS     Time 7 -- Black  -AS        Exercise 8    Exercise Name 8 -- IR  -AS     Reps 8 -- 30  -AS     Time 8 -- Black  -AS        Exercise 9    Exercise Name 9 -- ER  -AS     Reps 9 -- 30  -AS     Time 9 -- Blue  -AS               User Key  (r) = Recorded By, (t) = Taken By, (c) = Cosigned By      Initials Name Provider Type    AS Royer Pedroza, PT Physical Therapist                  Manual Rx (Last 36 Hours)       Manual Treatments       Row Name 11/27/24 1100             Manual Rx 1    Manual Rx 1 Location Right Shoulder  -AS      Manual Rx 1 Type PROM - Flex, ABD  -AS      Manual Rx 1 Duration 10 min  -AS                User Key  (r) = Recorded By, (t) = Taken By, (c) = Cosigned By      Initials Name Provider Type    AS Royer Pedroza, PT Physical Therapist                                 Outcome Measure Options: Quick DASH         Time Calculation:     Start Time: 1203  Stop Time: 1245  Time Calculation (min): 42 min  Timed Charges  56537 - PT Therapeutic Exercise Minutes: 30  Untimed Charges  PT Moist Heat Minutes: 10  Total Minutes  Timed Charges Total Minutes: 30  Untimed Charges Total Minutes: 10   Total Minutes: 40     Therapy Charges for Today       Code Description Service Date Service Provider Modifiers Qty    02894207835 HC PT THER PROC EA 15 MIN 11/27/2024 Royer Pedroza, PT GP 2            PT G-Codes  Outcome Measure Options: Pema Pedroza, PT  11/27/2024

## 2024-12-02 DIAGNOSIS — Z96.611 INSTABILITY OF REVERSE TOTAL ARTHROPLASTY OF RIGHT SHOULDER: ICD-10-CM

## 2024-12-02 DIAGNOSIS — Z96.611 STATUS POST REVERSE TOTAL REPLACEMENT OF RIGHT SHOULDER: ICD-10-CM

## 2024-12-02 DIAGNOSIS — T84.028A INSTABILITY OF REVERSE TOTAL ARTHROPLASTY OF RIGHT SHOULDER: ICD-10-CM

## 2024-12-02 RX ORDER — OXYCODONE AND ACETAMINOPHEN 5; 325 MG/1; MG/1
1 TABLET ORAL EVERY 8 HOURS PRN
Qty: 42 TABLET | Refills: 0 | Status: SHIPPED | OUTPATIENT
Start: 2024-12-02

## 2024-12-02 NOTE — TELEPHONE ENCOUNTER
Rx Refill Note  Requested Prescriptions     Pending Prescriptions Disp Refills    oxyCODONE-acetaminophen (PERCOCET) 5-325 MG per tablet 42 tablet 0     Sig: Take 1 tablet by mouth Every 8 (Eight) Hours As Needed for Moderate Pain or Severe Pain.      Last office visit with prescribing clinician: 11/27/2024      Next office visit with prescribing clinician: 3/3/2025   Last Filled: 11/18/2024    Encounter Diagnoses   Name Primary?    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     {TIP  Encounters:    {TIP  Please add Last Relevant Lab Date if appropriate:  {TIP  Is Refill Pharmacy correct?:

## 2024-12-16 ENCOUNTER — TELEPHONE (OUTPATIENT)
Dept: ORTHOPEDIC SURGERY | Facility: CLINIC | Age: 64
End: 2024-12-16
Payer: MEDICARE

## 2024-12-16 NOTE — TELEPHONE ENCOUNTER
Patient called for refill of pain medication, informed that unfortunately he is past his 3 months post op and we can no longer refill.

## 2025-01-03 ENCOUNTER — OFFICE VISIT (OUTPATIENT)
Dept: INTERNAL MEDICINE | Facility: CLINIC | Age: 65
End: 2025-01-03
Payer: MEDICARE

## 2025-01-03 VITALS
HEIGHT: 74 IN | HEART RATE: 80 BPM | OXYGEN SATURATION: 98 % | DIASTOLIC BLOOD PRESSURE: 72 MMHG | SYSTOLIC BLOOD PRESSURE: 108 MMHG | TEMPERATURE: 98 F | BODY MASS INDEX: 29.24 KG/M2 | WEIGHT: 227.8 LBS

## 2025-01-03 DIAGNOSIS — G89.29 CHRONIC PAIN OF RIGHT HIP: ICD-10-CM

## 2025-01-03 DIAGNOSIS — M25.551 CHRONIC PAIN OF RIGHT HIP: ICD-10-CM

## 2025-01-03 DIAGNOSIS — M54.16 CHRONIC RIGHT-SIDED LUMBAR RADICULOPATHY: Primary | ICD-10-CM

## 2025-01-03 PROCEDURE — 3078F DIAST BP <80 MM HG: CPT | Performed by: NURSE PRACTITIONER

## 2025-01-03 PROCEDURE — 1126F AMNT PAIN NOTED NONE PRSNT: CPT | Performed by: NURSE PRACTITIONER

## 2025-01-03 PROCEDURE — 99213 OFFICE O/P EST LOW 20 MIN: CPT | Performed by: NURSE PRACTITIONER

## 2025-01-03 PROCEDURE — 3074F SYST BP LT 130 MM HG: CPT | Performed by: NURSE PRACTITIONER

## 2025-01-03 NOTE — PROGRESS NOTES
"Chief Complaint   Patient presents with    Hip Pain     Having some right hip pain, has not discussed this with ortho. Has been ongoing for about 3 years and just gets worse        Subjective     Sylwia Spears is a 64 y.o. male being seen for an acute visit for right hip pain. This began 3 years ago. He reports outer glute and low lumbar pain radiating to right thigh. He reports that this is worse after a long car ride. Described as stabbing pain \"like a knife.\"  He had taken Mobic in the past, but did not see any help.     Hip Pain          No Known Allergies      Current Outpatient Medications:     atorvastatin (LIPITOR) 10 MG tablet, TAKE 1 TABLET BY MOUTH DAILY, Disp: 90 tablet, Rfl: 1    Cholecalciferol (Vitamin D3) 30 MCG/15ML liquid, Take  by mouth., Disp: , Rfl:     Chromic Chloride crystals, Use Daily., Disp: , Rfl:     CINNAMON PO, Take  by mouth., Disp: , Rfl:     DULoxetine (CYMBALTA) 30 MG capsule, TAKE 1 CAPSULE BY MOUTH DAILY, Disp: 90 capsule, Rfl: 1    lisinopril (PRINIVIL,ZESTRIL) 20 MG tablet, TAKE 1 TABLET BY MOUTH DAILY, Disp: 90 tablet, Rfl: 1    Zinc Acetate, Oral, (ZINC ACETATE PO), Take  by mouth., Disp: , Rfl:     aspirin 81 MG EC tablet, Take 1 tablet by mouth Daily. Indications: VTE Prophylaxis, Disp: 14 tablet, Rfl: 0    meloxicam (MOBIC) 15 MG tablet, Take 1 tablet by mouth Daily. (Patient not taking: Reported on 11/27/2024), Disp: 30 tablet, Rfl: 0    The following portions of the patient's history were reviewed and updated as appropriate: allergies, current medications, past family history, past medical history, past social history, past surgical history, and problem list.    Review of Systems   Cardiovascular:  Negative for chest pain and leg swelling.   Gastrointestinal: Negative.    Musculoskeletal:  Positive for arthralgias, back pain and gait problem. Negative for joint swelling.       Assessment     Physical Exam  Vitals reviewed.   Constitutional:       Appearance: Normal " appearance. He is not ill-appearing.   Cardiovascular:      Rate and Rhythm: Normal rate and regular rhythm.      Pulses: Normal pulses.      Heart sounds: Normal heart sounds. No murmur heard.  Pulmonary:      Effort: Pulmonary effort is normal.      Breath sounds: Normal breath sounds. No stridor.   Musculoskeletal:      Lumbar back: Tenderness (L4-5) present. No swelling. Normal range of motion. Negative right straight leg raise test and negative left straight leg raise test.      Right hip: No bony tenderness or crepitus. Decreased range of motion. Decreased strength.      Right lower leg: No edema.      Left lower leg: No edema.   Skin:     General: Skin is warm and dry.   Neurological:      General: No focal deficit present.      Mental Status: He is alert and oriented to person, place, and time.   Psychiatric:         Mood and Affect: Mood normal.         Behavior: Behavior normal.         Thought Content: Thought content normal.         Plan         Diagnoses and all orders for this visit:    1. Chronic right-sided lumbar radiculopathy (Primary)  -     XR Hip With or Without Pelvis 2 - 3 View Right; Future    2. Chronic pain of right hip  -     XR Spine Lumbar 2 or 3 View; Future        Discussed lumbar radicular pain. He does not want to start NSAIDs. Discussed epidurals, he declines. Will proceed with imaging to confirm. Exercises given for rehab.     Follow up as scheduled

## 2025-01-20 ENCOUNTER — OFFICE VISIT (OUTPATIENT)
Dept: INTERNAL MEDICINE | Facility: CLINIC | Age: 65
End: 2025-01-20
Payer: MEDICARE

## 2025-01-20 VITALS
WEIGHT: 234 LBS | TEMPERATURE: 98 F | HEART RATE: 77 BPM | OXYGEN SATURATION: 97 % | BODY MASS INDEX: 30.03 KG/M2 | HEIGHT: 74 IN | DIASTOLIC BLOOD PRESSURE: 72 MMHG | SYSTOLIC BLOOD PRESSURE: 116 MMHG

## 2025-01-20 DIAGNOSIS — I25.10 CORONARY ARTERY CALCIFICATION: ICD-10-CM

## 2025-01-20 DIAGNOSIS — E78.5 HYPERLIPIDEMIA LDL GOAL <70: ICD-10-CM

## 2025-01-20 DIAGNOSIS — E11.9 TYPE 2 DIABETES MELLITUS WITHOUT COMPLICATION, WITHOUT LONG-TERM CURRENT USE OF INSULIN: Primary | ICD-10-CM

## 2025-01-20 DIAGNOSIS — I10 BENIGN ESSENTIAL HTN: ICD-10-CM

## 2025-01-20 PROCEDURE — 3044F HG A1C LEVEL LT 7.0%: CPT | Performed by: NURSE PRACTITIONER

## 2025-01-20 PROCEDURE — G2211 COMPLEX E/M VISIT ADD ON: HCPCS | Performed by: NURSE PRACTITIONER

## 2025-01-20 PROCEDURE — 1126F AMNT PAIN NOTED NONE PRSNT: CPT | Performed by: NURSE PRACTITIONER

## 2025-01-20 PROCEDURE — 99214 OFFICE O/P EST MOD 30 MIN: CPT | Performed by: NURSE PRACTITIONER

## 2025-01-20 PROCEDURE — 3074F SYST BP LT 130 MM HG: CPT | Performed by: NURSE PRACTITIONER

## 2025-01-20 PROCEDURE — 3078F DIAST BP <80 MM HG: CPT | Performed by: NURSE PRACTITIONER

## 2025-01-20 RX ORDER — LISINOPRIL 10 MG/1
10 TABLET ORAL DAILY
Qty: 30 TABLET | Refills: 0 | Status: SHIPPED | OUTPATIENT
Start: 2025-01-20

## 2025-01-20 NOTE — PROGRESS NOTES
"Chief Complaint   Patient presents with    Diabetes     6 mo f/u on labs       Subjective     Sylwia Spears is a 64 y.o. male being seen for a follow up appointment today regarding DM 2, HTN and hyperlipidemia. He was on Metformin XR 500mg daily for diabetes, but stopped this aftet weight loss. He has not been checking his glucose levels at home. He denies any hypoglycemic events unless he does not eat. He has lost about 60 pounds with better eating and avoiding sugars over the past 6 months, this is intentional weight loss. He weighed 296 pounds at one time.      He is on lisinopril 20mg daily for HTN. He tolerates this w/o cough. He denies CP, SOA swelling in legs.      He has daily pain from \"where I got electrocuted\" he has nerve pain. Daily pain rated a 5 of 10, usually, but he is on pain medication at this time due to shoulder surgery. He pain is mostly in upper back and shoulders. He takes duloxetine 60mg for this, and oxycodone.       He has a CT chest showing Coronary artery calcifications. He was started on Lipitor 10mg nightly for this. He denies chest pain, unilateral weakness.  He had a stress test 1-.      He has had low mildly platelet count for 3 years. He was referred to [facility] for eval. Consult with Uriel Fowler MD (11/16/2023) No excess bruising, no use of aspirin products.    Diabetes  Pertinent negatives for diabetes include no chest pain.        No Known Allergies      Current Outpatient Medications:     atorvastatin (LIPITOR) 10 MG tablet, TAKE 1 TABLET BY MOUTH DAILY, Disp: 90 tablet, Rfl: 1    Cholecalciferol (Vitamin D3) 30 MCG/15ML liquid, Take  by mouth., Disp: , Rfl:     Chromic Chloride crystals, Use Daily., Disp: , Rfl:     CINNAMON PO, Take  by mouth., Disp: , Rfl:     DULoxetine (CYMBALTA) 30 MG capsule, TAKE 1 CAPSULE BY MOUTH DAILY, Disp: 90 capsule, Rfl: 1    lisinopril (PRINIVIL,ZESTRIL) 20 MG tablet, TAKE 1 TABLET BY MOUTH DAILY, Disp: 90 tablet, Rfl: 1    " Zinc Acetate, Oral, (ZINC ACETATE PO), Take  by mouth., Disp: , Rfl:     The following portions of the patient's history were reviewed and updated as appropriate: allergies, current medications, past family history, past medical history, past social history, past surgical history, and problem list.    Review of Systems   Constitutional: Negative.    HENT: Negative.     Eyes: Negative.    Respiratory: Negative.     Cardiovascular: Negative.  Negative for chest pain.   Gastrointestinal: Negative.    Endocrine: Negative.    Musculoskeletal:  Positive for arthralgias.   Allergic/Immunologic: Negative.    Hematological: Negative.    All other systems reviewed and are negative.      Assessment     Physical Exam  Vitals reviewed.   Constitutional:       Appearance: Normal appearance.   HENT:      Head: Normocephalic.   Cardiovascular:      Rate and Rhythm: Normal rate and regular rhythm.      Pulses: Normal pulses.      Heart sounds: Normal heart sounds. No murmur heard.  Pulmonary:      Effort: Pulmonary effort is normal. No respiratory distress.      Breath sounds: Normal breath sounds. No stridor.   Musculoskeletal:      Cervical back: Neck supple.      Right lower leg: No edema.      Left lower leg: No edema.   Skin:     General: Skin is warm and dry.   Neurological:      General: No focal deficit present.      Mental Status: He is alert and oriented to person, place, and time.   Psychiatric:         Mood and Affect: Mood normal.         Behavior: Behavior normal.         Thought Content: Thought content normal.         Plan     His fasting labs were reviewed with the patient from last week.     Diagnoses and all orders for this visit:    1. Type 2 diabetes mellitus without complication, without long-term current use of insulin (Primary)  Comments:  Diet controlled on a low carb diet. Hgb A1c goal < 6.5%    2. Benign essential HTN  Comments:  Reduce lisinopril to 10mg daily for 30 days. Keep a BP log. If BP remina <  135/85, may d/c medication.  Orders:  -     lisinopril (PRINIVIL,ZESTRIL) 10 MG tablet; Take 1 tablet by mouth Daily.  Dispense: 30 tablet; Refill: 0    3. Coronary artery calcification  Comments:  On statin therapy. Aspirin reccomended, he stopped this on his own    4. Hyperlipidemia LDL goal <70  Comments:  Lipitor 10mg nightly        AW w labs

## 2025-01-21 PROBLEM — Z09 HOSPITAL DISCHARGE FOLLOW-UP: Status: RESOLVED | Noted: 2024-09-09 | Resolved: 2025-01-21

## 2025-01-23 NOTE — PROGRESS NOTES
POD# 1 s/p revision right reverse total shoulder arthroplasty with revision of 2 components for recurrent instability    Subjective:Sylwia Spears resting in bed this a.m., pain fairly well-controlled with, improvement with medication.  Denies any residual numbness or tingling at the right upper extremity.  Denies any other concerns present.    Objective:  Vitals:    09/03/24 2242 09/03/24 2337 09/04/24 0459 09/04/24 0720   BP:  132/68 132/72 126/60   BP Location:  Left arm Left arm Left arm   Patient Position:  Lying Lying Lying   Pulse:  62 68 70   Resp:  18 18 20   Temp:  98.1 °F (36.7 °C) 98 °F (36.7 °C) 98.4 °F (36.9 °C)   TempSrc:  Oral Oral Oral   SpO2: 92% 92% 92% 94%   Weight:       Height:           Results from last 7 days   Lab Units 09/04/24  0344   WBC 10*3/mm3 10.21   HEMOGLOBIN g/dL 13.7   HEMATOCRIT % 40.3   PLATELETS 10*3/mm3 108*       Results from last 7 days   Lab Units 09/04/24  0344   SODIUM mmol/L 138   POTASSIUM mmol/L 4.2   CHLORIDE mmol/L 105   CO2 mmol/L 23.1   BUN mg/dL 16   CREATININE mg/dL 0.74*   GLUCOSE mg/dL 105*   CALCIUM mg/dL 8.9       Exam:    Right upper extremity examined  Positive sensation light touch all distributions right upper extremity including the palmar and dorsum of the hand and all digits  Flex/extend all digits right hand  2+ distal radius pulse  Brisk cap refill all digits    Impression: s/p revision right reverse total shoulder arthroplasty with revision of 2 components for recurrent instability    Plan:  1. PT/OT-sling x 4 weeks with elbow, wrist, hand motion initiated within first week as well as gentle pendulum exercises of shoulder  2. Pain control-acetaminophen, oxycodone  3. DVT prophylaxis-ambulate, aspirin once daily  4. Wound care-remain intact until follow-up in office  5. Disposition-Home today, follow-up in orthopedic office in 2 weeks previously scheduled appointment.      -Patient on anticoagulation for Afib, with guaiac positive test. R/o GIB.  -Start protonix BID  -Hold her xarelto, plavix d/t suspected ABLA  -Hold her metoprolol, cardizem, and aldactone d/t doft BP.   -Keep NPO except for meds.  -GI consult.

## 2025-02-13 DIAGNOSIS — M48.02 CERVICAL SPINAL STENOSIS: ICD-10-CM

## 2025-02-14 RX ORDER — DULOXETIN HYDROCHLORIDE 30 MG/1
30 CAPSULE, DELAYED RELEASE ORAL DAILY
Qty: 90 CAPSULE | Refills: 1 | OUTPATIENT
Start: 2025-02-14

## 2025-02-19 DIAGNOSIS — I10 BENIGN ESSENTIAL HTN: ICD-10-CM

## 2025-02-19 RX ORDER — LISINOPRIL 10 MG/1
10 TABLET ORAL DAILY
Qty: 30 TABLET | Refills: 0 | Status: SHIPPED | OUTPATIENT
Start: 2025-02-19

## 2025-02-19 NOTE — TELEPHONE ENCOUNTER
Rx Refill Note  Requested Prescriptions     Pending Prescriptions Disp Refills    lisinopril (PRINIVIL,ZESTRIL) 10 MG tablet [Pharmacy Med Name: LISINOPRIL 10 MG TABLET] 30 tablet 0     Sig: Take 1 tablet by mouth Daily.      Last office visit with prescribing clinician: 1/20/2025   Last telemedicine visit with prescribing clinician: Visit date not found   Next office visit with prescribing clinician: 7/22/2025                         Would you like a call back once the refill request has been completed: [] Yes [] No    If the office needs to give you a call back, can they leave a voicemail: [] Yes [] No    Bianca Patterson MA  02/19/25, 18:23 EST

## 2025-02-26 DIAGNOSIS — I25.10 CORONARY ARTERY CALCIFICATION: ICD-10-CM

## 2025-02-26 RX ORDER — ATORVASTATIN CALCIUM 10 MG/1
10 TABLET, FILM COATED ORAL DAILY
Qty: 90 TABLET | Refills: 1 | Status: SHIPPED | OUTPATIENT
Start: 2025-02-26

## 2025-02-26 NOTE — TELEPHONE ENCOUNTER
Rx Refill Note  Requested Prescriptions     Pending Prescriptions Disp Refills    atorvastatin (LIPITOR) 10 MG tablet [Pharmacy Med Name: ATORVASTATIN 10 MG TABLET] 90 tablet 1     Sig: TAKE 1 TABLET BY MOUTH DAILY      Last office visit with prescribing clinician: 1/20/2025   Last telemedicine visit with prescribing clinician: Visit date not found   Next office visit with prescribing clinician: 7/22/2025                         Would you like a call back once the refill request has been completed: [] Yes [] No    If the office needs to give you a call back, can they leave a voicemail: [] Yes [] No    Bianca Patterson MA  02/26/25, 16:13 EST

## 2025-03-03 ENCOUNTER — OFFICE VISIT (OUTPATIENT)
Dept: ORTHOPEDIC SURGERY | Facility: CLINIC | Age: 65
End: 2025-03-03
Payer: MEDICARE

## 2025-03-03 VITALS — BODY MASS INDEX: 29.65 KG/M2 | HEIGHT: 74 IN

## 2025-03-03 DIAGNOSIS — Z96.611 STATUS POST REVERSE TOTAL SHOULDER REPLACEMENT, RIGHT: ICD-10-CM

## 2025-03-03 DIAGNOSIS — Z96.611 STATUS POST REVERSE TOTAL REPLACEMENT OF RIGHT SHOULDER: Primary | ICD-10-CM

## 2025-03-03 NOTE — PROGRESS NOTES
Subjective:     Patient ID: Sylwia Spears is a 64 y.o. male.    Chief Complaint:  s/p right revision reverse TSA Arthroplasty for instability date of surgery 9/3/2024   History of Present Illness  Sylwia returns to clinic today for follow-up evaluation regards to his right shoulder, states overall he is doing well at this point in time, he has had good improvement in regards to motion and function though still lacks some strength particularly overhead positioning.  Denies any fevers chills or sweats denies any numbness or tingling denies any issues with his incision.     Social History     Occupational History    Not on file   Tobacco Use    Smoking status: Former     Current packs/day: 0.00     Average packs/day: 1 pack/day for 47.0 years (47.0 ttl pk-yrs)     Types: Cigarettes     Start date:      Quit date: 2022     Years since quittin.2     Passive exposure: Past    Smokeless tobacco: Never   Vaping Use    Vaping status: Never Used   Substance and Sexual Activity    Alcohol use: No     Comment: rare    Drug use: Yes     Frequency: 7.0 times per week     Types: Marijuana     Comment: last used 24    Sexual activity: Defer      Past Medical History:   Diagnosis Date    Cervical disc disease     stenosis    Cirrhosis of liver     Colon polyp     Coronary artery calcification     Diabetes mellitus     Electrocution     Fell off a ladder and has chronic neck pain    Hyperlipidemia     Hypertension     Spleen enlarged     Thrombocytopenia      Past Surgical History:   Procedure Laterality Date    CHOLECYSTECTOMY      COLONOSCOPY      COLONOSCOPY N/A 2018    Procedure: COLONOSCOPY, polypectomy;  Surgeon: Raleigh Champagne MD;  Location: [facility] OR;  Service: Gastroenterology    COLONOSCOPY W/ POLYPECTOMY      COLONOSCOPY W/ POLYPECTOMY N/A 2021    Procedure: COLONOSCOPY; polypectomy;  Surgeon: Raleigh Champagne MD;  Location: [facility] OR;  Service: Gastroenterology;   Laterality: N/A;  polyps   diverticulosis    COLONOSCOPY W/ POLYPECTOMY N/A 9/11/2024    Procedure: COLONOSCOPY WITH POLYPECTOMY;  Surgeon: Raleigh Champagne MD;  Location: [facility] OR;  Service: Gastroenterology;  Laterality: N/A;  diverticulosis  ascending polyp x1 (cold snare)  transverse polyp x2 (cold snare x2)  descending polyp x1  sigmoid polyp x1    LIPOMA EXCISION      groin    SHOULDER SURGERY Left     TOTAL SHOULDER ARTHROPLASTY W/ DISTAL CLAVICLE EXCISION Right 12/1/2023    Procedure: TOTAL SHOULDER REVERSE ARTHROPLASTY;  Surgeon: David Ozuna MD;  Location: [facility] OR;  Service: Orthopedics;  Laterality: Right;    TOTAL SHOULDER REVISION Right 9/3/2024    Procedure: TOTAL SHOULDER REVISION;  Surgeon: David Ozuna MD;  Location: [facility] OR;  Service: Orthopedics;  Laterality: Right;       Family History   Problem Relation Age of Onset    Hypertension Mother     Heart disease Mother     Colon cancer Mother     Stroke Mother     Cancer Mother     Hypertension Father     Crohn's disease Brother     Malig Hyperthermia Neg Hx          Review of Systems        Objective:  There were no vitals filed for this visit.  There were no vitals filed for this visit.  There is no height or weight on file to calculate BMI.    Physical Exam    Vital signs reviewed.   General: No acute distress, alert and oriented  Eyes: conjunctiva clear; pupils equally round and reactive  ENT: external ears and nose atraumatic; oropharynx clear  CV: no peripheral edema  Resp: normal respiratory effort  Skin: no rashes or wounds; normal turgor  Psych: mood and affect appropriate; recent and remote memory intact       Physical Exam  Right shoulder-incision well-healed, active forward flexion 120 degrees, passive 160 degrees, active external rotation 35 degrees, passive 50 degrees, 4- out of 5 strength both planes of motion.  4 out of 5 strength on resisted abduction.  Positive deltoid firing all 3 components.  4 out of  5 strength in belly press test.  No joint line pain.        Imaging:  Right Shoulder X-Ray  Indication: Status post shoulder arthroplasty  AP, scapular Y, and axillary lateral views    Findings:  Shoulder arthroplasty components appear to be stable in position, well-seated, overall acceptable alignment.  No evidence of osteolysis, loosening, periprosthetic fracture, or reactive bone formation.    Compared to prior postoperative x-rays from office    Assessment:        1. Status post reverse total replacement of right shoulder    2. revision right total shoulder arthroplasty with revision of 2 components for recurrent instability, DOS 9/3/2024             Assessment & Plan  Patient is doing reasonably well with his right shoulder-I have encouraged him to continue his work and reengage in strengthening exercises with therapy bands or free weights to try to maximize recovery of function Prineo with his active motion as well as his strength.   At this point in time he is accepting of where his shoulder is at and really does not want to push it further in regards to trying to add additional recovery, he seems to be apprehensive about another episode or round of instability though his shoulder is stable at this time.  He prefers to only follow-up as needed-we will oblige.    Sylwia Spears was in agreement with plan and had all questions answered.     Orders:  No orders of the defined types were placed in this encounter.      Medications:  No orders of the defined types were placed in this encounter.      Followup:  No follow-ups on file.    Diagnoses and all orders for this visit:    1. Status post reverse total replacement of right shoulder (Primary)    2. revision right total shoulder arthroplasty with revision of 2 components for recurrent instability, DOS 9/3/2024                   Dictated utilizing Dragon dictation     Patient or patient representative verbalized consent for the use of Ambient Listening during  the visit with  David Ozuna MD for chart documentation. 3/3/2025  15:15 EST

## 2025-03-19 ENCOUNTER — POP HEALTH PHARMACY (OUTPATIENT)
Dept: PHARMACY | Facility: OTHER | Age: 65
End: 2025-03-19
Payer: MEDICARE

## 2025-03-19 DIAGNOSIS — I10 BENIGN ESSENTIAL HTN: ICD-10-CM

## 2025-03-19 RX ORDER — LISINOPRIL 10 MG/1
10 TABLET ORAL DAILY
Qty: 90 TABLET | Refills: 1 | Status: SHIPPED | OUTPATIENT
Start: 2025-03-19

## 2025-03-19 NOTE — PROGRESS NOTES
Yes, 90d [facility]

## 2025-06-17 ENCOUNTER — EXTERNAL PBMM DATA (OUTPATIENT)
Dept: PHARMACY | Facility: OTHER | Age: 65
End: 2025-06-17
Payer: MEDICARE

## 2025-06-25 ENCOUNTER — POP HEALTH PHARMACY (OUTPATIENT)
Dept: PHARMACY | Facility: OTHER | Age: 65
End: 2025-06-25
Payer: MEDICARE

## 2025-07-21 ENCOUNTER — OFFICE VISIT (OUTPATIENT)
Dept: INTERNAL MEDICINE | Facility: CLINIC | Age: 65
End: 2025-07-21
Payer: MEDICARE

## 2025-07-21 VITALS
TEMPERATURE: 98.4 F | DIASTOLIC BLOOD PRESSURE: 80 MMHG | HEIGHT: 75 IN | HEART RATE: 72 BPM | WEIGHT: 220 LBS | OXYGEN SATURATION: 98 % | BODY MASS INDEX: 27.35 KG/M2 | SYSTOLIC BLOOD PRESSURE: 130 MMHG

## 2025-07-21 DIAGNOSIS — Z12.2 ENCOUNTER FOR SCREENING FOR LUNG CANCER: ICD-10-CM

## 2025-07-21 DIAGNOSIS — I25.10 CORONARY ARTERY CALCIFICATION: ICD-10-CM

## 2025-07-21 DIAGNOSIS — E78.5 HYPERLIPIDEMIA LDL GOAL <70: ICD-10-CM

## 2025-07-21 DIAGNOSIS — E11.9 TYPE 2 DIABETES MELLITUS WITHOUT COMPLICATION, WITHOUT LONG-TERM CURRENT USE OF INSULIN: ICD-10-CM

## 2025-07-21 DIAGNOSIS — I10 BENIGN ESSENTIAL HTN: ICD-10-CM

## 2025-07-21 DIAGNOSIS — Z87.891 PERSONAL HISTORY OF NICOTINE DEPENDENCE: ICD-10-CM

## 2025-07-21 DIAGNOSIS — Z00.00 ENCOUNTER FOR ANNUAL WELLNESS VISIT (AWV) IN MEDICARE PATIENT: Primary | ICD-10-CM

## 2025-07-21 PROCEDURE — G0439 PPPS, SUBSEQ VISIT: HCPCS | Performed by: NURSE PRACTITIONER

## 2025-07-21 PROCEDURE — 99213 OFFICE O/P EST LOW 20 MIN: CPT | Performed by: NURSE PRACTITIONER

## 2025-07-21 PROCEDURE — 1125F AMNT PAIN NOTED PAIN PRSNT: CPT | Performed by: NURSE PRACTITIONER

## 2025-07-21 PROCEDURE — 3044F HG A1C LEVEL LT 7.0%: CPT | Performed by: NURSE PRACTITIONER

## 2025-07-21 PROCEDURE — 1170F FXNL STATUS ASSESSED: CPT | Performed by: NURSE PRACTITIONER

## 2025-07-21 PROCEDURE — 3075F SYST BP GE 130 - 139MM HG: CPT | Performed by: NURSE PRACTITIONER

## 2025-07-21 PROCEDURE — 3079F DIAST BP 80-89 MM HG: CPT | Performed by: NURSE PRACTITIONER

## 2025-07-21 RX ORDER — ATORVASTATIN CALCIUM 10 MG/1
10 TABLET, FILM COATED ORAL DAILY
Qty: 90 TABLET | Refills: 1 | Status: SHIPPED | OUTPATIENT
Start: 2025-07-21

## 2025-07-21 NOTE — PROGRESS NOTES
Subjective   The ABCs of the Annual Wellness Visit  Medicare Wellness Visit      Sylwia Spears is a 65 y.o. patient who presents for a Medicare Wellness Visit.    The following portions of the patient's history were reviewed and   updated as appropriate: allergies, current medications, past family history, past medical history, past social history, past surgical history, and problem list.    Compared to one year ago, the patient's physical   health is the same.  Compared to one year ago, the patient's mental   health is the same.    Recent Hospitalizations:  This patient has had a [facility] admission record on file within the last 365 days.  Current Medical Providers:  Patient Care Team:  Angie Munoz APRN as PCP - General (Family Medicine)  Angie Munoz APRN as Referring Physician (Family Medicine)  David Ozuna MD as Surgeon (Orthopedic Surgery)    Outpatient Medications Prior to Visit   Medication Sig Dispense Refill    Cholecalciferol (Vitamin D3) 30 MCG/15ML liquid Take  by mouth.      Chromic Chloride crystals Use Daily.      CINNAMON PO Take  by mouth.      Zinc Acetate, Oral, (ZINC ACETATE PO) Take  by mouth.      atorvastatin (LIPITOR) 10 MG tablet TAKE 1 TABLET BY MOUTH DAILY (Patient not taking: Reported on 7/21/2025) 90 tablet 1    lisinopril (PRINIVIL,ZESTRIL) 10 MG tablet Take 1 tablet by mouth Daily. (Patient not taking: Reported on 7/21/2025) 90 tablet 1     No facility-administered medications prior to visit.     No opioid medication identified on active medication list. I have reviewed chart for other potential  high risk medication/s and harmful drug interactions in the elderly.      Aspirin is not on active medication list.  Aspirin use is not indicated based on review of current medical condition/s. Risk of harm outweighs potential benefits.  .    Patient Active Problem List   Diagnosis    Type 2 diabetes mellitus without complication, without long-term current use of insulin  "   Benign essential HTN    Morbidly obese    Cervical spinal stenosis    Encounter for annual wellness visit (AWV) in Medicare patient    Personal history of colonic polyps    Family history of colon cancer    Hyperlipidemia LDL goal <70    Midline thoracic back pain    Erectile dysfunction due to diseases classified elsewhere    Personal history of nicotine dependence    Coronary artery calcification    Varicose veins of right lower extremity with other complications    DJD of shoulder    Rotator cuff arthropathy of right shoulder    Complete tear of right rotator cuff    Chronic right shoulder pain    Thrombocytopenia    Abnormal international normal ratio (INR)    Status post reverse total shoulder replacement, open biceps tenodesis, right, 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  ACP discussion was held with the patient during this visit. Will discuss with wife            Objective   Vitals:    07/21/25 1532   BP: 130/80   BP Location: Left arm   Patient Position: Sitting   Cuff Size: Adult   Pulse: 72   Temp: 98.4 °F (36.9 °C)   TempSrc: Infrared   SpO2: 98%   Weight: 99.8 kg (220 lb)   Height: 189.2 cm (74.5\")   PainSc: 6        Estimated body mass index is 27.87 kg/m² as calculated from the following:    Height as of this encounter: 189.2 cm (74.5\").    Weight as of this encounter: 99.8 kg (220 lb).                Does the patient have evidence of cognitive impairment? No  Lab Results   Component Value Date    CHLPL 118 07/15/2025    TRIG 108 07/15/2025    HDL 41 07/15/2025    LDL 57 07/15/2025    VLDL 20 07/15/2025    HGBA1C 5.80 (H) 07/15/2025                                                                                                Health  Risk Assessment    Smoking Status:  Social History "     Tobacco Use   Smoking Status Former    Current packs/day: 0.00    Average packs/day: 1 pack/day for 47.0 years (47.0 ttl pk-yrs)    Types: Cigarettes    Start date:     Quit date: 2022    Years since quittin.6    Passive exposure: Past   Smokeless Tobacco Never     Alcohol Consumption:  Social History     Substance and Sexual Activity   Alcohol Use No    Comment: rare       Fall Risk Screen  STEADI Fall Risk Assessment was completed, and patient is at LOW risk for falls.Assessment completed on:2025    Depression Screening   Little interest or pleasure in doing things? Not at all   Feeling down, depressed, or hopeless? Not at all   PHQ-2 Total Score 0      Health Habits and Functional and Cognitive Screenin/21/2025     3:28 PM   Functional & Cognitive Status   Do you have difficulty preparing food and eating? No   Do you have difficulty bathing yourself, getting dressed or grooming yourself? No   Do you have difficulty using the toilet? No   Do you have difficulty moving around from place to place? No   Do you have trouble with steps or getting out of a bed or a chair? No   Current Diet Well Balanced Diet   Dental Exam Up to date   Eye Exam Up to date   Current Exercises Include Yard Work   Do you need help using the phone?  No   Are you deaf or do you have serious difficulty hearing?  No   Do you need help to go to places out of walking distance? No   Do you need help shopping? No   Do you need help preparing meals?  No   Do you need help with housework?  No   Do you need help with laundry? No   Do you need help taking your medications? No   Do you need help managing money? No   Do you ever drive or ride in a car without wearing a seat belt? No   Have you felt unusual fatigue (could be tiredness), stress, anger or loneliness in the last month? Yes   Who do you live with? Spouse   If you need help, do you have trouble finding someone available to you? No   Have you been bothered in the  last four weeks by sexual problems? No   Do you have difficulty concentrating, remembering or making decisions? Yes           Age-appropriate Screening Schedule:  Refer to the list below for future screening recommendations based on patient's age, sex and/or medical conditions. Orders for these recommended tests are listed in the plan section. The patient has been provided with a written plan.    Health Maintenance List  Health Maintenance   Topic Date Due    ZOSTER VACCINE (1 of 2) Never done    Hepatitis B (1 of 3 - Risk 3-dose series) Never done    COVID-19 Vaccine (1 - 2024-25 season) Never done    URINE MICROALBUMIN-CREATININE RATIO (uACR)  10/17/2024    LUNG CANCER SCREENING  07/16/2025    ANNUAL WELLNESS VISIT  07/17/2025    Pneumococcal Vaccine 50+ (1 of 2 - PCV) 01/17/2026 (Originally 3/18/1979)    DIABETIC EYE EXAM  09/27/2025    INFLUENZA VACCINE  10/01/2025    HEMOGLOBIN A1C  01/15/2026    DIABETIC FOOT EXAM  01/20/2026    LIPID PANEL  07/15/2026    COLORECTAL CANCER SCREENING  09/11/2027    TDAP/TD VACCINES (2 - Td or Tdap) 08/13/2034    HEPATITIS C SCREENING  Completed                                                                                                                                                CMS Preventative Services Quick Reference  Risk Factors Identified During Encounter  Chronic Pain: Orthopedics Referral Ordered  Depression/Dysphoria: Elevated PHQ score reflective of other stress or illness, not depression    Immunizations Discussed/Encouraged: Capvaxive (Pneumococcal conjugate - PCV21) and Shingrix    The above risks/problems have been discussed with the patient.  Pertinent information has been shared with the patient in the After Visit Summary.  An After Visit Summary and PPPS were made available to the patient.    Follow Up:   Next Medicare Wellness visit to be scheduled in 1 year.         Additional E&M Note during same encounter follows:  Patient has additional, significant,  "and separately identifiable condition(s)/problem(s) that require work above and beyond the Medicare Wellness Visit     Chief Complaint  Medicare Wellness-subsequent    Subjective    HPI  Sylwia is also being seen today for additional medical problem/s.    He needs a f/u on DM 2, HTN and hyperlipidemia. He was on Metformin XR 500mg daily for diabetes, but stopped this aftet weight loss. He has not been checking his glucose levels at home. He denies any hypoglycemic events unless he does not eat. He has lost about 66 pounds with better eating and avoiding sugars over the past 12 months, this is intentional weight loss. He weighed 296 pounds at one time.      He is on lisinopril 10mg daily for HTN. He tolerates this w/o cough. He denies CP, SOA swelling in legs.      He has daily pain from \"where I got electrocuted\" he has nerve pain. Daily pain rated a 5 of 10, usually, but he is on pain medication at this time due to shoulder surgery. He pain is mostly in upper back and shoulders. He takes duloxetine 60mg for this, and oxycodone.       He has a CT chest showing Coronary artery calcifications. He was started on Lipitor 10mg nightly for this. He denies chest pain, unilateral weakness.  He had a stress test 1-.      He has had low mildly platelet count for 3 years. He was referred to [facility] for eval. Consult with Uriel Fowler MD (11/16/2023) No excess bruising, no use of aspirin products.       Review of Systems   HENT: Negative.     Eyes: Negative.    Respiratory: Negative.     Cardiovascular: Negative.  Negative for chest pain.   Gastrointestinal: Negative.    Musculoskeletal:  Positive for arthralgias (right arrm).   Neurological:  Positive for weakness (RUE since shoulder surgery).   Hematological: Negative.    Psychiatric/Behavioral: Negative.     All other systems reviewed and are negative.         Objective   Vital Signs:  /80 (BP Location: Left arm, Patient Position: Sitting, Cuff Size: " "Adult)   Pulse 72   Temp 98.4 °F (36.9 °C) (Infrared)   Ht 189.2 cm (74.5\")   Wt 99.8 kg (220 lb)   SpO2 98%   BMI 27.87 kg/m²   Physical Exam  Vitals reviewed.   Constitutional:       Appearance: Normal appearance. He is not ill-appearing.   HENT:      Head: Normocephalic.      Right Ear: Tympanic membrane normal. There is no impacted cerumen.      Left Ear: Tympanic membrane normal. There is no impacted cerumen.      Nose: No congestion or rhinorrhea.   Cardiovascular:      Rate and Rhythm: Normal rate and regular rhythm.      Pulses: Normal pulses.      Heart sounds: Normal heart sounds. No murmur heard.  Pulmonary:      Effort: Pulmonary effort is normal. No respiratory distress.      Breath sounds: Normal breath sounds. No stridor.   Abdominal:      Palpations: Abdomen is soft.   Musculoskeletal:      Right shoulder: Decreased range of motion.      Right lower leg: No edema.      Left lower leg: No edema.      Comments: Right hand strength 5/6, left hand strength 6/6   Skin:     General: Skin is warm and dry.   Neurological:      General: No focal deficit present.      Mental Status: He is alert and oriented to person, place, and time.      Gait: Gait normal.   Psychiatric:         Mood and Affect: Mood normal.         Behavior: Behavior normal.         Thought Content: Thought content normal.               The following data was reviewed by: CELIA Peres on 07/21/2025:  Data reviewed : Radiologic studies last CT chest and CT shoulder, Cardiology studies last ECG, Consultant notes ortho, Recent hospitalization notes ortho, and GI studies    Common labs          9/4/2024    03:44 1/15/2025    08:18 7/15/2025    09:05   Common Labs   Glucose 105  111  126    BUN 16  20  22.0    Creatinine 0.74  0.94  1.04    Sodium 138  140  142    Potassium 4.2  4.7  4.1    Chloride 105  103  108    Calcium 8.9  9.6  9.3    Albumin  4.2  3.9    Total Bilirubin  0.3  0.3    Alkaline Phosphatase  55  51    AST (SGOT)  " 30  29    ALT (SGPT)  32  24    WBC 10.21  4.72  4.16    Hemoglobin 13.7  15.3  14.9    Hematocrit 40.3  44.0  45.5    Platelets 108  109  106    Total Cholesterol  143  118    Triglycerides  106  108    HDL Cholesterol  46  41    LDL Cholesterol   78  57    Hemoglobin A1C  6.20  5.80    PSA   0.821      CMP          9/4/2024    03:44 1/15/2025    08:18 7/15/2025    09:05   CMP   Glucose 105  111  126    BUN 16  20  22.0    Creatinine 0.74  0.94  1.04    EGFR 101.8  91.1  80.2    Sodium 138  140  142    Potassium 4.2  4.7  4.1    Chloride 105  103  108    Calcium 8.9  9.6  9.3    Total Protein  6.7  6.5    Albumin  4.2  3.9    Globulin  2.5  2.6    Total Bilirubin  0.3  0.3    Alkaline Phosphatase  55  51    AST (SGOT)  30  29    ALT (SGPT)  32  24    Albumin/Globulin Ratio  1.7  1.5    BUN/Creatinine Ratio 21.6  21.3  21.2    Anion Gap 9.9        CBC          9/4/2024    03:44 1/15/2025    08:18 7/15/2025    09:05   CBC   WBC 10.21  4.72  4.16    RBC 4.48  4.85  4.86    Hemoglobin 13.7  15.3  14.9    Hematocrit 40.3  44.0  45.5    MCV 90.0  90.7  93.6    MCH 30.6  31.5  30.7    MCHC 34.0  34.8  32.7    RDW 14.1  12.9  13.1    Platelets 108  109  106      CBC w/diff          9/4/2024    03:44 1/15/2025    08:18 7/15/2025    09:05   CBC w/Diff   WBC 10.21  4.72  4.16    RBC 4.48  4.85  4.86    Hemoglobin 13.7  15.3  14.9    Hematocrit 40.3  44.0  45.5    MCV 90.0  90.7  93.6    MCH 30.6  31.5  30.7    MCHC 34.0  34.8  32.7    RDW 14.1  12.9  13.1    Platelets 108  109  106    Neutrophil Rel %  52.9  52.9    Lymphocyte Rel %  36.9  35.6    Monocyte Rel %  6.8  8.2    Eosinophil Rel %  2.8  2.6    Basophil Rel %  0.4  0.5      Lipid Panel          1/15/2025    08:18 7/15/2025    09:05   Lipid Panel   Total Cholesterol 143  118    Triglycerides 106  108    HDL Cholesterol 46  41    VLDL Cholesterol 19  20    LDL Cholesterol  78  57      TSH          1/15/2025    08:18   TSH   TSH 2.110      Electrolytes           9/4/2024    03:44 1/15/2025    08:18 7/15/2025    09:05   Electrolytes   Sodium 138  140  142    Potassium 4.2  4.7  4.1    Chloride 105  103  108    Calcium 8.9  9.6  9.3      Renal Profile          9/4/2024    03:44 1/15/2025    08:18 7/15/2025    09:05   Renal Profile   BUN 16  20  22.0    Creatinine 0.74  0.94  1.04        Results             Assessment and Plan         Diagnosis Plan   1. Encounter for annual wellness visit (AWV) in Medicare patient        2. Type 2 diabetes mellitus without complication, without long-term current use of insulin  CBC & Differential    Comprehensive Metabolic Panel    Lipid Panel With / Chol / HDL Ratio    Hemoglobin A1c    well contolled, glucose in normal range after significant weight loss due to diet change      3. Benign essential HTN  Comprehensive Metabolic Panel    Lipid Panel With / Chol / HDL Ratio    Resolved after weight loss, lisinopril 10mg stopped      4. Hyperlipidemia LDL goal <70  Comprehensive Metabolic Panel    Lipid Panel With / Chol / HDL Ratio      5. Coronary artery calcification  atorvastatin (LIPITOR) 10 MG tablet    Comprehensive Metabolic Panel    Lipid Panel With / Chol / HDL Ratio    Aggressive BP and lipid control discussed. Start statin therapy lipitor 10mg nightly      6. Encounter for screening for lung cancer  CT Chest Low Dose Wo      7. Personal history of nicotine dependence  CT Chest Low Dose Wo              I spent 30 minutes caring for Sylwia on this date of service. This time includes time spent by me in the following activities:preparing for the visit, reviewing tests, obtaining and/or reviewing a separately obtained history, performing a medically appropriate examination and/or evaluation , counseling and educating the patient/family/caregiver, ordering medications, tests, or procedures, referring and communicating with other health care professionals , documenting information in the medical record, and independently interpreting  results and communicating that information with the patient/family/caregiver  Follow Up   6 months w labs  Patient was given instructions and counseling regarding his condition or for health maintenance advice. Please see specific information pulled into the AVS if appropriate.  Patient or patient representative verbalized consent for the use of Ambient Listening during the visit with  CELIA Peres for chart documentation. 7/21/2025  16:02 EDT

## 2025-08-01 ENCOUNTER — TELEPHONE (OUTPATIENT)
Dept: INTERNAL MEDICINE | Facility: CLINIC | Age: 65
End: 2025-08-01
Payer: MEDICARE

## 2025-08-22 ENCOUNTER — HOSPITAL ENCOUNTER (OUTPATIENT)
Dept: CT IMAGING | Facility: HOSPITAL | Age: 65
Discharge: HOME | End: 2025-08-22
Payer: MEDICARE

## 2025-08-22 DIAGNOSIS — Z12.2 ENCOUNTER FOR SCREENING FOR LUNG CANCER: ICD-10-CM

## 2025-08-22 DIAGNOSIS — Z87.891 PERSONAL HISTORY OF NICOTINE DEPENDENCE: ICD-10-CM

## 2025-08-22 PROCEDURE — 71271 CT THORAX LUNG CANCER SCR C-: CPT

## (undated) DEVICE — TRANSPOSAL ULTRAFLEX DUO/QUAD ULTRA CART MANIFOLD

## (undated) DEVICE — BW-412T DISP COMBO CLEANING BRUSH: Brand: SINGLE USE COMBINATION CLEANING BRUSH

## (undated) DEVICE — TRAP FLD MINIVAC MEGADYNE 100ML

## (undated) DEVICE — MASK,FACE,FLUID RESIST,SHLD,EARLOOP: Brand: MEDLINE

## (undated) DEVICE — BNDG COBAN S/ADHR WRP LF 6IN 5YD TN

## (undated) DEVICE — DRAPE,U/ SHT,SPLIT,PLAS,STERIL: Brand: MEDLINE

## (undated) DEVICE — ENDO. PORT CONNECTOR W/VALVE FOR OLYMPUS® SCOPES: Brand: ERBE

## (undated) DEVICE — DRSNG SURESITE WNDW 4X4.5

## (undated) DEVICE — VIAL FORMALIN CAP 10P 40ML

## (undated) DEVICE — TRANSFER SET 3": Brand: MEDLINE INDUSTRIES, INC.

## (undated) DEVICE — SNAR POLYP CAPTIVATOR/COLD STFF RND 10MM 240CM

## (undated) DEVICE — WRAP SHLDR COMPR COLD/THERP

## (undated) DEVICE — FRAZIER SUCTION INSTRUMENT 10 FR W/CONTROL VENT & OBTURATOR: Brand: FRAZIER

## (undated) DEVICE — SOL IRR H2O BTL 1000ML STRL

## (undated) DEVICE — Device

## (undated) DEVICE — SPNG GZ WOVN 4X4IN 12PLY 10/BX STRL

## (undated) DEVICE — 3M™ IOBAN™ 2 ANTIMICROBIAL INCISE DRAPE 6640EZ: Brand: IOBAN™ 2

## (undated) DEVICE — APPL CHLORAPREP HI/LITE 26ML ORNG

## (undated) DEVICE — SUT VIC 2/0 CP2 CR8 18IN J762D

## (undated) DEVICE — PAD GRND E/S W/CORD SPLT A/

## (undated) DEVICE — FRCP BX RADJAW4 NDL 2.8 240CM LG OG BX40

## (undated) DEVICE — GLV SURG SENSICARE PI LF PF 7.0

## (undated) DEVICE — PAD,NON-ADHERENT,3X8,STERILE,LF,1/PK: Brand: MEDLINE

## (undated) DEVICE — SOL ISO/ALC RUB 70PCT 4OZ

## (undated) DEVICE — WEREWOLF FASTSEAL 6.0 HEMOSTASIS WAND: Brand: FASTSEAL 6.0 HEMOSTASIS WAND

## (undated) DEVICE — SHEET, ORTHO, SPLIT, STERILE: Brand: MEDLINE

## (undated) DEVICE — KT ORCA ORCAPOD DISP STRL

## (undated) DEVICE — DUAL CUT SAGITTAL BLADE

## (undated) DEVICE — PK TOTL 90

## (undated) DEVICE — TP CLTH MEDIPORE SFT 4IN 10YD

## (undated) DEVICE — ADAPT CLN BIOGUARD AIR/H2O DISP

## (undated) DEVICE — SYS CLS SKIN PREMIERPRO EXOFINFUSION 22CM

## (undated) DEVICE — DRSNG TELFA PAD NONADH STR 1S 3X8IN

## (undated) DEVICE — TRAP POLYP 4CHAMBER

## (undated) DEVICE — ANTIBACTERIAL UNDYED BRAIDED (POLYGLACTIN 910), SYNTHETIC ABSORBABLE SUTURE: Brand: COATED VICRYL

## (undated) DEVICE — T-MAX DISPOSABLE FACE MASK 8 PER BOX

## (undated) DEVICE — GLV SURG SENSICARE PF POLYISPRN SZ8 LF

## (undated) DEVICE — TP SXN YANKR BULB STRL

## (undated) DEVICE — GOWN ISOL W/THUMB UNIV BLU BX/15

## (undated) DEVICE — DRSNG PAD ABD 8X10IN STRL

## (undated) DEVICE — ZIPPERED TOGA, 2X LARGE: Brand: FLYTE, SURGICOOL

## (undated) DEVICE — GLV SURG SENSICARE PI PF LF 7 GRN STRL

## (undated) DEVICE — 3M™ DURAPORE™ SURGICAL TAPE 2 INCHES X 10YARDS (5.0CM X 9.1M) 6ROLLS/CARTON 10CARTONS/CASE 1538-2: Brand: 3M™ DURAPORE™

## (undated) DEVICE — SYR LL 3CC

## (undated) DEVICE — DRAPE,TOWEL,LARGE,INVISISHIELD: Brand: MEDLINE

## (undated) DEVICE — COVER,MAYO STAND,STERILE: Brand: MEDLINE

## (undated) DEVICE — TBG PENCL TELESCP MEGADYNE SMOKE EVAC 10FT

## (undated) DEVICE — SUCTION CANISTER, 3000CC,SAFELINER: Brand: DEROYAL

## (undated) DEVICE — SUT VIC 0 CT1 CR8 18IN J840D

## (undated) DEVICE — COAXIAL FEMORAL CANAL TIP

## (undated) DEVICE — SUT ABS VICRLY/PLS COAT CR 2/0 CP/2/REV/CUT/NDL 8X18IN UD

## (undated) DEVICE — PCH SURG INVISISHIELD FLD/COL W/DRN/PRT 20X6IN

## (undated) DEVICE — LINER SURG CANSTR SXN S/RIGD 1500CC

## (undated) DEVICE — NEEDLE, QUINCKE 22GX3.5": Brand: MEDLINE INDUSTRIES, INC.

## (undated) DEVICE — ELECTRD NDL EZ CLN STD 2.75IN

## (undated) DEVICE — DECANTER BAG 9": Brand: MEDLINE INDUSTRIES, INC.

## (undated) DEVICE — Device: Brand: DEFENDO AIR/WATER/SUCTION AND BIOPSY VALVE

## (undated) DEVICE — INTENDED FOR TISSUE SEPARATION, AND OTHER PROCEDURES THAT REQUIRE A SHARP SURGICAL BLADE TO PUNCTURE OR CUT.: Brand: BARD-PARKER ® STAINLESS STEEL BLADES

## (undated) DEVICE — TUBING, SUCTION, 1/4" X 20', STRAIGHT: Brand: MEDLINE INDUSTRIES, INC.

## (undated) DEVICE — CONTAINER,SPECIMEN,OR STERILE,4OZ: Brand: MEDLINE

## (undated) DEVICE — DISPOSABLE DRAPE, STERILE, FOR A CDS-3072 6 FOOT TABLE: Brand: PEDIGO PRODUCTS, INC.

## (undated) DEVICE — PK KN TOTL 90

## (undated) DEVICE — GLV SURG SENSICARE PI MIC PF SZ8 LF STRL

## (undated) DEVICE — PCH INST SURG INVISISHIELD 2PCKT

## (undated) DEVICE — JACKT LAB F/R KNIT CUFF/COLR XLG BLU

## (undated) DEVICE — SUCTION CANISTER, 1000CC,SAFELINER: Brand: DEROYAL

## (undated) DEVICE — GLV SURG SENSICARE MICRO PF LF 7.5 STRL

## (undated) DEVICE — GLV SURG SENSICARE PI MIC PF SZ7.5 LF STRL

## (undated) DEVICE — SYR CONTRL LUERLOK 10CC

## (undated) DEVICE — PREP SOL POVIDONE/IODINE BT 4OZ

## (undated) DEVICE — SNAR POLYP CAPTIFLX WD OVL 27MM 240CM

## (undated) DEVICE — SYR LL W/SCALE/MARK 3ML STRL

## (undated) DEVICE — DRP SURG U/DRP INVISISHIELD PA 48X52IN

## (undated) DEVICE — PEEL-AWAY TOGA, 2X LARGE: Brand: FLYTE

## (undated) DEVICE — INTENDED USE FOR SURGICAL MARKING ON INTACT SKIN, ALSO PROVIDES A PERMANENT METHOD OF IDENTIFYING OBJECTS IN THE OPERATING ROOM: Brand: WRITESITE® REGULAR TIP SKIN MARKER

## (undated) DEVICE — THE SINGLE USE ETRAP – POLYP TRAP IS USED FOR SUCTION RETRIEVAL OF ENDOSCOPICALLY REMOVED POLYPS.: Brand: ETRAP

## (undated) DEVICE — YANKAUER,BULB TIP,W/O VENT,RIGID,STERILE: Brand: MEDLINE

## (undated) DEVICE — WRAP SHOULDER COLD THERAPY